# Patient Record
Sex: MALE | Race: ASIAN | Employment: OTHER | URBAN - METROPOLITAN AREA
[De-identification: names, ages, dates, MRNs, and addresses within clinical notes are randomized per-mention and may not be internally consistent; named-entity substitution may affect disease eponyms.]

---

## 2017-11-03 ENCOUNTER — OFFICE VISIT (OUTPATIENT)
Dept: FAMILY MEDICINE CLINIC | Age: 62
End: 2017-11-03

## 2017-11-03 VITALS
WEIGHT: 153 LBS | RESPIRATION RATE: 18 BRPM | HEART RATE: 85 BPM | DIASTOLIC BLOOD PRESSURE: 92 MMHG | SYSTOLIC BLOOD PRESSURE: 141 MMHG | TEMPERATURE: 98.5 F | BODY MASS INDEX: 22.66 KG/M2 | OXYGEN SATURATION: 100 % | HEIGHT: 69 IN

## 2017-11-03 DIAGNOSIS — Z11.59 ENCOUNTER FOR HEPATITIS C SCREENING TEST FOR LOW RISK PATIENT: ICD-10-CM

## 2017-11-03 DIAGNOSIS — R26.89 SHUFFLING GAIT: ICD-10-CM

## 2017-11-03 DIAGNOSIS — Z13.220 SCREENING FOR LIPID DISORDERS: ICD-10-CM

## 2017-11-03 DIAGNOSIS — R41.0 CONFUSION: Primary | ICD-10-CM

## 2017-11-03 DIAGNOSIS — K62.5 RECTAL BLEEDING: ICD-10-CM

## 2017-11-03 LAB — HGB BLD-MCNC: 16.3 G/DL

## 2017-11-03 NOTE — MR AVS SNAPSHOT
Visit Information Date & Time Provider Department Dept. Phone Encounter #  
 11/3/2017  9:50 AM Shailesh Felipe MD [address] [place] [address] [phone] [id number] Upcoming Health Maintenance Date Due Hepatitis C Screening 1955 DTaP/Tdap/Td series (1 - Tdap) 5/10/1974 FOBT Q 1 YEAR AGE 50-75 5/10/2003 ZOSTER VACCINE AGE 60> 3/10/2013 INFLUENZA AGE 9 TO ADULT 8/1/2017 Allergies as of 11/3/2017  Review Complete On: 11/3/2017 By: Edilson Kelley LPN Severity Noted Reaction Type Reactions Sulfa (Sulfonamide Antibiotics)  01/20/2013    Rash Current Immunizations  Never Reviewed No immunizations on file. Not reviewed this visit You Were Diagnosed With   
  
 Codes Comments Confusion    -  Primary ICD-10-CM: R41.0 ICD-9-CM: 298.9 Rectal bleeding     ICD-10-CM: K62.5 ICD-9-CM: 569.3 Encounter for hepatitis C screening test for low risk patient     ICD-10-CM: Z11.59 
ICD-9-CM: V73.89 Screening for lipid disorders     ICD-10-CM: Z13.220 ICD-9-CM: V77.91 Vitals BP Pulse Temp Resp Height(growth percentile) Weight(growth percentile) (!) 141/92 (BP 1 Location: Left arm, BP Patient Position: Sitting) 85 98.5 °F (36.9 °C) (Oral) 18 5' 9\" (1.753 m) 153 lb (69.4 kg) SpO2 BMI Smoking Status 100% 22.59 kg/m2 Never Smoker Vitals History BMI and BSA Data Body Mass Index Body Surface Area  
 22.59 kg/m 2 1.84 m 2 Preferred Pharmacy Pharmacy Name Phone [facility] [phone] Your Updated Medication List  
  
   
This list is accurate as of: 11/3/17 10:23 AM.  Always use your most recent med list.  
  
  
  
  
 MOTRIN PO Take  by mouth. TYLENOL PO Take  by mouth. We Performed the Following AMB POC FECAL BLOOD, OCCULT, QL 3 CARDS [11023 CPT(R)] AMB POC HEMOGLOBIN (HGB) [45741 CPT(R)] CBC W/O DIFF [93853 CPT(R)] FOLATE [id number] CPT(R)] HEPATITIS C AB [22243 CPT(R)] LIPID PANEL [48991 CPT(R)] METABOLIC PANEL, COMPREHENSIVE [79656 CPT(R)] REFERRAL TO NEUROLOGY [ALY32 Custom] Comments:  
 Please evaluate for new onset confusion, issues with driving/coordination. Also with restlessness. REFERRAL TO NEUROPSYCHOLOGY [QTD45 Custom] Comments:  
 Please evaluate for recent onset confusion, fatigue. TSH RFX ON ABNORMAL TO FREE T4 [[id number] Custom] VITAMIN B12 [id number] CPT(R)] Referral Information Referral ID Referred By Referred To  
  
 [id number] VERONICA CORONA MD [place] [address] [place], [address] [address] Phone: [phone] Fax: [phone] Visits Status Start Date End Date 1 New Request 11/3/17 11/3/18 If your referral has a status of pending review or denied, additional information will be sent to support the outcome of this decision. Referral ID Referred By Referred To  
 [id number] [facility] [address] [place], [address] [address] Phone: [phone] Fax: [phone] Visits Status Start Date End Date 1 New Request 11/3/17 11/3/18 If your referral has a status of pending review or denied, additional information will be sent to support the outcome of this decision. Patient Instructions [facility]  
Emma Ocasio [address] [facility] [address] [place], [address] [address] Phone: [phone] Fax: [phone] Candice Kay PsyD, EdS, P - [facility] [address] [place], [address] [address] Phone: [phone] Fax: [phone] Once you have appointments, call Hannah at [phone] to complete referral. 
 
 
 
  
Learning About Positive Thinking What is positive thinking? Positive thinking, or healthy thinking, is a way to help you stay well or cope with a health problem by changing how you think. It's based on research that shows that you can change how you think. And how you think affects how you feel. Cognitive-behavioral therapy, also called CBT, is a therapy that is often used to help people think in a healthy way. It focuses on thought (cognitive) and action (behavioral). How can positive thinking help you? If you think in a positive way, you may be more able to care for yourself and handle life's challenges. You will feel better. And you may be more able to avoid or cope with stress, anxiety, and depression. CBT may be able to help you sleep better and lose weight. How can you get started with positive thinking? CBT involves techniques that you can practice every day so that healthy thinking comes naturally. Here are the steps for one technique. 1. Stop. When you notice a negative thought, stop it in its tracks and write it down. 2. Ask. Look at that thought and ask yourself whether it is helpful or unhelpful right now. 3. Choose. Choose a new, helpful thought to replace a negative one. Here's an example of how this might work: 
· In a job review, your boss praised several things about your work. But you're feeling down because she had one small criticism. You might even think, \"I'm no good at my job\" or \"She doesn't like me. I must be bad. \" These are negative thoughts. You want to stop them. · Ask yourself questions about the situation and your negative thoughts. You might ask, \"What did my boss say exactly? \" \"Were there positive comments? \" \"Why do I focus only on one criticism? \" Your answers can help you find more accurate and helpful statements. · Now choose a helpful thought to replace the negative thoughts. For example, you might think, \"I've done a lot of good work this year, and my boss noticed it. She thought there was one area I can improve.  So I'll think of some things I can do to get stronger in that area. \" With time and practice, you can learn to see that the harsh things you say to yourself may keep you from enjoying your life and work. You can replace them with more helpful thoughts. Where can you learn more? Go to http://elliot-casper.info/. Enter X812 in the search box to learn more about \"Learning About Positive Thinking. \" Current as of: May 12, 2017 Content Version: 11.4 © 0045-0084 [employer]. Care instructions adapted under license by [employer] (which disclaims liability or warranty for this information). If you have questions about a medical condition or this instruction, always ask your healthcare professional. [address] any warranty or liability for your use of this information. Introducing [facility]! Nayely Ernandez introduces [employer] patient portal. Now you can access parts of your medical record, email your doctor's office, and request medication refills online. 1. In your internet browser, go to [email or web address] [employer] 2. Click on the First Time User? Click Here link in the Sign In box. You will see the New Member Sign Up page. 3. Enter your [employer] Access Code exactly as it appears below. You will not need to use this code after youve completed the sign-up process. If you do not sign up before the expiration date, you must request a new code. · [employer] Access Code: [email or web address] Expires: 2/1/2018  9:36 AM 
 
4. Enter the last four digits of your Social Security Number (xxxx) and Date of Birth (mm/dd/yyyy) as indicated and click Submit. You will be taken to the next sign-up page. 5. Create a [employer] ID. This will be your [employer] login ID and cannot be changed, so think of one that is secure and easy to remember. 6. Create a [employer] password. You can change your password at any time. 7. Enter your Password Reset Question and Answer. This can be used at a later time if you forget your password. 8. Enter your e-mail address. You will receive e-mail notification when new information is available in [address] 9. Click Sign Up. You can now view and download portions of your medical record. 10. Click the Download Summary menu link to download a portable copy of your medical information. If you have questions, please visit the Frequently Asked Questions section of the [employer] website. Remember, [employer] is NOT to be used for urgent needs. For medical emergencies, dial 911. Now available from your iPhone and Android! Please provide this summary of care documentation to your next provider. If you have any questions after today's visit, please call [phone].

## 2017-11-03 NOTE — PATIENT INSTRUCTIONS
[facility]   Raya Hung [address]  [address] [facility]  Phone: [phone]  Fax: [phone]    Jenny Ascencio PsyD, [address], LCP - Neuropsychology  [place] [address]  [address] [place] [address]  Phone: [phone]  Fax: [phone]    Once you have appointments, call Adelfo Cohen at [phone] to complete referral.           Learning About Positive Thinking  What is positive thinking? Positive thinking, or healthy thinking, is a way to help you stay well or cope with a health problem by changing how you think. It's based on research that shows that you can change how you think. And how you think affects how you feel. Cognitive-behavioral therapy, also called CBT, is a therapy that is often used to help people think in a healthy way. It focuses on thought (cognitive) and action (behavioral). How can positive thinking help you? If you think in a positive way, you may be more able to care for yourself and handle life's challenges. You will feel better. And you may be more able to avoid or cope with stress, anxiety, and depression. CBT may be able to help you sleep better and lose weight. How can you get started with positive thinking? CBT involves techniques that you can practice every day so that healthy thinking comes naturally. Here are the steps for one technique. 1. Stop. When you notice a negative thought, stop it in its tracks and write it down. 2. Ask. Look at that thought and ask yourself whether it is helpful or unhelpful right now. 3. Choose. Choose a new, helpful thought to replace a negative one. Here's an example of how this might work:  · In a job review, your boss praised several things about your work. But you're feeling down because she had one small criticism. You might even think, \"I'm no good at my job\" or \"She doesn't like me. I must be bad. \" These are negative thoughts. You want to stop them.   · Ask yourself questions about the situation and your negative thoughts. You might ask, \"What did my boss say exactly? \" \"Were there positive comments? \" \"Why do I focus only on one criticism? \" Your answers can help you find more accurate and helpful statements. · Now choose a helpful thought to replace the negative thoughts. For example, you might think, \"I've done a lot of good work this year, and my boss noticed it. She thought there was one area I can improve. So I'll think of some things I can do to get stronger in that area. \"  With time and practice, you can learn to see that the harsh things you say to yourself may keep you from enjoying your life and work. You can replace them with more helpful thoughts. Where can you learn more? Go to http://elliot-casper.info/. Enter E346 in the search box to learn more about \"Learning About Positive Thinking. \"  Current as of: May 12, 2017  Content Version: 11.4  © 1081-3732 [employer]. Care instructions adapted under license by [employer] (which disclaims liability or warranty for this information). If you have questions about a medical condition or this instruction, always ask your healthcare professional. [address] any warranty or liability for your use of this information.

## 2017-11-03 NOTE — PROGRESS NOTES
[address] [place] [address] with [address] [place] [address]     Chief Complaint: \"Anal bleeding and fatigue. \"    Virginie Maya is an 57 y.o. male with no significant medical history who presents for increased fatigue, concentration issues, speech slowing, and rectal bleeding. He presents today with his wife. He has not been seen in our office for over 3 years. He and his wife note that he has not been himself over the past year or so. His speech is markedly slowed. He has forgetfulness. Sleeping very often. Increased weakness. Wife mentions that he has difficulty driving--often running off the road or driving into oncoming traffic. Also notes that he has suffered from constipation over the past few months. Able to use stool softeners, but they note that there is occasional blood in the stool as well. Has not had a bloody bowel movement in over 1 week. Allergies - reviewed: Allergies   Allergen Reactions    Sulfa (Sulfonamide Antibiotics) Rash       Medications - reviewed:   Current Outpatient Prescriptions   Medication Sig    ACETAMINOPHEN (TYLENOL PO) Take  by mouth.  IBUPROFEN (MOTRIN PO) Take  by mouth. No current facility-administered medications for this visit. I have reviewed and updated the histories as listed below:    Past Medical History - reviewed:  History reviewed. No pertinent past medical history. Past Surgical History - reviewed:   History reviewed. No pertinent surgical history. Social History - reviewed:  Social History     Social History    Marital status:      Spouse name: N/A    Number of children: N/A    Years of education: N/A     Occupational History    Not on file.      Social History Main Topics    Smoking status: Never Smoker    Smokeless tobacco: Never Used    Alcohol use No    Drug use: No    Sexual activity: Not on file     Other Topics Concern    Not on file     Social History Narrative Family History - reviewed:  History reviewed. No pertinent family history. Immunizations - reviewed: There is no immunization history on file for this patient. Flu: has not received flu vaccination this season. Review of Systems  Review of Systems   Constitutional: Negative for activity change, chills and fever. HENT: Negative for congestion. Respiratory: Negative for shortness of breath. Cardiovascular: Negative for chest pain. Gastrointestinal: Positive for blood in stool (not currenlty.) and constipation (not currently. ). Negative for diarrhea, nausea, rectal pain and vomiting. Musculoskeletal: Negative for arthralgias and myalgias. Neurological: Positive for weakness. Negative for dizziness, seizures, syncope, light-headedness, numbness and headaches. Psychiatric/Behavioral: Positive for behavioral problems, confusion, decreased concentration and sleep disturbance (sleeping more. ). All other systems reviewed and are negative. Physical Exam    Visit Vitals    BP (!) 141/92 (BP 1 Location: Left arm, BP Patient Position: Sitting)    Pulse 85    Temp 98.5 °F (36.9 °C) (Oral)    Resp 18    Ht 5' 9\" (1.753 m)    Wt 153 lb (69.4 kg)    SpO2 100%    BMI 22.59 kg/m2       Physical Exam   Constitutional: He is oriented to person, place, and time. He appears well-developed and well-nourished. No distress. Blunted affect. Very slow speech. Slowly responding. HENT:   Head: Normocephalic. Eyes: Pupils are equal, round, and reactive to light. Neck: Normal range of motion. No thyromegaly present. Cardiovascular: Normal rate, regular rhythm, normal heart sounds and intact distal pulses. Exam reveals no gallop and no friction rub. No murmur heard. Pulmonary/Chest: Effort normal and breath sounds normal. No respiratory distress. He has no wheezes. He has no rales. He exhibits no tenderness. Abdominal: Soft.  Bowel sounds are normal. He exhibits no distension and no mass. There is no tenderness. There is no rebound and no guarding. Genitourinary: Rectum normal and prostate normal. Rectal exam shows no external hemorrhoid and no internal hemorrhoid. Musculoskeletal: Normal range of motion. Shuffling gait as patient walking out of exam room. Not swaying arms. Lymphadenopathy:     He has no cervical adenopathy. Neurological: He is alert and oriented to person, place, and time. Skin: Skin is warm and dry. Psychiatric: His behavior is normal. Judgment and thought content normal. His affect is blunt. His speech is delayed. Cognition and memory are normal.   Nursing note and vitals reviewed. MMSE:  26/30. Normal clock draw. Recent Results (from the past 12 hour(s))   AMB POC HEMOGLOBIN (HGB)    Collection Time: 11/03/17  9:45 AM   Result Value Ref Range    Hemoglobin (POC) 16.3      FOBT:  Negative. Assessment    ICD-10-CM ICD-9-CM    1. Confusion R41.0 298.9 CBC W/O DIFF      METABOLIC PANEL, COMPREHENSIVE      TSH RFX ON ABNORMAL TO FREE T4      FOLATE      VITAMIN B12      REFERRAL TO NEUROLOGY      REFERRAL TO NEUROPSYCHOLOGY      IL PT-FOCUSED HLTH RISK ASSMT SCORE DOC STND INSTRM      BEHAV ASSMT W/SCORE & DOCD/STAND INSTRUMENT   2. Rectal bleeding K62.5 569.3 AMB POC HEMOGLOBIN (HGB)      AMB POC FECAL BLOOD, OCCULT, QL 3 CARDS   3. Encounter for hepatitis C screening test for low risk patient Z11.59 V73.89 HEPATITIS C AB   4. Screening for lipid disorders Z13.220 V77.91 LIPID PANEL   5. Shuffling gait R26.89 781.2      Plan  1. Rectal bleeding - Hgb today wnl. FOBT negative. - AMB POC HEMOGLOBIN (HGB)  - AMB POC FECAL OCCULT BLOOD QL-3 CARDS    2. Confusion - not sure what to make of this at this point. Patient with concerning findings on exam (affect, shuffling gait), but MMSE and clock draw wnl. Wonder if this is early Parkinson's. Will draw labs today. Will also make referral to neurology and neuropsychiatary.   Advised patient and his wife that he should NOT be driving until we are able to get to the bottom of what is going on. I gave instructions on how to make appointments at referred doctors. - CBC W/O DIFF  - METABOLIC PANEL, COMPREHENSIVE  - TSH RFX ON ABNORMAL TO FREE T4  - FOLATE  - VITAMIN B12  - REFERRAL TO NEUROLOGY  - REFERRAL TO NEUROPSYCHOLOGY    3. Encounter for hepatitis C screening test for low risk patient - due for this today, will obtain.  - HEPATITIS C AB    4. Screening for lipid disorders - due for this today, will obtain.  - LIPID PANEL    Follow-up Disposition:  Return in about 2 weeks (around 11/17/2017) for Catch up on HM and follow up on confusion. .    I have discussed the diagnosis with the patient and the intended plan as seen in the above orders. Patient verbalized understanding of the plan and agrees with the plan. The patient has received an after-visit summary and questions were answered concerning future plans. I have discussed medication side effects and warnings with the patient as well. Informed patient to return to the office if new symptoms arise. Patient was discussed with Dr. Amina Benitez.     Erendira Pfeiffer MD  Family Medicine Resident

## 2017-11-04 LAB
ALBUMIN SERPL-MCNC: 4.5 G/DL (ref 3.6–4.8)
ALBUMIN/GLOB SERPL: 1.5 {RATIO} (ref 1.2–2.2)
ALP SERPL-CCNC: 55 IU/L (ref 39–117)
ALT SERPL-CCNC: 12 IU/L (ref 0–44)
AST SERPL-CCNC: 19 IU/L (ref 0–40)
BILIRUB SERPL-MCNC: 0.5 MG/DL (ref 0–1.2)
BUN SERPL-MCNC: 14 MG/DL (ref 8–27)
BUN/CREAT SERPL: 15 (ref 10–24)
CALCIUM SERPL-MCNC: 9.4 MG/DL (ref 8.6–10.2)
CHLORIDE SERPL-SCNC: 101 MMOL/L (ref 96–106)
CHOLEST SERPL-MCNC: 199 MG/DL (ref 100–199)
CO2 SERPL-SCNC: 21 MMOL/L (ref 18–29)
CREAT SERPL-MCNC: 0.94 MG/DL (ref 0.76–1.27)
ERYTHROCYTE [DISTWIDTH] IN BLOOD BY AUTOMATED COUNT: 13 % (ref 12.3–15.4)
FOLATE SERPL-MCNC: 7.1 NG/ML
GFR SERPLBLD CREATININE-BSD FMLA CKD-EPI: 85 ML/MIN/1.73
GFR SERPLBLD CREATININE-BSD FMLA CKD-EPI: 99 ML/MIN/1.73
GLOBULIN SER CALC-MCNC: 3 G/DL (ref 1.5–4.5)
GLUCOSE SERPL-MCNC: 93 MG/DL (ref 65–99)
HCT VFR BLD AUTO: 45.6 % (ref 37.5–51)
HCV AB S/CO SERPL IA: <0.1 S/CO RATIO (ref 0–0.9)
HDLC SERPL-MCNC: 45 MG/DL
HGB BLD-MCNC: 15.3 G/DL (ref 12.6–17.7)
INTERPRETATION, 910389: NORMAL
LDLC SERPL CALC-MCNC: 113 MG/DL (ref 0–99)
MCH RBC QN AUTO: 28.9 PG (ref 26.6–33)
MCHC RBC AUTO-ENTMCNC: 33.6 G/DL (ref 31.5–35.7)
MCV RBC AUTO: 86 FL (ref 79–97)
PLATELET # BLD AUTO: 210 X10E3/UL (ref 150–379)
POTASSIUM SERPL-SCNC: 4.2 MMOL/L (ref 3.5–5.2)
PROT SERPL-MCNC: 7.5 G/DL (ref 6–8.5)
RBC # BLD AUTO: 5.29 X10E6/UL (ref 4.14–5.8)
SODIUM SERPL-SCNC: 142 MMOL/L (ref 134–144)
TRIGL SERPL-MCNC: 205 MG/DL (ref 0–149)
TSH SERPL DL<=0.005 MIU/L-ACNC: 1.98 UIU/ML (ref 0.45–4.5)
VIT B12 SERPL-MCNC: 242 PG/ML (ref 211–946)
VLDLC SERPL CALC-MCNC: 41 MG/DL (ref 5–40)
WBC # BLD AUTO: 5 X10E3/UL (ref 3.4–10.8)

## 2017-11-06 NOTE — PROGRESS NOTES
Results reviewed. Labs mostly wnl--no explanation for presenting symptoms during office visit. Will discuss results with patient at 11/21 appointment. Lipids are outside normal ranges as patient not fasting at lab draw.

## 2017-11-21 ENCOUNTER — OFFICE VISIT (OUTPATIENT)
Dept: FAMILY MEDICINE CLINIC | Age: 62
End: 2017-11-21

## 2017-11-21 VITALS
HEIGHT: 69 IN | BODY MASS INDEX: 22.66 KG/M2 | SYSTOLIC BLOOD PRESSURE: 131 MMHG | TEMPERATURE: 98.4 F | OXYGEN SATURATION: 99 % | WEIGHT: 153 LBS | HEART RATE: 82 BPM | RESPIRATION RATE: 18 BRPM | DIASTOLIC BLOOD PRESSURE: 90 MMHG

## 2017-11-21 DIAGNOSIS — R41.0 CONFUSION: Primary | ICD-10-CM

## 2017-11-21 DIAGNOSIS — R26.89 SHUFFLING GAIT: ICD-10-CM

## 2017-11-21 DIAGNOSIS — M21.611 BUNION OF GREAT TOE OF RIGHT FOOT: ICD-10-CM

## 2017-11-21 NOTE — MR AVS SNAPSHOT
Visit Information Date & Time Provider Department Dept. Phone Encounter #  
 11/21/2017  2:10 PM Laura Kolb MD [address] [place] [address] [phone] [id number] Upcoming Health Maintenance Date Due DTaP/Tdap/Td series (1 - Tdap) 5/10/1974 FOBT Q 1 YEAR AGE 50-75 5/10/2003 ZOSTER VACCINE AGE 60> 3/10/2013 Influenza Age 5 to Adult 8/1/2017 Allergies as of 11/21/2017  Review Complete On: 11/3/2017 By: Laura Kolb MD  
  
 Severity Noted Reaction Type Reactions Sulfa (Sulfonamide Antibiotics)  01/20/2013    Rash Current Immunizations  Never Reviewed No immunizations on file. Not reviewed this visit You Were Diagnosed With   
  
 Codes Comments Confusion    -  Primary ICD-10-CM: R41.0 ICD-9-CM: 298.9 Shuffling gait     ICD-10-CM: R26.89 
ICD-9-CM: 781.2 Bunion of great toe of right foot     ICD-10-CM: M21.611 ICD-9-CM: 727.1 Vitals BP Pulse Temp Resp Height(growth percentile) Weight(growth percentile) 131/90 82 98.4 °F (36.9 °C) 18 5' 9\" (1.753 m) 153 lb (69.4 kg) SpO2 BMI Smoking Status 99% 22.59 kg/m2 Never Smoker Vitals History BMI and BSA Data Body Mass Index Body Surface Area  
 22.59 kg/m 2 1.84 m 2 Preferred Pharmacy Pharmacy Name Phone Joseline Maurice [address] [facility] [phone] Your Updated Medication List  
  
   
This list is accurate as of: 11/21/17  2:19 PM.  Always use your most recent med list.  
  
  
  
  
 MOTRIN PO Take  by mouth. TYLENOL PO Take  by mouth. Patient Instructions [facility]  
Emma Mckee [address] [facility] [address] [place], [address] [facility] Phone: [phone] Fax: [phone] Rose Lopez PsyD, EdS, LCP - Neuropsychology [place] [address] [place], [address] [facility] Phone: [phone] Fax: [phone] Bunions: Care Instructions Your Care Instructions A bunion is a bump on the outside of the joint at the bottom of your big toe. It can cause pain and swelling in the toe. A bunion forms when bone or tissue around the joint becomes swollen from too much pressure. You also can have a bunionette, or tailor's bunion, which forms on the joint of the little toe. Sometimes, a bunion on the big toe turns the toe in toward the second toe. This is called displacement. It can lead to problems with the other toes. You can get a bunion from having an unusual walking style, having flatfeet, or wearing tight-fitting shoes. You can treat most bunions at home with a few simple steps. If you have a lot of pain, your doctor may inject medicine into the bunion to reduce swelling for a while. If you still have pain, you may need to have surgery. Follow-up care is a key part of your treatment and safety. Be sure to make and go to all appointments, and call your doctor if you are having problems. It's also a good idea to know your test results and keep a list of the medicines you take. How can you care for yourself at home? · Ask your doctor if you can take an over-the-counter pain medicine, such as acetaminophen (Tylenol), ibuprofen (Advil, Motrin), or naproxen (Aleve). Be safe with medicines. Read and follow all instructions on the label. · Wear shoes that have a wide and deep space for the toes. Also, wear shoes that have low or flat heels and good arch supports. Do not wear tight, narrow, or high-heeled shoes. · Try bunion pads, arch supports, toe spacers, or shoe inserts. They can help shift your weight when you walk to take pressure off your big toe. · Put moleskin or another type of cushion on or around the bunion to keep it from rubbing against your shoe. · Put ice or a cold pack on the area for 10 to 20 minutes at a time as needed. Put a thin cloth between the ice and your skin. · Prop up your foot on a pillow when you ice your toe or anytime you sit or lie down. Try to keep it above the level of your heart. This will help reduce swelling. When should you call for help? Call your doctor now or seek immediate medical care if: 
? · You have severe pain. ? · Your toe is cool or pale or changes color. ? · You have tingling, weakness, or numbness in the toe. ? Watch closely for changes in your health, and be sure to contact your doctor if: 
? · Pain and swelling get worse. ? · You do not get better as expected. Where can you learn more? Go to http://elliot-casper.info/. Enter H210 in the search box to learn more about \"Bunions: Care Instructions. \" Current as of: March 21, 2017 Content Version: 11.4 © 9991-3421 [employer]. Care instructions adapted under license by [employer] (which disclaims liability or warranty for this information). If you have questions about a medical condition or this instruction, always ask your healthcare professional. [address] any warranty or liability for your use of this information. Introducing [facility] [place] [facility]! [facility] introduces [facility] patient portal. Now you can access parts of your medical record, email your doctor's office, and request medication refills online. 1. In your internet browser, go to [email or web address] 2. Click on the First Time User? Click Here link in the Sign In box. You will see the New Member Sign Up page. 3. Enter your [facility] Access Code exactly as it appears below. You will not need to use this code after youve completed the sign-up process. If you do not sign up before the expiration date, you must request a new code. · [employer] [facility] Access Code: [email or web address] Expires: 2/1/2018  8:36 AM 
 
4.  Enter the last four digits of your Social Security Number (xxxx) and Date of Birth (mm/dd/yyyy) as indicated and click Submit. You will be taken to the next sign-up page. 5. Create a Erenis ID. This will be your Erenis login ID and cannot be changed, so think of one that is secure and easy to remember. 6. Create a Erenis password. You can change your password at any time. 7. Enter your Password Reset Question and Answer. This can be used at a later time if you forget your password. 8. Enter your e-mail address. You will receive e-mail notification when new information is available in [address] 9. Click Sign Up. You can now view and download portions of your medical record. 10. Click the Download Summary menu link to download a portable copy of your medical information. If you have questions, please visit the Frequently Asked Questions section of the Erenis website. Remember, E[employer]is is NOT to be used for urgent needs. For medical emergencies, dial 911. Now available from your iPhone and Android! Please provide this summary of care documentation to your next provider. If you have any questions after today's visit, please call [phone].

## 2017-11-21 NOTE — PATIENT INSTRUCTIONS
[facility]   Raya Hung 51  [address] [facility]  Phone: [phone]  Fax: [phone]    Dex Mcconnell PsyD, EdS, P - Neuropsychology  [facility] [address]  [address] [facility] [address]  Phone: [phone]  Fax: [phone]         Bunions: Care Instructions  Your Care Instructions    A bunion is a bump on the outside of the joint at the bottom of your big toe. It can cause pain and swelling in the toe. A bunion forms when bone or tissue around the joint becomes swollen from too much pressure. You also can have a bunionette, or tailor's bunion, which forms on the joint of the little toe. Sometimes, a bunion on the big toe turns the toe in toward the second toe. This is called displacement. It can lead to problems with the other toes. You can get a bunion from having an unusual walking style, having flatfeet, or wearing tight-fitting shoes. You can treat most bunions at home with a few simple steps. If you have a lot of pain, your doctor may inject medicine into the bunion to reduce swelling for a while. If you still have pain, you may need to have surgery. Follow-up care is a key part of your treatment and safety. Be sure to make and go to all appointments, and call your doctor if you are having problems. It's also a good idea to know your test results and keep a list of the medicines you take. How can you care for yourself at home? · Ask your doctor if you can take an over-the-counter pain medicine, such as acetaminophen (Tylenol), ibuprofen (Advil, Motrin), or naproxen (Aleve). Be safe with medicines. Read and follow all instructions on the label. · Wear shoes that have a wide and deep space for the toes. Also, wear shoes that have low or flat heels and good arch supports. Do not wear tight, narrow, or high-heeled shoes. · Try bunion pads, arch supports, toe spacers, or shoe inserts.  They can help shift your weight when you walk to take pressure off your big toe. · Put moleskin or another type of cushion on or around the bunion to keep it from rubbing against your shoe. · Put ice or a cold pack on the area for 10 to 20 minutes at a time as needed. Put a thin cloth between the ice and your skin. · Prop up your foot on a pillow when you ice your toe or anytime you sit or lie down. Try to keep it above the level of your heart. This will help reduce swelling. When should you call for help? Call your doctor now or seek immediate medical care if:  ? · You have severe pain. ? · Your toe is cool or pale or changes color. ? · You have tingling, weakness, or numbness in the toe. ? Watch closely for changes in your health, and be sure to contact your doctor if:  ? · Pain and swelling get worse. ? · You do not get better as expected. Where can you learn more? Go to http://elliot-casper.info/. Enter H210 in the search box to learn more about \"Bunions: Care Instructions. \"  Current as of: March 21, 2017  Content Version: 11.4  © 6498-4285 [employer]. Care instructions adapted under license by [employer] (which disclaims liability or warranty for this information). If you have questions about a medical condition or this instruction, always ask your healthcare professional. [address] any warranty or liability for your use of this information.

## 2017-11-22 NOTE — PROGRESS NOTES
[address] [facility] with [address] [facility]     Chief Complaint: \"altered mental status, would like to discuss results. \"    Amber Ulloa is an 57 y.o. male with no significant medical history who presents for a follow up visit. I saw this patient in 11/3 for extreme fatigue, concentration issues, and speech slowing. He presents again today with his wife for a follow up visit. She notes that there has been no improvement him Blanka's thinking and fatigue since last visit. Still moving around very slowly. Due to insurance issues, they have been unable to get in with neurology and neuropsychology. They are waiting on Care Card approval.  She is interested in lab work up that was done at last visit. Mr. Sybil Pérez notes no pain today. No acute symptoms. Still sleeping often and with forgetfulness. Does have some increased weakness. Still driving (though I made recommendation not to at last visit), but wife is always present in the car with him when driving. He is also complaining of a foot deformity that he has dealt with for several years. Notes that he wears supportive shoes, does not change them often though. Wife notes that he insists on wearing the same worn out shoes every day (though he states that they are comfortable). Notes no foot pain. Concerned that his great toe wraps around his smaller toes and there is a hard bunion on the medial edge of his right foot. The same is present on his left foot, though not as severe. The patient also notes that rectal bleeding discussed at last visit has completely resolved. Allergies - reviewed: Allergies   Allergen Reactions    Sulfa (Sulfonamide Antibiotics) Rash       Medications - reviewed:   Current Outpatient Prescriptions   Medication Sig    ACETAMINOPHEN (TYLENOL PO) Take  by mouth.  IBUPROFEN (MOTRIN PO) Take  by mouth. No current facility-administered medications for this visit. I have reviewed and updated the histories as listed below:    Past Medical History - reviewed:  History reviewed. No pertinent past medical history. Past Surgical History - reviewed:   History reviewed. No pertinent surgical history. Social History - reviewed:  Social History     Social History    Marital status:      Spouse name: N/A    Number of children: N/A    Years of education: N/A     Occupational History    Not on file. Social History Main Topics    Smoking status: Never Smoker    Smokeless tobacco: Never Used    Alcohol use No    Drug use: No    Sexual activity: Not on file     Other Topics Concern    Not on file     Social History Narrative         Family History - reviewed:  History reviewed. No pertinent family history. Immunizations - reviewed: There is no immunization history on file for this patient. Flu: declines flu shot again today. Review of Systems  Review of Systems   Constitutional: Negative for chills and fever. Respiratory: Negative for shortness of breath. Cardiovascular: Negative for chest pain. Neurological: Negative for light-headedness and headaches. Psychiatric/Behavioral: Positive for confusion (not confused currently.) and sleep disturbance (sleeping too much.). Physical Exam    Visit Vitals    /90    Pulse 82    Temp 98.4 °F (36.9 °C)    Resp 18    Ht 5' 9\" (1.753 m)    Wt 153 lb (69.4 kg)    SpO2 99%    BMI 22.59 kg/m2       Physical Exam   Constitutional: He is oriented to person, place, and time. He appears well-developed and well-nourished. Blunted affect. Shuffling while walking. HENT:   Head: Normocephalic. Mouth/Throat: No oropharyngeal exudate. Eyes: Pupils are equal, round, and reactive to light. Neck: Normal range of motion. Neck supple. No thyromegaly present. Cardiovascular: Normal rate, regular rhythm, normal heart sounds and intact distal pulses.   Exam reveals no gallop and no friction rub. No murmur heard. Pulmonary/Chest: Effort normal and breath sounds normal. No respiratory distress. He has no wheezes. He has no rales. He exhibits no tenderness. Lymphadenopathy:     He has no cervical adenopathy. Neurological: He is alert and oriented to person, place, and time. He has normal strength. No cranial nerve deficit or sensory deficit. He displays a negative Romberg sign. Gait (shuffling.) abnormal. Coordination normal.   Psychiatric:   Blunted affect. Pleasant in conversation. Answering questions appropriately, but responses are very slow. Vitals reviewed. Assessment    ICD-10-CM ICD-9-CM    1. Confusion R41.0 298.9    2. Shuffling gait R26.89 781.2    3. Bunion of great toe of right foot M21.611 727.1      Plan    1. Confusion - needs evaluation by neurology and neuropsychology. Referrals were placed at last visit, but patient has not yet gotten an appointment. I discussed with referrals coordinator about this. [facility]. Appointment was made with neurology for 11/27, but not neuropsychology (he can make that appointment when he is at the office). The patient was in agreement with this. 2. Shuffling gait - see #1. Has appointment scheduled with neurology. 3. Bunion of great toe of right foot - most likely what is present on his right foot. Instructed to wear more supportive footwear and change shoes every ~6 months. Wearing slide sandals today. If able to get insurance, can make referral to podiatry down the line. Follow-up Disposition:  Return in about 1 month (around 12/21/2017), or after follow up with neurology. .    I have discussed the diagnosis with the patient and the intended plan as seen in the above orders. Patient verbalized understanding of the plan and agrees with the plan.  The patient has received an after-visit summary and questions were answered concerning future plans. I have discussed medication side effects and warnings with the patient as well. Informed patient to return to the office if new symptoms arise. Patient was discussed with Dr. Maura Phan.     Pavan Pollock MD  Family Medicine Resident

## 2017-11-27 ENCOUNTER — OFFICE VISIT (OUTPATIENT)
Dept: NEUROLOGY | Age: 62
End: 2017-11-27

## 2017-11-27 VITALS
HEART RATE: 82 BPM | BODY MASS INDEX: 22.96 KG/M2 | OXYGEN SATURATION: 98 % | WEIGHT: 155 LBS | DIASTOLIC BLOOD PRESSURE: 80 MMHG | SYSTOLIC BLOOD PRESSURE: 126 MMHG | HEIGHT: 69 IN

## 2017-11-27 DIAGNOSIS — G25.81 RESTLESS LEG: ICD-10-CM

## 2017-11-27 DIAGNOSIS — G20 PARKINSONISM, UNSPECIFIED PARKINSONISM TYPE (HCC): Primary | ICD-10-CM

## 2017-11-27 DIAGNOSIS — F32.A DEPRESSION, UNSPECIFIED DEPRESSION TYPE: ICD-10-CM

## 2017-11-27 RX ORDER — CARBIDOPA AND LEVODOPA 25; 100 MG/1; MG/1
TABLET ORAL
Qty: 90 TAB | Refills: 1 | Status: SHIPPED | OUTPATIENT
Start: 2017-11-27 | End: 2018-01-23 | Stop reason: SDUPTHER

## 2017-11-27 NOTE — PATIENT INSTRUCTIONS
You have been diagnosed with Parkinson's disease     Common symptoms: hand tremor at rest, slow walking, rigidity of arms or legs, low voice, progressively smaller handwriting, difficulty swallowing, difficulty getting up from a seated position, balance problems and/ or falls, freezing episodes while walking, taking multiple steps to turn around, attention or concentration difficulty, difficulty with memory/ planning/ organization, vivid hallucinations or nightmares, fatigue, frequent limb movements in sleep, excessive daytime sleepiness, constipation    You are being started on Sinemet (levodopa-carbidopa, 25/ 100) for Parkinson's      If you develop side-effects during the upward titration phase, reduce the # of tablets to the lower tolerable dose    If you need to discontinue the medication, do it slowly by gradually decreasing the tablets over time. Do not abruptly discontinue the medication.     Common side effects of Sinemet: nausea, vomiting, sedation/ sleepiness, insomnia, dizziness, headaches, mood changes/ anxiety/ depression, swelling of feet/ lower legs, low blood pressure causing light-headedness/ feeling like passing out/ passing out, constipation, sleep disturbances, altered dreaming, dry mouth, fatigue    Potential severe side effects of Sinemet: sexual preoccupation, madisyn/ paranoia/ agitation/ psychosis, vivid hallucinations (usually animals, people), compulsive behaviors, unusual movements of the head/ neck/ face or limbs (dyskinesias, pronounced [id number])    * impulse control disorders (gambling or spending sprees)  - Family should keep a close eye on patient's finances    * accelerated growth of melanoma (a form of skin cancer) in patients with Parkinson's disease who are treated with anti-parkinson's medications     - Family/ spouse/ significant other should check your skin every couple months for any increasing moles or change in size/ shape/ color of skin moles      - You should report any increasing number of, or change in size/ shape/ color of skin moles to your PCP or Neurologist, and ask for a referral to a Dermatologist for evaluation of skin moles

## 2017-11-27 NOTE — MR AVS SNAPSHOT
Visit Information Date & Time Provider Department Dept. Phone Encounter #  
 11/27/2017  9:00 AM Bala Garcia MD [facility] [phone] [id number] Follow-up Instructions Return in about 5 weeks (around 1/1/2018). Upcoming Health Maintenance Date Due DTaP/Tdap/Td series (1 - Tdap) 5/10/1974 FOBT Q 1 YEAR AGE 50-75 5/10/2003 ZOSTER VACCINE AGE 60> 3/10/2013 Influenza Age 5 to Adult 8/1/2017 Allergies as of 11/27/2017  Review Complete On: 11/27/2017 By: Casey Diaz LPN Severity Noted Reaction Type Reactions Sulfa (Sulfonamide Antibiotics)  01/20/2013    Rash Current Immunizations  Never Reviewed No immunizations on file. Not reviewed this visit You Were Diagnosed With   
  
 Codes Comments Parkinsonism, unspecified [facility]ism type ([facility]    -  Primary ICD-10-CM: G20 
ICD-9-CM: 332.0 Vitals BP Pulse Height(growth percentile) Weight(growth percentile) SpO2 BMI  
 126/80 (BP 1 Location: Left arm, BP Patient Position: Sitting) 82 5' 9\" (1.753 m) 155 lb (70.3 kg) 98% 22.89 kg/m2 Smoking Status Never Smoker BMI and BSA Data Body Mass Index Body Surface Area  
 22.89 kg/m 2 1.85 m 2 Preferred Pharmacy Pharmacy Name Phone [facility] [phone] Your Updated Medication List  
  
   
This list is accurate as of: 11/27/17  9:55 AM.  Always use your most recent med list.  
  
  
  
  
 carbidopa-levodopa  mg per tablet Commonly known as:  SINEMET Week 1: HALF tab 8 AM, 12 PM, and 4 PM.  Week 2 and after: one tablet three times a day. For Parkinson's symptoms MOTRIN PO Take  by mouth. TYLENOL PO Take  by mouth. Prescriptions Sent to Pharmacy  Refills  
 carbidopa-levodopa (SINEMET)  mg per tablet 1  
 Sig: Week 1: HALF tab 8 AM, 12 PM, and 4 PM.  Week 2 and after: one tablet three times a day. For Parkinson's symptoms Class: Normal  
 Pharmacy: [facility] [address]  #: [phone] Follow-up Instructions Return in about 5 weeks (around 1/1/2018). To-Do List   
 11/27/2017 Imaging:  MRI BRAIN W WO CONT Patient Instructions You have been diagnosed with Parkinson's disease Common symptoms: hand tremor at rest, slow walking, rigidity of arms or legs, low voice, progressively smaller handwriting, difficulty swallowing, difficulty getting up from a seated position, balance problems and/ or falls, freezing episodes while walking, taking multiple steps to turn around, attention or concentration difficulty, difficulty with memory/ planning/ organization, vivid hallucinations or nightmares, fatigue, frequent limb movements in sleep, excessive daytime sleepiness, constipation You are being started on Sinemet (levodopa-carbidopa, 25/ 100) for Parkinson's If you develop side-effects during the upward titration phase, reduce the # of tablets to the lower tolerable dose If you need to discontinue the medication, do it slowly by gradually decreasing the tablets over time. Do not abruptly discontinue the medication. Common side effects of Sinemet: nausea, vomiting, sedation/ sleepiness, insomnia, dizziness, headaches, mood changes/ anxiety/ depression, swelling of feet/ lower legs, low blood pressure causing light-headedness/ feeling like passing out/ passing out, constipation, sleep disturbances, altered dreaming, dry mouth, fatigue Potential severe side effects of Sinemet: sexual preoccupation, madisyn/ paranoia/ agitation/ psychosis, vivid hallucinations (usually animals, people), compulsive behaviors, unusual movements of the head/ neck/ face or limbs (dyskinesias, pronounced [id number]) * impulse control disorders (gambling or spending sprees) - Family should keep a close eye on patient's finances * accelerated growth of melanoma (a form of skin cancer) in patients with Parkinson's disease who are treated with anti-parkinson's medications - Family/ spouse/ significant other should check your skin every couple months for any increasing moles or change in size/ shape/ color of skin moles - You should report any increasing number of, or change in size/ shape/ color of skin moles to your PCP or Neurologist, and ask for a referral to a Dermatologist for evaluation of skin moles Introducing [address]! [facility] introduces [employer] patient portal. Now you can access parts of your medical record, email your doctor's office, and request medication refills online. 1. In your internet browser, go to [email or web address] 2. Click on the First Time User? Click Here link in the Sign In box. You will see the New Member Sign Up page. 3. Enter your [email or web address] Access Code exactly as it appears below. You will not need to use this code after youve completed the sign-up process. If you do not sign up before the expiration date, you must request a new code. · [email or web address] Access Code: [email or web address] Expires: 2/1/2018  8:36 AM 
 
4. Enter the last four digits of your Social Security Number (xxxx) and Date of Birth (mm/dd/yyyy) as indicated and click Submit. You will be taken to the next sign-up page. 5. Create a makemojit ID. This will be your [email or web address] login ID and cannot be changed, so think of one that is secure and easy to remember. 6. Create a [email or web address] password. You can change your password at any time. 7. Enter your Password Reset Question and Answer. This can be used at a later time if you forget your password. 8. Enter your e-mail address. You will receive e-mail notification when new information is available in [address] 9. Click Sign Up. You can now view and download portions of your medical record. 10. Click the Download Summary menu link to download a portable copy of your medical information. If you have questions, please visit the Frequently Asked Questions section of the Daptiv website. Remember, Daptiv is NOT to be used for urgent needs. For medical emergencies, dial 911. Now available from your iPhone and Android! Please provide this summary of care documentation to your next provider. Your primary care clinician is listed as Bebeto Singh. If you have any questions after today's visit, please call [phone].
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
## 2017-12-04 ENCOUNTER — HOSPITAL ENCOUNTER (OUTPATIENT)
Dept: MRI IMAGING | Age: 62
Discharge: HOME | End: 2017-12-04
Attending: PSYCHIATRY & NEUROLOGY
Payer: SUBSIDIZED

## 2017-12-04 DIAGNOSIS — Z51.89 ENCOUNTER FOR OTHER SPECIFIED AFTERCARE: ICD-10-CM

## 2017-12-04 DIAGNOSIS — G20 PARKINSONISM, UNSPECIFIED PARKINSONISM TYPE (HCC): ICD-10-CM

## 2017-12-04 PROCEDURE — 74011250636 HC RX REV CODE- 250/636: Performed by: PSYCHIATRY & NEUROLOGY

## 2017-12-04 PROCEDURE — A9576 INJ PROHANCE MULTIPACK: HCPCS | Performed by: PSYCHIATRY & NEUROLOGY

## 2017-12-04 PROCEDURE — 70553 MRI BRAIN STEM W/O & W/DYE: CPT

## 2017-12-04 PROCEDURE — 70030 X-RAY EYE FOR FOREIGN BODY: CPT

## 2017-12-04 RX ADMIN — GADOTERIDOL 13 ML: 279.3 INJECTION, SOLUTION INTRAVENOUS at 11:45

## 2017-12-21 DIAGNOSIS — G20 PARKINSONISM, UNSPECIFIED PARKINSONISM TYPE (HCC): ICD-10-CM

## 2017-12-21 RX ORDER — CARBIDOPA AND LEVODOPA 25; 100 MG/1; MG/1
TABLET ORAL
Qty: 90 TAB | Refills: 1 | Status: CANCELLED | OUTPATIENT
Start: 2017-12-21

## 2017-12-21 NOTE — TELEPHONE ENCOUNTER
Jenny Sonw, wife, called to request a refill for the pt's \"Sinemet\". They will be traveling for vacation tomorrow, and he doesn't have enough pills to last. The Rx can be sent to the pt's [facility]. Also Randell Rankin would like to pt's EEG and MRI results before they fly out.     Allison Cardozo contact numbers are [phone] and [phone].

## 2017-12-21 NOTE — TELEPHONE ENCOUNTER
Contacted Mrs. Kayli Gibbs back. Informed her patient should have one refill left at pharmacy. Informed her Dr. Latha Peoples will discuss results at patients follow up visit. Mrs Kayli Gibbs voiced understanding and will call back if any further questions or concerns.

## 2018-01-08 ENCOUNTER — DOCUMENTATION ONLY (OUTPATIENT)
Dept: NEUROLOGY | Age: 63
End: 2018-01-08

## 2018-01-08 NOTE — PROCEDURES
Procedure:   Routine EEG     Location:   [address] [place] [address]  Date of Recordin17     Date of Interpretation:    18  Requesting provider:   Jayme Nguyễn MD    Interpreting physician: Jayme Nguyễn MD      Introduction: This is a 57 y.o. male with PMHx of Parkinson's who is undergoing this EEG due to complaints of associated confusion. Current medications: has a current medication list which includes the following prescription(s): carbidopa-levodopa, acetaminophen, and ibuprofen. Technical:   Digital EEG recorded in 10-20 international placement system, multiple montages    Interpretation:   Patient level of alertness: awake, drowsy  Background pattern: symmetric  Artifacts: no significant    Posterior dominant rhythm: 7-8 Hz. Photic stimulation: no clear driving response on either side. Hyperventilation: not performed. Drowsiness recorded: yes, normal.  Sleep recorded: no.  Single lead EKG: no abnormalities    Areas of focal slowing: none. Epileptiform discharges: none. Electrographic seizures: none. Impression: This is a normal awake and drowsy EEG recording. There were no areas of focal slowing, epileptiform discharge or subclinical seizure. Clinical correlation is necessary.         Jayme Nguyễn MD  [facility]

## 2018-01-23 ENCOUNTER — OFFICE VISIT (OUTPATIENT)
Dept: NEUROLOGY | Age: 63
End: 2018-01-23

## 2018-01-23 VITALS
OXYGEN SATURATION: 98 % | HEART RATE: 86 BPM | HEIGHT: 69 IN | WEIGHT: 155 LBS | SYSTOLIC BLOOD PRESSURE: 128 MMHG | BODY MASS INDEX: 22.96 KG/M2 | DIASTOLIC BLOOD PRESSURE: 84 MMHG

## 2018-01-23 DIAGNOSIS — G20 PARKINSONISM, UNSPECIFIED PARKINSONISM TYPE (HCC): Primary | ICD-10-CM

## 2018-01-23 RX ORDER — CARBIDOPA AND LEVODOPA 25; 100 MG/1; MG/1
1.5 TABLET ORAL 3 TIMES DAILY
Qty: 135 TAB | Refills: 3 | Status: SHIPPED | OUTPATIENT
Start: 2018-01-23 | End: 2018-05-23 | Stop reason: SDUPTHER

## 2018-01-23 NOTE — PROGRESS NOTES
Interval HPI:   This is a 57 y.o. male who is following up for     Chief Complaint   Patient presents with    Results     MRI Brain (images reviewed): mild chronic small vessel ischemic disease, and mild global atrophy, no hydrocephalus (i.e no evidence of NPH). EEG: normal awake and drowsy EEG. Pt says voice is louder, easier to understand. He appears more alert. Is more active than he was at last visit, before the Sinemet. Appetite is improved, eating better. Wife says not as restless as he was before (couldn't sit still). Didn't have any tremors to begin with. Wasn't shuffling either. Mood is slightly improved. Pt says he has started driving again. Brief ROS: as above or otherwise negative  There have been no significant changes in PMHx, PSHx, SHx except as noted above. Allergies   Allergen Reactions    Sulfa (Sulfonamide Antibiotics) Rash     Current Outpatient Prescriptions   Medication Sig Dispense Refill    carbidopa-levodopa (SINEMET)  mg per tablet Take 1.5 Tabs by mouth three (3) times daily. Take around 8 AM, 12 PM, and 4 PM.  Indications: IDIOPATHIC PARKINSONISM 135 Tab 3    ACETAMINOPHEN (TYLENOL PO) Take  by mouth.  IBUPROFEN (MOTRIN PO) Take  by mouth. Physical Exam  Blood pressure 128/84, pulse 86, height 5' 9\" (1.753 m), weight 70.3 kg (155 lb), SpO2 98 %. Awake, alert, masked facial expression bilaterally but conversant, low voice  Neck: no stiffness  Skin: no rashes    Focused Neurological Exam     Mental status: Alert and oriented to person, place situation. Language: normal fluency and comprehension; no dysarthria. CNs:   Visual fields grossly normal  Extraocular movements intact, no nystagmus  Face appears symmetric and facial strength normal.    Hearing is intact to casual conversation. Sensory: intact light touch in arms  Motor: Normal bulk and strength in all 4 extremities.     Reflexes: not examined this visit  Cerebellar: no resting tremors, mild cogwheel rigidity both arms  Gait: ambulates independently, forward leaning gait, not shuffling      Impression      ICD-10-CM ICD-9-CM    1.  Parkinsonism, unspecified Parkinsonism type ([facility] G20 332.0 carbidopa-levodopa (SINEMET)  mg per tablet       Some improvement with voice, akathisia, and mood since starting Sinemet  Increased Sinemet to 1.5 tabs TID  Follow up in 4 months

## 2018-01-23 NOTE — MR AVS SNAPSHOT
[address] [address] [address] [address] 
[phone] Patient: Nunzio Dancer MRN: [id number] MELINDA:[birth year] Visit Information Date & Time Provider Department Dept. Phone Encounter #  
 1/23/2018 10:40 AM Marianne Harden, [address] [facility] [phone] [id number] Follow-up Instructions Return in about 4 months (around 5/23/2018). Upcoming Health Maintenance Date Due DTaP/Tdap/Td series (1 - Tdap) 5/10/1974 FOBT Q 1 YEAR AGE 50-75 5/10/2003 ZOSTER VACCINE AGE 60> 3/10/2013 Influenza Age 5 to Adult 8/1/2017 Allergies as of 1/23/2018  Review Complete On: 1/23/2018 By: Mattie Lockhart LPN Severity Noted Reaction Type Reactions Sulfa (Sulfonamide Antibiotics)  01/20/2013    Rash Current Immunizations  Never Reviewed No immunizations on file. Not reviewed this visit You Were Diagnosed With   
  
 Codes Comments Parkinsonism, unspecified Parkinsonism type ([facility]    -  Primary ICD-10-CM: G20 
ICD-9-CM: 332.0 Vitals BP Pulse Height(growth percentile) Weight(growth percentile) SpO2 BMI  
 128/84 (BP 1 Location: Left arm, BP Patient Position: Sitting) 86 5' 9\" (1.753 m) 155 lb (70.3 kg) 98% 22.89 kg/m2 Smoking Status Never Smoker Vitals History BMI and BSA Data Body Mass Index Body Surface Area  
 22.89 kg/m 2 1.85 m 2 Preferred Pharmacy Pharmacy Name Phone [facility] [phone] Your Updated Medication List  
  
   
This list is accurate as of: 1/23/18 11:16 AM.  Always use your most recent med list.  
  
  
  
  
 carbidopa-levodopa  mg per tablet Commonly known as:  SINEMET Take 1.5 Tabs by mouth three (3) times daily. Take around 8 AM, 12 PM, and 4 PM.  Indications: IDIOPATHIC PARKINSONISM MOTRIN PO Take  by mouth. TYLENOL PO Take  by mouth. Prescriptions Sent to Pharmacy Refills  
 carbidopa-levodopa (SINEMET)  mg per tablet 3 Sig: Take 1.5 Tabs by mouth three (3) times daily. Take around 8 AM, 12 PM, and 4 PM.  Indications: IDIOPATHIC PARKINSONISM Class: Normal  
 Pharmacy: [facility] [address] [address], [address]  #: [phone] Route: Oral  
  
Follow-up Instructions Return in about 4 months (around 5/23/2018). Introducing [facility]! [employer] introduces [employer] My Car patient portal. Now you can access parts of your medical record, email your doctor's office, and request medication refills online. 1. In your internet browser, go to [email or web address] 2. Click on the First Time User? Click Here link in the Sign In box. You will see the New Member Sign Up page. 3. Enter your [employer] Can [employer] My Car Access Code exactly as it appears below. You will not need to use this code after youve completed the sign-up process. If you do not sign up before the expiration date, you must request a new code. · [employer] Access Code: [email or web address] Expires: 2/1/2018  8:36 AM 
 
4. Enter the last four digits of your Social Security Number (xxxx) and Date of Birth (mm/dd/yyyy) as indicated and click Submit. You will be taken to the next sign-up page. 5. Create a [employer] My [employer] ID. This will be your [employer] login ID and cannot be changed, so think of one that is secure and easy to remember. 6. Create a [employer] password. You can change your password at any time. 7. Enter your Password Reset Question and Answer. This can be used at a later time if you forget your password. 8. Enter your e-mail address. You will receive e-mail notification when new information is available in [address] 9. Click Sign Up. You can now view and download portions of your medical record.  
10. Click the Download Summary menu link to download a portable copy of your medical information. If you have questions, please visit the Frequently Asked Questions section of the HumanCloud website. Remember, HumanCloud is NOT to be used for urgent needs. For medical emergencies, dial 911. Now available from your iPhone and Android! Please provide this summary of care documentation to your next provider. Your primary care clinician is listed as Frank Linda. If you have any questions after today's visit, please call [phone].

## 2018-05-07 ENCOUNTER — HOSPITAL ENCOUNTER (OUTPATIENT)
Dept: LAB | Age: 63
Discharge: HOME | End: 2018-05-07
Payer: MEDICARE

## 2018-05-07 ENCOUNTER — OFFICE VISIT (OUTPATIENT)
Dept: FAMILY MEDICINE CLINIC | Age: 63
End: 2018-05-07

## 2018-05-07 VITALS
DIASTOLIC BLOOD PRESSURE: 76 MMHG | RESPIRATION RATE: 16 BRPM | HEIGHT: 69 IN | TEMPERATURE: 98 F | WEIGHT: 163 LBS | HEART RATE: 90 BPM | SYSTOLIC BLOOD PRESSURE: 118 MMHG | OXYGEN SATURATION: 98 % | BODY MASS INDEX: 24.14 KG/M2

## 2018-05-07 DIAGNOSIS — R35.0 URINARY FREQUENCY: Primary | ICD-10-CM

## 2018-05-07 DIAGNOSIS — K64.4 EXTERNAL HEMORRHOID: ICD-10-CM

## 2018-05-07 DIAGNOSIS — Z12.11 COLON CANCER SCREENING: ICD-10-CM

## 2018-05-07 DIAGNOSIS — K59.00 CONSTIPATION, UNSPECIFIED CONSTIPATION TYPE: ICD-10-CM

## 2018-05-07 PROCEDURE — G0103 PSA SCREENING: HCPCS

## 2018-05-07 PROCEDURE — 87086 URINE CULTURE/COLONY COUNT: CPT

## 2018-05-07 PROCEDURE — 80048 BASIC METABOLIC PNL TOTAL CA: CPT

## 2018-05-07 PROCEDURE — 36415 COLL VENOUS BLD VENIPUNCTURE: CPT

## 2018-05-07 PROCEDURE — 81001 URINALYSIS AUTO W/SCOPE: CPT

## 2018-05-07 PROCEDURE — 84153 ASSAY OF PSA TOTAL: CPT

## 2018-05-07 RX ORDER — POLYETHYLENE GLYCOL 3350 17 G/17G
17 POWDER, FOR SOLUTION ORAL DAILY
Qty: 90 PACKET | Refills: 2 | Status: SHIPPED | OUTPATIENT
Start: 2018-05-07 | End: 2018-08-26 | Stop reason: SDUPTHER

## 2018-05-07 NOTE — PATIENT INSTRUCTIONS
Marlee Islas MD  [facility] [address]  [place]   Office: [phone]    Lazaro Willis MD  [facility]. [address]  [address]  Phone: [phone]  Fax: [phone]

## 2018-05-07 NOTE — MR AVS SNAPSHOT
[address] 
[phone] Patient: Christina Jewell MRN: [id number] NAK:4/07/6612 Visit Information Date & Time Provider Department Dept. Phone Encounter #  
 5/7/2018  1:30 PM Karmen Wallace MD [address] [place] [address] [phone] [id number] Follow-up Instructions Return in about 4 weeks (around 6/4/2018) for Follow Up. Your Appointments 5/23/2018 10:40 AM  
Follow Up with Kentrell Burdick MD  
[facility]) Appt Note: follow up parkinsons sck [place] [address] [facility] 250 [facility] 99 [phone]  
  
   
 [place] [address] [id number] [address] Upcoming Health Maintenance Date Due DTaP/Tdap/Td series (1 - Tdap) 5/10/1974 FOBT Q 1 YEAR AGE 50-75 5/10/2003 ZOSTER VACCINE AGE 60> 3/10/2013 Influenza Age 5 to Adult 8/1/2018 Allergies as of 5/7/2018  Review Complete On: 1/23/2018 By: Jose Florentino LPN Severity Noted Reaction Type Reactions Sulfa (Sulfonamide Antibiotics)  01/20/2013    Rash Current Immunizations  Never Reviewed No immunizations on file. Not reviewed this visit You Were Diagnosed With   
  
 Codes Comments Urinary frequency    -  Primary ICD-10-CM: R35.0 ICD-9-CM: 788.41 Checking labs, can see if symptoms resolve with constipation. He is also to discuss with neurologist as possible side effect 2/2 sinemet. INI - uro referral.  
 Colon cancer screening     ICD-10-CM: Z12.11 ICD-9-CM: V76.51 Colonoscopy referral.  
 Constipation, unspecified constipation type     ICD-10-CM: K59.00 ICD-9-CM: 564.00 Pt to start miralax daily and see GI for very overdue colonoscopy. External hemorrhoid     ICD-10-CM: K64.4 ICD-9-CM: 769. 3 Will have pt see GI for band ligation. Vitals BP Pulse Temp Resp Height(growth percentile) Weight(growth percentile) 118/76 (BP 1 Location: Left arm, BP Patient Position: Sitting) 90 98 °F (36.7 °C) (Oral) 16 5' 9\" (1.753 m) 163 lb (73.9 kg) SpO2 BMI Smoking Status 98% 24.07 kg/m2 Never Smoker Vitals History BMI and BSA Data Body Mass Index Body Surface Area 24.07 kg/m 2 1.9 m 2 Preferred Pharmacy Pharmacy Name Phone [facility] [place] [address], [place] 2017 [facility] [phone] Your Updated Medication List  
  
   
This list is accurate as of 5/7/18  2:27 PM.  Always use your most recent med list.  
  
  
  
  
 carbidopa-levodopa  mg per tablet Commonly known as:  SINEMET Take 1.5 Tabs by mouth three (3) times daily. Take around 8 AM, 12 PM, and 4 PM.  Indications: IDIOPATHIC PARKINSONISM MOTRIN PO Take  by mouth. TYLENOL PO Take  by mouth. We Performed the Following CULTURE, URINE X9623888 CPT(R)] METABOLIC PANEL, BASIC [54390 CPT(R)] PSA W/ REFLX FREE PSA [67282 CPT(R)] REFERRAL FOR COLONOSCOPY [TVM567 Custom] URINALYSIS W/MICROSCOPIC [40438 CPT(R)] Follow-up Instructions Return in about 4 weeks (around 6/4/2018) for Follow Up. Referral Information Referral ID Referred By Referred To  
  
 [id number] Rachael Gonzalez Not Available Visits Status Start Date End Date 1 New Request 5/7/18 5/7/19 If your referral has a status of pending review or denied, additional information will be sent to support the outcome of this decision. Patient Instructions Gallito Godwin MD 
[facility], [address] [place] [address] Office: [phone] Bebeto Lepe MD 
[facility] [address] [place] [address] Phone: [phone] Fax: [phone] Introducing [facility]! Juan Sumner introduces D[employer]ymotion patient portal. Now you can access parts of your medical record, email your doctor's office, and request medication refills online. 1. In your internet browser, go to [email or web address] 2. Click on the First Time User? Click Here link in the Sign In box. You will see the New Member Sign Up page. 3. Enter your Dailymotion Access Code exactly as it appears below. You will not need to use this code after youve completed the sign-up process. If you do not sign up before the expiration date, you must request a new code. · Dail[employer]otion Access Code: [email or web address] Expires: 8/5/2018  2:27 PM 
 
4. Enter the last four digits of your Social Security Number (xxxx) and Date of Birth (mm/dd/yyyy) as indicated and click Submit. You will be taken to the next sign-up page. 5. Create a Dailymotion ID. This will be your Dailymotion login ID and cannot be changed, so think of one that is secure and easy to remember. 6. Create a Dailymotion password. You can change your password at any time. 7. Enter your Password Reset Question and Answer. This can be used at a later time if you forget your password. 8. Enter your e-mail address. You will receive e-mail notification when new information is available in [address] 9. Click Sign Up. You can now view and download portions of your medical record. 10. Click the Download Summary menu link to download a portable copy of your medical information. If you have questions, please visit the Frequently Asked Questions section of the Dailymotion website. Remember, Dailymotion is NOT to be used for urgent needs. For medical emergencies, dial 911. Now available from your iPhone and Android! Please provide this summary of care documentation to your next provider. Your primary care clinician is listed as Radha Rice. If you have any questions after today's visit, please call [phone].

## 2018-05-07 NOTE — PROGRESS NOTES
Assessment / Plan   Diagnoses and all orders for this visit:    1. Urinary frequency  Comments:  Checking labs, can see if symptoms resolve with constipation. He is also to discuss with neurologist as possible side effect 2/2 sinemet. INI - uro referral.  Orders:  -     URINALYSIS W/MICROSCOPIC  -     PSA W/ REFLX FREE PSA  -     CULTURE, URINE  -     METABOLIC PANEL, BASIC    2. Colon cancer screening  Comments:  Colonoscopy referral.  Orders:  -     REFERRAL FOR COLONOSCOPY    3. Constipation, unspecified constipation type  Comments:  Pt to start miralax daily and see GI for very overdue colonoscopy. Orders:  -     REFERRAL FOR COLONOSCOPY  -     polyethylene glycol (MIRALAX) 17 gram packet; Take 1 Packet by mouth daily. 4. External hemorrhoid  Comments: Will have pt see GI for band ligation. Follow-up Disposition:  Return in about 4 weeks (around 6/4/2018) for Follow Up. I have discussed the diagnosis with the patient and the intended plan as seen in the above orders. The patient has received an after-visit summary and questions were answered concerning future plans. I have discussed medication side effects and warnings with the patient as well. Sunday Alexandre MD  Family Medicine Resident       HPI     Chief Complaint   Patient presents with    Urinary Retention     x months    Constipation    Urinary Frequency     He is a 57 y.o. male who presents for multiple complaints. He notes 3 to 4 months of increasing urinary frequency and urinary urgency but denies any change in color of his urine, change in smell of his urine, or any dysuria. He also notes about a year and a half of constipation. Bowel movements every 3 to 5 days, and he strains to have a bowel movement. He also notes several months of severe rectal pain and feeling external lumps by his rectum with pain when he sits down.  He notes he has not had any colonoscopy ever, denies night sweats, denies weight loss, but does endorse fatigue. Last BM this AM.     Review of Systems - No fears, chills, nausea, vomiting, abdominal pain, chest pain, crashes. Reviewed PmHx, RxHx, FmHx, SocHx, AllgHx and updated and dated in the chart. Physical Exam:  Visit Vitals    /76 (BP 1 Location: Left arm, BP Patient Position: Sitting)    Pulse 90    Temp 98 °F (36.7 °C) (Oral)    Resp 16    Ht 5' 9\" (1.753 m)    Wt 163 lb (73.9 kg)    SpO2 98%    BMI 24.07 kg/m2     Physical Exam   Constitutional: He appears well-developed and well-nourished. HENT:   Head: Normocephalic. Nose: Nose normal.   Eyes: Conjunctivae are normal.   Cardiovascular: Normal rate, regular rhythm and normal heart sounds. Exam reveals no gallop and no friction rub. No murmur heard. Pulmonary/Chest: Effort normal and breath sounds normal. No respiratory distress. He has no wheezes. He has no rales. Abdominal: Soft. Bowel sounds are normal. He exhibits no distension. There is no tenderness. Genitourinary: Penis normal.   Genitourinary Comments: Significant, several external non-thrombosed hemorrhoids present on exam - due to these, prostate exam deferred. Musculoskeletal: He exhibits no edema or tenderness. Neurological: He is alert. Skin: Skin is warm and dry. He is not diaphoretic. Vitals reviewed.

## 2018-05-08 LAB
APPEARANCE UR: CLEAR
BACTERIA #/AREA URNS HPF: NORMAL /[HPF]
BACTERIA UR CULT: NO GROWTH
BILIRUB UR QL STRIP: NEGATIVE
BUN SERPL-MCNC: 8 MG/DL (ref 8–27)
BUN/CREAT SERPL: 10 (ref 10–24)
CALCIUM SERPL-MCNC: 8.9 MG/DL (ref 8.6–10.2)
CASTS URNS QL MICRO: NORMAL /LPF
CHLORIDE SERPL-SCNC: 101 MMOL/L (ref 96–106)
CO2 SERPL-SCNC: 26 MMOL/L (ref 18–29)
COLOR UR: YELLOW
CREAT SERPL-MCNC: 0.81 MG/DL (ref 0.76–1.27)
EPI CELLS #/AREA URNS HPF: NORMAL /HPF
GFR SERPLBLD CREATININE-BSD FMLA CKD-EPI: 109 ML/MIN/1.73
GFR SERPLBLD CREATININE-BSD FMLA CKD-EPI: 94 ML/MIN/1.73
GLUCOSE SERPL-MCNC: 105 MG/DL (ref 65–99)
GLUCOSE UR QL: NEGATIVE
HGB UR QL STRIP: NEGATIVE
KETONES UR QL STRIP: NEGATIVE
LEUKOCYTE ESTERASE UR QL STRIP: NEGATIVE
MICRO URNS: NORMAL
MICRO URNS: NORMAL
NITRITE UR QL STRIP: NEGATIVE
PH UR STRIP: 7.5 [PH] (ref 5–7.5)
POTASSIUM SERPL-SCNC: 4.1 MMOL/L (ref 3.5–5.2)
PROT UR QL STRIP: NEGATIVE
PSA SERPL-MCNC: 0.9 NG/ML (ref 0–4)
RBC #/AREA URNS HPF: NORMAL /HPF
REFLEX CRITERIA: NORMAL
SODIUM SERPL-SCNC: 143 MMOL/L (ref 134–144)
SP GR UR: 1.01 (ref 1–1.03)
UROBILINOGEN UR STRIP-MCNC: 0.2 MG/DL (ref 0.2–1)
WBC #/AREA URNS HPF: NORMAL /HPF

## 2018-05-23 ENCOUNTER — OFFICE VISIT (OUTPATIENT)
Dept: NEUROLOGY | Age: 63
End: 2018-05-23

## 2018-05-23 VITALS
OXYGEN SATURATION: 98 % | BODY MASS INDEX: 23.11 KG/M2 | HEART RATE: 83 BPM | HEIGHT: 69 IN | WEIGHT: 156 LBS | SYSTOLIC BLOOD PRESSURE: 122 MMHG | DIASTOLIC BLOOD PRESSURE: 82 MMHG

## 2018-05-23 DIAGNOSIS — G25.81 RESTLESS LEG: ICD-10-CM

## 2018-05-23 DIAGNOSIS — F32.A DEPRESSION, UNSPECIFIED DEPRESSION TYPE: ICD-10-CM

## 2018-05-23 DIAGNOSIS — G20 PARKINSONISM, UNSPECIFIED PARKINSONISM TYPE (HCC): ICD-10-CM

## 2018-05-23 RX ORDER — ESCITALOPRAM OXALATE 10 MG/1
10 TABLET ORAL
Qty: 30 TAB | Refills: 0 | Status: SHIPPED | OUTPATIENT
Start: 2018-05-23 | End: 2018-08-26 | Stop reason: SDUPTHER

## 2018-05-23 RX ORDER — CARBIDOPA AND LEVODOPA 25; 100 MG/1; MG/1
1 TABLET ORAL 3 TIMES DAILY
Qty: 270 TAB | Refills: 0 | Status: SHIPPED | OUTPATIENT
Start: 2018-05-23 | End: 2018-07-03 | Stop reason: SDUPTHER

## 2018-05-23 NOTE — MR AVS SNAPSHOT
[address] [facility] [place] 
 
 
 [place] [address] [address] [phone] Patient: Nubia Evans MRN: [id number] EMP:6/39/9443 Visit Information Date & Time Provider Department Dept. Phone Encounter #  
 5/23/2018 10:40 AM Monica Johnson MD [facility] Neurology [facility] [phone] [id number] Follow-up Instructions Return in about 4 months (around 9/23/2018). Your Appointments 9/19/2018 10:40 AM  
Follow Up with Monica Johnson MD  
[facility]) Appt Note: follow up PD  
 [address] [address] [phone]  
  
   
 [facility] [place] [address] [address] [address] Upcoming Health Maintenance Date Due DTaP/Tdap/Td series (1 - Tdap) 5/10/1974 FOBT Q 1 YEAR AGE 50-75 5/10/2003 ZOSTER VACCINE AGE 60> 3/10/2013 GLAUCOMA SCREENING Q2Y 5/10/2018 Pneumococcal 65+ Low/Medium Risk (1 of 2 - PCV13) 5/10/2018 Influenza Age 5 to Adult 8/1/2018 Allergies as of 5/23/2018  Review Complete On: 5/23/2018 By: Yifan Wilkins LPN Severity Noted Reaction Type Reactions Sulfa (Sulfonamide Antibiotics)  01/20/2013    Rash Current Immunizations  Never Reviewed No immunizations on file. Not reviewed this visit You Were Diagnosed With   
  
 Codes Comments Parkinsonism, unspecified [facility] type [facility]     ICD-10-CM: G20 
ICD-9-CM: 332.0 Restless leg     ICD-10-CM: G25.81 ICD-9-CM: 333.94 Depression, unspecified depression type     ICD-10-CM: F32.9 ICD-9-CM: 400 Vitals BP Pulse Height(growth percentile) Weight(growth percentile) SpO2 BMI  
 122/82 (BP 1 Location: Left arm, BP Patient Position: Sitting) 83 5' 9\" (1.753 m) 156 lb (70.8 kg) 98% 23.04 kg/m2 Smoking Status Never Smoker Vitals History BMI and BSA Data Body Mass Index Body Surface Area 23.04 kg/m 2 1.86 m 2 Preferred Pharmacy Pharmacy Name Phone [facility] [address] [phone] Your Updated Medication List  
  
   
This list is accurate as of 5/23/18 11:13 AM.  Always use your most recent med list.  
  
  
  
  
 carbidopa-levodopa  mg per tablet Commonly known as:  SINEMET Take 1 Tab by mouth three (3) times daily for 90 days. Take around 8 AM, 12 PM, and 4 PM.  Indications: IDIOPATHIC PARKINSONISM  
  
 escitalopram oxalate 10 mg tablet Commonly known as:  [employer] Take 1 Tab by mouth every morning. MOTRIN PO Take  by mouth.  
  
 polyethylene glycol 17 gram packet Commonly known as:  Lugene Minder Take 1 Packet by mouth daily. TYLENOL PO Take  by mouth. Prescriptions Sent to Pharmacy Refills  
 carbidopa-levodopa (SINEMET)  mg per tablet 0 Sig: Take 1 Tab by mouth three (3) times daily for 90 days. Take around 8 AM, 12 PM, and 4 PM.  Indications: IDIOPATHIC PARKINSONISM Class: Normal  
 [facility] [place] Ph #: [phone] Route: Oral  
 escitalopram oxalate (LEXAPRO) 10 mg tablet 0 Sig: Take 1 Tab by mouth every morning. Class: Normal  
 [facility] [place] Ph #: [phone] Route: Oral  
  
Follow-up Instructions Return in about 4 months (around 9/23/2018). Introducing [facility]! [employer] introduces [employer] patient portal. Now you can access parts of your medical record, email your doctor's office, and request medication refills online. 1. In your internet browser, go to [email or web address] 2. Click on the First Time User? Click Here link in the Sign In box. You will see the New Member Sign Up page. 3. Enter your Physician Referral Network (PRN) Access Code exactly as it appears below. You will not need to use this code after youve completed the sign-up process. If you do not sign up before the expiration date, you must request a new code. · Physician Referral Network (PRN) Access Code: [email or web address] Expires: 8/5/2018  2:27 PM 
 
4. Enter the last four digits of your Social Security Number (xxxx) and Date of Birth (mm/dd/yyyy) as indicated and click Submit. You will be taken to the next sign-up page. 5. Create a Physician Referral Network (PRN) ID. This will be your Physician Referral Network (PRN) login ID and cannot be changed, so think of one that is secure and easy to remember. 6. Create a Physician Referral Network (PRN) password. You can change your password at any time. 7. Enter your Password Reset Question and Answer. This can be used at a later time if you forget your password. 8. Enter your e-mail address. You will receive e-mail notification when new information is available in [address] 9. Click Sign Up. You can now view and download portions of your medical record. 10. Click the Download Summary menu link to download a portable copy of your medical information. If you have questions, please visit the Frequently Asked Questions section of the Physician Referral Network (PRN) website. Remember, Physician Referral Network (PRN) is NOT to be used for urgent needs. For medical emergencies, dial 911. Now available from your iPhone and Android! Please provide this summary of care documentation to your next provider. Your primary care clinician is listed as Kris Stinson. If you have any questions after today's visit, please call [phone].

## 2018-05-23 NOTE — PROGRESS NOTES
Interval HPI:   This is a 70 y.o. male who is following up for     Chief Complaint   Patient presents with    Parkinsons Disease     MRI Brain (images reviewed): mild chronic small vessel ischemic disease, and mild global atrophy, no hydrocephalus (i.e no evidence of NPH). EEG: normal awake and drowsy EEG. Last visit increased Sinemet to 1.5 tabs TID. Pt says he tried taking the higher dose but caused some mood or cognitive side effects so he lowered back to 1 tab TID. He and wife continue to feel that since being on the medication, he's moving around better, \"more active\" and his voice is not as low (not normal though). Also, not as restless as before the medication. Since he has been fasting for Ramadan, he has stopped taking the noon dose. Since then, he notices that he feels slower until the evening dose. Didn't have any tremors to begin with and no shuffling noted at initial visit, though wife says at times he was shuffling. He continues to drive locally,,short distances, avoids driving at night. When asked about mood, wife says he is still withdrawn/ avoids social interactions and gets upset easily. Brief ROS: as above or otherwise negative  There have been no significant changes in PMHx, PSHx, SHx except as noted above. Allergies   Allergen Reactions    Sulfa (Sulfonamide Antibiotics) Rash     Current Outpatient Prescriptions   Medication Sig Dispense Refill    carbidopa-levodopa (SINEMET)  mg per tablet Take 1 Tab by mouth three (3) times daily for 90 days. Take around 8 AM, 12 PM, and 4 PM.  Indications: IDIOPATHIC PARKINSONISM 270 Tab 0    escitalopram oxalate (LEXAPRO) 10 mg tablet Take 1 Tab by mouth every morning. 30 Tab 0    polyethylene glycol (MIRALAX) 17 gram packet Take 1 Packet by mouth daily. 90 Packet 2    ACETAMINOPHEN (TYLENOL PO) Take  by mouth.  IBUPROFEN (MOTRIN PO) Take  by mouth.            Physical Exam  Blood pressure 122/82, pulse 83, height 5' 9\" (1.753 m), weight 70.8 kg (156 lb), SpO2 98 %. Awake, alert, masked facial expression bilaterally but conversant, low voice  Neck: no stiffness  Skin: no rashes    Focused Neurological Exam     Mental status: Alert and oriented to person, place situation. Language: normal fluency and comprehension; no dysarthria. CNs:   Visual fields grossly normal  Extraocular movements intact, no nystagmus  Face appears symmetric and facial strength normal.    Hearing is intact to casual conversation. Sensory: intact light touch in arms  Motor: Normal bulk and strength in all 4 extremities. Reflexes: not examined this visit  Cerebellar: no resting tremors, mild cogwheel rigidity both arms  Gait: ambulates independently, forward leaning gait, not shuffling      Impression      ICD-10-CM ICD-9-CM    1. Parkinsonism, unspecified Parkinsonism type ([facility] [address] [facility] G20 332.0 carbidopa-levodopa (SINEMET)  mg per tablet   2. Restless leg G25.81 333.94 carbidopa-levodopa (SINEMET)  mg per tablet   3.  Depression, unspecified depression type F32.9 311 escitalopram oxalate (LEXAPRO) 10 mg tablet         Some improvement with voice, akathisia, mood, and mobility since starting Sinemet  Cannot tolerate more than 1 tablet per dose  At next visit, will consider changing to another medication that can be titrated upward   For now, continue 1 tab Sinemet TID (when not fasting)   Pt agreeable to adding antidepressant to address mood issues  Added Lexapro 10 mg QAM.  Sent 1 month Rx for him to try  Advised pt not to drive on highways or busy streets due to reduced reaction times with Parkinson's  Pt to work with GI for constipation issues      F/u in 4 months

## 2018-06-11 DIAGNOSIS — G20 PARKINSONISM, UNSPECIFIED PARKINSONISM TYPE (HCC): ICD-10-CM

## 2018-06-11 DIAGNOSIS — G25.81 RESTLESS LEG: ICD-10-CM

## 2018-07-12 ENCOUNTER — TELEPHONE (OUTPATIENT)
Dept: NEUROLOGY | Age: 63
End: 2018-07-12
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
## 2018-07-12 NOTE — TELEPHONE ENCOUNTER
Patient's spouse is requesting an appt as soon as possible. Was not very clear about what was wrong with her  but stated it was an emergency and needs to talk to/see Dr. Araseli Payne as soon as possible. Patient's spouse would like a call back today.  [phone]

## 2018-07-13 NOTE — TELEPHONE ENCOUNTER
Patient's wife called the office. She states understanding and will come to the office Monday in order to  patient's office notes. Printed office records with results.  Put up front for patient  after signing release of information

## 2018-07-13 NOTE — TELEPHONE ENCOUNTER
----- Message from Corrinne Hue sent at 7/13/2018  2:45 PM EDT -----  Regarding:  Steven Jiménez, wife, returned a missed call. Best contact [phone].

## 2018-08-26 ENCOUNTER — OFFICE VISIT (OUTPATIENT)
Dept: FAMILY MEDICINE CLINIC | Age: 63
End: 2018-08-26

## 2018-08-26 ENCOUNTER — HOSPITAL ENCOUNTER (OUTPATIENT)
Dept: LAB | Age: 63
Discharge: HOME | End: 2018-08-26
Payer: MEDICARE

## 2018-08-26 VITALS
DIASTOLIC BLOOD PRESSURE: 66 MMHG | HEART RATE: 89 BPM | HEIGHT: 69 IN | SYSTOLIC BLOOD PRESSURE: 95 MMHG | RESPIRATION RATE: 16 BRPM | WEIGHT: 160 LBS | TEMPERATURE: 98.4 F | OXYGEN SATURATION: 99 % | BODY MASS INDEX: 23.7 KG/M2

## 2018-08-26 DIAGNOSIS — R29.91 ABNORMAL FOOT FINDING: ICD-10-CM

## 2018-08-26 DIAGNOSIS — Z12.11 COLON CANCER SCREENING: ICD-10-CM

## 2018-08-26 DIAGNOSIS — F32.A DEPRESSION, UNSPECIFIED DEPRESSION TYPE: ICD-10-CM

## 2018-08-26 DIAGNOSIS — F33.9 RECURRENT DEPRESSION (HCC): ICD-10-CM

## 2018-08-26 DIAGNOSIS — K62.5 RECTAL BLEEDING: ICD-10-CM

## 2018-08-26 DIAGNOSIS — Z13.5 GLAUCOMA SCREENING: ICD-10-CM

## 2018-08-26 DIAGNOSIS — R35.1 NOCTURIA ASSOCIATED WITH BENIGN PROSTATIC HYPERPLASIA: ICD-10-CM

## 2018-08-26 DIAGNOSIS — R53.83 FATIGUE, UNSPECIFIED TYPE: ICD-10-CM

## 2018-08-26 DIAGNOSIS — L21.9 SEBORRHEA: ICD-10-CM

## 2018-08-26 DIAGNOSIS — K59.00 CONSTIPATION, UNSPECIFIED CONSTIPATION TYPE: ICD-10-CM

## 2018-08-26 DIAGNOSIS — Z00.00 WELCOME TO MEDICARE PREVENTIVE VISIT: Primary | ICD-10-CM

## 2018-08-26 DIAGNOSIS — N40.1 NOCTURIA ASSOCIATED WITH BENIGN PROSTATIC HYPERPLASIA: ICD-10-CM

## 2018-08-26 DIAGNOSIS — Z91.81 RISK FOR FALLS: ICD-10-CM

## 2018-08-26 DIAGNOSIS — G20 PARKINSONISM, UNSPECIFIED PARKINSONISM TYPE (HCC): ICD-10-CM

## 2018-08-26 PROCEDURE — 87086 URINE CULTURE/COLONY COUNT: CPT

## 2018-08-26 RX ORDER — POLYETHYLENE GLYCOL 3350 17 G/17G
17 POWDER, FOR SOLUTION ORAL DAILY
Qty: 90 PACKET | Refills: 2 | Status: SHIPPED | OUTPATIENT
Start: 2018-08-26 | End: 2019-08-13

## 2018-08-26 RX ORDER — CLOBETASOL PROPIONATE 0.46 MG/ML
SOLUTION TOPICAL
Qty: 50 ML | Refills: 11 | Status: SHIPPED | OUTPATIENT
Start: 2018-08-26 | End: 2019-08-13

## 2018-08-26 RX ORDER — ESCITALOPRAM OXALATE 10 MG/1
10 TABLET ORAL
Qty: 90 TAB | Refills: 3 | Status: SHIPPED | OUTPATIENT
Start: 2018-08-26 | End: 2018-08-29

## 2018-08-26 NOTE — PATIENT INSTRUCTIONS
Fasting labs soon    Obtain EKG at follow up    See Dr. Jonel Nuñez for eye exam in mid September (#[phone])    See Dr. Uli Carlton for rectal bleeding. #[phone] (call for an appointment)    Consider physical therapy for fall prevention    Consider a flu vaccine soon    Consider vaccines for tetanus and PCV13 pneumonia (Rx were given to obtain at [facility])    Start lexapro 10 mg one pill daily for energy    Start miralax once daily for constipation    Follow up with Dr. Jed Ramirez for Parkinsonism    Stay active. Do no fall    Wash scalp and face with Selsun Blue medicated shampoo.     Apply tomovate scalp solution at bedtime

## 2018-08-26 NOTE — PROGRESS NOTES
Lien Bynum is a 70 y.o. male      Issues discussed today include:        Signs and symptoms:  Constipation and hemorrhoids  Duration:  months  Context:  He is now on Rx for Parkinson's (for about one year). Likely related  Location:  +[facility]  Quality:  +painful BM  Severity:  He goes BM every 3 days  Timing:  Never had colonscopy  Modifying factors:  Refer GI, start miralx    Data reviewed or ordered today:  Needs EKG , fasting labs soon    WELLNESS     PSA:  2018  AAA screen:   Never smoker  Screening chest CT scan:   Never smoker    Hearing screen:   done  Vision screening:   done  Depression screening:   Done. PHQ9 = 10. Rx lexapro 10 mg  Fall assessment:   Done. +fall risk. They declined PT for fall prevention    We discussed health maintenance: They declined flu vaccine today    BMI = Body mass index is 23.63 kg/(m^2). We discussed diet/exercise/healthy weight    We reviewed and updated pertinent past medical history in the problem list      Colonoscopy:  Needs 2018  TDAP:  Rx given 2018  Pneumovax:  Consider 2019  PCV-13:  Rx 2018  Flu shot:  Consider 2018  Shingrix:  Consider 2019  Eye exam:  Needs 9/15/2018 Dr. Hansel Morales     EKG:  Needs 2018    FTF for DME:  done:  none  Advanced directive:  Full code  Specialists:  Neuro Fahad  GI Grzegorz  OP Hansel Morales            Other problems include:  Patient Active Problem List   Diagnosis Code    Parkinsonism ([facility] [address] G20    Restless leg G25.81    Depression F32.9       Medications:  Current Outpatient Prescriptions   Medication Sig Dispense Refill    escitalopram oxalate (LEXAPRO) 10 mg tablet Take 1 Tab by mouth every morning. 90 Tab 3    polyethylene glycol (MIRALAX) 17 gram packet Take 1 Packet by mouth daily. 90 Packet 2    clobetasol (TEMOVATE) 0.05 % external solution Apply to scaling skin at bedtime 50 mL 11    carbidopa-levodopa (SINEMET)  mg per tablet Take 1 Tab by mouth three (3) times daily. 90 Tab 1    ACETAMINOPHEN (TYLENOL PO) Take  by mouth. Allergies: Allergies   Allergen Reactions    Sulfa (Sulfonamide Antibiotics) Rash       LMP:  No LMP for male patient. Social History     Social History    Marital status:      Spouse name: N/A    Number of children: N/A    Years of education: N/A     Occupational History    Not on file. Social History Main Topics    Smoking status: Never Smoker    Smokeless tobacco: Never Used    Alcohol use No    Drug use: No    Sexual activity: Not on file     Other Topics Concern    Not on file     Social History Narrative         History reviewed. No pertinent family history. Other family history:  No Parkinsonism    Meaningful use:  done      ROS:  Headaches:  no  Chest Pain:  no  SOB:  no  Fevers:  no  Falls:  no  anxiety/depression/losing interest in doing things that were previously enjoyed:  no. PHQ2 = 6, PHQ9 = 10  Other significant ROS:  +drooling  Patient denied problems with:    Hearing/vision, swallowing, Reflux/indigestion, Cough,Diarrhea, Snoring/sleep apnea,Fatigue, Weight change, memory                                                         Any other Positive ROS include: soft speech    Nocturia, constipation    Falls in the past 12 months:  no           Over the last year (or since your last visit):  Have you been diagnosed with heart attack, stroke, broken bones, or skin cancer = no    Exercise:  Decline PT             Smoking history:  none                                  No LMP for male patient. Physical Exam  Visit Vitals    BP 95/66    Pulse 89    Temp 98.4 °F (36.9 °C)    Resp 16    Ht 5' 9\" (1.753 m)    Wt 160 lb (72.6 kg)    SpO2 99%    BMI 23.63 kg/m2     BP Readings from Last 3 Encounters:   08/26/18 95/66   05/23/18 122/82   05/07/18 118/76     Constitutional:  Appears well,  No Acute Distress, Vitals noted  Psychiatric:   Affect flat, Alert and cooperative, Oriented to person/place/time.   He looks to wife for a lot of answers    Eyes:   Pupils equally round and reactive, EOMI, conjunctiva clear, eyelids normal  ENT:   External ears and nose normal/lips, teeth=OK/gums normal, TMs and Orophyarynx normal  Neck:   general inspection and Thyroid normal.  No abnormal cervical or supraclavicular nodes    Lungs:   clear to auscultation, good respiratory effort  Heart: Ausculation normal.  Regular rhythm. No cardiac murmurs. No carotid bruits or palpable thrills  Chest wall normal  Abdominal exam:   normal.  Liver and spleen normal.  No bruits/masses/tenderness    Extremities:   without edema, good peripheral pulses  Skin:   Warm to palpation, without rashes, bruising, or suspicious lesions   Feet:  +bunion right foot.   calluses left foot    Neuro:  No facial droop, speech fluent bit soft, EOMI, ?proptosis, Pupils equally round and reactive to light, visual fields seem OK, hearing seems normal and symmetrical,smile symmetrical, puffs out cheeks symmetrically    Shoulder shrug symmetrical     moves all extremities, strength/sensation seems intact and symmetrical    +cogwheeling, balance seems tentative, no pronator drift, gait fairly normal. \"get up and go\" test OK    squats OK, heel standing/toe standing OK    no tenderness of C spine, T spine, LS spine, flexion/extension of spine OK    Affect seems flat, mental processing seems slow    MSK:  Full passive ROM all joints     and Rectal:  Will defer until he sees Dr. Mundo Dinero (wife present and uncomfortable today)                    Assessment:    Patient Active Problem List   Diagnosis Code    [facility] G20    Restless leg G25.81    Depression F32.9       Today's diagnoses are:    ICD-10-CM ICD-9-CM    1. Welcome to Medicare preventive visit Z00.00 V70.0 AMB POC EKG ROUTINE W/ 12 LEADS, INTER & REP      LIPID PANEL      CBC W/O DIFF      METABOLIC PANEL, COMPREHENSIVE      AMB POC URINE, MICROALBUMIN, SEMIQUANT (3 RESULTS)   2. Parkinsonism, unspecified Parkinsonism type ([facility] G20 332.0     sees Dr. Alfa Sotelo 3. Recurrent depression (HCC) F33.9 296.30     PHQ9 = 10. start lexapro (not started previously)   4. Depression, unspecified depression type F32.9 311 escitalopram oxalate (LEXAPRO) 10 mg tablet   5. Constipation, unspecified constipation type K59.00 564.00 polyethylene glycol (MIRALAX) 17 gram packet    Pt to start miralax daily and see GI for very overdue colonoscopy. 6. Fatigue, unspecified type R53.83 780.79 TSH 3RD GENERATION   7. Nocturia associated with benign prostatic hyperplasia N40.1 600.01 PSA, DIAGNOSTIC (PROSTATE SPECIFIC AG)    R35.1 788.43 CULTURE, URINE   8. Rectal bleeding K62.5 569.3 REFERRAL TO GASTROENTEROLOGY   9. Colon cancer screening Z12.11 V76.51 REFERRAL TO GASTROENTEROLOGY   10. Glaucoma screening Z13.5 V80.1 REFERRAL TO OPHTHALMOLOGY       GLAUCOMA SCREENING   11. Risk for falls Z91.81 V15.88 GA FALLS RISK ASSESSMENT DOCUMENTED    consdier PT for fall prevention   12. Seborrhea L21.9 706.3 clobetasol (TEMOVATE) 0.05 % external solution   13. Abnormal foot finding R29.91 793.7     bunion and calluses.   may need to see podiatry       Plan:  Orders Placed This Encounter    CULTURE, URINE    LIPID PANEL    CBC W/O DIFF    METABOLIC PANEL, COMPREHENSIVE    TSH 3RD GENERATION    PROSTATE SPECIFIC AG    [facility]     Referral Priority:   Routine     Referral Type:   Consultation     Referral Reason:   Specialty Services Required     Referral Location:   [facility]     Referred to Provider:   Trinity Abraham MD    REFERRAL TO OPHTHALMOLOGY     Referral Priority:   Routine     Referral Type:   Consultation     Referral Reason:   Specialty Services Required     Referral Location:   [facility]     Referred to Provider:   Maria De Jesus Rios MD     Requested Specialty:   Ophthalmology    AMB POC URINE, MICROALBUMIN, SEMIQUANT (3 RESULTS)    AMB POC EKG ROUTINE W/ 12 LEADS, INTER & REP     Order Specific Question:   Reason for Exam:     Answer:   IPPE  HM GLAUCOMA SCREENING    TN FALLS RISK ASSESSMENT DOCUMENTED    escitalopram oxalate (LEXAPRO) 10 mg tablet     Sig: Take 1 Tab by mouth every morning. Dispense:  90 Tab     Refill:  3    polyethylene glycol (MIRALAX) 17 gram packet     Sig: Take 1 Packet by mouth daily. Dispense:  90 Packet     Refill:  2    clobetasol (TEMOVATE) 0.05 % external solution     Sig: Apply to scaling skin at bedtime     Dispense:  50 mL     Refill:  11       See patient instructions  Patient Instructions   Fasting labs soon    Obtain EKG at follow up    See Dr. Mike Fulton for eye exam in mid September (#[phone])    See Dr. Carolynn Valladares for rectal bleeding. #[phone] (call for an appointment)    Consider physical therapy for fall prevention    Consider a flu vaccine soon    Consider vaccines for tetanus and PCV13 pneumonia (Rx were given to obtain at [facility])    Start lexapro 10 mg one pill daily for energy    Start miralax once daily for constipation    Follow up with Dr. Alla Bravo for Parkinsonism    Stay active. Do no fall    Wash scalp and face with Selsun Blue medicated shampoo. Apply tomovate scalp solution at bedtime        refresh note:  done    AVS Printed:  done      Diagnoses and all orders for this visit:    1. Welcome to Medicare preventive visit  -     AMB POC EKG ROUTINE W/ 12 LEADS, INTER & REP  -     LIPID PANEL  -     CBC W/O DIFF  -     METABOLIC PANEL, COMPREHENSIVE  -     AMB POC URINE, MICROALBUMIN, SEMIQUANT (3 RESULTS)    2. Parkinsonism, unspecified Parkinsonism type (HCC)  Comments:  sees Dr. Alla Bravo    3. Recurrent depression ([facility] [address]  Comments:  PHQ9 = 10. start lexapro (not started previously)    4. Depression, unspecified depression type  -     escitalopram oxalate (LEXAPRO) 10 mg tablet; Take 1 Tab by mouth every morning. 5. Constipation, unspecified constipation type  Comments:  Pt to start miralax daily and see GI for very overdue colonoscopy.    Orders:  -     polyethylene glycol (MIRALAX) 17 gram packet; Take 1 Packet by mouth daily. 6. Fatigue, unspecified type  -     TSH 3RD GENERATION    7. Nocturia associated with benign prostatic hyperplasia  -     PROSTATE SPECIFIC AG  -     CULTURE, URINE    8. Rectal bleeding  -     [facility]    9. Colon cancer screening  -     [facility]    10. Glaucoma screening  -     REFERRAL TO OPHTHALMOLOGY  -      GLAUCOMA SCREENING    11. Risk for falls  Comments:  consdier PT for fall prevention  Orders:  -     UT FALLS RISK ASSESSMENT DOCUMENTED    12. Seborrhea  -     clobetasol (TEMOVATE) 0.05 % external solution; Apply to scaling skin at bedtime    13. Abnormal foot finding  Comments:  bunion and calluses.   may need to see podiatry

## 2018-08-26 NOTE — MR AVS SNAPSHOT
[address] [phone] Patient: Kaylee Sellers MRN: [id number] ESL:[birth year] Visit Information Date & Time Provider Department Dept. Phone Encounter #  
 8/26/2018  8:30 AM Layla Ibarra MD [address] [facility] [phone] [id number] Your Appointments 9/19/2018 10:40 AM  
Follow Up with Capri Rolon MD  
[facility] Appt Note: follow up PD  
 [address] [facility] [address] [phone]  
  
   
 [place] [address] [facility] [id number] [address] Upcoming Health Maintenance Date Due ZOSTER VACCINE AGE 60> 8/26/2019* Pneumococcal 65+ Low/Medium Risk (1 of 2 - PCV13) 8/26/2019* Influenza Age 5 to Adult 8/26/2019* FOBT Q 1 YEAR AGE 50-75 8/26/2019* DTaP/Tdap/Td series (1 - Tdap) 8/26/2028* MEDICARE YEARLY EXAM 8/27/2019 GLAUCOMA SCREENING Q2Y 8/26/2020 *Topic was postponed. The date shown is not the original due date. Allergies as of 8/26/2018  Review Complete On: 8/26/2018 By: Layla Ibarra MD  
  
 Severity Noted Reaction Type Reactions Sulfa (Sulfonamide Antibiotics)  01/20/2013    Rash Current Immunizations  Never Reviewed No immunizations on file. Not reviewed this visit You Were Diagnosed With   
  
 Codes Comments Welcome to Medicare preventive visit    -  Primary ICD-10-CM: Z00.00 ICD-9-CM: V70.0 Parkinsonism, unspecified Parkinsonism type ([facility] [address]     ICD-10-CM: G20 
ICD-9-CM: 332.0 Recurrent depression ([facility]     ICD-10-CM: F33.9 ICD-9-CM: 296.30 PHQ9 = 10. start lexapro (not started previously) Depression, unspecified depression type     ICD-10-CM: F32.9 ICD-9-CM: 087 Constipation, unspecified constipation type     ICD-10-CM: K59.00 ICD-9-CM: 564.00 Pt to start miralax daily and see GI for very overdue colonoscopy. Fatigue, unspecified type     ICD-10-CM: R53.83 ICD-9-CM: 780.79 Nocturia associated with benign prostatic hyperplasia     ICD-10-CM: N40.1, R35.1 ICD-9-CM: 600.01, 788.43 Rectal bleeding     ICD-10-CM: K62.5 ICD-9-CM: 569.3 Colon cancer screening     ICD-10-CM: Z12.11 ICD-9-CM: V76.51 Glaucoma screening     ICD-10-CM: Z13.5 ICD-9-CM: V80.1 Risk for falls     ICD-10-CM: Z91.81 
ICD-9-CM: V15.88 consdier PT for fall prevention Seborrhea     ICD-10-CM: L21.9 ICD-9-CM: 706. 3 Vitals BP Pulse Temp Resp Height(growth percentile) Weight(growth percentile) 95/66 89 98.4 °F (36.9 °C) 16 5' 9\" (1.753 m) 160 lb (72.6 kg) SpO2 BMI Smoking Status 99% 23.63 kg/m2 Never Smoker BMI and BSA Data Body Mass Index Body Surface Area  
 23.63 kg/m 2 1.88 m 2 Preferred Pharmacy Pharmacy Name Phone [id number] [place], [address] AT [facility] [phone] Your Updated Medication List  
  
   
This list is accurate as of 8/26/18  9:20 AM.  Always use your most recent med list.  
  
  
  
  
 carbidopa-levodopa  mg per tablet Commonly known as:  SINEMET Take 1 Tab by mouth three (3) times daily. clobetasol 0.05 % external solution Commonly known as:  Amrit Congress Apply to scaling skin at bedtime  
  
 escitalopram oxalate 10 mg tablet Commonly known as:  Colan Big Take 1 Tab by mouth every morning. polyethylene glycol 17 gram packet Commonly known as:  Kavitha Zainab Take 1 Packet by mouth daily. TYLENOL PO Take  by mouth. Prescriptions Sent to Pharmacy Refills  
 escitalopram oxalate (LEXAPRO) 10 mg tablet 3 Sig: Take 1 Tab by mouth every morning. Class: Normal  
 Pharmacy: [address] [place] [address] Ph #: [phone]  Route: Oral  
 polyethylene glycol (MIRALAX) 17 gram packet 2  
 Sig: Take 1 Packet by mouth daily. Class: Normal  
 Pharmacy: [address] Ph #: [phone] Route: Oral  
 clobetasol (TEMOVATE) 0.05 % external solution 11 Sig: Apply to scaling skin at bedtime Class: Normal  
 Pharmacy: [address] [facility], [address] [place] [address] Ph #: [phone] We Performed the Following AMB POC EKG ROUTINE W/ 12 LEADS, INTER & REP [54304 CPT(R)] AMB POC URINE, MICROALBUMIN, SEMIQUANT (3 RESULTS) [94215 CPT(R)] CBC W/O DIFF [94432 CPT(R)] CULTURE, URINE [id number] CPT(R)] HM GLAUCOMA SCREENING [LF39 Custom] LIPID PANEL [29270 CPT(R)] METABOLIC PANEL, COMPREHENSIVE [39165 CPT(R)] NM FALLS RISK ASSESSMENT DOCUMENTED [1979Q CPT(R)] PSA, DIAGNOSTIC (PROSTATE SPECIFIC AG) [id number] CPT(R)] REFERRAL TO GASTROENTEROLOGY [OSO51 Custom] REFERRAL TO OPHTHALMOLOGY [REF57 Custom] Comments:  
 See Dr. Phylliss Spurling for glaucoma screening #[phone] TSH 3RD GENERATION [52246 CPT(R)] Referral Information Referral ID Referred By Referred To  
  
 [id number] [address]  [place], [address] [facility] Visits Status Start Date End Date 1 New Request 8/26/18 8/26/19 If your referral has a status of pending review or denied, additional information will be sent to support the outcome of this decision. Referral ID Referred By Referred To  
 [id number] Lesleigh Nash [facility] [address] Visits Status Start Date End Date 1 New Request 8/26/18 8/26/19 If your referral has a status of pending review or denied, additional information will be sent to support the outcome of this decision. Patient Instructions Fasting labs soon Obtain EKG at follow up See Dr. Phylliss Spurling for eye exam in mid September (#[phone]) See Dr. Jesus Alberto Hernandez for rectal bleeding. #[phone] (call for an appointment) Consider physical therapy for fall prevention Consider a flu vaccine soon Consider vaccines for tetanus and PCV13 pneumonia (Rx were given to obtain at [facility]) Start lexapro 10 mg one pill daily for energy Start miralax once daily for constipation Follow up with Dr. Jhonny Sanabria for Parkinsonism Stay active. Do no fall Wash scalp and face with Selsun Blue medicated shampoo. Apply tomovate scalp solution at bedtime Introducing [facility]! [facility] introduces [employer] patient portal. Now you can access parts of your medical record, email your doctor's office, and request medication refills online. 1. In your internet browser, go to [email or web address] 2. Click on the First Time User? Click Here link in the Sign In box. You will see the New Member Sign Up page. 3. Enter your [employer] Access Code exactly as it appears below. You will not need to use this code after youve completed the sign-up process. If you do not sign up before the expiration date, you must request a new code. · [employer] Access Code: [email or web address] Expires: 11/24/2018  9:17 AM 
 
4. Enter the last four digits of your Social Security Number (xxxx) and Date of Birth (mm/dd/yyyy) as indicated and click Submit. You will be taken to the next sign-up page. 5. Create a [employer] ID. This will be your [employer] login ID and cannot be changed, so think of one that is secure and easy to remember. 6. Create a [employer] password. You can change your password at any time. 7. Enter your Password Reset Question and Answer. This can be used at a later time if you forget your password. 8. Enter your e-mail address. You will receive e-mail notification when new information is available in [address] 9. Click Sign Up. You can now view and download portions of your medical record. 10. Click the Download Summary menu link to download a portable copy of your medical information. If you have questions, please visit the Frequently Asked Questions section of the [employer] website. Remember, [employer] is NOT to be used for urgent needs. For medical emergencies, dial 911. Now available from your iPhone and Android! Please provide this summary of care documentation to your next provider. Your primary care clinician is listed as Cassie Doty. If you have any questions after today's visit, please call [phone].

## 2018-08-26 NOTE — PROGRESS NOTES
Chief Complaint   Patient presents with    Constipation     Continuous constipation and bleeding during bowel movements

## 2018-08-27 ENCOUNTER — LAB ONLY (OUTPATIENT)
Dept: FAMILY MEDICINE CLINIC | Age: 63
End: 2018-08-27

## 2018-08-27 ENCOUNTER — HOSPITAL ENCOUNTER (OUTPATIENT)
Dept: LAB | Age: 63
Discharge: HOME | End: 2018-08-27
Payer: MEDICARE

## 2018-08-27 LAB
ALBUMIN UR QL STRIP: 10 MG/L
CREATININE, URINE POC: 50 MG/DL
MICROALBUMIN/CREAT RATIO POC: <30 MG/G

## 2018-08-27 PROCEDURE — 84153 ASSAY OF PSA TOTAL: CPT

## 2018-08-27 PROCEDURE — 36415 COLL VENOUS BLD VENIPUNCTURE: CPT

## 2018-08-27 PROCEDURE — 80061 LIPID PANEL: CPT

## 2018-08-27 PROCEDURE — 80053 COMPREHEN METABOLIC PANEL: CPT

## 2018-08-27 PROCEDURE — 85027 COMPLETE CBC AUTOMATED: CPT

## 2018-08-27 PROCEDURE — 84443 ASSAY THYROID STIM HORMONE: CPT

## 2018-08-27 NOTE — MR AVS SNAPSHOT
[address] [phone] Patient: Curtis Frias MRN: [id number] EUP:4[birth year] Visit Information Date & Time Provider Department Dept. Phone Encounter #  
 8/27/2018 10:00 AM LAB [facility] [address] [facility] [phone] [id number] Your Appointments 9/19/2018 10:40 AM  
Follow Up with Karen Archibald MD  
[facility] Appt Note: follow up PD  
 [address] [facility] [address] [address] [phone]  
  
   
 [place] [address] [id number] [address] Upcoming Health Maintenance Date Due ZOSTER VACCINE AGE 60> 8/26/2019* Pneumococcal 65+ Low/Medium Risk (1 of 2 - PCV13) 8/26/2019* Influenza Age 5 to Adult 8/26/2019* FOBT Q 1 YEAR AGE 50-75 8/26/2019* DTaP/Tdap/Td series (1 - Tdap) 8/26/2028* MEDICARE YEARLY EXAM 8/27/2019 GLAUCOMA SCREENING Q2Y 8/26/2020 *Topic was postponed. The date shown is not the original due date. Allergies as of 8/27/2018  Review Complete On: 8/26/2018 By: Marleen Clement MD  
  
 Severity Noted Reaction Type Reactions Sulfa (Sulfonamide Antibiotics)  01/20/2013    Rash Current Immunizations  Never Reviewed No immunizations on file. Not reviewed this visit Vitals Smoking Status Never Smoker Preferred Pharmacy Pharmacy Name Phone [facility] [phone] Your Updated Medication List  
  
   
This list is accurate as of 8/27/18 11:37 AM.  Always use your most recent med list.  
  
  
  
  
 carbidopa-levodopa  mg per tablet Commonly known as:  SINEMET Take 1 Tab by mouth three (3) times daily. clobetasol 0.05 % external solution Commonly known as:  Dana Sosa Apply to scaling skin at bedtime escitalopram oxalate 10 mg tablet Commonly known as:  Shan Barrs Take 1 Tab by mouth every morning. polyethylene glycol 17 gram packet Commonly known as:  Jai Crow Take 1 Packet by mouth daily. TYLENOL PO Take  by mouth. Introducing [facility]! [facility] introduces [employer] patient portal. Now you can access parts of your medical record, email your doctor's office, and request medication refills online. 1. In your internet browser, go to [email or web address] 2. Click on the First Time User? Click Here link in the Sign In box. You will see the New Member Sign Up page. 3. Enter your [employer] Access Code exactly as it appears below. You will not need to use this code after youve completed the sign-up process. If you do not sign up before the expiration date, you must request a new code. · [employer] Access Code: [email or web address] Expires: 11/24/2018  9:17 AM 
 
4. Enter the last four digits of your Social Security Number (xxxx) and Date of Birth (mm/dd/yyyy) as indicated and click Submit. You will be taken to the next sign-up page. 5. Create a [employer] ID. This will be your [employer] login ID and cannot be changed, so think of one that is secure and easy to remember. 6. Create a [employer] password. You can change your password at any time. 7. Enter your Password Reset Question and Answer. This can be used at a later time if you forget your password. 8. Enter your e-mail address. You will receive e-mail notification when new information is available in [address] 9. Click Sign Up. You can now view and download portions of your medical record. 10. Click the Download Summary menu link to download a portable copy of your medical information. If you have questions, please visit the Frequently Asked Questions section of the [employer] website. Remember, [employer] is NOT to be used for urgent needs. For medical emergencies, dial 911. Now available from your iPhone and Android! Please provide this summary of care documentation to your next provider. Your primary care clinician is listed as Omar Atwood. If you have any questions after today's visit, please call [phone].

## 2018-08-28 LAB
ALBUMIN SERPL-MCNC: 4.2 G/DL (ref 3.6–4.8)
ALBUMIN/GLOB SERPL: 1.4 {RATIO} (ref 1.2–2.2)
ALP SERPL-CCNC: 61 IU/L (ref 39–117)
ALT SERPL-CCNC: 9 IU/L (ref 0–44)
AST SERPL-CCNC: 14 IU/L (ref 0–40)
BACTERIA UR CULT: NORMAL
BILIRUB SERPL-MCNC: 1.2 MG/DL (ref 0–1.2)
BUN SERPL-MCNC: 9 MG/DL (ref 8–27)
BUN/CREAT SERPL: 10 (ref 10–24)
CALCIUM SERPL-MCNC: 9.2 MG/DL (ref 8.6–10.2)
CHLORIDE SERPL-SCNC: 103 MMOL/L (ref 96–106)
CHOLEST SERPL-MCNC: 200 MG/DL (ref 100–199)
CO2 SERPL-SCNC: 24 MMOL/L (ref 20–29)
CREAT SERPL-MCNC: 0.91 MG/DL (ref 0.76–1.27)
ERYTHROCYTE [DISTWIDTH] IN BLOOD BY AUTOMATED COUNT: 13.9 % (ref 12.3–15.4)
GLOBULIN SER CALC-MCNC: 3.1 G/DL (ref 1.5–4.5)
GLUCOSE SERPL-MCNC: 89 MG/DL (ref 65–99)
HCT VFR BLD AUTO: 45.6 % (ref 37.5–51)
HDLC SERPL-MCNC: 41 MG/DL
HGB BLD-MCNC: 15.4 G/DL (ref 13–17.7)
INTERPRETATION, 910389: NORMAL
LDLC SERPL CALC-MCNC: 109 MG/DL (ref 0–99)
MCH RBC QN AUTO: 29.6 PG (ref 26.6–33)
MCHC RBC AUTO-ENTMCNC: 33.8 G/DL (ref 31.5–35.7)
MCV RBC AUTO: 88 FL (ref 79–97)
PLATELET # BLD AUTO: 204 X10E3/UL (ref 150–379)
POTASSIUM SERPL-SCNC: 4.6 MMOL/L (ref 3.5–5.2)
PROT SERPL-MCNC: 7.3 G/DL (ref 6–8.5)
PSA SERPL-MCNC: 0.7 NG/ML (ref 0–4)
RBC # BLD AUTO: 5.21 X10E6/UL (ref 4.14–5.8)
SODIUM SERPL-SCNC: 142 MMOL/L (ref 134–144)
TRIGL SERPL-MCNC: 252 MG/DL (ref 0–149)
TSH SERPL DL<=0.005 MIU/L-ACNC: 2.19 UIU/ML (ref 0.45–4.5)
VLDLC SERPL CALC-MCNC: 50 MG/DL (ref 5–40)
WBC # BLD AUTO: 8 X10E3/UL (ref 3.4–10.8)

## 2018-08-29 ENCOUNTER — TELEPHONE (OUTPATIENT)
Dept: FAMILY MEDICINE CLINIC | Age: 63
End: 2018-08-29

## 2018-08-29 DIAGNOSIS — N41.1 PROSTATITIS, CHRONIC: Primary | ICD-10-CM

## 2018-08-29 DIAGNOSIS — E78.9 LIPID DISORDER: ICD-10-CM

## 2018-08-29 RX ORDER — DOXYCYCLINE 100 MG/1
100 TABLET ORAL DAILY
Qty: 14 TAB | Refills: 0 | Status: SHIPPED | OUTPATIENT
Start: 2018-08-29 | End: 2018-09-08

## 2018-08-29 RX ORDER — ESCITALOPRAM OXALATE 10 MG/1
TABLET ORAL
Qty: 30 TAB | Refills: 0 | Status: SHIPPED | OUTPATIENT
Start: 2018-08-29 | End: 2018-09-19 | Stop reason: SDUPTHER

## 2018-08-29 RX ORDER — GUAIFENESIN 100 MG/5ML
81 LIQUID (ML) ORAL EVERY OTHER DAY
Qty: 90 TAB | Refills: 1
Start: 2018-08-29 | End: 2019-08-13

## 2018-08-29 NOTE — PROGRESS NOTES
It appears that you may have a low grade prostate infection. I suggest doxycycline 100 mg once daily for 2 weeks. Your cholesterol and triglycerides are creeping up. Focus on regular exercise (150 minutes each week) and healthy eating. Eat more fruits and vegetables. Eat more protein (egg whites, beans, and nuts you know you tolerate) and less carbohydrates (white bread, white rice, white pasta, white potatoes, sodas, and sweets). Eat appropriately small portion sizes. Recheck fasting labs in 3 months. If lipids remain up, we may need to consider treatment. I do suggest an 81 mg aspirin with food 3 times a week. Follow up for eye exam and colonoscopy as we discussed.

## 2018-08-29 NOTE — TELEPHONE ENCOUNTER
[address] [facility],    Please call to clarify directions on medication      doxycycline (ADOXA) 100 mg tablet       Call [phone]

## 2018-08-29 NOTE — TELEPHONE ENCOUNTER
Spoke with pharmacist, clarified medication is for 14 days not 10. Pharmacist verbalized understanding.

## 2018-09-19 ENCOUNTER — OFFICE VISIT (OUTPATIENT)
Dept: NEUROLOGY | Age: 63
End: 2018-09-19

## 2018-09-19 VITALS
DIASTOLIC BLOOD PRESSURE: 74 MMHG | WEIGHT: 161 LBS | HEIGHT: 69 IN | OXYGEN SATURATION: 97 % | SYSTOLIC BLOOD PRESSURE: 124 MMHG | HEART RATE: 84 BPM | BODY MASS INDEX: 23.85 KG/M2

## 2018-09-19 DIAGNOSIS — G20 PARKINSONISM, UNSPECIFIED PARKINSONISM TYPE (HCC): Primary | ICD-10-CM

## 2018-09-19 DIAGNOSIS — F32.A DEPRESSION, UNSPECIFIED DEPRESSION TYPE: ICD-10-CM

## 2018-09-19 RX ORDER — ESCITALOPRAM OXALATE 10 MG/1
10 TABLET ORAL DAILY
Qty: 30 TAB | Refills: 2 | Status: SHIPPED | OUTPATIENT
Start: 2018-09-19 | End: 2018-12-19 | Stop reason: SDUPTHER

## 2018-09-19 RX ORDER — PRAMIPEXOLE DIHYDROCHLORIDE 0.12 MG/1
TABLET ORAL
Qty: 126 TAB | Refills: 0 | Status: SHIPPED | OUTPATIENT
Start: 2018-09-19 | End: 2018-12-19 | Stop reason: SDUPTHER

## 2018-09-19 RX ORDER — PRAMIPEXOLE DIHYDROCHLORIDE 0.25 MG/1
TABLET ORAL
Qty: 210 TAB | Refills: 0 | Status: SHIPPED | OUTPATIENT
Start: 2018-09-19 | End: 2018-12-19 | Stop reason: SDUPTHER

## 2018-09-19 NOTE — MR AVS SNAPSHOT
[address] [place] [address] 
 
 
 [place] [address] [address] [phone] Patient: Jovi Tate MRN: [id number] [id number] Visit Information Date & Time Provider Department Dept. Phone Encounter #  
 9/19/2018 10:40 AM Dieudonne Sevilla MD [facility] Neurology [facility] [phone] [id number] Follow-up Instructions Return in about 3 months (around 12/19/2018). Your Appointments 12/19/2018 10:40 AM  
Follow Up with Dieudonne Sevilla MD  
[facility]) Appt Note: Follow up,pd,lsw,09/19/18 [place] [address] [address] [phone]  
  
   
 [place] [address] [address] [address] Upcoming Health Maintenance Date Due ZOSTER VACCINE AGE 60> 8/26/2019* Influenza Age 5 to Adult 8/26/2019* FOBT Q 1 YEAR AGE 50-75 8/26/2019* Pneumococcal 65+ Low/Medium Risk (2 of 2 - PPSV23) 8/26/2019 MEDICARE YEARLY EXAM 8/27/2019 GLAUCOMA SCREENING Q2Y 8/26/2020 DTaP/Tdap/Td series (2 - Td) 8/26/2028 *Topic was postponed. The date shown is not the original due date. Allergies as of 9/19/2018  Review Complete On: 9/19/2018 By: Hudson Goodpasture Severity Noted Reaction Type Reactions Sulfa (Sulfonamide Antibiotics)  01/20/2013    Rash Current Immunizations  Reviewed on 8/28/2018 Name Date Pneumococcal Conjugate (PCV-13) 8/26/2018 Tdap 8/26/2018 Not reviewed this visit You Were Diagnosed With   
  
 Codes Comments Parkinsonism, unspecified Parkinsonism type ([facility]    -  Primary ICD-10-CM: G20 
ICD-9-CM: 332.0 Depression, unspecified depression type     ICD-10-CM: F32.9 ICD-9-CM: 392 Vitals BP Pulse Height(growth percentile) Weight(growth percentile) SpO2 BMI  
 124/74 84 5' 9\" (1.753 m) 161 lb (73 kg) 97% 23.78 kg/m2 Smoking Status Never Smoker Vitals History BMI and BSA Data Body Mass Index Body Surface Area 23.78 kg/m 2 1.89 m 2 Preferred Pharmacy Pharmacy Name Phone [facility] [address] [phone] Your Updated Medication List  
  
   
This list is accurate as of 9/19/18 11:14 AM.  Always use your most recent med list.  
  
  
  
  
 aspirin 81 mg chewable tablet Take 1 Tab by mouth every other day. carbidopa-levodopa  mg per tablet Commonly known as:  SINEMET Take 1 Tab by mouth three (3) times daily. clobetasol 0.05 % external solution Commonly known as:  Adriane Nestle Apply to scaling skin at bedtime  
  
 escitalopram oxalate 10 mg tablet Commonly known as:  Wilene Heads Take 1 Tab by mouth daily. Anti-depressant for mood  
  
 polyethylene glycol 17 gram packet Commonly known as:  Carol Lamprey Take 1 Packet by mouth daily. * pramipexole 0.125 mg tablet Commonly known as:  MIRAPEX Week 1 and 2: take 1 tab three times a day. Week 3 and 4: take 2 tabs three times a day. For Parkinson's. * pramipexole 0.25 mg tablet Commonly known as:  MIRAPEX Week 1 and 2: 2 tabs three times a day. Week 3 and 4: 3 tabs three times a day. For Parkinson's. TYLENOL PO Take  by mouth. * Notice: This list has 2 medication(s) that are the same as other medications prescribed for you. Read the directions carefully, and ask your doctor or other care provider to review them with you. Prescriptions Printed Refills  
 pramipexole (MIRAPEX) 0.125 mg tablet 0 Sig: Week 1 and 2: take 1 tab three times a day. Week 3 and 4: take 2 tabs three times a day. For Parkinson's. Class: Print  
 pramipexole (MIRAPEX) 0.25 mg tablet 0 Sig: Week 1 and 2: 2 tabs three times a day. Week 3 and 4: 3 tabs three times a day. For Parkinson's.   
 Class: Print  
 escitalopram oxalate (LEXAPRO) 10 mg tablet 2  
 Sig: Take 1 Tab by mouth daily. Anti-depressant for mood Class: Print Route: Oral  
  
Follow-up Instructions Return in about 3 months (around 12/19/2018). Patient Instructions A Healthy Lifestyle: Care Instructions Your Care Instructions A healthy lifestyle can help you feel good, stay at a healthy weight, and have plenty of energy for both work and play. A healthy lifestyle is something you can share with your whole family. A healthy lifestyle also can lower your risk for serious health problems, such as high blood pressure, heart disease, and diabetes. You can follow a few steps listed below to improve your health and the health of your family. Follow-up care is a key part of your treatment and safety. Be sure to make and go to all appointments, and call your doctor if you are having problems. It's also a good idea to know your test results and keep a list of the medicines you take. How can you care for yourself at home? · Do not eat too much sugar, fat, or fast foods. You can still have dessert and treats now and then. The goal is moderation. · Start small to improve your eating habits. Pay attention to portion sizes, drink less juice and soda pop, and eat more fruits and vegetables. ¨ Eat a healthy amount of food. A 3-ounce serving of meat, for example, is about the size of a deck of cards. Fill the rest of your plate with vegetables and whole grains. ¨ Limit the amount of soda and sports drinks you have every day. Drink more water when you are thirsty. ¨ Eat at least 5 servings of fruits and vegetables every day. It may seem like a lot, but it is not hard to reach this goal. A serving or helping is 1 piece of fruit, 1 cup of vegetables, or 2 cups of leafy, raw vegetables. Have an apple or some carrot sticks as an afternoon snack instead of a candy bar.  Try to have fruits and/or vegetables at every meal. 
 · Make exercise part of your daily routine. You may want to start with simple activities, such as walking, bicycling, or slow swimming. Try to be active 30 to 60 minutes every day. You do not need to do all 30 to 60 minutes all at once. For example, you can exercise 3 times a day for 10 or 20 minutes. Moderate exercise is safe for most people, but it is always a good idea to talk to your doctor before starting an exercise program. 
· Keep moving. Ginette Folk the lawn, work in the garden, or [employer]. Take the stairs instead of the elevator at work. · If you smoke, quit. People who smoke have an increased risk for heart attack, stroke, cancer, and other lung illnesses. Quitting is hard, but there are ways to boost your chance of quitting tobacco for good. ¨ Use nicotine gum, patches, or lozenges. ¨ Ask your doctor about stop-smoking programs and medicines. ¨ Keep trying. In addition to reducing your risk of diseases in the future, you will notice some benefits soon after you stop using tobacco. If you have shortness of breath or asthma symptoms, they will likely get better within a few weeks after you quit. · Limit how much alcohol you drink. Moderate amounts of alcohol (up to 2 drinks a day for men, 1 drink a day for women) are okay. But drinking too much can lead to liver problems, high blood pressure, and other health problems. Family health If you have a family, there are many things you can do together to improve your health. · Eat meals together as a family as often as possible. · Eat healthy foods. This includes fruits, vegetables, lean meats and dairy, and whole grains. · Include your family in your fitness plan. Most people think of activities such as jogging or tennis as the way to fitness, but there are many ways you and your family can be more active. Anything that makes you breathe hard and gets your heart pumping is exercise. Here are some tips: ¨ Walk to do errands or to take your child to school or the bus. ¨ Go for a family bike ride after dinner instead of watching TV. Where can you learn more? Go to http://elliot-casper.info/. Enter Q761 in the search box to learn more about \"A Healthy Lifestyle: Care Instructions. \" Current as of: December 7, 2017 Content Version: 11.7 © 3939-2766 "[employer]. Care instructions adapted under license by [employer] (which disclaims liability or warranty for this information). If you have questions about a medical condition or this instruction, always ask your healthcare professional. [address] any warranty or liability for your use of this information. Introducing [facility]! Lyndsey Monroy introduces [employer] patient portal. Now you can access parts of your medical record, email your doctor's office, and request medication refills online. 1. In your internet browser, go to [email or web address]. [employer] 2. Click on the First Time User? Click Here link in the Sign In box. You will see the New Member Sign Up page. 3. Enter your [employer] Access Code exactly as it appears below. You will not need to use this code after youve completed the sign-up process. If you do not sign up before the expiration date, you must request a new code. · [employer] Access Code: [email or web address] Expires: 11/24/2018  9:17 AM 
 
4. Enter the last four digits of your Social Security Number (xxxx) and Date of Birth (mm/dd/yyyy) as indicated and click Submit. You will be taken to the next sign-up page. 5. Create a [employer] ID. This will be your [employer] login ID and cannot be changed, so think of one that is secure and easy to remember. 6. Create a [employer] password. You can change your password at any time. 7. Enter your Password Reset Question and Answer. This can be used at a later time if you forget your password. 8. Enter your e-mail address. You will receive e-mail notification when new information is available in [address] 9. Click Sign Up. You can now view and download portions of your medical record. 10. Click the Download Summary menu link to download a portable copy of your medical information. If you have questions, please visit the Frequently Asked Questions section of the [employer] website. Remember, [employer] is NOT to be used for urgent needs. For medical emergencies, dial 911. Now available from your iPhone and Android! Please provide this summary of care documentation to your next provider. Your primary care clinician is listed as Leonard Valdovinos. If you have any questions after today's visit, please call [phone].

## 2018-09-19 NOTE — PATIENT INSTRUCTIONS

## 2018-09-19 NOTE — PROGRESS NOTES
Interval HPI:   This is a 70 y.o. male who is following up for     Chief Complaint   Patient presents with    Follow-up     parkinsons       Remains on Sinemet 1 tab TID (couldn't tolerate higher dose). Given Rx for 1 month trial of lexapro at last visit as wife noted he didn't seem motivated to do anything, and easily irritated. Pt can't recall how he responded to the lexapro. Brief ROS: as above or otherwise negative    =====================    Brief Hx:    MRI Brain (images reviewed): mild chronic small vessel ischemic disease, and mild global atrophy, no hydrocephalus (i.e no evidence of NPH). EEG: normal awake and drowsy EEG. Couldn't tolerate Sinemet 1.5 TID due to cognitive difficulty so backed down to 1 tab TID. He/ wife reported that since being on Sinemet, he's more active, moving around better, voice is not as low as it used to be, not as restless as he used to be. Never had any tremors to begin with. No shuffling noted at initial visit but wife says he shuffles at times. Allergies   Allergen Reactions    Sulfa (Sulfonamide Antibiotics) Rash     Current Outpatient Prescriptions   Medication Sig Dispense Refill    pramipexole (MIRAPEX) 0.125 mg tablet Week 1 and 2: take 1 tab three times a day. Week 3 and 4: take 2 tabs three times a day. For Parkinson's. 126 Tab 0    pramipexole (MIRAPEX) 0.25 mg tablet Week 1 and 2: 2 tabs three times a day. Week 3 and 4: 3 tabs three times a day. For Parkinson's. 210 Tab 0    escitalopram oxalate (LEXAPRO) 10 mg tablet Take 1 Tab by mouth daily. Anti-depressant for mood 30 Tab 2    carbidopa-levodopa (SINEMET)  mg per tablet Take 1 Tab by mouth three (3) times daily. 90 Tab 1    ACETAMINOPHEN (TYLENOL PO) Take  by mouth.  aspirin 81 mg chewable tablet Take 1 Tab by mouth every other day. 90 Tab 1    polyethylene glycol (MIRALAX) 17 gram packet Take 1 Packet by mouth daily.  90 Packet 2    clobetasol (TEMOVATE) 0.05 % external solution Apply to scaling skin at bedtime 50 mL 11       History reviewed. No pertinent past medical history. History reviewed. No pertinent surgical history. History reviewed. No pertinent family history. Social History     Social History    Marital status:      Spouse name: N/A    Number of children: N/A    Years of education: N/A     Occupational History    Not on file. Social History Main Topics    Smoking status: Never Smoker    Smokeless tobacco: Never Used    Alcohol use No    Drug use: No    Sexual activity: Not on file     Other Topics Concern    Not on file     Social History Narrative       Physical Exam  Blood pressure 124/74, pulse 84, height 5' 9\" (1.753 m), weight 73 kg (161 lb), SpO2 97 %. Awake, alert, masked facial expression bilaterally but conversant, low voice  Neck: no stiffness  Skin: no rashes    Focused Neurological Exam     Mental status: Alert and oriented to person, place situation. Language: normal fluency and comprehension; no dysarthria. CNs:   Visual fields grossly normal  Extraocular movements intact, no nystagmus  Face appears symmetric and facial strength normal.    Hearing is intact to casual conversation. Sensory: intact light touch in arms  Motor: Normal bulk and strength in all 4 extremities. Reflexes: not examined this visit  Cerebellar: no resting tremors, mild cogwheel rigidity both arms  Gait: ambulates independently, forward leaning gait, not shuffling, right shoulder dropped, reduced right arm swing      Impression      ICD-10-CM ICD-9-CM    1. Parkinsonism, unspecified Parkinsonism type ([facility] G20 332.0 pramipexole (MIRAPEX) 0.125 mg tablet      pramipexole (MIRAPEX) 0.25 mg tablet      REFERRAL TO OCCUPATIONAL THERAPY   2.  Depression, unspecified depression type F32.9 311 escitalopram oxalate (LEXAPRO) 10 mg tablet       70 y.o. male with Parkinsonian features (hypo-phonia, masked facial expression, reduced mobility, never any tremors, subjective/ intermittent shuffling per wife). Some improvement since starting Sinemet , one tab TID (can't tolerate higher doses). Discussed addition of a 2nd med (pramipexole) to see if that improves mobility/ gait. Pt was agreeable to try. Wrote 2 separate Rxs, one starting at 0.125 mg TID x 2 weeks, then increase to 2 tabs TID x 2 weeks. Next switch to new Rx of Pramipexole 0.25 mg, two tabs TID x 2 weeks, then increase to 3 tabs TID x 2 weeks. Renewed Rx Lexapro 10 mg/ day for suspected depression    D/w pt and wife in detail that he should not be driving until/ unless he is cleared through a formal driving eval course. Referred him to [facility] for Drivers Eval.  Wife/ son asked whether I would fill out a form saying the he needed transportation to medical appointments. I told them to drop off the form and I will sign. F/u in 3 months.

## 2018-09-23 DIAGNOSIS — G25.81 RESTLESS LEG: ICD-10-CM

## 2018-09-23 DIAGNOSIS — G20 PARKINSONISM, UNSPECIFIED PARKINSONISM TYPE (HCC): ICD-10-CM

## 2018-10-03 RX ORDER — CARBIDOPA AND LEVODOPA 25; 100 MG/1; MG/1
TABLET ORAL
Qty: 90 TAB | Refills: 0 | Status: SHIPPED | OUTPATIENT
Start: 2018-10-03 | End: 2018-12-19 | Stop reason: SDUPTHER

## 2018-12-12 ENCOUNTER — OFFICE VISIT (OUTPATIENT)
Dept: FAMILY MEDICINE CLINIC | Age: 63
End: 2018-12-12

## 2018-12-12 VITALS
HEIGHT: 69 IN | RESPIRATION RATE: 16 BRPM | WEIGHT: 164 LBS | HEART RATE: 89 BPM | OXYGEN SATURATION: 98 % | DIASTOLIC BLOOD PRESSURE: 84 MMHG | BODY MASS INDEX: 24.29 KG/M2 | TEMPERATURE: 98.5 F | SYSTOLIC BLOOD PRESSURE: 150 MMHG

## 2018-12-12 DIAGNOSIS — Z53.20 COLONOSCOPY REFUSED: ICD-10-CM

## 2018-12-12 DIAGNOSIS — M62.89 HERNIA OF FASCIA: Primary | ICD-10-CM

## 2018-12-12 RX ORDER — IBUPROFEN 200 MG
TABLET ORAL AS NEEDED
COMMUNITY
End: 2018-12-12

## 2018-12-12 NOTE — PROGRESS NOTES
1. Have you been to the ER, urgent care clinic since your last visit? Hospitalized since your last visit? No    2. Have you seen or consulted any other health care providers outside of the [address] [place] [address] since your last visit? Include any pap smears or colon screening. No    Chief Complaint   Patient presents with    Groin Pain     > one week       Blood pressure 150/84, pulse 89, temperature 98.5 °F (36.9 °C), temperature source Oral, resp. rate 16, height 5' 9\" (1.753 m), weight 164 lb (74.4 kg), SpO2 98 %.

## 2018-12-12 NOTE — PATIENT INSTRUCTIONS
Take tylenol for pain    Consider colonoscopy as we previously discussed    See Dr Sammy Moore for surgical evaluation    Call Dr. Sammy Moore #[phone] for an appointment then call our referral coordinator (#[phone]) with the date and time and the first and last name of who you will be seeing so we can authorize your referral with your insurance.

## 2018-12-12 NOTE — PROGRESS NOTES
Devan Cleaning is a 70 y.o. male      Issues discussed today include:        Signs and symptoms:  Pain in right groin  Duration:  2 weeks  Context:  Feels like flesh tearing he says  Location:  [facility]  Quality:  Size of 50 cent piece  Severity:  uncomfortable  Timing:  now  Modifying factors:  Refer to surgeon  He declined to get colonoscopy as we previously discussed    Data reviewed or ordered today:  Refer Dr. Kaylee Gates  Other problems include:  Patient Active Problem List   Diagnosis Code    Parkinsonism ([facility] G20    Restless leg G25.81    Depression F32.9    Lipid disorder E78.9    Colonoscopy refused Z53.20    Hernia of fascia M62.89       Medications:  Current Outpatient Medications   Medication Sig Dispense Refill    ibuprofen (MOTRIN IB) 200 mg tablet Take  by mouth as needed for Pain.  carbidopa-levodopa (SINEMET)  mg per tablet TAKE 1 TABLET BY MOUTH THREE TIMES A DAY 90 Tab 0    escitalopram oxalate (LEXAPRO) 10 mg tablet Take 1 Tab by mouth daily. Anti-depressant for mood 30 Tab 2    polyethylene glycol (MIRALAX) 17 gram packet Take 1 Packet by mouth daily. 90 Packet 2    ACETAMINOPHEN (TYLENOL PO) Take  by mouth as needed.  pramipexole (MIRAPEX) 0.125 mg tablet Week 1 and 2: take 1 tab three times a day. Week 3 and 4: take 2 tabs three times a day. For Parkinson's. 126 Tab 0    pramipexole (MIRAPEX) 0.25 mg tablet Week 1 and 2: 2 tabs three times a day. Week 3 and 4: 3 tabs three times a day. For Parkinson's. 210 Tab 0    aspirin 81 mg chewable tablet Take 1 Tab by mouth every other day. 90 Tab 1    clobetasol (TEMOVATE) 0.05 % external solution Apply to scaling skin at bedtime 50 mL 11       Allergies: Allergies   Allergen Reactions    Sulfa (Sulfonamide Antibiotics) Rash       LMP:  No LMP for male patient.     Social History     Socioeconomic History    Marital status:      Spouse name: Not on file    Number of children: Not on file    Years of education: Not on file    Highest education level: Not on file   Social Needs    Financial resource strain: Not on file    Food insecurity - worry: Not on file    Food insecurity - inability: Not on file    Transportation needs - medical: Not on file   [employer] [facility] needs - non-medical: Not on file   Occupational History    Not on file   Tobacco Use    Smoking status: Never Smoker    Smokeless tobacco: Never Used   Substance and Sexual Activity    Alcohol use: No    Drug use: No    Sexual activity: Not on file   Other Topics Concern    Not on file   Social History Narrative    Not on file         No family history on file. Meaningful use:  done      ROS:  Headaches:  no  Chest Pain:  no  SOB:  no  Fevers:  no  Falls:  no    Other significant ROS:      No LMP for male patient. Physical Exam  Visit Vitals  /84 (BP 1 Location: Left arm, BP Patient Position: Sitting)   Pulse 89   Temp 98.5 °F (36.9 °C) (Oral)   Resp 16   Ht 5' 9\" (1.753 m)   Wt 164 lb (74.4 kg)   SpO2 98%   BMI 24.22 kg/m²     BP Readings from Last 3 Encounters:   12/12/18 150/84   09/19/18 124/74   08/26/18 95/66     Constitutional:  Appears well,  No Acute Distress, Vitals noted  Psychiatric:   Affect normal, Alert and cooperative, Oriented to person/place/time    :  Both testicles descended, +right inguinal hernia  we previously discussed the USPSTF guidelines on prostate cancer screening and physical exam of the prostate was not done                Assessment:    Patient Active Problem List   Diagnosis Code    Parkinsonism ([facility] G20    Restless leg G25.81    Depression F32.9    Lipid disorder E78.9    Colonoscopy refused Z53.20    Hernia of fascia M62.89       Today's diagnoses are:  Diagnoses and all orders for this visit:    1. Colonoscopy refused    2.  Hernia of fascia  -     REFERRAL TO GENERAL SURGERY          Plan:  Orders Placed This Encounter    [address] [facility] [place] Surgery ref [facility]     Referral Priority:   Routine     Referral Type:   Consultation     Referral Reason:   Specialty Services Required     Referred to Provider:   Evert Otto MD     Number of Visits Requested:   1    ibuprofen (MOTRIN IB) 200 mg tablet     Sig: Take  by mouth as needed for Pain. See patient instructions  There are no Patient Instructions on file for this visit.       Arrange diagnoses:  done    Go down rooming path:      Check meds:  done    AVS Printed: done

## 2018-12-14 ENCOUNTER — OFFICE VISIT (OUTPATIENT)
Dept: SURGERY | Age: 63
End: 2018-12-14

## 2018-12-14 VITALS
BODY MASS INDEX: 24.29 KG/M2 | WEIGHT: 164 LBS | DIASTOLIC BLOOD PRESSURE: 72 MMHG | HEART RATE: 82 BPM | SYSTOLIC BLOOD PRESSURE: 109 MMHG | TEMPERATURE: 98.1 F | RESPIRATION RATE: 14 BRPM | HEIGHT: 69 IN | OXYGEN SATURATION: 98 %

## 2018-12-14 DIAGNOSIS — R10.31 RIGHT GROIN PAIN: Primary | ICD-10-CM

## 2018-12-14 NOTE — PROGRESS NOTES
1. Have you been to the ER, urgent care clinic since your last visit? Hospitalized since your last visit? No    2. Have you seen or consulted any other health care providers outside of the [address] [place] [address] since your last visit? Include any pap smears or colon screening.  No

## 2018-12-14 NOTE — PROGRESS NOTES
Surgery History and Physical    Subjective:      Devan Cleaning is a 70 y.o. [facility] male who presents for evaluation of a right inguinal hernia. About 15 days ago, Mr. Dejon Dennis was lifting and developed a scratching, stretching pain in his right groin. The pain is worse when he walks. He has not noticed a bulge. He denies any symptoms on the left side. He is eating fine and moving his bowels with constipation and urinating fine. He denies any previous hernia repairs or abdominal surgery. Past Medical History:   Diagnosis Date    Depression     Parkinson disease ([address]     Restless leg syndrome      History reviewed. No pertinent surgical history. History reviewed. No pertinent family history. Social History     Tobacco Use    Smoking status: Never Smoker    Smokeless tobacco: Never Used   Substance Use Topics    Alcohol use: No      Prior to Admission medications    Medication Sig Start Date End Date Taking? Authorizing Provider   carbidopa-levodopa (SINEMET)  mg per tablet TAKE 1 TABLET BY MOUTH THREE TIMES A DAY 10/3/18  Yes Jasbir Recinos MD   pramipexole (MIRAPEX) 0.125 mg tablet Week 1 and 2: take 1 tab three times a day. Week 3 and 4: take 2 tabs three times a day. For Parkinson's. 9/19/18  Yes Jasbir Recinos MD   pramipexole (MIRAPEX) 0.25 mg tablet Week 1 and 2: 2 tabs three times a day. Week 3 and 4: 3 tabs three times a day. For Parkinson's. 9/19/18  Yes Jasbir Recinos MD   escitalopram oxalate (LEXAPRO) 10 mg tablet Take 1 Tab by mouth daily. Anti-depressant for mood 9/19/18  Yes Jasbir Recinos MD   aspirin 81 mg chewable tablet Take 1 Tab by mouth every other day. 8/29/18  Yes Loan Gamboa MD   polyethylene glycol [facility]) 17 gram packet Take 1 Packet by mouth daily.  8/26/18  Yes Loan Gamboa MD   clobetasol (TEMOVATE) 0.05 % external solution Apply to scaling skin at bedtime 8/26/18  Yes Loan Gamboa MD   ACETAMINOPHEN (TYLENOL PO) Take  by mouth as needed. Yes Provider, Historical      Allergies   Allergen Reactions    Sulfa (Sulfonamide Antibiotics) Rash       Review of Systems:  A comprehensive review of systems was negative except for that written in the History of Present Illness. Objective:      Physical Exam:  GENERAL: alert, cooperative, no distress, appears stated age, EYE: negative findings: anicteric sclera, LYMPHATIC: Cervical, supraclavicular, and axillary nodes normal. , THROAT & NECK: neck supple and symmetrical.  The thyroid is grossly normal., LUNG: clear to auscultation bilaterally, HEART: regular rate and rhythm, ABDOMEN: Soft, NT, ND. There are no masses. , GROIN: On the right, there is no hernia. On the left, there is no hernia. Genitalia is grossly normal.  Testicles are descended bilaterally. , EXTREMITIES:  no edema, SKIN: Normal., NEUROLOGIC: negative, PSYCHIATRIC: non focal    Assessment:     Right groin pain. Plan:     Mr. Jenny Angulo does not have a hernia evident by my exam today. He denies any pain presently. He is not able to give much effort with Valsalva so I will order an US of the right groin to evaluate for a hernia. I will call his wife with the results. Further management will be based on the findings.     Signed By: Gissell Davis MD     December 14, 2018

## 2018-12-17 DIAGNOSIS — R10.31 RIGHT GROIN PAIN: Primary | ICD-10-CM

## 2018-12-18 ENCOUNTER — OFFICE VISIT (OUTPATIENT)
Dept: FAMILY MEDICINE CLINIC | Age: 63
End: 2018-12-18

## 2018-12-18 VITALS
WEIGHT: 165 LBS | DIASTOLIC BLOOD PRESSURE: 84 MMHG | SYSTOLIC BLOOD PRESSURE: 129 MMHG | HEART RATE: 86 BPM | TEMPERATURE: 97.6 F | OXYGEN SATURATION: 97 % | BODY MASS INDEX: 24.44 KG/M2 | RESPIRATION RATE: 16 BRPM | HEIGHT: 69 IN

## 2018-12-18 DIAGNOSIS — Z91.09 ENVIRONMENTAL ALLERGIES: Primary | ICD-10-CM

## 2018-12-18 DIAGNOSIS — R05.9 COUGH: ICD-10-CM

## 2018-12-18 RX ORDER — GUAIFENESIN 600 MG/1
600 TABLET, EXTENDED RELEASE ORAL 2 TIMES DAILY
Qty: 10 TAB | Refills: 0 | Status: SHIPPED | OUTPATIENT
Start: 2018-12-18 | End: 2019-07-01 | Stop reason: ALTCHOICE

## 2018-12-18 NOTE — PROGRESS NOTES
Chief Complaint   Patient presents with    Cough     times 2 days    Watery Eyes    Nasal Discharge     1. Have you been to the ER, urgent care clinic since your last visit? Hospitalized since your last visit? No    2. Have you seen or consulted any other health care providers outside of the [address] [place] [address] since your last visit? Include any pap smears or colon screening.  No

## 2018-12-18 NOTE — PROGRESS NOTES
Subjective  Siobhan Kevin is an 70 y.o. male who presents for dry cough. Pt states symptoms started 2 days ago. No fever, SOB, N/V. Eating and drinking at baseline. The symptoms are similar to env allergies that he has hx of, but he moved to a new home and has not unpacked his medication yet. No one else is sick at home. The patient denies any recent travel history. Never smoker. Allergies - reviewed: Allergies   Allergen Reactions    Sulfa (Sulfonamide Antibiotics) Rash         Medications - reviewed:   Current Outpatient Medications   Medication Sig    guaiFENesin ER (MUCINEX) 600 mg ER tablet Take 1 Tab by mouth two (2) times a day.  aspirin 81 mg chewable tablet Take 1 Tab by mouth every other day.  escitalopram oxalate (LEXAPRO) 10 mg tablet Take 1 Tab by mouth daily. Anti-depressant for mood    pramipexole (MIRAPEX) 0.125 mg tablet Week 1 and 2: take 1 tab three times a day. Week 3 and 4: take 2 tabs three times a day. For Parkinson's.  pramipexole (MIRAPEX) 0.25 mg tablet Week 1 and 2: 2 tabs three times a day. Week 3 and 4: 3 tabs three times a day. For Parkinson's.  carbidopa-levodopa (SINEMET)  mg per tablet Take 1 Tab by mouth three (3) times daily.  polyethylene glycol (MIRALAX) 17 gram packet Take 1 Packet by mouth daily.  clobetasol (TEMOVATE) 0.05 % external solution Apply to scaling skin at bedtime    ACETAMINOPHEN (TYLENOL PO) Take  by mouth as needed. No current facility-administered medications for this visit.           Past Medical History - reviewed:  Past Medical History:   Diagnosis Date    Depression     Parkinson disease ([facility]     Restless leg syndrome        Immunizations - reviewed:   Immunization History   Administered Date(s) Administered    Pneumococcal Conjugate (PCV-13) 08/26/2018    Tdap 08/26/2018       ROS  Denies fever, chills  Denies SOB, wheezing  Denies N/V/D  Denies headache or dizziness    Physical Exam  Visit Vitals  /84 Pulse 86   Temp 97.6 °F (36.4 °C) (Oral)   Resp 16   Ht 5' 9\" (1.753 m)   Wt 165 lb (74.8 kg)   SpO2 97%   BMI 24.37 kg/m²       Gen: Appears in NAD, well hydrated, no coughing  EYES:  Conjunctiva clear; pupils round and reactive to light; extraocular  movements are intact. EAR: External ears are normal.  Tympanic membranes are clear and without  effusion. NOSE: Turbinates are within normal limits. No drainage  OROPHYARYNX: No oral lesions or exudates. Postnasal drip. NECK:  Supple; no cervical lymphadenopathy           LUNGS: Respirations unlabored; clear to auscultation bilaterally  CARDIOVASCULAR: Regular, rate, and rhythm without murmurs, gallops or rubs   EXT: Well perfused. No edema. SKIN: No obvious rashes. Assessment/Plan    ICD-10-CM ICD-9-CM    1. Environmental allergies Z91.09 V15.09    2. Cough R05 786.2      VSS. Reassuring PE. Hydration. Restart Flonase and supportive care  Precautions given    Follow-up Disposition:  Return if symptoms worsen or fail to improve. I have discussed the diagnosis with the patient and the intended plan as seen in the above orders. Patient verbalized understanding of the plan and agrees with the plan. The patient has received an after-visit summary and questions were answered concerning future plans. I have discussed medication side effects and warnings with the patient as well. Informed patient to return to the office if new symptoms arise.         Maya Holman DO  Family Medicine Resident

## 2018-12-19 ENCOUNTER — OFFICE VISIT (OUTPATIENT)
Dept: NEUROLOGY | Age: 63
End: 2018-12-19

## 2018-12-19 VITALS
DIASTOLIC BLOOD PRESSURE: 84 MMHG | RESPIRATION RATE: 20 BRPM | HEIGHT: 69 IN | SYSTOLIC BLOOD PRESSURE: 122 MMHG | BODY MASS INDEX: 24.44 KG/M2 | HEART RATE: 88 BPM | OXYGEN SATURATION: 97 % | WEIGHT: 165 LBS

## 2018-12-19 DIAGNOSIS — G20 PARKINSONISM, UNSPECIFIED PARKINSONISM TYPE (HCC): ICD-10-CM

## 2018-12-19 DIAGNOSIS — F32.A DEPRESSION, UNSPECIFIED DEPRESSION TYPE: ICD-10-CM

## 2018-12-19 DIAGNOSIS — G25.81 RESTLESS LEG: ICD-10-CM

## 2018-12-19 RX ORDER — CARBIDOPA AND LEVODOPA 25; 100 MG/1; MG/1
1 TABLET ORAL 3 TIMES DAILY
Qty: 90 TAB | Refills: 1 | Status: SHIPPED | OUTPATIENT
Start: 2018-12-19 | End: 2019-03-13 | Stop reason: SDUPTHER

## 2018-12-19 RX ORDER — PRAMIPEXOLE DIHYDROCHLORIDE 0.25 MG/1
TABLET ORAL
Qty: 210 TAB | Refills: 0 | Status: SHIPPED | OUTPATIENT
Start: 2018-12-19 | End: 2019-03-13 | Stop reason: SDUPTHER

## 2018-12-19 RX ORDER — PRAMIPEXOLE DIHYDROCHLORIDE 0.12 MG/1
TABLET ORAL
Qty: 126 TAB | Refills: 0 | Status: SHIPPED | OUTPATIENT
Start: 2018-12-19 | End: 2019-03-13 | Stop reason: SDUPTHER

## 2018-12-19 RX ORDER — ESCITALOPRAM OXALATE 10 MG/1
10 TABLET ORAL DAILY
Qty: 30 TAB | Refills: 2 | Status: SHIPPED | OUTPATIENT
Start: 2018-12-19 | End: 2019-06-13 | Stop reason: SDUPTHER

## 2018-12-19 NOTE — PATIENT INSTRUCTIONS
Discharge instructions:  Salt water gargle. Plenty of fluids. Throat lozenges such as Halls as needed. Humidifier as needed. FLONASE AND claratin     Follow Up:  Get re-examined if not improved in  5-7 days or if symptoms worsen.      If you get suddenly worse, go to the nearest hospital Emergency Room      [phone]

## 2018-12-19 NOTE — PROGRESS NOTES
Parkinson's disease- he stated he has been doing fine, no changes he thinks he has improved     Less sleeping during the day   Less tiredness

## 2018-12-19 NOTE — PROGRESS NOTES
Interval HPI:   This is a 70 y.o. male who is following up for     Chief Complaint   Patient presents with    Follow-up     parkinson's disease       Last visit, pt was not able to tolerate more than 1 tab of Sinemet TID so given 2 tapering up Rxs of pramipexole (starting at 0.125 mg TID and ending at 0.25 mg THREE tabs TID). Pt doesn't adding the second med made much difference/ improvement, but wife says that there was some moving issues at the time and the medication got put in a box and she's not sure if he actually was taking it/ pramipexole. They also report he stopped taking the lexapro, no reason. Brief ROS: as above or otherwise negative    =====================    Brief Hx:    MRI Brain (images reviewed): mild chronic small vessel ischemic disease, and mild global atrophy, no hydrocephalus (i.e no evidence of NPH). EEG: normal awake and drowsy EEG. Couldn't tolerate Sinemet 1.5 TID due to cognitive difficulty so backed down to 1 tab TID. He/ wife reported that since being on Sinemet, he's more active, moving around better, voice is not as low as it used to be, not as restless as he used to be. Never had any tremors to begin with. No shuffling noted at initial visit but wife says he shuffles at times. Allergies   Allergen Reactions    Sulfa (Sulfonamide Antibiotics) Rash     Current Outpatient Medications   Medication Sig Dispense Refill    escitalopram oxalate (LEXAPRO) 10 mg tablet Take 1 Tab by mouth daily. Anti-depressant for mood 30 Tab 2    pramipexole (MIRAPEX) 0.125 mg tablet Week 1 and 2: take 1 tab three times a day. Week 3 and 4: take 2 tabs three times a day. For Parkinson's. 126 Tab 0    pramipexole (MIRAPEX) 0.25 mg tablet Week 1 and 2: 2 tabs three times a day. Week 3 and 4: 3 tabs three times a day. For Parkinson's. 210 Tab 0    carbidopa-levodopa (SINEMET)  mg per tablet Take 1 Tab by mouth three (3) times daily.  90 Tab 1    guaiFENesin ER (MUCINEX) 600 mg ER tablet Take 1 Tab by mouth two (2) times a day. 10 Tab 0    aspirin 81 mg chewable tablet Take 1 Tab by mouth every other day. 90 Tab 1    polyethylene glycol (MIRALAX) 17 gram packet Take 1 Packet by mouth daily. 90 Packet 2    clobetasol (TEMOVATE) 0.05 % external solution Apply to scaling skin at bedtime 50 mL 11    ACETAMINOPHEN (TYLENOL PO) Take  by mouth as needed. Past Medical History:   Diagnosis Date    Depression     Parkinson disease ([facility]     Restless leg syndrome        No past surgical history on file. No family history on file. Social History     Socioeconomic History    Marital status:      Spouse name: Not on file    Number of children: Not on file    Years of education: Not on file    Highest education level: Not on file   Social Needs    Financial resource strain: Not on file    Food insecurity - worry: Not on file    Food insecurity - inability: Not on file    Transportation needs - medical: Not on file   [employer] needs - non-medical: Not on file   Occupational History    Not on file   Tobacco Use    Smoking status: Never Smoker    Smokeless tobacco: Never Used   Substance and Sexual Activity    Alcohol use: No    Drug use: No    Sexual activity: Not on file   Other Topics Concern    Not on file   Social History Narrative    Not on file       Physical Exam  Blood pressure 122/84, pulse 88, resp. rate 20, height 5' 9\" (1.753 m), weight 74.8 kg (165 lb), SpO2 97 %. Awake, alert, masked facial expression bilaterally but conversant, low voice  Neck: no stiffness  Skin: no rashes    Focused Neurological Exam     Mental status: Alert and oriented to person, place situation. Language: normal fluency and comprehension; no dysarthria. CNs:   Visual fields grossly normal  Extraocular movements intact, no nystagmus  Face appears symmetric and facial strength normal.    Hearing is intact to casual conversation.      Sensory: intact light touch in arms  Motor: Normal bulk and strength in all 4 extremities. Reflexes: not examined this visit  Cerebellar: no resting tremors, mild cogwheel rigidity both arms  Gait: ambulates independently, forward leaning gait, not shuffling, right shoulder dropped, reduced right arm swing      Impression      ICD-10-CM ICD-9-CM    1. Depression, unspecified depression type F32.9 311 escitalopram oxalate (LEXAPRO) 10 mg tablet   2. Parkinsonism, unspecified Parkinsonism type ([facility] G20 332.0 pramipexole (MIRAPEX) 0.125 mg tablet      pramipexole (MIRAPEX) 0.25 mg tablet      carbidopa-levodopa (SINEMET)  mg per tablet   3. Restless leg G25.81 333.94 carbidopa-levodopa (SINEMET)  mg per tablet       70 y.o. male with Parkinsonian features (hypo-phonia, masked facial expression, reduced mobility, never any tremors, subjective/ intermittent shuffling per wife). Continue Sinemet , one tab TID (can't tolerate higher doses). Add back Lexapro 10 mg/ day. 1 month after adding back Lexapro, re-try Pramipexole, tapering up (instructions written on Rxs).   Follow up in 3 months    Signed By: Leatha Parkinson MD     December 19, 2018

## 2018-12-19 NOTE — PATIENT INSTRUCTIONS
1. Restart Lexapro 10 mg/ day for mood, irritability, reduced motivation    2. 1 month after being on Lexapro, start taking Pramipexole (for parkison's). Start with the first, lower dose prescription, and when that's done, move on to the 2nd Pramipexole prescription.     3. Follow up in 3 months

## 2018-12-24 ENCOUNTER — HOSPITAL ENCOUNTER (OUTPATIENT)
Dept: ULTRASOUND IMAGING | Age: 63
Discharge: HOME | End: 2018-12-24
Attending: SURGERY
Payer: MEDICARE

## 2018-12-24 ENCOUNTER — DOCUMENTATION ONLY (OUTPATIENT)
Dept: SURGERY | Age: 63
End: 2018-12-24

## 2018-12-24 DIAGNOSIS — R10.31 RIGHT GROIN PAIN: ICD-10-CM

## 2018-12-24 PROCEDURE — 76882 US LMTD JT/FCL EVL NVASC XTR: CPT

## 2018-12-24 NOTE — PROGRESS NOTES
12/24/18 - 100 PM    I spoke to Mrs. Ransom Lundborg and informed her that her 's US did not reveal a hernia or any other abnormality. I have recommended that she f/u with Dr. Jeremie Mccloud if he has any further problems.

## 2019-03-01 ENCOUNTER — OFFICE VISIT (OUTPATIENT)
Dept: FAMILY MEDICINE CLINIC | Age: 64
End: 2019-03-01

## 2019-03-01 VITALS
TEMPERATURE: 98.6 F | SYSTOLIC BLOOD PRESSURE: 122 MMHG | HEART RATE: 91 BPM | RESPIRATION RATE: 18 BRPM | OXYGEN SATURATION: 95 % | HEIGHT: 69 IN | WEIGHT: 172 LBS | BODY MASS INDEX: 25.48 KG/M2 | DIASTOLIC BLOOD PRESSURE: 73 MMHG

## 2019-03-01 DIAGNOSIS — J00 COMMON COLD: ICD-10-CM

## 2019-03-01 DIAGNOSIS — R05.9 COUGH: ICD-10-CM

## 2019-03-01 DIAGNOSIS — J02.9 SORE THROAT: Primary | ICD-10-CM

## 2019-03-01 LAB
QUICKVUE INFLUENZA TEST: NEGATIVE
S PYO AG THROAT QL: NEGATIVE
VALID INTERNAL CONTROL?: YES
VALID INTERNAL CONTROL?: YES

## 2019-03-01 NOTE — PATIENT INSTRUCTIONS
Advised on the need to stay well hydrated and that symptoms can last up to 1.5 weeks   - Can use tylenol/motrin as needed for generalized muscle pain and fever  -  for nasal congestion or nasal saline rinses 2-3 times daily  - Will give Mucinex for cough, may also try Tea with honey  - Chloraseptic throat losenges and saline gargles (1 tsp salt in 8 oz water) for sore   throat  - Wash hand frequently and cough/sneeze into your sleeve to help prevent   infection of others   - Educated on the lack of benefit of antibiotics in a viral illness but advised to call if symptoms worsening past 10 days

## 2019-03-01 NOTE — PROGRESS NOTES
Chief Complaint   Patient presents with    Sore Throat     x 3 days    Cough   :    HPI  Dequan Hilton is a 70 y.o. male who presents for an acute onset of cough and runny nose, 3 days ago and progressively improving. the context:siblings also sick. Associated symptoms are body aches, sore throat subjective fever and chills Tmax: 99F. Has has flu shot. Patient has tried tylenol, cold + which are helping. Patient denies   CP/ SOB/ N/V/ diarrhea. Allergies - reviewed: Allergies   Allergen Reactions    Sulfa (Sulfonamide Antibiotics) Rash     Immunization History   Administered Date(s) Administered    Pneumococcal Conjugate (PCV-13) 08/26/2018    Tdap 08/26/2018       Meds - reviewed:   Current Outpatient Medications   Medication Sig Dispense Refill    escitalopram oxalate (LEXAPRO) 10 mg tablet Take 1 Tab by mouth daily. Anti-depressant for mood 30 Tab 2    pramipexole (MIRAPEX) 0.125 mg tablet Week 1 and 2: take 1 tab three times a day. Week 3 and 4: take 2 tabs three times a day. For Parkinson's. 126 Tab 0    pramipexole (MIRAPEX) 0.25 mg tablet Week 1 and 2: 2 tabs three times a day. Week 3 and 4: 3 tabs three times a day. For Parkinson's. 210 Tab 0    carbidopa-levodopa (SINEMET)  mg per tablet Take 1 Tab by mouth three (3) times daily. 90 Tab 1    guaiFENesin ER (MUCINEX) 600 mg ER tablet Take 1 Tab by mouth two (2) times a day. 10 Tab 0    aspirin 81 mg chewable tablet Take 1 Tab by mouth every other day. 90 Tab 1    polyethylene glycol (MIRALAX) 17 gram packet Take 1 Packet by mouth daily. 90 Packet 2    clobetasol (TEMOVATE) 0.05 % external solution Apply to scaling skin at bedtime 50 mL 11    ACETAMINOPHEN (TYLENOL PO) Take  by mouth as needed.          PMH- reviewed:  Past Medical History:   Diagnosis Date    Depression     Parkinson disease ([facility]     Restless leg syndrome        SH- reviewed:  Smoker:  Social History     Tobacco Use   Smoking Status Never Smoker   Smokeless Tobacco Never Used       ETOH- reviewed:   Social History     Substance and Sexual Activity   Alcohol Use No       FH- reviewed:   No family history on file. ROS: Positive for Items in bold:   Constitutional: headache, fever, fatigue, weight loss or weight gain      Eyes: redness, pruritis, pain, visual changes, swelling, or discharge      Ears: ear pain, loss or changes in hearing     Nose: congestion, rhinorrhea  Oropharynx: sore throat, lesions in throat  Cardiac: chest pain      Resp: cough or shortness of breath      GI: nausea, vomiting, constipation, diarrhea, blood in stool      Physical Exam:  Visit Vitals  /73   Pulse 91   Temp 98.6 °F (37 °C) (Oral)   Resp 18   Ht 5' 9\" (1.753 m)   Wt 172 lb (78 kg)   SpO2 95%   BMI 25.40 kg/m²       Gen: No apparent distress. Alert and oriented and responds to all questions appropriately. Eyes: Conjunctiva clear, extraocular movements are intact. Ear: External ears are normal. Hearing grossly normal b/l. Tympanic membranes are clear and without effusion. Nose: Turbinates are within normal limits. + drainage. no sinus tenderness. Oropharynx: No oral lesions or exudates. Neck: Supple; no masses; no thyromegaly appreciated. No cervical LAD  Lungs: Respirations unlabored; clear to auscultation bilaterally  Cardio: Regular, rate, and rhythm without murmurs, gallops or rubs     Skin: No obvious rashes.       Lab Results   Component Value Date/Time    Sodium 142 08/27/2018 09:56 AM    Potassium 4.6 08/27/2018 09:56 AM    Chloride 103 08/27/2018 09:56 AM    CO2 24 08/27/2018 09:56 AM    Glucose 89 08/27/2018 09:56 AM    BUN 9 08/27/2018 09:56 AM    Creatinine 0.91 08/27/2018 09:56 AM    BUN/Creatinine ratio 10 08/27/2018 09:56 AM    GFR est  08/27/2018 09:56 AM    GFR est non-AA 88 08/27/2018 09:56 AM    Calcium 9.2 08/27/2018 09:56 AM     Lab Results   Component Value Date/Time    Cholesterol, total 200 (H) 08/27/2018 09:56 AM    HDL Cholesterol 41 08/27/2018 09:56 AM    LDL, calculated 109 (H) 08/27/2018 09:56 AM    VLDL, calculated 50 (H) 08/27/2018 09:56 AM    Triglyceride 252 (H) 08/27/2018 09:56 AM     No results found for: HBA1C, HGBE8, UAB7VUNA, NHB9DSXC, EHD8SOYS    I have personally reviewed the labs results  amb flu and strep negative      Assessment and Plan:   Annette Ewing is a 70 y.o. male who presents for uri symptoms  - Advised on the need to stay well hydrated and that symptoms can last up to 1.5 weeks   - Can use tylenol/motrin as needed for generalized muscle pain and fever  -  for nasal congestion or nasal saline rinses 2-3 times daily  - Will give Mucinex for cough, may also try Tea with honey  - Chloraseptic throat losenges and saline gargles (1 tsp salt in 8 oz water) for sore   throat  - Wash hand frequently and cough/sneeze into your sleeve to help prevent   infection of others   - Educated on the lack of benefit of antibiotics in a viral illness but advised to call if symptoms worsening past 10 days          ICD-10-CM ICD-9-CM    1. Sore throat J02.9 462 AMB POC RAPID STREP A   2. Cough R05 786.2 AMB POC RAPID INFLUENZA TEST   3. Common cold J00 460          Discussed diagnoses in detail with patient. Patient expressed understanding of and agreement to above plan. All questions and concerns addressed. Medication risks/benefits/side effects discussed with patient. Patient is counseled to return to the office and/or ED if symptoms do not improve as expected. Patient discussed with Dr. Gay Stanford, Attending Physician. Saundra Ibarra MD  03/01/19    Family Medicine Resident

## 2019-03-01 NOTE — PROGRESS NOTES
Chief Complaint   Patient presents with    Sore Throat     x 3 days    Cough     1. Have you been to the ER, urgent care clinic since your last visit? Hospitalized since your last visit? No    2. Have you seen or consulted any other health care providers outside of the [address] [place] [address] since your last visit? Include any pap smears or colon screening.  No

## 2019-03-13 ENCOUNTER — OFFICE VISIT (OUTPATIENT)
Dept: NEUROLOGY | Age: 64
End: 2019-03-13

## 2019-03-13 VITALS
HEART RATE: 96 BPM | HEIGHT: 69 IN | DIASTOLIC BLOOD PRESSURE: 72 MMHG | RESPIRATION RATE: 20 BRPM | WEIGHT: 174.9 LBS | TEMPERATURE: 97.5 F | BODY MASS INDEX: 25.9 KG/M2 | OXYGEN SATURATION: 97 % | SYSTOLIC BLOOD PRESSURE: 112 MMHG

## 2019-03-13 DIAGNOSIS — G25.81 RESTLESS LEG: ICD-10-CM

## 2019-03-13 DIAGNOSIS — G20 PARKINSONISM, UNSPECIFIED PARKINSONISM TYPE (HCC): ICD-10-CM

## 2019-03-13 RX ORDER — PRAMIPEXOLE DIHYDROCHLORIDE 0.25 MG/1
TABLET ORAL
Qty: 210 TAB | Refills: 0 | Status: SHIPPED | OUTPATIENT
Start: 2019-03-13 | End: 2019-06-13

## 2019-03-13 RX ORDER — PRAMIPEXOLE DIHYDROCHLORIDE 0.12 MG/1
TABLET ORAL
Qty: 126 TAB | Refills: 0 | Status: SHIPPED | OUTPATIENT
Start: 2019-03-13 | End: 2019-06-13

## 2019-03-13 RX ORDER — CARBIDOPA AND LEVODOPA 25; 100 MG/1; MG/1
1 TABLET ORAL 3 TIMES DAILY
Qty: 270 TAB | Refills: 1 | Status: SHIPPED | OUTPATIENT
Start: 2019-03-13 | End: 2019-06-11

## 2019-03-13 RX ORDER — CARBIDOPA AND LEVODOPA 25; 100 MG/1; MG/1
1 TABLET ORAL 3 TIMES DAILY
Qty: 270 TAB | Refills: 1 | Status: SHIPPED | OUTPATIENT
Start: 2019-03-13 | End: 2019-03-13

## 2019-03-13 NOTE — PROGRESS NOTES
Interval HPI:   This is a 70 y.o. male who is following up for     Chief Complaint   Patient presents with    Parkinsons Disease     Follow Up      Continues on Sinemet  one tab TID (can't tolerate higher doses). Pt says he added back Lexapro 10 mg/ day and that has improved his mood. Wife says he's less irritable. He also complains that calves feel tight. He has a repetitive tapping behavior with feet (left > right). Wife says she doesn't notice it at home, only when he's at doctors or if he's anxious. Pt denies any pain radiating down either leg, no pain in feet. Last visit, given printed Rxs of Pramipexole to taper up on for Sx of both RLS and Parkinson's. Pt never started that Rx. Brief ROS: as above or otherwise negative    =====================    Brief Hx:    MRI Brain (images reviewed): mild chronic small vessel ischemic disease, and mild global atrophy, no hydrocephalus (i.e no evidence of NPH). EEG: normal awake and drowsy EEG. Couldn't tolerate Sinemet 1.5 TID due to cognitive difficulty so backed down to 1 tab TID. He/ wife reported that since being on Sinemet, he's more active, moving around better, voice is not as low as it used to be, not as restless as he used to be. Never had any tremors to begin with. No shuffling noted at initial visit but wife says he shuffles at times. Allergies   Allergen Reactions    Sulfa (Sulfonamide Antibiotics) Rash     Current Outpatient Medications   Medication Sig Dispense Refill    pramipexole (MIRAPEX) 0.125 mg tablet Week 1 and 2: take 1 tab three times a day. Week 3 and 4: take 2 tabs three times a day. For Parkinson's. 126 Tab 0    pramipexole (MIRAPEX) 0.25 mg tablet Week 1 and 2: 2 tabs three times a day. Week 3 and 4: 3 tabs three times a day. For Parkinson's. 210 Tab 0    carbidopa-levodopa (SINEMET)  mg per tablet Take 1 Tab by mouth three (3) times daily for 90 days.  270 Tab 1    escitalopram oxalate (LEXAPRO) 10 mg tablet Take 1 Tab by mouth daily. Anti-depressant for mood 30 Tab 2    aspirin 81 mg chewable tablet Take 1 Tab by mouth every other day. 90 Tab 1    polyethylene glycol (MIRALAX) 17 gram packet Take 1 Packet by mouth daily. 90 Packet 2    ACETAMINOPHEN (TYLENOL PO) Take  by mouth as needed.  guaiFENesin ER (MUCINEX) 600 mg ER tablet Take 1 Tab by mouth two (2) times a day. 10 Tab 0    clobetasol (TEMOVATE) 0.05 % external solution Apply to scaling skin at bedtime 50 mL 11       Past Medical History:   Diagnosis Date    Depression     Parkinson disease (HCC)     Restless leg syndrome        No past surgical history on file. No family history on file. Social History     Socioeconomic History    Marital status:      Spouse name: Not on file    Number of children: Not on file    Years of education: Not on file    Highest education level: Not on file   Social Needs    Financial resource strain: Not on file    Food insecurity - worry: Not on file    Food insecurity - inability: Not on file    Transportation needs - medical: Not on file   [employer] needs - non-medical: Not on file   Occupational History    Not on file   Tobacco Use    Smoking status: Never Smoker    Smokeless tobacco: Never Used   Substance and Sexual Activity    Alcohol use: No    Drug use: No    Sexual activity: Not on file   Other Topics Concern    Not on file   Social History Narrative    Not on file       Physical Exam  Blood pressure 112/72, pulse 96, temperature 97.5 °F (36.4 °C), resp. rate 20, height 5' 9\" (1.753 m), weight 79.3 kg (174 lb 14.4 oz), SpO2 97 %. Awake, alert, masked facial expression but conversant, low voice  Mood seems a little more upbeat  Neck: no stiffness  Skin: no rashes    Focused Neurological Exam     Mental status: Alert and oriented to person, place situation. Language: normal fluency and comprehension; no dysarthria.       CNs:   Visual fields grossly normal  Extraocular movements intact, no nystagmus  Face appears symmetric and facial strength normal.    Hearing is intact to casual conversation. Sensory: intact light touch in arms  Motor: Normal bulk and strength in all 4 extremities. Reflexes: not examined this visit  Cerebellar: no resting tremors, mild cogwheel rigidity both arms  Gait: ambulates independently, forward leaning gait, not shuffling, upper body rigidity with reduced right arm swing     Impression      ICD-10-CM ICD-9-CM    1. Parkinsonism, unspecified Parkinsonism type ([facility] G20 332.0 pramipexole (MIRAPEX) 0.125 mg tablet      pramipexole (MIRAPEX) 0.25 mg tablet      carbidopa-levodopa (SINEMET)  mg per tablet      DISCONTINUED: carbidopa-levodopa (SINEMET)  mg per tablet   2. Restless leg G25.81 333.94 pramipexole (MIRAPEX) 0.125 mg tablet      pramipexole (MIRAPEX) 0.25 mg tablet      carbidopa-levodopa (SINEMET)  mg per tablet      DISCONTINUED: carbidopa-levodopa (SINEMET)  mg per tablet       70 y.o. male with Parkinsonian features (hypo-phonia, masked facial expression, reduced mobility, never any tremors, subjective/ intermittent shuffling per wife). Continue Sinemet , one tab TID (can't tolerate higher doses). Pt doesn't want to increase Lexapro. Keep at Lexapro 10 mg/ day. Revisited topic of adding Pramipexole. Pt says he is willing to try. Rx'd Pramipexole with tapering up instructions.           Signed By: Kerry Huitron MD     March 13, 2019

## 2019-03-13 NOTE — PROGRESS NOTES
Patient is here for a follow up for parkinson's disease. He states that he has been doing quite good since his last visit. He states that he has pain in his legs. His wife states that he has mucus in the stool if he takes miralax. No other concerns at this time.

## 2019-03-13 NOTE — PATIENT INSTRUCTIONS
Add Pramipexole for your Parkinson's.     Follow the instructions on the prescriptions to TAPER UP    You were given 2 separate prescriptions for Pramipexole and I also printed a new prescription for your Carbidopa-levodopa (one tablet three times a day)    Follow up in 3 months

## 2019-05-30 ENCOUNTER — OFFICE VISIT (OUTPATIENT)
Dept: FAMILY MEDICINE CLINIC | Age: 64
End: 2019-05-30

## 2019-05-30 VITALS
HEIGHT: 69 IN | HEART RATE: 89 BPM | DIASTOLIC BLOOD PRESSURE: 93 MMHG | WEIGHT: 172.2 LBS | SYSTOLIC BLOOD PRESSURE: 153 MMHG | OXYGEN SATURATION: 97 % | TEMPERATURE: 98.4 F | RESPIRATION RATE: 17 BRPM | BODY MASS INDEX: 25.51 KG/M2

## 2019-05-30 DIAGNOSIS — R03.0 ELEVATED BLOOD PRESSURE READING: ICD-10-CM

## 2019-05-30 DIAGNOSIS — R09.82 POSTNASAL DRIP: ICD-10-CM

## 2019-05-30 DIAGNOSIS — M25.561 ACUTE PAIN OF RIGHT KNEE: Primary | ICD-10-CM

## 2019-05-30 DIAGNOSIS — Z88.9 H/O SEASONAL ALLERGIES: ICD-10-CM

## 2019-05-30 RX ORDER — FLUTICASONE PROPIONATE 50 MCG
2 SPRAY, SUSPENSION (ML) NASAL DAILY
Qty: 1 BOTTLE | Refills: 1 | Status: SHIPPED | OUTPATIENT
Start: 2019-05-30 | End: 2019-08-13

## 2019-05-30 RX ORDER — CETIRIZINE HCL 10 MG
10 TABLET ORAL DAILY
Qty: 90 TAB | Refills: 3 | Status: SHIPPED | OUTPATIENT
Start: 2019-05-30 | End: 2019-08-13

## 2019-05-30 NOTE — PATIENT INSTRUCTIONS
Your MRI is scheduled for 12.00 pm on Tuesday June 4, 2019. Please arrive at 11.30 am.  [facility]  Address: [address]  Jesus Manuel Li, Sirisha [facility]  Phone: [phone]    Allergies  1. Start using Flonase nasal spray 2 puffs to each nostril two times a day  2. Start taking your Zyrtec 10 mg once a day      Joint Pain: Care Instructions  Your Care Instructions    Many people have small aches and pains from overuse or injury to muscles and joints. Joint injuries often happen during sports or recreation, work tasks, or projects around the home. An overuse injury can happen when you put too much stress on a joint or when you do an activity that stresses the joint over and over, such as using the computer or rowing a boat. You can take action at home to help your muscles and joints get better. You should feel better in 1 to 2 weeks, but it can take 3 months or more to heal completely. Follow-up care is a key part of your treatment and safety. Be sure to make and go to all appointments, and call your doctor if you are having problems. It's also a good idea to know your test results and keep a list of the medicines you take. How can you care for yourself at home? · Do not put weight on the injured joint for at least a day or two. · For the first day or two after an injury, do not take hot showers or baths, and do not use hot packs. The heat could make swelling worse. · Put ice or a cold pack on the sore joint for 10 to 20 minutes at a time. Try to do this every 1 to 2 hours for the next 3 days (when you are awake) or until the swelling goes down. Put a thin cloth between the ice and your skin. · Wrap the injury in an elastic bandage. Do not wrap it too tightly because this can cause more swelling. · Prop up the sore joint on a pillow when you ice it or anytime you sit or lie down during the next 3 days. Try to keep it above the level of your heart.  This will help reduce swelling. · Take an over-the-counter pain medicine, such as acetaminophen (Tylenol), ibuprofen (Advil, Motrin), or naproxen (Aleve). Read and follow all instructions on the label. · After 1 or 2 days of rest, begin moving the joint gently. While the joint is still healing, you can begin to exercise using activities that do not strain or hurt the painful joint. When should you call for help? Call your doctor now or seek immediate medical care if:    · You have signs of infection, such as:  ? Increased pain, swelling, warmth, and redness. ? Red streaks leading from the joint. ? A fever.    Watch closely for changes in your health, and be sure to contact your doctor if:    · Your movement or symptoms are not getting better after 1 to 2 weeks of home treatment. Where can you learn more? Go to http://elliot[employer]earLab Corporationcasper.info/. Enter P205 in the search box to learn more about \"Joint Pain: Care Instructions. \"  Current as of: September 20, 2018  Content Version: 11.9  © 1967-1990 [employer]. Care instructions adapted under license by [employer] (which disclaims liability or warranty for this information). If you have questions about a medical condition or this instruction, always ask your healthcare professional. [address] any warranty or liability for your use of this information. Musculoskeletal Pain: Care Instructions  Your Care Instructions    Different problems with the bones, muscles, nerves, ligaments, and tendons in the body can cause pain. One or more areas of your body may ache or burn. Or they may feel tired, stiff, or sore. The medical term for this type of pain is musculoskeletal pain. It can have many different causes. Sometimes the pain is caused by an injury such as a strain or sprain. Or you might have pain from using one part of your body in the same way over and over again. This is called overuse.   In some cases, the cause of the pain is another health problem such as arthritis or fibromyalgia. The doctor will examine you and ask you questions about your health to help find the cause of your pain. Blood tests or imaging tests like an X-ray may also be helpful. But sometimes doctors can't find a cause of the pain. Treatment depends on your symptoms and the cause of the pain, if known. The doctor has checked you carefully, but problems can develop later. If you notice any problems or new symptoms, get medical treatment right away. Follow-up care is a key part of your treatment and safety. Be sure to make and go to all appointments, and call your doctor if you are having problems. It's also a good idea to know your test results and keep a list of the medicines you take. How can you care for yourself at home? · Rest until you feel better. · Do not do anything that makes the pain worse. Return to exercise gradually if you feel better and your doctor says it's okay. · Be safe with medicines. Read and follow all instructions on the label. ? If the doctor gave you a prescription medicine for pain, take it as prescribed. ? If you are not taking a prescription pain medicine, ask your doctor if you can take an over-the-counter medicine. · Put ice or a cold pack on the area for 10 to 20 minutes at a time to ease pain. Put a thin cloth between the ice and your skin. When should you call for help? Call your doctor now or seek immediate medical care if:    · You have new pain, or your pain gets worse.     · You have new symptoms such as a fever, a rash, or chills.    Watch closely for changes in your health, and be sure to contact your doctor if:    · You do not get better as expected. Where can you learn more? Go to http://elliot-casper.info/. Enter L178 in the search box to learn more about \"Musculoskeletal Pain: Care Instructions. \"  Current as of: Mae 3, 2018  Content Version: 11.9  © 2778-4029 [employer]. Care instructions adapted under license by [employer] (which disclaims liability or warranty for this information). If you have questions about a medical condition or this instruction, always ask your healthcare professional. [address] any warranty or liability for your use of this information.

## 2019-05-30 NOTE — PROGRESS NOTES
HPI:  Chief Complaint   Patient presents with    Knee Pain     right knee pain x 5 days        Kiara Cordova is a 75 y.o. male who presents with right  knee pain. The pain located on the lateral side of the right knee,started 5 days ago. The pain started suddenly without prior trauma. The pain is exacerbated by prolong walking, alleviated by rest and Tylenol intake. No mechanical symptoms. He denies fever, swelling of the knee. No previous hx of trauma, surgeries on the right knee. He has chronic intermittent numbness and tingling in the feet bilaterally. Past Medical History:   Diagnosis Date    Depression     Parkinson disease ([facility] [address]     Restless leg syndrome        Current Outpatient Medications:     fluticasone propionate (FLONASE) 50 mcg/actuation nasal spray, 2 Sprays by Both Nostrils route daily. , Disp: 1 Bottle, Rfl: 1    cetirizine (ZYRTEC) 10 mg tablet, Take 1 Tab by mouth daily. , Disp: 90 Tab, Rfl: 3    pramipexole (MIRAPEX) 0.125 mg tablet, Week 1 and 2: take 1 tab three times a day. Week 3 and 4: take 2 tabs three times a day. For Parkinson's., Disp: 126 Tab, Rfl: 0    pramipexole (MIRAPEX) 0.25 mg tablet, Week 1 and 2: 2 tabs three times a day. Week 3 and 4: 3 tabs three times a day. For Parkinson's., Disp: 210 Tab, Rfl: 0    carbidopa-levodopa (SINEMET)  mg per tablet, Take 1 Tab by mouth three (3) times daily for 90 days. , Disp: 270 Tab, Rfl: 1    escitalopram oxalate (LEXAPRO) 10 mg tablet, Take 1 Tab by mouth daily. Anti-depressant for mood, Disp: 30 Tab, Rfl: 2    polyethylene glycol (MIRALAX) 17 gram packet, Take 1 Packet by mouth daily. , Disp: 80 Packet, Rfl: 2    ACETAMINOPHEN (TYLENOL PO), Take  by mouth as needed. , Disp: , Rfl:     guaiFENesin ER (MUCINEX) 600 mg ER tablet, Take 1 Tab by mouth two (2) times a day., Disp: 10 Tab, Rfl: 0    aspirin 81 mg chewable tablet, Take 1 Tab by mouth every other day., Disp: 90 Tab, Rfl: 1    clobetasol (TEMOVATE) 0.05 % external solution, Apply to scaling skin at bedtime, Disp: 50 mL, Rfl: 11  Allergies   Allergen Reactions    Sulfa (Sulfonamide Antibiotics) Rash     Past Medical History:   Diagnosis Date    Depression     Parkinson disease ([address]     Restless leg syndrome      History reviewed. No pertinent family history. ROS: As per HPI otherwise negative. Objective:   Visit Vitals  BP (!) 153/93   Pulse 89   Temp 98.4 °F (36.9 °C) (Oral)   Resp 17   Ht 5' 9\" (1.753 m)   Wt 172 lb 3.2 oz (78.1 kg)   SpO2 97%   BMI 25.43 kg/m²     Gen: Well appearing. No apparent distress. Alert and oriented. Responds to all questions appropriately. Lungs: No labored respirations. Talking in complete sentences without difficulty. Musculoskeletal:  Knee: Right  Knee Effusion: None  Quadriceps atrophy: None   Popliteal (Bakers) Cyst: Negative   Patellofemoral crepitus: Negative  ROM:  Flexion: 130  Extension: 0   Hip IR/ER: FROM without pain    Dynamic Test:  Gait: Minimal limping   Assistive devices: None    Palpation:   Patella tenderness: None  Patellar tendon tenderness: None  Quad tendon tenderness: None  Medial joint line tenderness: None  Lateral joint line tenderness: None  MCL tenderness: None  LCL tenderness: None  Tibia tubercle tenderness: None  Proximal fibula tenderness: None  Prepatellar bursa tenderness: None    Ligament/Meniscal Exam:  Patellar Grind: Negative   Patellar apprehension (medial/lateral): Negative   Lachman: Negative, with good endpoint   Anterior Drawer: Negative   Posterior Drawer: Negative   Valgus stress: Negative with good endpoint   Varus stress: Negative with good endpoint   Dial test: Negative   Catherine: Negative   Amber sign: Negative. Strength (0-5/5):   Flexion: Left: 5/5    Right: 5/5    Extension: Left: 5/5    Right: 5/5    Hip abduction: 5/5    Hip adduction: 5/5      Neuro/Vascular : Pulses intact, no edema, and neurologically intact .   Skin: No obvious rash or skin breakdown. ASSESSMENT:  76 yo male who is here for:     ICD-10-CM ICD-9-CM    1. Acute pain of right knee M25.561 719.46 XR KNEE RT 3 V      MRI KNEE RT W WO CONT   2. H/O seasonal allergies Z88.9 V15.09 fluticasone propionate (FLONASE) 50 mcg/actuation nasal spray      cetirizine (ZYRTEC) 10 mg tablet   3. Postnasal drip R09.82 784.91    4. Elevated blood pressure reading R03.0 796.2        PLAN:  1. Right knee pain. DDx includes degenerative disease vs insufficiency fracture based on the CXR, but the picture is unclear.   -Will get MRI of the knee. If there is degenerative changes will refer to sports medicine specialist for steroid injection, if any significant changes -will consider referring to ortho  -Non weight bearing until MRI results are back by using the walker, script was given  -Ice 15 minutes, three times a day PRN and after exercise. Can alternate with heat for 15 minutes. - Acetaminophen (Tylenol):  500mg 1-2 tablets every 6 hours as needed for pain. 2. Seasonal allergies with postnasal drip  -Start Zyrtec daily   -Flonase nasal spray daily    3. Elevated blood pressure reading. No hx of hypertension.  Likely 2/2 pain  -will recheck next time    The patient was discussed with Dr. Deneen White ( The sports medicine attending physician)    Nathen Valencia MD  Family Medicine Resident, PGY-3

## 2019-05-30 NOTE — PROGRESS NOTES
Chief Complaint   Patient presents with    Knee Pain     right knee pain x 5 days      Blood pressure (!) 153/93, pulse 89, temperature 98.4 °F (36.9 °C), temperature source Oral, resp. rate 17, height 5' 9\" (1.753 m), weight 172 lb 3.2 oz (78.1 kg), SpO2 97 %. 1. Have you been to the ER, urgent care clinic since your last visit? Hospitalized since your last visit? No    2. Have you seen or consulted any other health care providers outside of the [address] [place] [address] since your last visit? Include any pap smears or colon screening. No      MRI of the right knee scheduled 6/4/19 at 12:00pm at [facility]. Patient notified of appointment.

## 2019-06-04 ENCOUNTER — HOSPITAL ENCOUNTER (OUTPATIENT)
Dept: MRI IMAGING | Age: 64
Discharge: HOME | End: 2019-06-04
Attending: FAMILY MEDICINE
Payer: MEDICARE

## 2019-06-04 DIAGNOSIS — M25.561 ACUTE PAIN OF RIGHT KNEE: ICD-10-CM

## 2019-06-04 PROCEDURE — 73721 MRI JNT OF LWR EXTRE W/O DYE: CPT

## 2019-06-06 ENCOUNTER — TELEPHONE (OUTPATIENT)
Dept: FAMILY MEDICINE CLINIC | Age: 64
End: 2019-06-06

## 2019-06-06 DIAGNOSIS — Z87.828 HISTORY OF MENISCAL TEAR: Primary | ICD-10-CM

## 2019-06-06 NOTE — TELEPHONE ENCOUNTER
I called the patient at [phone] to discuss the test results. The patient was identified by 2 identifiers. Discussed results (please refer to my result note for MRI). Discussed the findings on MRI, recommended to see orthopedic surgeon as the patient would benefit from total knee replacement. The patient was open to surgery and wanted to see the orthopedic surgeon. Contact information was provided as below, referral was placed in the chart.        Dr. Elizabeth Cooper MD  Orthopedic surgeon in [place], [place]  Address: [address] [place] [address], [address] [place] [address]  Phone: [phone]    8:44 AM  6/6/2019  Susan Leahy MD

## 2019-06-06 NOTE — PROGRESS NOTES
MRI of the knee c/w multiple changes including chronic ACL tear, tear on meniscus in different sites, tricorpartment degenerative changes with joint effusion. Discussed the case with  (Sports medicine specialist). The patient will benefit from knee replacement. Will refer to Ortho. Will call and discuss with the patient.

## 2019-06-13 ENCOUNTER — OFFICE VISIT (OUTPATIENT)
Dept: NEUROLOGY | Age: 64
End: 2019-06-13

## 2019-06-13 VITALS
OXYGEN SATURATION: 95 % | RESPIRATION RATE: 20 BRPM | DIASTOLIC BLOOD PRESSURE: 78 MMHG | HEIGHT: 69 IN | WEIGHT: 172 LBS | BODY MASS INDEX: 25.48 KG/M2 | SYSTOLIC BLOOD PRESSURE: 100 MMHG | HEART RATE: 97 BPM

## 2019-06-13 DIAGNOSIS — F32.A DEPRESSION, UNSPECIFIED DEPRESSION TYPE: ICD-10-CM

## 2019-06-13 DIAGNOSIS — G20 PARKINSONISM, UNSPECIFIED PARKINSONISM TYPE (HCC): Primary | ICD-10-CM

## 2019-06-13 RX ORDER — CARBIDOPA AND LEVODOPA 25; 100 MG/1; MG/1
1 TABLET ORAL 3 TIMES DAILY
Qty: 270 TAB | Refills: 1 | Status: SHIPPED | OUTPATIENT
Start: 2019-06-13 | End: 2019-09-11

## 2019-06-13 RX ORDER — ESCITALOPRAM OXALATE 10 MG/1
10 TABLET ORAL DAILY
Qty: 90 TAB | Refills: 1 | Status: SHIPPED | OUTPATIENT
Start: 2019-06-13 | End: 2019-09-11

## 2019-06-13 NOTE — PROGRESS NOTES
Interval HPI:   This is a 75 y.o. male who is following up for Parkinson's (masked facies, forward leaning/ fast gait)    Chief Complaint   Patient presents with    Parkinsons Disease     Follow Up     Continues on Sinemet  one tab TID (can't tolerate higher doses). No worsening of fgait, no hand tremors  Ran out of Lexapro 10 mg/day but wife says mood was better on it  Pt started Pramipexole at last visit due to RLS and Parkinson's  Today he can't say it's improved his leg symptoms  Repetitively taps his feet (left > right)  More often if he's anxious      Brief ROS: as above or otherwise negative    =====================    Brief Hx:    MRI Brain (images reviewed): mild chronic small vessel ischemic disease, and mild global atrophy, no hydrocephalus (i.e no evidence of NPH). EEG: normal awake and drowsy EEG. Couldn't tolerate Sinemet 1.5 TID due to cognitive difficulty so backed down to 1 tab TID. He/ wife reported that since being on Sinemet, he's more active, moving around better, voice is not as low as it used to be, not as restless as he used to be. Never had any tremors to begin with. No shuffling noted at initial visit but wife says he shuffles at times. Allergies   Allergen Reactions    Sulfa (Sulfonamide Antibiotics) Rash     Current Outpatient Medications   Medication Sig Dispense Refill    escitalopram oxalate (LEXAPRO) 10 mg tablet Take 1 Tab by mouth daily for 90 days. Anti-depressant for mood 90 Tab 1    carbidopa-levodopa (SINEMET)  mg per tablet Take 1 Tab by mouth three (3) times daily for 90 days. 270 Tab 1    fluticasone propionate (FLONASE) 50 mcg/actuation nasal spray 2 Sprays by Both Nostrils route daily. 1 Bottle 1    guaiFENesin ER (MUCINEX) 600 mg ER tablet Take 1 Tab by mouth two (2) times a day. 10 Tab 0    polyethylene glycol (MIRALAX) 17 gram packet Take 1 Packet by mouth daily.  90 Packet 2    ACETAMINOPHEN (TYLENOL PO) Take  by mouth as needed.  cetirizine (ZYRTEC) 10 mg tablet Take 1 Tab by mouth daily. 90 Tab 3    aspirin 81 mg chewable tablet Take 1 Tab by mouth every other day. 90 Tab 1    clobetasol (TEMOVATE) 0.05 % external solution Apply to scaling skin at bedtime 50 mL 11     PMHx:   Past Medical History:   Diagnosis Date    Depression     Parkinson disease (HCC)     Restless leg syndrome      PSHx:  has no past surgical history on file. SocHx:  reports that he has never smoked. He has never used smokeless tobacco. He reports that he does not drink alcohol or use drugs. FHx: family history is not on file. Physical Exam  Blood pressure 100/78, pulse 97, resp. rate 20, height 5' 9\" (1.753 m), weight 78 kg (172 lb), SpO2 95 %. Awake, alert, masked facial expression but conversant, low voice  Mood seems a little more upbeat  Neck: no stiffness  Skin: no rashes    Focused Neurological Exam     Mental status: Alert and oriented to person, place situation. Language: normal fluency and comprehension; no dysarthria. CNs:   Visual fields grossly normal  Extraocular movements intact, no nystagmus  Face appears symmetric and facial strength normal.    Hearing is intact to casual conversation. Sensory: intact light touch in arms  Motor: Normal bulk and strength in all 4 extremities. Reflexes: not examined this visit  Cerebellar: no resting tremors, mild cogwheel rigidity both arms  Gait: ambulates independently, forward leaning gait, not shuffling, upper body rigidity with reduced right arm swing     Impression      ICD-10-CM ICD-9-CM    1. Parkinsonism, unspecified [facility]ism type ([facility] G20 332.0 carbidopa-levodopa (SINEMET)  mg per tablet   2. Depression, unspecified depression type F32.9 311 escitalopram oxalate (LEXAPRO) 10 mg tablet       75 y.o. male with Parkinsonian features (hypo-phonia, masked facial expression, reduced mobility, never any tremors, subjective/ intermittent shuffling per wife). Continue Sinemet , one tab TID (can't tolerate higher doses). Restart lexapro 10 mg/ day (pt doesn't want to increase). Stop taking Pramipexole (no clear benefit, may be causing REM sleep disorder)    Follow-up and Dispositions    · Return in about 6 months (around 12/13/2019).          Signed By: Daiana Carey MD     June 13, 2019

## 2019-06-13 NOTE — PROGRESS NOTES
Patient is here for a follow up for parkinson's     He states that he has been feeling tired, having less energy. No specific concerns at this time. Would like to continue the medication for his mood. He states that it helped him a lot. No other concerns at this time.

## 2019-07-01 ENCOUNTER — OFFICE VISIT (OUTPATIENT)
Dept: FAMILY MEDICINE CLINIC | Age: 64
End: 2019-07-01

## 2019-07-01 VITALS
RESPIRATION RATE: 16 BRPM | BODY MASS INDEX: 25.98 KG/M2 | OXYGEN SATURATION: 96 % | WEIGHT: 175.4 LBS | HEIGHT: 69 IN | DIASTOLIC BLOOD PRESSURE: 89 MMHG | SYSTOLIC BLOOD PRESSURE: 155 MMHG | TEMPERATURE: 97.9 F | HEART RATE: 85 BPM

## 2019-07-01 DIAGNOSIS — M54.59 MECHANICAL LOW BACK PAIN: Primary | ICD-10-CM

## 2019-07-01 NOTE — PROGRESS NOTES
Identified Patient with two Patient identifiers (Name and ). Two Patient Identifiers confirmed. Reviewed record in preparation for visit and have obtained necessary documentation. Chief Complaint   Patient presents with    Back Pain     c/o right side lower back pain x 5 days. Taking OTC Motrin without relief. States pain is worse when gpoing from s sitting standing position        Visit Vitals  /89 (BP 1 Location: Right arm, BP Patient Position: Sitting)   Pulse 85   Temp 97.9 °F (36.6 °C) (Oral)   Resp 16   Ht 5' 9\" (1.753 m)   Wt 175 lb 6.4 oz (79.6 kg)   SpO2 96%   BMI 25.90 kg/m²       1. Have you been to the ER, urgent care clinic since your last visit? Hospitalized since your last visit? No    2. Have you seen or consulted any other health care providers outside of the [address] [place] [address] since your last visit? Include any pap smears or colon screening.  No

## 2019-07-01 NOTE — PROGRESS NOTES
[address] [facility] [address]   Office [phone], Fax [phone]    Subjective:     Chief Complaint   Patient presents with    Back Pain     History provided by patient and wife. HPI:  Tang Reid is a 75 y.o.  male w/ Hx of Parkinson's Disease, restless leg syndrome and depression presents for back pain (R lower back, 10 days, 2 tablets 200 motrin 1-2x per day with no relief & 500-1000mgTylenol every 6 hours, standing pain \"feels tight\", 10/10, non-radiating). Pain constant but worse while standing or ambulating. He denies trauma or extraneous activity. He denies constitutional signs (fever/chills) or Hx of cancer. Today, patient did not take anything for pain. Medication reviewed. Current Outpatient Medications on File Prior to Visit   Medication Sig Dispense Refill    escitalopram oxalate (LEXAPRO) 10 mg tablet Take 1 Tab by mouth daily for 90 days. Anti-depressant for mood 90 Tab 1    carbidopa-levodopa (SINEMET)  mg per tablet Take 1 Tab by mouth three (3) times daily for 90 days. 270 Tab 1    polyethylene glycol (MIRALAX) 17 gram packet Take 1 Packet by mouth daily. 90 Packet 2    clobetasol (TEMOVATE) 0.05 % external solution Apply to scaling skin at bedtime 50 mL 11    ACETAMINOPHEN (TYLENOL PO) Take  by mouth as needed.  fluticasone propionate (FLONASE) 50 mcg/actuation nasal spray 2 Sprays by Both Nostrils route daily. 1 Bottle 1    cetirizine (ZYRTEC) 10 mg tablet Take 1 Tab by mouth daily. 90 Tab 3    aspirin 81 mg chewable tablet Take 1 Tab by mouth every other day. 90 Tab 1     No current facility-administered medications on file prior to visit. Allergy reviewed. Allergies   Allergen Reactions    Sulfa (Sulfonamide Antibiotics) Rash       SocHx. - Denies smoking, alcohol use, illcit drug use. - Occupation: none. Low physical activity. Usually sitting. Review of Systems   Constitutional: Negative. HENT: Negative.     Eyes: Positive for blurred vision. Respiratory: Negative. Cardiovascular: Negative. Gastrointestinal: Negative. Genitourinary: Positive for frequency. Musculoskeletal: Positive for back pain and joint pain. Negative for falls, myalgias and neck pain. R back pain, BL knee pain (R>L)   Skin: Negative. Neurological: Positive for speech change. Negative for dizziness, tremors and focal weakness. Slow speech   Endo/Heme/Allergies: Negative. Psychiatric/Behavioral: Negative. Negative for depression, substance abuse and suicidal ideas. Objective:     Visit Vitals  /89 (BP 1 Location: Right arm, BP Patient Position: Sitting)   Pulse 85   Temp 97.9 °F (36.6 °C) (Oral)   Resp 16   Ht 5' 9\" (1.753 m)   Wt 175 lb 6.4 oz (79.6 kg)   SpO2 96%   BMI 25.90 kg/m²     Visit Vitals  Ht 5' 9\" (1.753 m)   Wt 175 lb 6.4 oz (79.6 kg)   BMI 25.90 kg/m²      Physical Exam   Constitutional: He is oriented to person, place, and time. He appears well-developed and well-nourished. No distress. Pt sitting comfortably. HENT:   Head: Normocephalic and atraumatic. Eyes: Pupils are equal, round, and reactive to light. Conjunctivae and EOM are normal.   Neck: Normal range of motion. Neck supple. Cardiovascular: Normal rate, regular rhythm, normal heart sounds and intact distal pulses. Pulmonary/Chest: Effort normal and breath sounds normal. No respiratory distress. Abdominal: Soft. Bowel sounds are normal. There is tenderness. L side    Musculoskeletal: Normal range of motion. He exhibits edema and tenderness. R lower back pain, non-tender on palpation  Mild B/L LE swelling   Neurological: He is alert and oriented to person, place, and time. Coordination abnormal.   Forward leaning shuffling gait, cogwheel rigidity, mask-like facies, NO tremor    Skin: Skin is dry. Psychiatric: He has a normal mood and affect.  Judgment normal.       Assessment and orders:   76 yo M w/ PMH Parkinson's and restless leg syndrome presenting w/ R lower back pain. Mechanical Low Back pain  -possibly related to Parkinson's- altered posture, spasm  -continue OTC motrin 600 mg (3 at a time 4x day), add Tylenol 3x as needed   -warm compress, massages    Leg Swelling- Venous Insufficiency   - pt counciled to increase physical activity (walking) and elevate feet while sitting    *continue regimen for 10 days  *f/u in 2 weeks to reassess     Pt was discussed with Dr Agustin Carvalho (attending physician). I have reviewed patient medical and social history and medications. I have reviewed pertinent labs results and other data. I have discussed the diagnosis with the patient and the intended plan as seen in the above orders. The patient has received an after-visit summary and questions were answered concerning future plans. I have discussed medication side effects and warnings with the patient as well.     Lexie Jalloh MD  Resident [facility]  07/01/19

## 2019-07-17 ENCOUNTER — OFFICE VISIT (OUTPATIENT)
Dept: FAMILY MEDICINE CLINIC | Age: 64
End: 2019-07-17

## 2019-07-17 VITALS
HEART RATE: 90 BPM | TEMPERATURE: 98.2 F | BODY MASS INDEX: 26.04 KG/M2 | WEIGHT: 175.8 LBS | RESPIRATION RATE: 18 BRPM | SYSTOLIC BLOOD PRESSURE: 111 MMHG | HEIGHT: 69 IN | OXYGEN SATURATION: 96 % | DIASTOLIC BLOOD PRESSURE: 73 MMHG

## 2019-07-17 DIAGNOSIS — M54.59 MECHANICAL LOW BACK PAIN: Primary | ICD-10-CM

## 2019-07-17 NOTE — PATIENT INSTRUCTIONS
-Only take Motrin/Tylenol as needed.  -Continue current regimen of Physical Therapy, massages and warm compresses. Low Back Pain: Exercises  Your Care Instructions  Here are some examples of typical rehabilitation exercises for your condition. Start each exercise slowly. Ease off the exercise if you start to have pain. Your doctor or physical therapist will tell you when you can start these exercises and which ones will work best for you. How to do the exercises  Press-up    1. Lie on your stomach, supporting your body with your forearms. 2. Press your elbows down into the floor to raise your upper back. As you do this, relax your stomach muscles and allow your back to arch without using your back muscles. As your press up, do not let your hips or pelvis come off the floor. 3. Hold for 15 to 30 seconds, then relax. 4. Repeat 2 to 4 times. Alternate arm and leg (bird dog) exercise    1. Start on the floor, on your hands and knees. 2. Tighten your belly muscles. 3. Raise one leg off the floor, and hold it straight out behind you. Be careful not to let your hip drop down, because that will twist your trunk. 4. Hold for about 6 seconds, then lower your leg and switch to the other leg. 5. Repeat 8 to 12 times on each leg. 6. Over time, work up to holding for 10 to 30 seconds each time. 7. If you feel stable and secure with your leg raised, try raising the opposite arm straight out in front of you at the same time. Knee-to-chest exercise    1. Lie on your back with your knees bent and your feet flat on the floor. 2. Bring one knee to your chest, keeping the other foot flat on the floor (or keeping the other leg straight, whichever feels better on your lower back). 3. Keep your lower back pressed to the floor. Hold for at least 15 to 30 seconds. 4. Relax, and lower the knee to the starting position. 5. Repeat with the other leg. Repeat 2 to 4 times with each leg.   6. To get more stretch, put your other leg flat on the floor while pulling your knee to your chest.    Curl-ups    1. Lie on the floor on your back with your knees bent at a 90-degree angle. Your feet should be flat on the floor, about 12 inches from your buttocks. 2. Cross your arms over your chest. If this bothers your neck, try putting your hands behind your neck (not your head), with your elbows spread apart. 3. Slowly tighten your belly muscles and raise your shoulder blades off the floor. 4. Keep your head in line with your body, and do not press your chin to your chest.  5. Hold this position for 1 or 2 seconds, then slowly lower yourself back down to the floor. 6. Repeat 8 to 12 times. Pelvic tilt exercise    1. Lie on your back with your knees bent. 2. \"Brace\" your stomach. This means to tighten your muscles by pulling in and imagining your belly button moving toward your spine. You should feel like your back is pressing to the floor and your hips and pelvis are rocking back. 3. Hold for about 6 seconds while you breathe smoothly. 4. Repeat 8 to 12 times. Heel dig bridging    1. Lie on your back with both knees bent and your ankles bent so that only your heels are digging into the floor. Your knees should be bent about 90 degrees. 2. Then push your heels into the floor, squeeze your buttocks, and lift your hips off the floor until your shoulders, hips, and knees are all in a straight line. 3. Hold for about 6 seconds as you continue to breathe normally, and then slowly lower your hips back down to the floor and rest for up to 10 seconds. 4. Do 8 to 12 repetitions. Hamstring stretch in doorway    1. Lie on your back in a doorway, with one leg through the open door. 2. Slide your leg up the wall to straighten your knee. You should feel a gentle stretch down the back of your leg. 3. Hold the stretch for at least 15 to 30 seconds. Do not arch your back, point your toes, or bend either knee.  Keep one heel touching the floor and the other heel touching the wall. 4. Repeat with your other leg. 5. Do 2 to 4 times for each leg. Hip flexor stretch    1. Kneel on the floor with one knee bent and one leg behind you. Place your forward knee over your foot. Keep your other knee touching the floor. 2. Slowly push your hips forward until you feel a stretch in the upper thigh of your rear leg. 3. Hold the stretch for at least 15 to 30 seconds. Repeat with your other leg. 4. Do 2 to 4 times on each side. Wall sit    1. Stand with your back 10 to 12 inches away from a wall. 2. Lean into the wall until your back is flat against it. 3. Slowly slide down until your knees are slightly bent, pressing your lower back into the wall. 4. Hold for about 6 seconds, then slide back up the wall. 5. Repeat 8 to 12 times. Follow-up care is a key part of your treatment and safety. Be sure to make and go to all appointments, and call your doctor if you are having problems. It's also a good idea to know your test results and keep a list of the medicines you take. Where can you learn more? Go to http://elliot-casper.info/. Enter N971 in the search box to learn more about \"Low Back Pain: Exercises. \"  Current as of: September 20, 2018  Content Version: 11.9  © 6046-1542 [employer]. Care instructions adapted under license by [employer] (which disclaims liability or warranty for this information). If you have questions about a medical condition or this instruction, always ask your healthcare professional. [place] [address] any warranty or liability for your use of this information.

## 2019-07-17 NOTE — PROGRESS NOTES
Identified Patient with two Patient identifiers (Name and ). Two Patient Identifiers confirmed. Reviewed record in preparation for visit and have obtained necessary documentation. Chief Complaint   Patient presents with    Back Pain     Back Pain Follow Up       Visit Vitals  /73 (BP 1 Location: Right arm, BP Patient Position: Sitting)   Pulse 90   Temp 98.2 °F (36.8 °C) (Oral)   Resp 18   Ht 5' 9\" (1.753 m)   Wt 175 lb 12.8 oz (79.7 kg)   SpO2 96%   BMI 25.96 kg/m²       1. Have you been to the ER, urgent care clinic since your last visit? Hospitalized since your last visit? No    2. Have you seen or consulted any other health care providers outside of the [address] [place] [address] since your last visit? Include any pap smears or colon screening.  No

## 2019-07-17 NOTE — PROGRESS NOTES
[address] [facility] [address] [place] [address]   Office [phone], Fax [phone]    Subjective:     Chief Complaint   Patient presents with    Back Pain     Back Pain Follow Up     History provided by patient and wife. HPI:  Saji Vo is a 75 y.o.  male w/ Hx of Parkinson's Disease, restless leg syndrome and depression presents for f/u on back pain (R lower back, almost 1 month, non-radiating, 4/10, worse when bending, no longer worse when walking/standing). His pain has significantly improved and he is only taking 1 OTC motrin (600 mg) per day and at times Tylenol 1x/day. The warm compresses and massages have been working. PT sees pt 2x per week. He can now stand up and sit by himself. He denies trauma or extraneous activity. He denies constitutional signs (fever/chills) or Hx of cancer. Today, patient did not take anything for pain. Medication reviewed. Allergy reviewed. SocHx. - Denies smoking, alcohol use, illcit drug use. - Sexually active:   - Occupation: none. Low physical activity. Usually sitting. Current Outpatient Medications on File Prior to Visit   Medication Sig Dispense Refill    escitalopram oxalate (LEXAPRO) 10 mg tablet Take 1 Tab by mouth daily for 90 days. Anti-depressant for mood 90 Tab 1    carbidopa-levodopa (SINEMET)  mg per tablet Take 1 Tab by mouth three (3) times daily for 90 days. 270 Tab 1    fluticasone propionate (FLONASE) 50 mcg/actuation nasal spray 2 Sprays by Both Nostrils route daily. 1 Bottle 1    cetirizine (ZYRTEC) 10 mg tablet Take 1 Tab by mouth daily. 90 Tab 3    aspirin 81 mg chewable tablet Take 1 Tab by mouth every other day. 90 Tab 1    polyethylene glycol (MIRALAX) 17 gram packet Take 1 Packet by mouth daily. 90 Packet 2    clobetasol (TEMOVATE) 0.05 % external solution Apply to scaling skin at bedtime 50 mL 11    ACETAMINOPHEN (TYLENOL PO) Take  by mouth as needed.        No current facility-administered medications on file prior to visit. ROS (bolded are positive):   General Negative for fever, chills, changes in weight, changes in appetite   HEENT Negative for hearing loss, earache, congestion, sore throat   CV Negative for chest pain, palpitations, edema   Respiratory Negative for cough, shortness of breath, wheezing   GI Negative for change in bowel habits, abdominal pain, black or bloody stools, nausea or vomiting    Negative for frequency, dysuria, hematuria, vaginal discharge   MSK +back pain, +joint pain (BL knee), muscle pain   Breast Negative for breast lumps, nipple discharge, galactorrhea   Skin Negative for itching, rash, hives   Neuro +speech change (slow). Negative for dizziness, headache, confusion, weakness   Psych Negative for anxiety, depression, change in mood   Heme/lymph Negative for bleeding, bruising, pallor     Objective:   Vitals - reviewed. Visit Vitals  /73 (BP 1 Location: Right arm, BP Patient Position: Sitting)   Pulse 90   Temp 98.2 °F (36.8 °C) (Oral)   Resp 18   Ht 5' 9\" (1.753 m)   Wt 175 lb 12.8 oz (79.7 kg)   SpO2 96%   BMI 25.96 kg/m²        Physical Exam   Constitutional: He is oriented to person, place, and time. He appears well-developed and well-nourished. HENT:   Head: Normocephalic and atraumatic. Neck: Normal range of motion. Neck supple. Cardiovascular: Normal rate, regular rhythm and normal heart sounds. Pulmonary/Chest: Effort normal and breath sounds normal.   Abdominal: Soft. Bowel sounds are normal.   Musculoskeletal:   R lower back tenderness (worse with flexion)   Neurological: He is alert and oriented to person, place, and time. Coordination abnormal.   Forward leaning shuffling gait, cogwheel rigidity, mask-like facies, NO tremor     Skin: Skin is warm and dry. Psychiatric: He has a normal mood and affect.        Assessment and orders:     76 yo M w/ PMH Parkinson's and restless leg syndrome presenting for f/u on R lower back pain.     Mechanical Low Back pain- improved significantly  -possibly related to Parkinson's- altered posture, spasm  -limit or discontinue OTC meds (Motrin and Tylenol)   -continue conservative management with warm compress, massages  -continue Physical Therapy 2x per week. -counseled to increase physical activity (take frequent walks)      Pt was discussed with Dr Rand Iyer. I have reviewed patient medical and social history and medications. I have reviewed pertinent labs results and other data. I have discussed the diagnosis with the patient and the intended plan as seen in the above orders. The patient has received an after-visit summary and questions were answered concerning future plans. I have discussed medication side effects and warnings with the patient as well.     Reji Kamara MD  Resident [address] [facility]  07/17/19

## 2019-07-30 NOTE — PROGRESS NOTES
[address] Medicine Residency Attending Addendum:  I saw and evaluated the patient on the day of the encounter with Luis Blanco MD  , performing the key elements of the service. I discussed the findings, assessment and plan with the resident and agree with the resident's findings and plan as documented in the resident's note.       Fely Mcdonald MD, CAQSM, RMSK

## 2019-08-01 ENCOUNTER — HOSPITAL ENCOUNTER (OUTPATIENT)
Dept: LAB | Age: 64
Discharge: HOME | End: 2019-08-01
Payer: MEDICARE

## 2019-08-01 ENCOUNTER — OFFICE VISIT (OUTPATIENT)
Dept: FAMILY MEDICINE CLINIC | Age: 64
End: 2019-08-01

## 2019-08-01 ENCOUNTER — HOSPITAL ENCOUNTER (OUTPATIENT)
Dept: CT IMAGING | Age: 64
Discharge: HOME | End: 2019-08-01
Attending: STUDENT IN AN ORGANIZED HEALTH CARE EDUCATION/TRAINING PROGRAM
Payer: MEDICARE

## 2019-08-01 VITALS
SYSTOLIC BLOOD PRESSURE: 110 MMHG | BODY MASS INDEX: 26.07 KG/M2 | WEIGHT: 176 LBS | TEMPERATURE: 98 F | RESPIRATION RATE: 16 BRPM | OXYGEN SATURATION: 95 % | DIASTOLIC BLOOD PRESSURE: 73 MMHG | HEIGHT: 69 IN | HEART RATE: 88 BPM

## 2019-08-01 DIAGNOSIS — R10.84 GENERALIZED ABDOMINAL PAIN: Primary | ICD-10-CM

## 2019-08-01 DIAGNOSIS — R10.84 GENERALIZED ABDOMINAL PAIN: ICD-10-CM

## 2019-08-01 DIAGNOSIS — K40.90 RIGHT INGUINAL HERNIA: ICD-10-CM

## 2019-08-01 DIAGNOSIS — Z12.9 CANCER SCREENING: ICD-10-CM

## 2019-08-01 LAB
BILIRUB UR QL STRIP: NEGATIVE
CREAT BLD-MCNC: 0.9 MG/DL (ref 0.6–1.3)
GLUCOSE UR-MCNC: NEGATIVE MG/DL
KETONES P FAST UR STRIP-MCNC: NEGATIVE MG/DL
PH UR STRIP: 5.5 [PH] (ref 4.6–8)
PROT UR QL STRIP: NEGATIVE
SP GR UR STRIP: 1.02 (ref 1–1.03)
UA UROBILINOGEN AMB POC: NORMAL (ref 0.2–1)
URINALYSIS CLARITY POC: CLEAR
URINALYSIS COLOR POC: YELLOW
URINE BLOOD POC: NORMAL
URINE LEUKOCYTES POC: NEGATIVE
URINE NITRITES POC: NEGATIVE

## 2019-08-01 PROCEDURE — 85025 COMPLETE CBC W/AUTO DIFF WBC: CPT

## 2019-08-01 PROCEDURE — 80053 COMPREHEN METABOLIC PANEL: CPT

## 2019-08-01 PROCEDURE — 74177 CT ABD & PELVIS W/CONTRAST: CPT

## 2019-08-01 PROCEDURE — 74011636320 HC RX REV CODE- 636/320

## 2019-08-01 PROCEDURE — 82565 ASSAY OF CREATININE: CPT

## 2019-08-01 PROCEDURE — 83690 ASSAY OF LIPASE: CPT

## 2019-08-01 PROCEDURE — 36415 COLL VENOUS BLD VENIPUNCTURE: CPT

## 2019-08-01 RX ADMIN — IOPAMIDOL 100 ML: 755 INJECTION, SOLUTION INTRAVENOUS at 17:11

## 2019-08-01 NOTE — PATIENT INSTRUCTIONS

## 2019-08-01 NOTE — PROGRESS NOTES
[address] [place] [address]   Office [phone], Fax [phone]    Subjective:     Chief Complaint   Patient presents with    Abdominal Pain     History provided by patient and wife    HPI:  Alecia Conner is a 75 y.o. middle [facility] male presents for abdominal pain. Significant history pertinent to presentation includes constipation, parkinson's disease. Generalized abdominal pain  - For past 1-2 months, pt has been feeling abdominal pain. Feels like a cramping mainly in periumbilical region. Today he was taking out the  and felt a sharp pain 10/10, now 6/10. Hurts more when sitting straight up. Worse with activity. BM every 3-4 days. Last one last night. Denies fever/chills, diarrhea, n/v, burning with urination, discharge. Has miralax prn (last taken 2 days). No alcohol drinking hx (1 drink every month). Medication reviewed. Allergy reviewed. SocHx. - Denies smoking, alcohol use, illcit drug use. ROS (bolded are positive):   General Negative for fever, chills, changes in weight, changes in appetite   CV Negative for chest pain, palpitations, edema   Respiratory Negative for cough, shortness of breath, wheezing   GI Negative for change in bowel habits, abdominal pain, black or bloody stools, nausea or vomiting    Negative for frequency, dysuria, hematuria, penile discharge   Skin Negative for itching, rash, hives   Neuro Negative for dizziness, headache, confusion, weakness   Psych Negative for anxiety, depression, change in mood     Objective:   Vitals - reviewed  Visit Vitals  /73   Pulse 88   Temp 98 °F (36.7 °C) (Oral)   Resp 16   Ht 5' 9\" (1.753 m)   Wt 176 lb (79.8 kg)   SpO2 95%   BMI 25.99 kg/m²        Physical exam:   GEN: NAD. Alert. Well nourished. EYES:  Conjunctiva clear; PERRLA. extraocular movements are intact. LUNGS: Respirations unlabored; CTAB.  no wheeze, rales, rhonchi   CARDIOVASCULAR: Regular, rate, and rhythm without murmurs, gallops or rubs   ABDOMEN: Soft; mild-mod TTP periumbilical region, ND. Mild decrease in bowel sounds; no rebound tenderness, voluntary guarding. no masses or organomegaly  NEUROLOGIC:  No focal neurologic deficits. Strength and sensation grossly intact. EXT: Well perfused. No edema. No erythema. PSYCH: appropriate mood and affect. Good insight and judgement. Cooperative. SKIN: No obvious rashes or lesions. Pertinent Labs/Studies:   - UA unremarkable. Assessment and orders:       ICD-10-CM ICD-9-CM    1. Generalized abdominal pain R10.84 789.07 CBC WITH AUTOMATED DIFF      METABOLIC PANEL, COMPREHENSIVE      LIPASE      CT ABD PELV W CONT      AMB POC URINALYSIS DIP STICK AUTO W/O MICRO     Diagnoses and all orders for this visit:    1. Generalized abdominal pain. No acute abdomen. Will get stat CT abd/pelvis for further evaluation. F/u labs as below. UA unremarkable. -     CBC WITH AUTOMATED DIFF  -     METABOLIC PANEL, COMPREHENSIVE  -     LIPASE  -     CT ABD PELV W CONT; Future  -     AMB POC URINALYSIS DIP STICK AUTO W/O MICRO      Follow-up and Dispositions    · Return in about 1 week (around 8/8/2019) for abdominal pain. Pt was discussed with Dr Russ Reddy (attending physician). I have reviewed patient medical and social history and medications. I have reviewed pertinent labs results and other data. I have discussed the diagnosis with the patient and the intended plan as seen in the above orders. The patient has received an after-visit summary and questions were answered concerning future plans. I have discussed medication side effects and warnings with the patient as well.     Lucila Bates MD  Resident [address] [facility]  08/01/19

## 2019-08-01 NOTE — PROGRESS NOTES
Chief Complaint   Patient presents with    Abdominal Pain     1. Have you been to the ER, urgent care clinic since your last visit? Hospitalized since your last visit? No    2. Have you seen or consulted any other health care providers outside of the [address] [place] [address] since your last visit? Include any pap smears or colon screening.  No

## 2019-08-02 LAB
ALBUMIN SERPL-MCNC: 4.1 G/DL (ref 3.6–4.8)
ALBUMIN/GLOB SERPL: 1.4 {RATIO} (ref 1.2–2.2)
ALP SERPL-CCNC: 63 IU/L (ref 39–117)
ALT SERPL-CCNC: 16 IU/L (ref 0–44)
AST SERPL-CCNC: 18 IU/L (ref 0–40)
BASOPHILS # BLD AUTO: 0 X10E3/UL (ref 0–0.2)
BASOPHILS NFR BLD AUTO: 0 %
BILIRUB SERPL-MCNC: 0.7 MG/DL (ref 0–1.2)
BUN SERPL-MCNC: 13 MG/DL (ref 8–27)
BUN/CREAT SERPL: 15 (ref 10–24)
CALCIUM SERPL-MCNC: 9.3 MG/DL (ref 8.6–10.2)
CHLORIDE SERPL-SCNC: 101 MMOL/L (ref 96–106)
CO2 SERPL-SCNC: 25 MMOL/L (ref 20–29)
CREAT SERPL-MCNC: 0.85 MG/DL (ref 0.76–1.27)
EOSINOPHIL # BLD AUTO: 0.2 X10E3/UL (ref 0–0.4)
EOSINOPHIL NFR BLD AUTO: 3 %
ERYTHROCYTE [DISTWIDTH] IN BLOOD BY AUTOMATED COUNT: 14.5 % (ref 12.3–15.4)
GLOBULIN SER CALC-MCNC: 3 G/DL (ref 1.5–4.5)
GLUCOSE SERPL-MCNC: 85 MG/DL (ref 65–99)
HCT VFR BLD AUTO: 44.2 % (ref 37.5–51)
HGB BLD-MCNC: 14.7 G/DL (ref 13–17.7)
IMM GRANULOCYTES # BLD AUTO: 0 X10E3/UL (ref 0–0.1)
IMM GRANULOCYTES NFR BLD AUTO: 0 %
LIPASE SERPL-CCNC: 38 U/L (ref 13–78)
LYMPHOCYTES # BLD AUTO: 1.5 X10E3/UL (ref 0.7–3.1)
LYMPHOCYTES NFR BLD AUTO: 25 %
MCH RBC QN AUTO: 28.9 PG (ref 26.6–33)
MCHC RBC AUTO-ENTMCNC: 33.3 G/DL (ref 31.5–35.7)
MCV RBC AUTO: 87 FL (ref 79–97)
MONOCYTES # BLD AUTO: 0.7 X10E3/UL (ref 0.1–0.9)
MONOCYTES NFR BLD AUTO: 12 %
NEUTROPHILS # BLD AUTO: 3.7 X10E3/UL (ref 1.4–7)
NEUTROPHILS NFR BLD AUTO: 60 %
PLATELET # BLD AUTO: 231 X10E3/UL (ref 150–450)
POTASSIUM SERPL-SCNC: 4.3 MMOL/L (ref 3.5–5.2)
PROT SERPL-MCNC: 7.1 G/DL (ref 6–8.5)
RBC # BLD AUTO: 5.08 X10E6/UL (ref 4.14–5.8)
SODIUM SERPL-SCNC: 141 MMOL/L (ref 134–144)
WBC # BLD AUTO: 6.2 X10E3/UL (ref 3.4–10.8)

## 2019-08-02 NOTE — PROGRESS NOTES
No acute process. Non-obstructed R inguinal hernia with loop ileum. Will refer to Gen surgery. Also will send to GI (in the past had declined colonoscopy but would like to now). To assess stomach pain as well. Given precautions to go to ED. Informed to do daily miralax for now compared to prn use prior. Spoke to pt as well about the L4-L5 finding and informed to discuss that with his neurologist who is taking care of his Parkinson's disease.

## 2019-08-09 ENCOUNTER — OFFICE VISIT (OUTPATIENT)
Dept: SURGERY | Age: 64
End: 2019-08-09

## 2019-08-09 VITALS
OXYGEN SATURATION: 96 % | HEIGHT: 69 IN | DIASTOLIC BLOOD PRESSURE: 67 MMHG | BODY MASS INDEX: 25.97 KG/M2 | RESPIRATION RATE: 14 BRPM | SYSTOLIC BLOOD PRESSURE: 109 MMHG | TEMPERATURE: 98.3 F | WEIGHT: 175.31 LBS | HEART RATE: 87 BPM

## 2019-08-09 DIAGNOSIS — K40.90 INGUINAL HERNIA OF RIGHT SIDE WITHOUT OBSTRUCTION OR GANGRENE: Primary | ICD-10-CM

## 2019-08-09 PROBLEM — R10.31 RIGHT GROIN PAIN: Status: RESOLVED | Noted: 2018-12-14 | Resolved: 2019-08-09

## 2019-08-09 PROBLEM — M62.89 HERNIA OF FASCIA: Status: RESOLVED | Noted: 2018-12-12 | Resolved: 2019-08-09

## 2019-08-09 NOTE — PROGRESS NOTES
Surgery History and Physical    Subjective:      Antony Casper is a 75 y.o. [facility] male who presents for evaluation of a right inguinal hernia. Mr. Shaina Lopez was seen last year by me for right groin pain. No hernia was detected on exam, and an US of his groin was negative. He has continued to have pain so a CT was ordered which revealed a right inguinal hernia. He has now noticed a bulge. He is eating fine and moving his bowels and urinating normally. He denies any abdomina surgery or previous hernia repairs. Past Medical History:   Diagnosis Date    Depression     Parkinson disease ([facility] [address]     Restless leg syndrome      History reviewed. No pertinent surgical history. History reviewed. No pertinent family history. Social History     Tobacco Use    Smoking status: Never Smoker    Smokeless tobacco: Never Used   Substance Use Topics    Alcohol use: No      Prior to Admission medications    Medication Sig Start Date End Date Taking? Authorizing Provider   escitalopram oxalate (LEXAPRO) 10 mg tablet Take 1 Tab by mouth daily for 90 days. Anti-depressant for mood 6/13/19 9/11/19 Yes Adalberto Quiñonez MD   carbidopa-levodopa (SINEMET)  mg per tablet Take 1 Tab by mouth three (3) times daily for 90 days. 6/13/19 9/11/19 Yes Adalberto Quiñonez MD   fluticasone propionate (FLONASE) 50 mcg/actuation nasal spray 2 Sprays by Both Nostrils route daily. 5/30/19  Yes Capri Lopes MD   cetirizine (ZYRTEC) 10 mg tablet Take 1 Tab by mouth daily. 5/30/19  Yes Capri Lopes MD   aspirin 81 mg chewable tablet Take 1 Tab by mouth every other day. 8/29/18  Yes Scotty Luna MD   polyethylene glycol [facility]) 17 gram packet Take 1 Packet by mouth daily. 8/26/18  Yes Scotty Luna MD   clobetasol (TEMOVATE) 0.05 % external solution Apply to scaling skin at bedtime 8/26/18  Yes Scotty Luna MD   ACETAMINOPHEN (TYLENOL PO) Take  by mouth as needed.    Yes Provider, Historical      Allergies Allergen Reactions    Sulfa (Sulfonamide Antibiotics) Rash       Review of Systems:  Pertinent items are noted in the History of Present Illness. Objective:      Physical Exam:  GENERAL: alert, cooperative, no distress, EYE: negative findings: anicteric sclera, LUNG: clear to auscultation bilaterally, HEART: regular rate and rhythm, ABDOMEN: Soft, NT, ND., GROIN: On the left, there is no hernia. On the right, there is a reducible inguinal hernia. Genitalia is grossly normal.  Testicles are descended bilaterally. , EXTREMITIES:  edema mild, SKIN: Normal., NEUROLOGIC: negative, PSYCHIATRIC: non focal    Assessment:     Right inguinal hernia without gangrene or obstruction. Plan:     Mr. Maria T Colby and his wife would like to have his hernia repaired as soon as possible. I discussed the risks of the procedure including bleeding, infection, wound healing problems, urinary retention, mesh complications, blood clots, injury to the bowel, bladder, spermatic cord, or neurovascular structures, and reaction to the prep or local and general anesthetic. He and his wife understand the risks; any and all questions were answered to their satisfaction. Mr. Maria T Colby will be scheduled for an elective outpatient robotic-assisted laparoscopic right inguinal herniorrhaphy with mesh, possible open under general anesthesia.

## 2019-08-09 NOTE — H&P (VIEW-ONLY)
Surgery History and Physical 
 
Subjective:  
  
La Schmidt is a 75 [facility] who presents for evaluation of a right inguinal hernia. Mr. Evelyne Villafuerte was seen last year by me for right groin pain. No hernia was detected on exam, and an US of his groin was negative. He has continued to have pain so a CT was ordered which revealed a right inguinal hernia. He has now noticed a bulge. He is eating fine and moving his bowels and urinating normally. He denies any abdomina surgery or previous hernia repairs. Past Medical History:  
Diagnosis Date  Depression  Parkinson disease ([facility] [address]  Restless leg syndrome History reviewed. No pertinent surgical history. History reviewed. No pertinent family history. Social History Tobacco Use  Smoking status: Never Smoker  Smokeless tobacco: Never Used Substance Use Topics  Alcohol use: No  
  
Prior to Admission medications Medication Sig Start Date End Date Taking? Authorizing Provider  
escitalopram oxalate (LEXAPRO) 10 mg tablet Take 1 Tab by mouth daily for 90 days. Anti-depressant for mood 6/13/19 9/11/19 Yes David Forbes MD  
carbidopa-levodopa (SINEMET)  mg per tablet Take 1 Tab by mouth three (3) times daily for 90 days. 6/13/19 9/11/19 Yes David Forbes MD  
fluticasone propionate (FLONASE) 50 mcg/actuation nasal spray 2 Sprays by Both Nostrils route daily. 5/30/19  Yes Mika Reid MD  
cetirizine (ZYRTEC) 10 mg tablet Take 1 Tab by mouth daily. 5/30/19  Yes Mika Reid MD  
aspirin 81 mg chewable tablet Take 1 Tab by mouth every other day. 8/29/18  Yes Myron Slade MD  
polyethylene glycol [facility]) 17 gram packet Take 1 Packet by mouth daily. 8/26/18  Yes Myron Slade MD  
clobetasol (TEMOVATE) 0.05 % external solution Apply to scaling skin at bedtime 8/26/18  Yes Myron Slade MD  
ACETAMINOPHEN (TYLENOL PO) Take  by mouth as needed. Yes Provider, Historical  
  
Allergies Allergen Reactions  Sulfa (Sulfonamide Antibiotics) Rash Review of Systems: 
Pertinent items are noted in the History of Present Illness. Objective:  
 
 Physical Exam: 
GENERAL: alert, cooperative, no distress, EYE: negative findings: anicteric sclera, LUNG: clear to auscultation bilaterally, HEART: regular rate and rhythm, ABDOMEN: Soft, NT, ND., GROIN: On the left, there is no hernia. On the right, there is a reducible inguinal hernia. Genitalia is grossly normal.  Testicles are descended bilaterally. , EXTREMITIES:  edema mild, SKIN: Normal., NEUROLOGIC: negative, PSYCHIATRIC: non focal 
 
Assessment:  
 
Right inguinal hernia without gangrene or obstruction. Plan: Mr. Mary Beth Partida and his wife would like to have his hernia repaired as soon as possible. I discussed the risks of the procedure including bleeding, infection, wound healing problems, urinary retention, mesh complications, blood clots, injury to the bowel, bladder, spermatic cord, or neurovascular structures, and reaction to the prep or local and general anesthetic. He and his wife understand the risks; any and all questions were answered to their satisfaction. Mr. Mary Beth Partida will be scheduled for an elective outpatient robotic-assisted laparoscopic right inguinal herniorrhaphy with mesh, possible open under general anesthesia.

## 2019-08-09 NOTE — PROGRESS NOTES
1. Have you been to the ER, urgent care clinic since your last visit? Hospitalized since your last visit? No    2. Have you seen or consulted any other health care providers outside of the [address] [place] [address] since your last visit? Include any pap smears or colon screening.  No

## 2019-08-13 RX ORDER — POLYETHYLENE GLYCOL 3350 17 G/17G
17 POWDER, FOR SOLUTION ORAL AS NEEDED
COMMUNITY
End: 2020-04-21 | Stop reason: SDUPTHER

## 2019-08-13 NOTE — PERIOP NOTES
[address], [address] [facility]   MAIN OR                                  [phone]   MAIN PRE OP                          [phone]                                                                                AMBULATORY PRE OP          [phone]  PRE-ADMISSION TESTING    [phone]   Surgery Date:   Thursday 8/15/19        Is surgery arrival time given by surgeon? NO  If [facility] staff will call you Wednesday 8/14/19 between 3 and 7pm the day before your surgery with your arrival time. (If your surgery is on a Monday, we will call you the Friday before.)    Call [phone] after 7pm Monday-Friday if you did not receive your arrival time. INSTRUCTIONS BEFORE YOUR SURGERY   When You  Arrive Arrive at the [address] [facility] on the day of your surgery  Have your insurance card, photo ID, and any copayment (if needed)   Food   and   Drink NO food or drink after midnight the night before surgery    This means NO water, gum, mints, coffee, juice, etc.  No alcohol (beer, wine, liquor) 24 hours before and after surgery   Medications to   TAKE   Morning of Surgery MEDICATIONS TO TAKE THE MORNING OF SURGERY WITH A SIP OF WATER:    Sinemet, lexapro, Tylenol if needed   Medications  To  STOP      7 days before surgery  Non-Steroidal anti-inflammatory Drugs (NSAID's): for example, Ibuprofen (Advil, Motrin), Naproxen (Aleve)   Aspirin, if taking for pain    Herbal supplements, vitamins, and fish oil     Blood  Thinners  If you take  Aspirin, Plavix, Coumadin, or any blood-thinning or anti-blood clot medicine, talk to the doctor who prescribed the medications for pre-operative instructions.    Bathing Clothing  Jewelry  Valuables       If you shower the morning of surgery, please do not apply anything to your skin (lotions, powders, deodorant, or makeup, especially mascara)   Do not shave or trim anywhere 24 hours before surgery   Wear loose, comfortable, clean clothes   Wear glasses instead of contacts  [address], valuables, and jewelry, including body piercings, at home   Going Home - or Spending the Night  SAME-DAY SURGERY: You must have a responsible adult drive you home and stay with you 24 hours after surgery   ADMITS: If your doctor is keeping you in the hospital after surgery, leave personal belongings/luggage in your car until you have a hospital room number. Hospital discharge time is 12 noon  Drivers must be here before 12 noon unless you are told differently   Special Instructions Free  parking 7a-5p. Bring list of medication currently taking on day of surgery. Follow all instructions so your surgery wont be cancelled. Please, be on time. If a situation occurs and you are delayed the day of surgery, call [phone] or [phone]. If your physical condition changes (like a fever, cold, flu, etc.) call your surgeon. The patient was contacted  via phone. Home medication reviewed and verified during PAT appointment. The patient verbalizes understanding of all instructions and does not  need reinforcement.

## 2019-08-14 ENCOUNTER — TELEPHONE (OUTPATIENT)
Dept: SURGERY | Age: 64
End: 2019-08-14

## 2019-08-14 ENCOUNTER — ANESTHESIA EVENT (OUTPATIENT)
Dept: SURGERY | Age: 64
End: 2019-08-14
Payer: MEDICARE

## 2019-08-14 NOTE — TELEPHONE ENCOUNTER
Spoke to pt's wife, Urban Sandifer (on hipaa) and she stated she would like the pt to be kept over night for observation. I explained that this is an out pt procedure and he will not be kept over night unless it is medically necessary. Wife stated it would be easier on her if he was kept for at least one night because she will be keeping him by herself and she is not feeling well & he is a Parkinsons pt. I informed that we cannot keep him because it would be better for her. Mohsina verbalized understanding.

## 2019-08-14 NOTE — TELEPHONE ENCOUNTER
Wife called. Patient having surgery tomorrow. Wants patient to stay over night. Cause Ms. Lesley Ndiaye has health problems. Please call at [phone].

## 2019-08-15 ENCOUNTER — ANESTHESIA (OUTPATIENT)
Dept: SURGERY | Age: 64
End: 2019-08-15
Payer: MEDICARE

## 2019-08-15 ENCOUNTER — HOSPITAL ENCOUNTER (OUTPATIENT)
Age: 64
Setting detail: OUTPATIENT SURGERY
Discharge: HOME | End: 2019-08-15
Attending: SURGERY | Admitting: SURGERY
Payer: MEDICARE

## 2019-08-15 VITALS
WEIGHT: 174.38 LBS | HEIGHT: 70 IN | DIASTOLIC BLOOD PRESSURE: 73 MMHG | OXYGEN SATURATION: 96 % | HEART RATE: 76 BPM | TEMPERATURE: 98 F | RESPIRATION RATE: 14 BRPM | BODY MASS INDEX: 24.97 KG/M2 | SYSTOLIC BLOOD PRESSURE: 124 MMHG

## 2019-08-15 DIAGNOSIS — Z98.890 S/P LAPAROSCOPIC HERNIA REPAIR: Primary | ICD-10-CM

## 2019-08-15 DIAGNOSIS — Z87.19 S/P LAPAROSCOPIC HERNIA REPAIR: Primary | ICD-10-CM

## 2019-08-15 DIAGNOSIS — K40.90 INGUINAL HERNIA OF RIGHT SIDE WITHOUT OBSTRUCTION OR GANGRENE: ICD-10-CM

## 2019-08-15 PROCEDURE — 76210000016 HC OR PH I REC 1 TO 1.5 HR: Performed by: SURGERY

## 2019-08-15 PROCEDURE — C1781 MESH (IMPLANTABLE): HCPCS | Performed by: SURGERY

## 2019-08-15 PROCEDURE — 77030016151 HC PROTCTR LNS DFOG COVD -B: Performed by: SURGERY

## 2019-08-15 PROCEDURE — 93005 ELECTROCARDIOGRAM TRACING: CPT

## 2019-08-15 PROCEDURE — 76210000022 HC REC RM PH II 1.5 TO 2 HR: Performed by: SURGERY

## 2019-08-15 PROCEDURE — 77030018673: Performed by: SURGERY

## 2019-08-15 PROCEDURE — 77030018836 HC SOL IRR NACL ICUM -A: Performed by: SURGERY

## 2019-08-15 PROCEDURE — 74011000250 HC RX REV CODE- 250: Performed by: SURGERY

## 2019-08-15 PROCEDURE — 77030020782 HC GWN BAIR PAWS FLX 3M -B

## 2019-08-15 PROCEDURE — 74011250636 HC RX REV CODE- 250/636: Performed by: ANESTHESIOLOGY

## 2019-08-15 PROCEDURE — 77030008684 HC TU ET CUF COVD -B: Performed by: STUDENT IN AN ORGANIZED HEALTH CARE EDUCATION/TRAINING PROGRAM

## 2019-08-15 PROCEDURE — 77030020703 HC SEAL CANN DISP INTU -B: Performed by: SURGERY

## 2019-08-15 PROCEDURE — 77030034850: Performed by: SURGERY

## 2019-08-15 PROCEDURE — 77030002895 HC DEV VASC CLOSR COVD -B: Performed by: SURGERY

## 2019-08-15 PROCEDURE — 74011250636 HC RX REV CODE- 250/636: Performed by: SURGERY

## 2019-08-15 PROCEDURE — 77030002933 HC SUT MCRYL J&J -A: Performed by: SURGERY

## 2019-08-15 PROCEDURE — 76010000877 HC OR TIME 2.5 TO 3HR INTENSV - TIER 2: Performed by: SURGERY

## 2019-08-15 PROCEDURE — 77030037032 HC INSRT SCIS CLICKLLINE DISP STOR -B: Performed by: SURGERY

## 2019-08-15 PROCEDURE — 74011000250 HC RX REV CODE- 250: Performed by: STUDENT IN AN ORGANIZED HEALTH CARE EDUCATION/TRAINING PROGRAM

## 2019-08-15 PROCEDURE — 74011250637 HC RX REV CODE- 250/637: Performed by: SURGERY

## 2019-08-15 PROCEDURE — 77030035277 HC OBTRTR BLDELSS DISP INTU -B: Performed by: SURGERY

## 2019-08-15 PROCEDURE — 77030011640 HC PAD GRND REM COVD -A: Performed by: SURGERY

## 2019-08-15 PROCEDURE — 74011250636 HC RX REV CODE- 250/636: Performed by: NURSE ANESTHETIST, CERTIFIED REGISTERED

## 2019-08-15 PROCEDURE — 77030031139 HC SUT VCRL2 J&J -A: Performed by: SURGERY

## 2019-08-15 PROCEDURE — 77030008771 HC TU NG SALEM SUMP -A: Performed by: STUDENT IN AN ORGANIZED HEALTH CARE EDUCATION/TRAINING PROGRAM

## 2019-08-15 PROCEDURE — 77030022704 HC SUT VLOC COVD -B: Performed by: SURGERY

## 2019-08-15 PROCEDURE — 77030013079 HC BLNKT BAIR HGGR 3M -A: Performed by: STUDENT IN AN ORGANIZED HEALTH CARE EDUCATION/TRAINING PROGRAM

## 2019-08-15 PROCEDURE — 74011250636 HC RX REV CODE- 250/636: Performed by: STUDENT IN AN ORGANIZED HEALTH CARE EDUCATION/TRAINING PROGRAM

## 2019-08-15 PROCEDURE — 77030040361 HC SLV COMPR DVT MDII -B

## 2019-08-15 PROCEDURE — 76060000036 HC ANESTHESIA 2.5 TO 3 HR: Performed by: SURGERY

## 2019-08-15 PROCEDURE — 77030033188 HC TBNG FLTRD BIIFUR DISP CNMD -C: Performed by: SURGERY

## 2019-08-15 DEVICE — HYDROPHILIC ANATOMICAL MESH,LEFT SIDE, POLYESTER
Type: IMPLANTABLE DEVICE | Site: INGUINAL | Status: FUNCTIONAL
Brand: PARIETEX

## 2019-08-15 RX ORDER — ONDANSETRON 2 MG/ML
INJECTION INTRAMUSCULAR; INTRAVENOUS AS NEEDED
Status: DISCONTINUED | OUTPATIENT
Start: 2019-08-15 | End: 2019-08-15 | Stop reason: HOSPADM

## 2019-08-15 RX ORDER — HYDROCODONE BITARTRATE AND ACETAMINOPHEN 5; 325 MG/1; MG/1
1 TABLET ORAL
Qty: 20 TAB | Refills: 0 | Status: SHIPPED | OUTPATIENT
Start: 2019-08-15 | End: 2019-08-18

## 2019-08-15 RX ORDER — LABETALOL HYDROCHLORIDE 5 MG/ML
INJECTION, SOLUTION INTRAVENOUS AS NEEDED
Status: DISCONTINUED | OUTPATIENT
Start: 2019-08-15 | End: 2019-08-15 | Stop reason: HOSPADM

## 2019-08-15 RX ORDER — NEOSTIGMINE METHYLSULFATE 1 MG/ML
INJECTION INTRAVENOUS AS NEEDED
Status: DISCONTINUED | OUTPATIENT
Start: 2019-08-15 | End: 2019-08-15 | Stop reason: HOSPADM

## 2019-08-15 RX ORDER — SODIUM CHLORIDE, SODIUM LACTATE, POTASSIUM CHLORIDE, CALCIUM CHLORIDE 600; 310; 30; 20 MG/100ML; MG/100ML; MG/100ML; MG/100ML
125 INJECTION, SOLUTION INTRAVENOUS CONTINUOUS
Status: DISCONTINUED | OUTPATIENT
Start: 2019-08-15 | End: 2019-08-15 | Stop reason: HOSPADM

## 2019-08-15 RX ORDER — LIDOCAINE HYDROCHLORIDE 10 MG/ML
0.1 INJECTION, SOLUTION EPIDURAL; INFILTRATION; INTRACAUDAL; PERINEURAL AS NEEDED
Status: DISCONTINUED | OUTPATIENT
Start: 2019-08-15 | End: 2019-08-15 | Stop reason: HOSPADM

## 2019-08-15 RX ORDER — SODIUM CHLORIDE, SODIUM LACTATE, POTASSIUM CHLORIDE, CALCIUM CHLORIDE 600; 310; 30; 20 MG/100ML; MG/100ML; MG/100ML; MG/100ML
INJECTION, SOLUTION INTRAVENOUS
Status: DISCONTINUED | OUTPATIENT
Start: 2019-08-15 | End: 2019-08-15 | Stop reason: HOSPADM

## 2019-08-15 RX ORDER — CEFAZOLIN SODIUM/WATER 2 G/20 ML
2 SYRINGE (ML) INTRAVENOUS ONCE
Status: DISCONTINUED | OUTPATIENT
Start: 2019-08-15 | End: 2019-08-15 | Stop reason: HOSPADM

## 2019-08-15 RX ORDER — CEFAZOLIN SODIUM 1 G/3ML
INJECTION, POWDER, FOR SOLUTION INTRAMUSCULAR; INTRAVENOUS AS NEEDED
Status: DISCONTINUED | OUTPATIENT
Start: 2019-08-15 | End: 2019-08-15 | Stop reason: HOSPADM

## 2019-08-15 RX ORDER — BUPIVACAINE HYDROCHLORIDE 5 MG/ML
30 INJECTION, SOLUTION EPIDURAL; INTRACAUDAL ONCE
Status: COMPLETED | OUTPATIENT
Start: 2019-08-15 | End: 2019-08-15

## 2019-08-15 RX ORDER — HYDROMORPHONE HYDROCHLORIDE 1 MG/ML
.25-1 INJECTION, SOLUTION INTRAMUSCULAR; INTRAVENOUS; SUBCUTANEOUS
Status: DISCONTINUED | OUTPATIENT
Start: 2019-08-15 | End: 2019-08-15 | Stop reason: HOSPADM

## 2019-08-15 RX ORDER — FENTANYL CITRATE 50 UG/ML
INJECTION, SOLUTION INTRAMUSCULAR; INTRAVENOUS AS NEEDED
Status: DISCONTINUED | OUTPATIENT
Start: 2019-08-15 | End: 2019-08-15 | Stop reason: HOSPADM

## 2019-08-15 RX ORDER — PROPOFOL 10 MG/ML
INJECTION, EMULSION INTRAVENOUS AS NEEDED
Status: DISCONTINUED | OUTPATIENT
Start: 2019-08-15 | End: 2019-08-15 | Stop reason: HOSPADM

## 2019-08-15 RX ORDER — ESMOLOL HYDROCHLORIDE 10 MG/ML
INJECTION INTRAVENOUS AS NEEDED
Status: DISCONTINUED | OUTPATIENT
Start: 2019-08-15 | End: 2019-08-15 | Stop reason: HOSPADM

## 2019-08-15 RX ORDER — ROCURONIUM BROMIDE 10 MG/ML
INJECTION, SOLUTION INTRAVENOUS AS NEEDED
Status: DISCONTINUED | OUTPATIENT
Start: 2019-08-15 | End: 2019-08-15 | Stop reason: HOSPADM

## 2019-08-15 RX ORDER — DIPHENHYDRAMINE HYDROCHLORIDE 50 MG/ML
12.5 INJECTION, SOLUTION INTRAMUSCULAR; INTRAVENOUS AS NEEDED
Status: DISCONTINUED | OUTPATIENT
Start: 2019-08-15 | End: 2019-08-15 | Stop reason: HOSPADM

## 2019-08-15 RX ORDER — HYDROCODONE BITARTRATE AND ACETAMINOPHEN 5; 325 MG/1; MG/1
1 TABLET ORAL
Status: DISCONTINUED | OUTPATIENT
Start: 2019-08-15 | End: 2019-08-15 | Stop reason: HOSPADM

## 2019-08-15 RX ORDER — NALOXONE HYDROCHLORIDE 0.4 MG/ML
0.2 INJECTION, SOLUTION INTRAMUSCULAR; INTRAVENOUS; SUBCUTANEOUS
Status: DISCONTINUED | OUTPATIENT
Start: 2019-08-15 | End: 2019-08-15 | Stop reason: HOSPADM

## 2019-08-15 RX ORDER — FLUMAZENIL 0.1 MG/ML
0.2 INJECTION INTRAVENOUS
Status: DISCONTINUED | OUTPATIENT
Start: 2019-08-15 | End: 2019-08-15 | Stop reason: HOSPADM

## 2019-08-15 RX ADMIN — PROPOFOL 120 MG: 10 INJECTION, EMULSION INTRAVENOUS at 10:26

## 2019-08-15 RX ADMIN — ROCURONIUM BROMIDE 50 MG: 50 INJECTION, SOLUTION INTRAVENOUS at 10:26

## 2019-08-15 RX ADMIN — ESMOLOL HYDROCHLORIDE 5 MG: 100 INJECTION, SOLUTION INTRAVENOUS at 12:34

## 2019-08-15 RX ADMIN — FENTANYL CITRATE 25 MCG: 50 INJECTION, SOLUTION INTRAMUSCULAR; INTRAVENOUS at 11:39

## 2019-08-15 RX ADMIN — SODIUM CHLORIDE, SODIUM LACTATE, POTASSIUM CHLORIDE, AND CALCIUM CHLORIDE 125 ML/HR: 600; 310; 30; 20 INJECTION, SOLUTION INTRAVENOUS at 08:35

## 2019-08-15 RX ADMIN — FENTANYL CITRATE 50 MCG: 50 INJECTION, SOLUTION INTRAMUSCULAR; INTRAVENOUS at 10:26

## 2019-08-15 RX ADMIN — ROCURONIUM BROMIDE 10 MG: 50 INJECTION, SOLUTION INTRAVENOUS at 11:38

## 2019-08-15 RX ADMIN — NEOSTIGMINE METHYLSULFATE 1 MG: 1 INJECTION, SOLUTION INTRAVENOUS at 12:58

## 2019-08-15 RX ADMIN — ONDANSETRON 4 MG: 2 INJECTION INTRAMUSCULAR; INTRAVENOUS at 10:51

## 2019-08-15 RX ADMIN — ESMOLOL HYDROCHLORIDE 5 MG: 100 INJECTION, SOLUTION INTRAVENOUS at 12:17

## 2019-08-15 RX ADMIN — FENTANYL CITRATE 25 MCG: 50 INJECTION, SOLUTION INTRAMUSCULAR; INTRAVENOUS at 11:55

## 2019-08-15 RX ADMIN — LABETALOL HYDROCHLORIDE 5 MG: 5 INJECTION INTRAVENOUS at 10:47

## 2019-08-15 RX ADMIN — CEFAZOLIN 2 G: 1 INJECTION, POWDER, FOR SOLUTION INTRAMUSCULAR; INTRAVENOUS at 10:41

## 2019-08-15 RX ADMIN — HYDROMORPHONE HYDROCHLORIDE 0.5 MG: 1 INJECTION, SOLUTION INTRAMUSCULAR; INTRAVENOUS; SUBCUTANEOUS at 13:53

## 2019-08-15 RX ADMIN — SODIUM CHLORIDE, SODIUM LACTATE, POTASSIUM CHLORIDE, AND CALCIUM CHLORIDE 125 ML/HR: 600; 310; 30; 20 INJECTION, SOLUTION INTRAVENOUS at 08:34

## 2019-08-15 RX ADMIN — SODIUM CHLORIDE, POTASSIUM CHLORIDE, SODIUM LACTATE AND CALCIUM CHLORIDE: 600; 310; 30; 20 INJECTION, SOLUTION INTRAVENOUS at 11:18

## 2019-08-15 RX ADMIN — HYDROCODONE BITARTRATE AND ACETAMINOPHEN 1 TABLET: 5; 325 TABLET ORAL at 15:10

## 2019-08-15 RX ADMIN — LABETALOL HYDROCHLORIDE 5 MG: 5 INJECTION INTRAVENOUS at 10:52

## 2019-08-15 RX ADMIN — SODIUM CHLORIDE, SODIUM LACTATE, POTASSIUM CHLORIDE, AND CALCIUM CHLORIDE: 600; 310; 30; 20 INJECTION, SOLUTION INTRAVENOUS at 12:22

## 2019-08-15 RX ADMIN — ROCURONIUM BROMIDE 10 MG: 50 INJECTION, SOLUTION INTRAVENOUS at 11:12

## 2019-08-15 RX ADMIN — ROCURONIUM BROMIDE 10 MG: 50 INJECTION, SOLUTION INTRAVENOUS at 12:04

## 2019-08-15 NOTE — DISCHARGE INSTRUCTIONS
Patient Education        Hernia Repair: What to Expect at [address] are likely to have pain for the next few days. You may also feel like you have the flu, and you may have a low fever and feel tired and nauseated. This is common. You should feel better after a few days and will probably feel much better in 7 days. For several weeks you may feel twinges or pulling in the hernia repair when you move. You may have some bruising on the scrotum and along the penis. This is normal.  Men will need to wear a jockstrap or briefs, not boxers, for scrotal support for several days after a groin (inguinal) hernia repair. [place] bicycle shorts may provide good support. This care sheet gives you a general idea about how long it will take for you to recover. But each person recovers at a different pace. Follow the steps below to get better as quickly as possible. How can you care for yourself at home? Activity    · Rest when you feel tired. Getting enough sleep will help you recover.     · Try to walk each day. Start by walking a little more than you did the day before. Bit by bit, increase the amount you walk. Walking boosts blood flow and helps prevent pneumonia and constipation.     · Avoid strenuous activities, such as biking, jogging, weight lifting, or aerobic exercise, until your doctor says it is okay.     · Avoid lifting anything over 30 pounds or that would make you strain for 6 weeks! This may include heavy grocery bags and milk containers, a heavy briefcase or backpack, cat litter or dog food bags, a vacuum , or a child.     · You may drive after 3 days and when you are no longer taking narcotic pain medicine and can quickly move your foot from the gas pedal to the brake! You must also be able to sit comfortably for a long period of time, even if you do not plan to go far.   You might get caught in traffic.     · Most people are able to return to work within 1 to 2 weeks after surgery.     · You may shower 24 hours after surgery, if your doctor okays it. Pat the cuts (incisions) dry. Do not take a bath for the first 2 weeks, and until after seen by your doctor.     ·    Diet    · You can eat your normal diet. If your stomach is upset, try bland, low-fat foods like plain rice, broiled chicken, toast, and yogurt.     · Drink plenty of fluids (unless your doctor tells you not to).     · You may notice that your bowel movements are not regular right after your surgery. This is common. Avoid constipation and straining with bowel movements. You may want to take a fiber supplement every day. If you have not had a bowel movement after a couple of days, take Miralax, Milk of Magnesia, prune juice or other laxative. Medicines    · You can restart your medicines. Your doctor will also give you instructions about taking any new medicines.     · If you take blood thinners, such as warfarin (Coumadin), be sure to talk to your doctor. Your doctor will tell you if and when to start taking those medicines again. Make sure that you understand exactly what your doctor wants you to do.     · Be safe with medicines. Take pain medicines exactly as directed. ? If the doctor gave you a prescription medicine for pain, take it as prescribed. ? If you are not taking a prescription pain medicine, take an over-the-counter medicine such as acetaminophen (Tylenol), ibuprofen (Advil, Motrin), or naproxen (Aleve). Read and follow all instructions on the label. ? Do not take two or more pain medicines at the same time unless the doctor told you to. Many pain medicines have acetaminophen, which is Tylenol. Too much acetaminophen (Tylenol) can be harmful.     · If your doctor prescribed antibiotics, take them as directed. Do not stop taking them just because you feel better.   You need to take the full course of antibiotics.     · If you think your pain medicine is making you sick to your stomach:  ? Take your medicine after meals (unless your doctor has told you not to). ? Ask your doctor for a different pain medicine. Incision care    · Remove the bandages on Saturday, 8/17. You may leave the incisions uncovered. · If you have strips of tape on the cut (incision) the doctor made, leave the tape on for a week or until it falls off.     · Keep the incisions clean and dry. · If there is any drainage, then cover the incisions with a dry bandage. Change the bandage daily as needed.     · Wash the area daily with warm, soapy water and pat it dry. · You may apply an ice pack to your groin for the first 48 hours. Every hour for 10 minutes. Wrap the ice pack in a towel. Do not apply directly to your skin! Follow-up care is a key part of your treatment and safety. Be sure to make and go to all appointments, and call your doctor if you are having problems. It's also a good idea to know your test results and keep a list of the medicines you take. When should you call for help? Call 911 anytime you think you may need emergency care. For example, call if:    · You passed out (lost consciousness).     · You are short of breath.    Call your doctor now or seek immediate medical care if:    · You have abdominal or groin pain that does not get better after you take pain medicine.     · You are sick to your stomach and cannot keep fluids down.     · You have signs of a blood clot in your leg (called a deep vein thrombosis), such as:  ? Pain in your calf, back of the knee, thigh, or groin. ? Redness and swelling in your leg or groin.     · You cannot pass stool, gas, or urine.     · Bright red blood has soaked through the bandage over your incision.     · You have loose stitches, or your incision comes open.     · You have signs of infection, such as:  ? Increased pain, swelling, warmth, or redness. ? Red streaks leading from the incision. ? Pus draining from the incision. ? A fever > 101.  Watch closely for any changes in your health, and be sure to contact your doctor if you have any problems. Where can you learn more? Go to http://elliot-casper.info/. Enter L298 in the search box to learn more about \"Hernia Repair: What to Expect at Home. \"  Current as of: November 7, 2018  Content Version: 12.1  © 6956-1622 [employer]. Care instructions adapted under license by [employer] (which disclaims liability or warranty for this information). If you have questions about a medical condition or this instruction, always ask your healthcare professional. [place] [address] any warranty or liability for your use of this information. Aure Buck. Adrienne Ferreira MD, FACS  General Surgery at [address] [place] [address], [place], [address]  [phone]  Fax [phone]        DISCHARGE SUMMARY from your Nurse    The following personal items collected during your admission are returned to you:   Dental Appliance: Dental Appliances: None  Vision: Visual Aid: Glasses  Hearing Aid:    Jewelry: Jewelry: None  Clothing: Clothing: Other (comment)(pacu locker)  Other Valuables: Other Valuables: None  Valuables sent to safe:        PATIENT INSTRUCTIONS:    After general anesthesia or intravenous sedation, for 24 hours or while taking prescription Narcotics:  · Limit your activities  · Do not drive and operate hazardous machinery  · Do not make important personal or business decisions  · Do  not drink alcoholic beverages  · If you have not urinated within 8 hours after discharge, please contact your surgeon on call.     Report the following to your surgeon:  · Excessive pain, swelling, redness or odor of or around the surgical area  · Temperature over 100.5  · Nausea and vomiting lasting longer than 4 hours or if unable to take medications  · Any signs of decreased circulation or nerve impairment to extremity: change in color, persistent  numbness, tingling, coldness or increase pain  · Any questions    INCENTIVE SPIROMETER  Your nurse may send an incentive spirometer home with you to help you with your breathing. The incentive spirometer provides another way to make the lungs work better. DIRECTIONS:  1. Exhale - begin with empty lungs. 2. Place lips around the mouthpiece; take a SLOW and DEEP breath in.  3. Keep the small blue \"float\" on the RIGHT between the top and bottom arrows. If you suck the small blue float all the way to the top, YOU ARE CHEATING. SLOW DOWN when you breathe in.  4. Your nurse will help you decide your target level for the big blue \"float\" that rises. Usually, that number is over the 1500 level. Your goal number is based upon your height and age, giving an estimate goal for your lung volume. 5. The arrow on the left side marks your goal.  Always try to go past your goal. Raise the arrow when you make a new goal.    6. When you take a full deep breath in, hold it for 2 seconds. Exhale normally. Don't be afraid to push yourself; rest a little between each attempt. Use the Incentive spirometer 10 times every hour while awake. It is OK to break the 10 to 12 attempts into smaller numbers if this exercise makes your tired. For example, perform 2 times every 10 minutes. IF YOU HAVE BEEN DIAGNOSED WITH SLEEP APNEA, PLEASE USE YOUR SLEEP APNEA DEVICE OR CPAP MACHINE WHEN YOU INTEND TO NAP AFTER TAKING PAIN MEDICATION. Ankle Pumps    Ankle pumps increase the circulation of oxygenated blood to your lower extremities and decrease your risk for circulation problems such as blood clots. They also stretch the muscles, tendons and ligaments in your foot and ankle, and prevent joint contracture in the ankle and foot, especially after surgeries on the legs.     It is important to do ankle pump exercises regularly after surgery because immobility increases your risk for developing a blood clot. Your doctor may also have you take an Aspirin for the next few days as well. If your doctor did not ask you to take an Aspirin, consult with him before starting Aspirin therapy on your own. The exercise is quite simple. 6. Slowly point your foot forward, feeling the muscles on the top of your lower leg stretch, and hold this position for 5 seconds. 7. Next, pull your foot back toward you as far as possible, stretching the calf muscles, and hold that position for 5 seconds. 8. Repeat with the other foot. 9. Perform 10 repetitions every hour while awake for both ankles if possible (down and then up with the foot once is one repetition). You should feel gentle stretching of the muscles in your lower leg when doing this exercise. If you feel pain, or your range of motion is limited, don't  Push too hard. Only go the limit your joint and muscles will let you go. If you have increasing pain, progressively worsening leg warmth or swelling, STOP the exercise and call your doctor. Other information in your discharge envelope:  []     PRESCRIPTIONS  []     PHYSICAL THERAPY PRESCRIPTION  []     APPOINTMENT CARDS  []     Regional Anesthesia Pamphlet for block or block with On-Q Catheter from Anesthesia Service  []     Medical device information sheets/pamphlets from their    []     School/work excuse note. []     /parent work excuse note. These are general instructions for a healthy lifestyle:    *  Please give a list of your current medications to your Primary Care Provider. *  Please update this list whenever your medications are discontinued, doses are      changed, or new medications (including over-the-counter products) are added. *  Please carry medication information at all times in case of emergency situations.     No smoking / No tobacco products / Avoid exposure to second hand smoke    Surgeon General's Warning: Quitting smoking now greatly reduces serious risk to your health. Obesity, smoking, and sedentary lifestyle greatly increases your risk for illness and disease. A healthy diet, regular physical exercise & weight monitoring are important for maintaining a healthy lifestyle. Congestive Heart Failure  You may be retaining fluid if you have a history of heart failure or if you experience any of the following symptoms:  Weight gain of 3 pounds or more overnight or 5 pounds in a week, increased swelling in our hands or feet or shortness of breath while lying flat in bed. Please call your doctor as soon as you notice any of these symptoms; do not wait until your next office visit. Recognize signs and symptoms of STROKE:  F - face looks uneven  A - arms unable to move or move even  S - speech slurred or non-existent  T - time-call 911 as soon as signs and symptoms begin-DO NOT go         Back to bed or wait to see if you get better-TIME IS BRAIN. Warning signs of Heart Attack                 Call 911 if you have these symptoms:    · Chest discomfort. Most heart attacks involve discomfort in the center of the chest that lasts more than a few minutes, or that goes away and comes back. It can feel like uncomfortable pressure, squeezing, fullness, or pain. · Discomfort in other areas of the upper body. Symptoms can include pain or discomfort in one or both        Arms, the back, neck, jaw, or stomach. ·  Shortness of breath with or without chest discomfort. · Other signs may include breaking out in a cold sweat, nausea, or lightheadedness    Don't wait more than five minutes to call 911 - MINUTES MATTER! Fast action can save your life. Calling 911 is almost always the fastest way to get lifesaving treatment. Emergency Medical Services staff can begin treatment when they arrive - up to an hour sooner than if someone gets to the hospital by car.     [facility] MEDICATION AND SIDE EFFECTS GUIDE    [facility] MEDICATION AND SIDE EFFECT GUIDE was provided to the PATIENT AND CARE PROVIDER.   Information provided includes instruction about drug purpose and common side effects for the following medications:    · Norco

## 2019-08-15 NOTE — OP NOTES
[facility]  OPERATIVE REPORT    Name:  Mumtaz Orta  MR#:  [id number]  :  1955  ACCOUNT #:  [de-identified]  DATE OF SERVICE:  08/15/2019      SURGEON:    Juan Wilson MD.    ASSISTANT:  1.  Maryam Dolan. 2.  [facility]. ANESTHESIA:  1. General endotracheal.  2.  0.5% Marcaine. PREOPERATIVE DIAGNOSIS:    Right inguinal hernia. POSTOPERATIVE DIAGNOSIS:    Indirect right inguinal hernia. PROCEDURE:    Robotic-assisted laparoscopic right inguinal herniorrhaphy with Parietex mesh. INDICATION:    Mr. Maria T Colby is a 75year-old [place] gentleman who was seen initially a year ago for right groin pain. He had a negative exam at that time, and an ultrasound of the scrotum was negative. He has continued to have pain and underwent a CAT scan which revealed a right inguinal hernia containing a loop of small bowel. He presents at this time for his elective repair. PROCEDURE IN DETAIL:    The patient was seen preoperatively in the holding area. The risks, benefits, and expected outcome were discussed with the patient, and all questions were answered satisfactorily. The patient concurred with the proposed plan, giving informed consent. The patient identified the right side as the correct side, and this was verified by me. The right was then marked. The patient was shaved in the holding area. The patient was taken to the OR. The patient was identified Crambu Ernst. Maria T Colby, and the procedure verified as Robotic-assisted laparoscopic right inguinal herniorrhaphy with mesh, possible open. The patient was placed on the OR table in the supine position. Prior to induction, antibiotic prophylaxis was administered. General anesthesia was administered and tolerated well. Both arms were tucked. The patient's abdomen and groin were prepped with ChloraPrep and draped in the usual sterile fashion with Bk Argue placed over the skin.   A timeout was performed, and the above information was confirmed. The local anesthetic was injected into the skin and subcutaneous tissue in the midline of the upper abdomen. Using a #15 blade, an incision was made. Towel clips were used to elevate the patient's abdominal wall. Using the Optiview technique, an 8 mm trocar was introduced into the abdomen. Insufflation was provided through this trocar to establish a pneumoperitoneum of 15 mmHg, which the patient tolerated. The pelvis was examined, and there were no adhesions noted to prevent a laparoscopic approach. An indirect inguinal hernia was identified on the right side, but no hernia was identified on the left side. The local anesthetic was injected at the remaining intended trocar sites. Two more 8 mm trocars were placed in the left upper quadrant and right upper quadrant, respectively, under direct laparoscopic vision. The patient was placed in the Trendelenburg position. The robotic arms were docked. At this time, I scrubbed out and went to the surgeon's console. I took control of the robotic arms for the majority of the procedure. Attention was turned to the right side. My assistant localized the anterior-superior iliac spine. The peritoneum was scored a few centimeters superior and medial to this point. The peritoneum was incised at this level, starting at the right medial umbilical ligament and carried over to the scored point. Dissection was initiated in the preperitoneal plane, elevating the transversalis fascia toward the abdominal wall. Dissection was continued caudally until Aashish's ligament was identified. The bladder was reflected medially. No direct, femoral, or obturator hernias were noted. As dissection was continued laterally into the space of Bogros, the iliopubic tract was defined. A tedious dissection was performed in order to skeletonize the right spermatic cord.   A large indirect hernia sac was identified, and it was carefully dissected out and inverted. There was a lipoma of the cord as well that was dissected out and reduced into the hernia sac. The right spermatic cord was then able to be encircled. Hemostasis was confirmed. A right-sided Parietex mesh was chosen for the repair. The mesh was folded up and placed into the preperitoneal space. The mesh was opened to cover all known and potential defects, while encircling the spermatic cord. The mesh was secured inferiorly to the Aashish's ligament, medially to the rectus abdominis muscle, and laterally to the abdominal wall with interrupted 3-0 Vicryl. The needle was removed. The pneumoperitoneum was taken down to 10 mmHg. The peritoneum was closed with a running 3-0 V-Loc. The needle was removed. The peritoneal flap was examined, and there was a single defect noted. The defect was closed with an interrupted 3-0 Vicryl. The needle was removed. The indirect hernia sac was left inverted. The underlying bowel was examined, and no injuries were noted. The robotic arms were undocked. The patient was placed flat. The left upper quadrant and epigastric trocars were removed under direct laparoscopic vision, and there was no bleeding noted internally from either site. The laparoscope was removed, and the abdomen was decompressed. The right upper quadrant trocar was then removed. Hemostasis was obtained within all wounds as needed with cautery. The skin at all sites was closed with 4-0 Monocryl in the usual subcuticular fashion. The wounds were cleaned and dried, and Steri Strips and bandages were applied. The right testicle was pulled down in the usual standard fashion. The patient was extubated in the room. ESTIMATED BLOOD LOSS:    Less than 25 mL. SPECIMENS:    None. IMPLANTS:    A right-sided Parietex mesh was placed into the preperitoneal space, covering the right myopectineal orifice. FINDINGS:  1.  A large indirect right inguinal hernia sac.   2.  A lipoma of the right spermatic cord. 3.  No left inguinal hernia. COUNTS:    All sponge, needle, and instrument counts were correct x 2. COMPLICATIONS:    None. DISPOSITION:    The patient was transferred to the recovery room in stable condition, having tolerated the procedure and anesthesia well.           Mike Cottrell.MD [email or web address]/  D:  08/15/2019 15:04  T:  08/15/2019 19:39  JOB #:  [id number]  CC:  MD Doug Alvares MD Verlena Homme, MD

## 2019-08-15 NOTE — INTERVAL H&P NOTE
H&P Update:  Antony Casper was seen and examined. History and physical has been reviewed. The patient has been examined.  There have been no significant clinical changes since the completion of the originally dated History and Physical.

## 2019-08-15 NOTE — ANESTHESIA PREPROCEDURE EVALUATION
Relevant Problems   No relevant active problems       Anesthetic History   No history of anesthetic complications            Review of Systems / Medical History  Patient summary reviewed, nursing notes reviewed and pertinent labs reviewed    Pulmonary  Within defined limits                 Neuro/Psych             Comments: PARKINSON'S DISEASE Cardiovascular                  Exercise tolerance: >4 METS     GI/Hepatic/Renal  Within defined limits              Endo/Other  Within defined limits           Other Findings              Physical Exam    Airway  Mallampati: III  TM Distance: 4 - 6 cm  Neck ROM: decreased range of motion   Mouth opening: Diminished (comment)     Cardiovascular    Rhythm: regular  Rate: normal         Dental  No notable dental hx       Pulmonary  Breath sounds clear to auscultation               Abdominal  GI exam deferred       Other Findings            Anesthetic Plan    ASA: 3  Anesthesia type: general          Induction: Intravenous  Anesthetic plan and risks discussed with: Patient and Spouse

## 2019-08-15 NOTE — PERIOP NOTES
Wife asking questions if home care is needed and if it would be covered by insurance. Son at pt. Side as well and both stated will be staying with pt. This evening at home. Noted if any further concerns to contact Dr. Vivek Miltno if needed - Both wife and son in agreement of plan.

## 2019-08-15 NOTE — BRIEF OP NOTE
BRIEF OPERATIVE NOTE    Date of Procedure: 8/15/2019   Preoperative Diagnosis: RIGHT INGUINAL HERNIA  Postoperative Diagnosis: INDIRECT RIGHT INGUINAL HERNIA    Procedure(s):  ROBOTIC ASSISTED LAPAROSCOPIC RIGHT INGUINAL HERNIORRHAPHY WITH PARIETEX MESH  Surgeon(s) and Role:     Dash Ying MD - Primary         Surgical Assistant: Giuseppe Renteria. Surgical Staff:  Circ-1: Antonio Olmstead RN; StillMay RN  Scrub Tech-1: Dileep Hay  Surg Asst-1: Maik Siu RN  Surg Asst-Relief: Ghazal Hansen  Float Staff: Bubba CARPIO  Event Time In Time Out   Incision Start 1039    Incision Close       Anesthesia: General   Estimated Blood Loss: Less than 25 ml. Specimens: * No specimens in log *   Findings: 1. A large indirect right inguinal hernia sac., 2. A lipoma of the right spermatic cord. Complications: None. Implants:   Implant Name Type Inv. Item Serial No.  Lot No. LRB No. Used Action   MESH CANDACE 6X4IN LT -- PARIETEX - SN/A Mesh MESH CANDACE 6X4IN LT -- PARIETEX N/A COVIDIEN  SURGICAL [id number] Right 1 Implanted     Disposition: Stable to the .

## 2019-08-15 NOTE — ANESTHESIA POSTPROCEDURE EVALUATION
Procedure(s):  ROBOTIC ASSISTED LAPAROSCOPIC RIGHT INGUINAL HERNIORRHAPHY WITH MESH. general    Anesthesia Post Evaluation      Multimodal analgesia: multimodal analgesia used between 6 hours prior to anesthesia start to PACU discharge  Patient location during evaluation: bedside  Patient participation: complete - patient participated  Level of consciousness: awake  Pain management: adequate  Airway patency: patent  Anesthetic complications: no  Cardiovascular status: acceptable  Respiratory status: acceptable  Hydration status: acceptable  Post anesthesia nausea and vomiting:  controlled      Vitals Value Taken Time   /77 8/15/2019  1:45 PM   Temp 36.7 °C (98 °F) 8/15/2019  1:14 PM   Pulse 57 8/15/2019  1:50 PM   Resp 17 8/15/2019  1:50 PM   SpO2 96 % 8/15/2019  1:50 PM   Vitals shown include unvalidated device data.

## 2019-08-16 ENCOUNTER — TELEPHONE (OUTPATIENT)
Dept: SURGERY | Age: 64
End: 2019-08-16

## 2019-08-16 NOTE — TELEPHONE ENCOUNTER
Spoke to pt's wife, Radha Finch (on hipaa) & pt. . Patient identified with two patient identifiers. How are you doing:better  Are you having any pain:yes  Are you taking pain meds:yes       If yes- recommended any OTC constipation treatment if needed  Have you had any nausea or vomiting:no  How is your appetite (eating & drinking):ok  Normal BM & urine output:urine but no BM yet  Pt notified to take dressing off after 48 hrs: informed  Do they have a drain:no  Are they keeping track of output:n/a  Did they review their discharge instructions:yes  Any other concerns:no  Your follow up office appt is:  8/30/2019 9:15 AM Severa Columbus., MD       Pt did not voice any other concerns. Pt will call with any problems or questions.

## 2019-08-19 LAB
ATRIAL RATE: 74 BPM
CALCULATED P AXIS, ECG09: 38 DEGREES
CALCULATED R AXIS, ECG10: 10 DEGREES
CALCULATED T AXIS, ECG11: 23 DEGREES
DIAGNOSIS, 93000: NORMAL
P-R INTERVAL, ECG05: 156 MS
Q-T INTERVAL, ECG07: 382 MS
QRS DURATION, ECG06: 84 MS
QTC CALCULATION (BEZET), ECG08: 424 MS
VENTRICULAR RATE, ECG03: 74 BPM

## 2019-08-30 ENCOUNTER — OFFICE VISIT (OUTPATIENT)
Dept: SURGERY | Age: 64
End: 2019-08-30

## 2019-08-30 VITALS
RESPIRATION RATE: 12 BRPM | WEIGHT: 173.13 LBS | OXYGEN SATURATION: 98 % | BODY MASS INDEX: 24.79 KG/M2 | TEMPERATURE: 97.7 F | DIASTOLIC BLOOD PRESSURE: 72 MMHG | HEIGHT: 70 IN | HEART RATE: 83 BPM | SYSTOLIC BLOOD PRESSURE: 110 MMHG

## 2019-08-30 DIAGNOSIS — Z98.890 S/P LAPAROSCOPIC HERNIA REPAIR: ICD-10-CM

## 2019-08-30 DIAGNOSIS — Z87.19 S/P LAPAROSCOPIC HERNIA REPAIR: ICD-10-CM

## 2019-08-30 PROBLEM — K40.90 INGUINAL HERNIA OF RIGHT SIDE WITHOUT OBSTRUCTION OR GANGRENE: Status: RESOLVED | Noted: 2019-08-09 | Resolved: 2019-08-30

## 2019-08-30 NOTE — PROGRESS NOTES
1. Have you been to the ER, urgent care clinic since your last visit? Hospitalized since your last visit? No    2. Have you seen or consulted any other health care providers outside of the [address] [place] [address] since your last visit? Include any pap smears or colon screening.  No

## 2019-08-30 NOTE — PROGRESS NOTES
Subjective:      Tang Reid is a 75 y.o. [facility] male presents for postop care 2 weeks following a lap hernia repair. Mr. Erica Damico is doing fine. His appetite is good, and he is eating a regular diet without difficulty. His bowel movements are regular. He is voiding without difficulty. He is not having any pain or problems with his incisions. Objective:     Visit Vitals  /72 (BP 1 Location: Left arm, BP Patient Position: Sitting)   Pulse 83   Temp 97.7 °F (36.5 °C) (Oral)   Resp 12   Ht 5' 10\" (1.778 m)   Wt 173 lb 2 oz (78.5 kg)   SpO2 98%   BMI 24.84 kg/m²       General:  alert, cooperative, no distress   Abdomen:  Soft, NT, ND. The incisions are clean, dry, and intact with no erythema. Right groin:  There is no hernia. Assessment:     1st POV, s/p robot lap right inguinal herniorrhaphy with mesh. Plan:     Mr. Erica Damico is doing fine and will continue with light activity until his final checkup with me in 4 weeks.   He is fine to resume PT.

## 2019-09-25 ENCOUNTER — OFFICE VISIT (OUTPATIENT)
Dept: SURGERY | Age: 64
End: 2019-09-25

## 2019-09-25 VITALS
SYSTOLIC BLOOD PRESSURE: 128 MMHG | TEMPERATURE: 98.4 F | DIASTOLIC BLOOD PRESSURE: 83 MMHG | HEIGHT: 70 IN | RESPIRATION RATE: 20 BRPM | WEIGHT: 177 LBS | HEART RATE: 91 BPM | BODY MASS INDEX: 25.34 KG/M2 | OXYGEN SATURATION: 98 %

## 2019-09-25 DIAGNOSIS — Z98.890 S/P LAPAROSCOPIC HERNIA REPAIR: Primary | ICD-10-CM

## 2019-09-25 DIAGNOSIS — Z87.19 S/P LAPAROSCOPIC HERNIA REPAIR: Primary | ICD-10-CM

## 2019-09-25 NOTE — PROGRESS NOTES
1. Have you been to the ER, urgent care clinic since your last visit? Hospitalized since your last visit? No    2. Have you seen or consulted any other health care providers outside of the [address] [place] [address] since your last visit? Include any pap smears or colon screening. No      Pt said he has pain in his calf and it has been there since before his surgery. Dr Arnel Del Valle aware.

## 2019-09-25 NOTE — PROGRESS NOTES
Subjective:      Maurisio Sorto is a 75 y.o. [place] male presents for postop care 6 weeks following a lap hernia repair. Mr. Nila Kelly is doing fine. He c/o b/l calf pain which was present prior to surgery. Objective:     Visit Vitals  /83   Pulse 91   Temp 98.4 °F (36.9 °C)   Resp 20   Ht 5' 10\" (1.778 m)   Wt 177 lb (80.3 kg)   SpO2 98%   BMI 25.40 kg/m²       General:  alert, cooperative, no distress   Abdomen:  Soft, NT, ND. The incisions are healed. Right groin:  There is no hernia. There is no calf tenderness. There is mild left leg edema. Assessment:     2nd POV, s/p robot lap right inguinal herniorrhaphy with mesh. Plan:     Mr. Nila Kelly is doing fine and can gradually resume normal activity. He can f/u with me prn. He has been instructed to f/u with his PCP regarding his calf pain.

## 2019-09-26 ENCOUNTER — OFFICE VISIT (OUTPATIENT)
Dept: FAMILY MEDICINE CLINIC | Age: 64
End: 2019-09-26

## 2019-09-26 VITALS
OXYGEN SATURATION: 95 % | HEART RATE: 85 BPM | TEMPERATURE: 98.2 F | SYSTOLIC BLOOD PRESSURE: 107 MMHG | BODY MASS INDEX: 25.34 KG/M2 | HEIGHT: 70 IN | RESPIRATION RATE: 16 BRPM | DIASTOLIC BLOOD PRESSURE: 76 MMHG | WEIGHT: 177 LBS

## 2019-09-26 DIAGNOSIS — R73.03 PRE-DIABETES: ICD-10-CM

## 2019-09-26 DIAGNOSIS — K64.4 EXTERNAL HEMORRHOID: ICD-10-CM

## 2019-09-26 DIAGNOSIS — R60.0 EDEMA OF LEFT FOOT: ICD-10-CM

## 2019-09-26 DIAGNOSIS — Z83.3 FAMILY HISTORY OF DIABETES MELLITUS: ICD-10-CM

## 2019-09-26 DIAGNOSIS — R35.0 URINARY FREQUENCY: Primary | ICD-10-CM

## 2019-09-26 DIAGNOSIS — R09.82 POSTNASAL DRIP: ICD-10-CM

## 2019-09-26 DIAGNOSIS — K59.00 CONSTIPATION, UNSPECIFIED CONSTIPATION TYPE: ICD-10-CM

## 2019-09-26 LAB
BILIRUB UR QL STRIP: NEGATIVE
GLUCOSE UR-MCNC: NEGATIVE MG/DL
HBA1C MFR BLD HPLC: 5.8 %
KETONES P FAST UR STRIP-MCNC: NORMAL MG/DL
PH UR STRIP: 6.5 [PH] (ref 4.6–8)
PROT UR QL STRIP: NEGATIVE
SP GR UR STRIP: 1.02 (ref 1–1.03)
UA UROBILINOGEN AMB POC: NORMAL (ref 0.2–1)
URINALYSIS CLARITY POC: CLEAR
URINALYSIS COLOR POC: YELLOW
URINE BLOOD POC: NEGATIVE
URINE LEUKOCYTES POC: NEGATIVE
URINE NITRITES POC: NEGATIVE

## 2019-09-26 RX ORDER — CETIRIZINE HCL 10 MG
10 TABLET ORAL DAILY
Qty: 30 TAB | Refills: 3 | Status: SHIPPED | OUTPATIENT
Start: 2019-09-26 | End: 2020-07-21

## 2019-09-26 RX ORDER — TAMSULOSIN HYDROCHLORIDE 0.4 MG/1
0.4 CAPSULE ORAL DAILY
Qty: 30 CAP | Refills: 3 | Status: SHIPPED | OUTPATIENT
Start: 2019-09-26 | End: 2020-04-21 | Stop reason: SDUPTHER

## 2019-09-26 NOTE — PROGRESS NOTES
I saw and evaluated the patient, performing the key elements of the service. I discussed the findings, assessment and plan with the resident and agree with the resident's findings and plan as documented in the resident's note. L foot edema: 5 months ongoing, no pain, L>R. Trial of compression stockings, not concerning for DVT, reduce salt intake. Allergies/PND: Cough, mucous, afebrile. Trial of Zyrtec + Flonase. Urinary frequency: 2 months but per chart review 1 year, sometimes dysuria, never had UTI, has hx 2 normal UAs with neg urine culture. PSA in the past 0.7. Check prostate exam, A1C to r/o DM. Likely BPH.

## 2019-09-26 NOTE — PROGRESS NOTES
1. Have you been to the ER, urgent care clinic since your last visit? Hospitalized since your last visit? Yes, [facility], Hernia repair    2. Have you seen or consulted any other health care providers outside of the [address] [place] [address] since your last visit? Include any pap smears or colon screening. No    Chief Complaint   Patient presents with    Foot Swelling     left foot       Patient states pain 8/10 from knees down to ankles bilateral leg. Gets some relief with massage and/or crossing one leg over other. Blood pressure 107/76, pulse 85, temperature 98.2 °F (36.8 °C), temperature source Oral, resp. rate 16, height 5' 10\" (1.778 m), weight 177 lb (80.3 kg), SpO2 95 %.

## 2019-09-26 NOTE — PROGRESS NOTES
[address] [place] [address]   Office [phone], Fax [phone]    Subjective:   Andreas Vivar is a 75 y.o. male   CC:   Chief Complaint   Patient presents with    Foot Swelling     left foot     History provided by patient     HPI:  75yoM with Parkinson's disease, R knee arthritis, bilateral chronic knee pain, bilateral leg weakness who is here for L foot edema, seasonal allergies and urinary frequency     L foot edema  This is a chronic issue for ~5-6 months now. He does have chronic b/l knee pain and b/l lower leg weakness. He just had lap hernia repair 6 weeks ago so he has not been going to PT, but it will restart on October 1, 2019. The edema seems to be constant. He has relief with massage or crossing his leg over the other. No redness, inhury, or recent falls. Seasonal allergies/ post-nasal drip  Everyday he has had a feeling of mucus running down his throat. He also has been coughing slightly with the small amount of yellow sputum. No blood. No SOB,wheezing. Not a smoker. Urinary Frequency  2 months now. Going every 1-2 hours. Sometimes it burns. No blood. No mucus. Never had UTI before. He does not have diagnosis of BPH. Per chart review, he has had this problem since May 2018 and as early as 11/2017. Chronic constipation  Ongoing issue for patient for years now. BM every 1-3 days with small, hard stools. No blood. Has h/o hemorrhoids. He does take Miralax as needed, usually every 3 days. No abdominal pain or N/V.           Past Medical History:   Diagnosis Date    Arthritis     knees    Chronic pain     knees, back    Constipation     Depression     H/O seasonal allergies     Parkinson disease (HCC)     Restless leg syndrome     Urinary frequency      Allergies   Allergen Reactions    Sulfa (Sulfonamide Antibiotics) Rash     Current Outpatient Medications on File Prior to Visit   Medication Sig Dispense Refill    CARBIDOPA-LEVODOPA PO Take  mg by mouth three (3) times daily.  polyethylene glycol (MIRALAX) 17 gram/dose powder Take 17 g by mouth as needed.  ACETAMINOPHEN (TYLENOL PO) Take 650 mg by mouth as needed. No current facility-administered medications on file prior to visit. No family history on file. Social History     Socioeconomic History    Marital status:      Spouse name: Not on file    Number of children: Not on file    Years of education: Not on file    Highest education level: Not on file   Tobacco Use    Smoking status: Never Smoker    Smokeless tobacco: Never Used   Substance and Sexual Activity    Alcohol use: No    Drug use: No    Sexual activity: Yes     Partners: Female     Past Surgical History:   Procedure Laterality Date    HX HERNIA REPAIR Right 08/15/2019    Robotic-assisted laparoscopic right inguinal herniorrhaphy with mesh. Patient Active Problem List   Diagnosis Code    Parkinsonism ([facility] G20    Restless leg G25.81    Depression F32.9    Lipid disorder E78.9    Colonoscopy refused Z53.20    S/P laparoscopic hernia repair Z98.890, Z87.19           ROS      Objective:     Visit Vitals  /76 (BP 1 Location: Left arm, BP Patient Position: Sitting)   Pulse 85   Temp 98.2 °F (36.8 °C) (Oral)   Resp 16   Ht 5' 10\" (1.778 m)   Wt 177 lb (80.3 kg)   SpO2 95%   BMI 25.40 kg/m²        Physical Exam    GEN: Alert and oriented. In no acute distress  HEENT: Normocephalic /Atraumatic. Sclera anicterus. Mask facies. B/l TM normal. Moist mucus membranes. No nasal discharge. CV: RRR. No M/R/R  Resp: Normal work of breathing. CTAB. No wheezes  GI: + bowel sounds. NTTP. No rebound tenderness or guarding. Nondistended. MSK: R great toe with hammertoe deformity. L foot with mild pedal edema. B/L NTTP, no erythema. : Multiple non-thrombosed external hemorrhoids. Large amount of hard stool in rectum. Prostate not enlarged, non-tender.         Assessment and orders:     Antony Casper is a 75 y.o.  male presents with:    1. Urinary frequency  UA with trace ketones, otherwise unremarkable so UTI unlikely. POC A1c 5.8%. Although prostate exam was limited by large about of stool in rectal vault, prostate was not enlarged or tender. Will check PSA, last one 0.7. Trial of flomax and f.u in 3 weeks. - AMB POC URINALYSIS DIP STICK AUTO W/O MICRO  - PSA, DIAGNOSTIC (PROSTATE SPECIFIC AG)  - AMB POC HEMOGLOBIN A1C  - tamsulosin (FLOMAX) 0.4 mg capsule; Take 1 Cap by mouth daily. Dispense: 30 Cap; Refill: 3    2. Pre-diabetes and 3. Family history of diabetes mellitus  - AMB POC HEMOGLOBIN A1C  - AMB POC URINALYSIS DIP STICK AUTO W/O MICRO    4. Postnasal drip  Lungs clear and HEENT unremarkable. Continue flonase. Will add zyrtec. - cetirizine (ZYRTEC) 10 mg tablet; Take 1 Tab by mouth daily. Dispense: 30 Tab; Refill: 3    5. External hemorrhoid and 6. Constipation, unspecified constipation type  Advise patient to start taking Miralax daily instead of prn until BM are regular and soft in consistently. Increase in fiber rich foods. Increase PO hydration    7. Edema of left foot  Compression stockings       Follow up: 3 weeks    I have reviewed patient medical and social history and medications. I have reviewed pertinent labs results and other data. I have discussed the diagnosis with the patient and the intended plan as seen in the above orders. The patient has received an after-visit summary and questions were answered concerning future plans. I have discussed medication side effects and warnings with the patient as well.     Patient discussed and seen with Dr. Heather Connolly, Attending Physician    Ashlee Prince MD  [address] [place] [address] Family Medicine Resident  09/26/19

## 2019-09-26 NOTE — PATIENT INSTRUCTIONS
COMPRESSION STOCKINGS to help with edema    LOW SALT DIET     Angie Gama, PT    Physical Therapy    NPI: [id number]    [address] [place] [address]         Phone: [phone]    Fax: [phone]     PT START Oct 1st, 2019    CONSTIPATION MIRALAX daily

## 2019-12-09 DIAGNOSIS — F32.A DEPRESSION, UNSPECIFIED DEPRESSION TYPE: Primary | ICD-10-CM

## 2019-12-09 DIAGNOSIS — G20 PARKINSONISM, UNSPECIFIED PARKINSONISM TYPE (HCC): ICD-10-CM

## 2019-12-09 RX ORDER — CARBIDOPA AND LEVODOPA 25; 100 MG/1; MG/1
1 TABLET ORAL 3 TIMES DAILY
Qty: 270 TAB | Refills: 0 | Status: SHIPPED | OUTPATIENT
Start: 2019-12-09 | End: 2020-02-12 | Stop reason: SDUPTHER

## 2019-12-09 RX ORDER — ESCITALOPRAM OXALATE 10 MG/1
10 TABLET ORAL DAILY
Qty: 90 TAB | Refills: 0 | Status: SHIPPED | OUTPATIENT
Start: 2019-12-09 | End: 2020-02-12

## 2020-02-12 ENCOUNTER — OFFICE VISIT (OUTPATIENT)
Dept: NEUROLOGY | Age: 65
End: 2020-02-12

## 2020-02-12 VITALS
HEART RATE: 89 BPM | OXYGEN SATURATION: 99 % | BODY MASS INDEX: 25.34 KG/M2 | WEIGHT: 177 LBS | DIASTOLIC BLOOD PRESSURE: 92 MMHG | SYSTOLIC BLOOD PRESSURE: 140 MMHG | HEIGHT: 70 IN

## 2020-02-12 DIAGNOSIS — F32.A DEPRESSION, UNSPECIFIED DEPRESSION TYPE: ICD-10-CM

## 2020-02-12 DIAGNOSIS — G20 PARKINSONISM, UNSPECIFIED PARKINSONISM TYPE (HCC): Primary | ICD-10-CM

## 2020-02-12 RX ORDER — CARBIDOPA AND LEVODOPA 25; 100 MG/1; MG/1
1 TABLET ORAL 3 TIMES DAILY
Qty: 90 TAB | Refills: 0 | Status: SHIPPED | OUTPATIENT
Start: 2020-02-12 | End: 2020-03-19

## 2020-02-12 RX ORDER — CARBIDOPA AND LEVODOPA 50; 200 MG/1; MG/1
1 TABLET, EXTENDED RELEASE ORAL
Qty: 30 TAB | Refills: 2 | Status: SHIPPED | OUTPATIENT
Start: 2020-02-12 | End: 2020-03-19

## 2020-02-12 RX ORDER — RASAGILINE 1 MG/1
1 TABLET ORAL DAILY
Qty: 30 TAB | Refills: 2 | Status: SHIPPED | OUTPATIENT
Start: 2020-02-12 | End: 2020-07-21

## 2020-02-12 NOTE — PATIENT INSTRUCTIONS
Added Azilect 1 mg once a day (for Parkinson's)    There are interactions between Azilect and depression medications so I had to stop the Lexapro. Put your Lexapro tablets in the cabinet and do not use while you are on Azilect. I tried to find another antidepressant that you could take while on Azilect but all of them showed possible interactions. I sent a Prescription for Rytary (combination of immediate release, medium release and extended release Carbidopa-Levodopa) to [facility] pharmacy. If this is approved, and affordable, then you would start taking this medication and stop taking Carbidopa-Levodopa  mg tablet three times a day. I also sent a prescription for an extended release version of Carbidopa-Levodopa  mg that you will take at bedtime (even if you start taking Rytary), as this may improve you early morning mobility/ getting out of bed issues.      Referred you to [facility]/ Parkinson's specialist

## 2020-02-12 NOTE — PROGRESS NOTES
Accompanied by wife. States he has been doing well but it has gotten harder to get out of bed. Normally he will slide to the floor and then stand up.

## 2020-02-12 NOTE — PROGRESS NOTES
Interval HPI:   This is a 75 y.o. male who is following up for Parkinson's (masked facies, forward leaning/ fast gait)    Chief Complaint   Patient presents with    Parkinsons Disease     Continues on Sinemet  one tab TID (can't tolerate higher doses). Tried Pramipexole at a prior visit due to Parkinson's and RLS symptoms  Pt couldn't tolerate it so that was stopped    He/ wife say that he's having increasing mobility difficulty. Can't get out of bed in the morning. They lowered his bed and he tries to get out but falls out of bed and lays there since he can't get up and she can't help him up. She says he's overall much less active, sits in chair for the majority of day. When asked he says it's a combination of him not being able to do things and him not wanting to do some things. Speech low and slow. No report of tremors (historically didn't have any tremors). Brief ROS: as above or otherwise negative    =====================    Brief Hx:    MRI Brain (images reviewed): mild chronic small vessel ischemic disease, and mild global atrophy, no hydrocephalus (i.e no evidence of NPH). EEG: normal awake and drowsy EEG. Couldn't tolerate Sinemet 1.5 TID due to cognitive difficulty so backed down to 1 tab TID. He/ wife reported that since being on Sinemet, he's more active, moving around better, voice is not as low as it used to be, not as restless as he used to be. Never had any tremors to begin with. No shuffling noted at initial visit but wife says he shuffles at times. Allergies   Allergen Reactions    Sulfa (Sulfonamide Antibiotics) Rash     Current Outpatient Medications   Medication Sig Dispense Refill    rasagiline (AZILECT) 1 mg tablet Take 1 Tab by mouth daily. For Parkinson's 30 Tab 2    carbidopa-levodopa ER (SINEMET CR)  mg per tablet Take 1 Tab by mouth nightly. To help early morning parkinson's symptoms.  30 Tab 2    carbidopa-levodopa ER (RYTARY) 23.75-95 mg per capsule Take 3 Caps by mouth three (3) times daily. For Parkinson's 270 Cap 2    carbidopa-levodopa (SINEMET)  mg per tablet Take 1 Tab by mouth three (3) times daily. For Parkinson's. Stop taking this if you start taking Rytary 90 Tab 0    cetirizine (ZYRTEC) 10 mg tablet Take 1 Tab by mouth daily. 30 Tab 3    tamsulosin (FLOMAX) 0.4 mg capsule Take 1 Cap by mouth daily. 30 Cap 3    polyethylene glycol (MIRALAX) 17 gram/dose powder Take 17 g by mouth as needed.  ACETAMINOPHEN (TYLENOL PO) Take 650 mg by mouth as needed. PMHx:   Past Medical History:   Diagnosis Date    Arthritis     knees    Chronic pain     knees, back    Constipation     Depression     H/O seasonal allergies     Parkinson disease (HCC)     Restless leg syndrome     Urinary frequency      PSHx:  has a past surgical history that includes hx hernia repair (Right, 08/15/2019). SocHx:  reports that he has never smoked. He has never used smokeless tobacco. He reports that he does not drink alcohol or use drugs. FHx: family history is not on file. Physical Exam  Blood pressure (!) 140/92, pulse 89, height 5' 10\" (1.778 m), weight 80.3 kg (177 lb), SpO2 99 %. Awake, alert, masked facial expression but conversant, low voice  Neck: no stiffness  Skin: no rashes    Focused Neurological Exam     Mental status: Alert and oriented to person, place situation. Language: normal fluency and comprehension; no dysarthria. CNs:   Visual fields grossly normal  Extraocular movements intact, no nystagmus  Face appears symmetric and facial strength normal.    Hearing is intact to casual conversation. Sensory: intact light touch in arms  Motor: Normal bulk and strength in all 4 extremities.     Reflexes: not examined this visit  Cerebellar: no resting tremors, mild cogwheel rigidity both arms  Gait: ambulates independently, forward leaning gait, not shuffling, upper body rigidity      Impression      ICD-10-CM ICD-9-CM    1. Parkinsonism, unspecified [facility] G20 332.0 rasagiline (AZILECT) 1 mg tablet      carbidopa-levodopa ER (SINEMET CR)  mg per tablet      carbidopa-levodopa ER (RYTARY) 23.75-95 mg per capsule      carbidopa-levodopa (SINEMET)  mg per tablet      REFERRAL TO NEUROLOGY   2. Depression, unspecified depression type F32.9 36        75 y.o. male with Parkinsonian features (hypo-phonia, masked facial expression, reduced mobility, never any tremors, subjective/ intermittent shuffling per wife). Given the following written instructions: Added Azilect 1 mg once a day (for Parkinson's)    There are interactions between Azilect and depression medications so I had to stop the Lexapro. Put your Lexapro tablets in the cabinet and do not use while you are on Azilect. I tried to find another antidepressant that you could take while on Azilect but all of them showed possible interactions. I sent a Prescription for Rytary (combination of immediate release, medium release and extended release Carbidopa-Levodopa) to [facility] pharmacy. If this is approved, and affordable, then you would start taking this medication and stop taking Carbidopa-Levodopa  mg tablet three times a day. I also sent a prescription for an extended release version of Carbidopa-Levodopa  mg that you will take at bedtime (even if you start taking Rytary), as this may improve you early morning mobility/ getting out of bed issues.      Referred you to [facility]/ Parkinson's specialist        Signed By: Kam Ricks MD     February 12, 2020

## 2020-02-14 ENCOUNTER — TELEPHONE (OUTPATIENT)
Dept: NEUROLOGY | Age: 65
End: 2020-02-14

## 2020-02-19 ENCOUNTER — TELEPHONE (OUTPATIENT)
Dept: NEUROLOGY | Age: 65
End: 2020-02-19

## 2020-02-19 NOTE — TELEPHONE ENCOUNTER
----- Message from Daya Peterson sent at 2/19/2020 12:31 PM EST -----  Regarding: Dr. Maite Banerjee first and last name: Salina Penaloza      Reason for call: requesting a call back in regards scheduling the appointment with the specialist the pt was referred to      [facility] required yes/no and why: yes      Best contact number(s): [phone]      Details to clarify the request:      Daya Peterson

## 2020-02-21 NOTE — TELEPHONE ENCOUNTER
R/t call. Spoke to patient's wife. appt for Dr. Juan Roberts is currently scheduled for June. She would like to get sooner appt. They were also a little confused about new medications that were prescribed. Tried to explain directions but patient's wife seemed confused. Put Dr. Asif Delay last note explaining medication in mail. If wife calls back with email, we will email note.

## 2020-02-26 ENCOUNTER — TELEPHONE (OUTPATIENT)
Dept: NEUROLOGY | Age: 65
End: 2020-02-26

## 2020-03-03 NOTE — TELEPHONE ENCOUNTER
Detailed message left on patient's voicemail
Patient's wife said they received Rytary medication and need to know how to take it.
Stop taking Sinemet  one tab three times a day and stop taking Sinemet CR one tablet at bedtime    Start taking Rytary 3 capsules three times day (as written on prescription)
- - -

## 2020-03-12 ENCOUNTER — TELEPHONE (OUTPATIENT)
Dept: NEUROLOGY | Age: 65
End: 2020-03-12

## 2020-03-12 NOTE — TELEPHONE ENCOUNTER
Prior Authorization submitted URGENTLY for Rytary 23.75-95 mg (x270 caps per 30 days) to [facility] via Cover My Meds. Status Pending. Allow 24 hours for response.      [facility] Key: [id number]

## 2020-03-13 NOTE — TELEPHONE ENCOUNTER
Prior Authorization APPROVED for Rytary 23.75mg-95mg (n556cxna/30 days) by [facility] Part D. Effective dates 03/12/20 - Until Further Notice. Case #[id number]. Approval will be scanned into media.  Pharmacy has been notified of approval.

## 2020-03-19 DIAGNOSIS — G20 PARKINSONISM, UNSPECIFIED PARKINSONISM TYPE (HCC): ICD-10-CM

## 2020-03-19 RX ORDER — LEVODOPA AND CARBIDOPA 95; 23.75 MG/1; MG/1
3 CAPSULE, EXTENDED RELEASE ORAL 3 TIMES DAILY
Qty: 270 CAP | Refills: 1 | Status: SHIPPED | OUTPATIENT
Start: 2020-03-19 | End: 2020-05-27

## 2020-04-02 ENCOUNTER — TELEPHONE (OUTPATIENT)
Dept: NEUROLOGY | Age: 65
End: 2020-04-02

## 2020-04-02 ENCOUNTER — VIRTUAL VISIT (OUTPATIENT)
Dept: NEUROLOGY | Age: 65
End: 2020-04-02

## 2020-04-02 VITALS — BODY MASS INDEX: 25.4 KG/M2 | HEIGHT: 70 IN

## 2020-04-02 DIAGNOSIS — G20 PARKINSONISM, UNSPECIFIED PARKINSONISM TYPE (HCC): Primary | ICD-10-CM

## 2020-04-02 RX ORDER — CARBIDOPA AND LEVODOPA 50; 200 MG/1; MG/1
1 TABLET, EXTENDED RELEASE ORAL
Qty: 90 TAB | Refills: 0 | Status: SHIPPED | OUTPATIENT
Start: 2020-04-02 | End: 2020-07-01

## 2020-04-02 NOTE — PROGRESS NOTES
Consent: René Carroll, who was seen by synchronous (real-time) audio-video technology, and/or his healthcare decision maker, is aware that this patient-initiated, Telehealth encounter on 4/2/2020 is a billable service, with coverage as determined by his insurance carrier. He is aware that he may receive a bill and has provided verbal consent to proceed: Yes. René Carroll is a 75 y.o. male being evaluated by a video visit encounter for concerns as above. A caregiver was present when appropriate. Due to this being a TeleHealth encounter (During RZJKU-81 public [facility] emergency), evaluation of the following organ systems was limited: Vitals/Constitutional/EENT/Resp/CV/GI//MS/Neuro/Skin/Heme-Lymph-Imm. Pursuant to the emergency declaration under the [facility], 1135 waiver authority and the [employer] Act, this Virtual  Visit was conducted, with patient's (and/or legal guardian's) consent, to reduce the patient's risk of exposure to COVID-19 and provide necessary medical care. Services were provided through a video synchronous discussion virtually to substitute for in-person clinic visit. Patient and provider were located at their individual homes. Assessment & Plan:   Diagnoses and all orders for this visit:    1. Parkinsonism, unspecified Parkinsonism type ([address]  -     carbidopa-levodopa ER (Sinemet CR)  mg per tablet; Take 1 Tab by mouth nightly for 90 days.  For Parkinson's      Continue Rytary 23.75/ 85 three capsules three times a day  Continue Sinemet CR  one tab QHS  Pt to look into cost of the Azilect  See [facility] in June  Follow up will be based on visit at [facility]:   René Carroll is a 75 y.o. male who was seen for Parkinsons Disease      Last visit, d/c'd Sinemet  (one tab TID; couldn't tolerate higher doses) and started pt on Rytary (Carbidopa-levodopa 23.75/ 95, three caps three times a day). Added Azilect 1 mg once a day (morning) and added Sinemet CR  one tab at bedtime (to help early morning symptoms) and referred pt to Parkinson's Specialist/ Dr Joshua Marie [facility]). Has appt with [facility] on 6-. Pt and wife report that there may be a mild improvement in his overall parkinson's symptoms since switching over to [address]. Gait a little better. Wake-up symptoms are only mildly improved since adding night time dose of Sinemet CR (one tab QHS). No resting tremor (no tremor historically). Denies any side effects. Has not picked up / started the Azilect Rx yet. Prior to Admission medications    Medication Sig Start Date End Date Taking? Authorizing Provider   carbidopa-levodopa ER (Sinemet CR)  mg per tablet Take 1 Tab by mouth nightly for 90 days. For Parkinson's 4/2/20 7/1/20 Yes Shanique Mars MD   carbidopa-levodopa ER (Rytary) 23.75-95 mg per capsule Take 3 Caps by mouth three (3) times daily. For Parkinson's 3/19/20  Yes Shanique Mars MD   ACETAMINOPHEN (TYLENOL PO) Take 650 mg by mouth as needed. Yes Provider, Historical   tamsulosin (Flomax) 0.4 mg capsule Take 1 Cap by mouth daily. 4/21/20   Perla Rudolph MD   rasagiline (AZILECT) 1 mg tablet Take 1 Tab by mouth daily. For Parkinson's 2/12/20   Shanique Mars MD   cetirizine (ZYRTEC) 10 mg tablet Take 1 Tab by mouth daily. 9/26/19   Mago Lange MD   polyethylene glycol [facility]) 17 gram/dose powder Take 17 g by mouth as needed.     Provider, Historical     Allergies   Allergen Reactions    Sulfa (Sulfonamide Antibiotics) Rash     PMHx:   Past Medical History:   Diagnosis Date    Arthritis     knees    Chronic pain     knees, back    Constipation     Depression     H/O seasonal allergies     Parkinson disease (HCC)     Restless leg syndrome     Urinary frequency      PSHx:  has a past surgical history that includes hx hernia repair (Right, 08/15/2019). SocHx:  reports that he has never smoked. He has never used smokeless tobacco. He reports that he does not drink alcohol or use drugs. FHx: family history is not on file. Objective:   Vital Signs: (As obtained by patient/caregiver at home)  Visit Vitals  Ht 5' 10\" (1.778 m)   BMI 25.40 kg/m²        [INSTRUCTIONS:  \"[x]\" Indicates a positive item  \"[]\" Indicates a negative item  -- DELETE ALL ITEMS NOT EXAMINED]    Constitutional: [x] Appears well-developed and well-nourished [x] No apparent distress      [] Abnormal -     Mental status: [x] Alert and awake  [x] Oriented to person/place/time [x] Able to follow commands    [] Abnormal -     Eyes:   EOM    [x]  Normal    [] Abnormal -   Sclera  [x]  Normal    [] Abnormal -          Discharge []  None visible   [] Abnormal -     HENT: [x] Normocephalic, atraumatic  [] Abnormal -   [] Mouth/Throat: Mucous membranes are moist    External Ears [x] Normal  [] Abnormal -    Neck: [x] No visualized mass [] Abnormal -     Pulmonary/Chest: [x] Respiratory effort normal   [x] No visualized signs of difficulty breathing or respiratory distress        [] Abnormal -      Musculoskeletal:   [x] Normal gait with no signs of ataxia         [x] Normal range of motion of neck        [] Abnormal -     Neurological:        [x] No Facial Asymmetry (Cranial nerve 7 motor function) (limited exam due to video visit)          [x] No gaze palsy        [] Masked facies, EOMI in all directions, conjugate eye movements, hearing normal, slow/ low volume speech, no resting tremors, no postural tremors. Slow to stand, slow gait (not clearly shuffling).          Skin:        [x] No significant exanthematous lesions or discoloration noted on facial skin         [] Abnormal -            Psychiatric:       [x] Normal Affect [] Abnormal -        [x] No Hallucinations    Other pertinent observable physical exam findings:-        Kg Huynh MD

## 2020-04-02 NOTE — TELEPHONE ENCOUNTER
----- Message from Shun Escobedo sent at 4/2/2020 10:47 AM EDT -----  Regarding: /telephone  General Message/Vendor Calls    Caller's first and last name:      Reason for call: The pt is returning a call from the office.       Callback required yes/no and why:yes      Best contact number(s):[phone]

## 2020-04-16 ENCOUNTER — DOCUMENTATION ONLY (OUTPATIENT)
Dept: FAMILY MEDICINE CLINIC | Age: 65
End: 2020-04-16

## 2020-04-16 ENCOUNTER — VIRTUAL VISIT (OUTPATIENT)
Dept: FAMILY MEDICINE CLINIC | Age: 65
End: 2020-04-16

## 2020-04-16 NOTE — PROGRESS NOTES
Erroneous encounter. Please disregard. Attempted to contact the patient multiple times at [phone] to perform Virtual visit. Let VM but no response yet.     11:35 AM  4/16/2020  Peewee Porter MD

## 2020-04-16 NOTE — PROGRESS NOTES
Attempted to reach the patient at [phone] to perform virtual visit. LVM. Will attempt to call again.     9:36 AM  4/16/2020  Radha Alba MD

## 2020-04-21 ENCOUNTER — TELEPHONE (OUTPATIENT)
Dept: FAMILY MEDICINE CLINIC | Age: 65
End: 2020-04-21

## 2020-04-21 ENCOUNTER — VIRTUAL VISIT (OUTPATIENT)
Dept: FAMILY MEDICINE CLINIC | Age: 65
End: 2020-04-21

## 2020-04-21 DIAGNOSIS — M79.604 BILATERAL LEG PAIN: ICD-10-CM

## 2020-04-21 DIAGNOSIS — M79.605 BILATERAL LEG PAIN: ICD-10-CM

## 2020-04-21 DIAGNOSIS — R35.0 URINARY FREQUENCY: ICD-10-CM

## 2020-04-21 DIAGNOSIS — M25.561 CHRONIC PAIN OF BOTH KNEES: ICD-10-CM

## 2020-04-21 DIAGNOSIS — M25.562 CHRONIC PAIN OF BOTH KNEES: ICD-10-CM

## 2020-04-21 DIAGNOSIS — M54.50 CHRONIC MIDLINE LOW BACK PAIN WITHOUT SCIATICA: Primary | ICD-10-CM

## 2020-04-21 DIAGNOSIS — G89.29 CHRONIC PAIN OF BOTH KNEES: ICD-10-CM

## 2020-04-21 DIAGNOSIS — G89.29 CHRONIC MIDLINE LOW BACK PAIN WITHOUT SCIATICA: Primary | ICD-10-CM

## 2020-04-21 RX ORDER — POLYETHYLENE GLYCOL 3350 17 G/17G
17 POWDER, FOR SOLUTION ORAL DAILY
Qty: 507 G | Refills: 3 | Status: SHIPPED | OUTPATIENT
Start: 2020-04-21 | End: 2021-07-15

## 2020-04-21 RX ORDER — DICLOFENAC SODIUM 10 MG/G
GEL TOPICAL 4 TIMES DAILY
Qty: 1 EACH | Refills: 1 | Status: SHIPPED | OUTPATIENT
Start: 2020-04-21

## 2020-04-21 RX ORDER — TAMSULOSIN HYDROCHLORIDE 0.4 MG/1
0.4 CAPSULE ORAL DAILY
Qty: 90 CAP | Refills: 3 | Status: SHIPPED | OUTPATIENT
Start: 2020-04-21 | End: 2020-07-21

## 2020-04-21 NOTE — PATIENT INSTRUCTIONS
Medicare Wellness Visit, Male The best way to live healthy is to have a lifestyle where you eat a well-balanced diet, exercise regularly, limit alcohol use, and quit all forms of tobacco/nicotine, if applicable. Regular preventive services are another way to keep healthy. Preventive services (vaccines, screening tests, monitoring & exams) can help personalize your care plan, which helps you manage your own care. Screening tests can find health problems at the earliest stages, when they are easiest to treat. [facility] follows the current, evidence-based guidelines published by the [facility] ([facility]) when recommending preventive services for our patients. Because we follow these guidelines, sometimes recommendations change over time as research supports it. (For example, a prostate screening blood test is no longer routinely recommended for men with no symptoms). Of course, you and your doctor may decide to screen more often for some diseases, based on your risk and co-morbidities (chronic disease you are already diagnosed with). Preventive services for you include: - Medicare offers their members a free annual wellness visit, which is time for you and your primary care provider to discuss and plan for your preventive service needs. Take advantage of this benefit every year! 
-All adults over age 72 should receive the recommended pneumonia vaccines. Current USPSTF guidelines recommend a series of two vaccines for the best pneumonia protection.  
-All adults should have a flu vaccine yearly and tetanus vaccine every 10 years. 
-All adults age 48 and older should receive the shingles vaccines (series of two vaccines).       
-All adults age 38-68 who are overweight should have a diabetes screening test once every three years.  
-Other screening tests & preventive services for persons with diabetes include: an eye exam to screen for diabetic retinopathy, a kidney function test, a foot exam, and stricter control over your cholesterol.  
-Cardiovascular screening for adults with routine risk involves an electrocardiogram (ECG) at intervals determined by the provider.  
-Colorectal cancer screening should be done for adults age 54-65 with no increased risk factors for colorectal cancer. There are a number of acceptable methods of screening for this type of cancer. Each test has its own benefits and drawbacks. Discuss with your provider what is most appropriate for you during your annual wellness visit. The different tests include: colonoscopy (considered the best screening method), a fecal occult blood test, a fecal DNA test, and sigmoidoscopy. 
-All adults born between [facility] should be screened once for Hepatitis C. 
-An Abdominal Aortic Aneurysm (AAA) Screening is recommended for men age 73-68 who has ever smoked in their lifetime. Here is a list of your current Health Maintenance items (your personalized list of preventive services) with a due date: 
Health Maintenance Due Topic Date Due  Shingles Vaccine (1 of 2) 05/10/2003  Colon Cancer Stool Test  05/10/2003  Pneumococcal Vaccine (2 of 2 - PPSV23) 08/26/2019 [address] [place] [address] Annual Well Visit  08/27/2019

## 2020-04-21 NOTE — PROGRESS NOTES
Estela Hall  75 y.o. male  1955  [address] [place] [address]  [id number]   [address]:    Telemedicine Progress Note  Cherelle Swift MD       Encounter Date and Time: April 21, 2020 at 9:29 AM    Consent:  He and/or the health care decision maker is aware that that he may receive a bill for this telephone service, depending on his insurance coverage, and has provided verbal consent to proceed: Yes        Chief Complaint   Patient presents with    LOW BACK PAIN    Leg Pain    Knee Pain    Benign Prostatic Hypertrophy     History of Present Illness   Estela Hall is a 75 y.o. male was evaluated by synchronous (real-time) audio-video technology from patient's home, through the [employer]  Portal.    The patient was initially scheduled for Medicare Wellness so decided to do problem visit and schedule Medicare Wellness visit later. Leg pain  The patient reports having intermittent pain in the legs primarily in the calves. The pain started about 4 months ago, intermittent, exacerbated by walking, alleviated by Tylenol intake. He denies any redness or warmth to touch. No history of trauma, no falls, no twisting on the ankles. No numbness, no tingling in the feet. Has has chronic limping while walking but no acute changes. No hx of VTE. No pain is present today. Back pain  The patient reports intermittent low back pain which has been present for many months. No trauma or falls prior to that. The pain is exacerbated by prolong walking or standing, alleviated by Tylenol Intake (he usually take Tylenol 500 mg once every other day). No fecal or urinary incontinence. No saddle anesthesia. He has chronic nocturia due to BPH but nothing new. Knee pain  The patient has chronic bilateral knee pain for many years, was evaluated for that in our clinic in May,2019.  Was found to have severe tricompartmental degenerative arthritis with chronic ACL tear and complex tear of medial meniscus. He was evaluated by ortho, IJ steroid injection was done and the patient was recommended the surgery if the pain persist. The patient is interested in surgery. Urinary frequency due to BPH  The patient reports intermittent frequent urination w/o burning, straining or pain with urination. He previously has been taking Flomax which controlled the symptoms but ran out of the medication. He denies fever, abdominal or flank pain, no hematuria. Review of Systems   Review of Systems   Constitutional: Negative for chills and fever. Respiratory: Negative for cough and shortness of breath. Cardiovascular: Negative for chest pain and palpitations. Gastrointestinal: Negative for abdominal pain, nausea and vomiting. Genitourinary: Positive for frequency. Negative for flank pain, hematuria and urgency. Musculoskeletal: Positive for back pain and joint pain. Neurological: Negative for dizziness and headaches. Vitals/Objective:     General: alert, cooperative, no distress   Mental  status: mental status: alert, oriented to person, place, and time, normal mood, behavior, speech, dress, motor activity, and thought processes   Resp: resp: normal effort and no respiratory distress   Neuro: neuro: no gross deficits   MSK: MSK:    Posture: Normal   Deformity: None    ROM:     Flexion: Normal    Extension: Normal     Lateral bending: Normal      Gait: Normal       Extremities: Both of the calves appears symmetrical, no erythema, no tenderness to palpation (the wide was asked to palpated the calves). Skin: skin: no discoloration or lesions of concern on visible areas       Due to this being a TeleHealth evaluation, many elements of the physical examination are unable to be assessed. Assessment and Plan:   69 yo male was evaluated for chronic medical conditions. 1. Urinary frequency  Likely due to BPH, uncontrolled currently due to non compliance with medication.  No signs of UTI. Advised to restart Flomax. Advised the patient to call the clinic if he starts to have burning, pain, blood with urination. - tamsulosin (Flomax) 0.4 mg capsule; Take 1 Cap by mouth daily. Dispense: 90 Cap; Refill: 3    2. Chronic midline low back pain without sciatica  Appears to be chronic. No red flags. Likely from OA , but no imaging on file. Responds to Tylenol. Advised to continue Tylenol and do home stretching exercising. May benefit form PT, will try to arrange when COVID-19 pandemic subsides, will need to get XR spine. 3. Bilateral leg pain  Likely MSK due to underlying knee arthritis. No signs of cellulitis, abdi not appear to be VTE, no risk factors. Responds to Tylenol. Advised to continue Tylenol and do home stretching exercise. If the pain worsen the patient will call the clinic. 4. Chronic pain of both knees  Continue Tylenol for now. Follow up with ortho as recommended. Time spent in direct conversation with the patient to include medical condition(s) discussed, assessment and treatment plan:       We discussed the expected course, resolution and complications of the diagnosis(es) in detail. Medication risks, benefits, costs, interactions, and alternatives were discussed as indicated. I advised him to contact the office if his condition worsens, changes or fails to improve as anticipated. He expressed understanding with the diagnosis(es) and plan. Patient understands that this encounter was a temporary measure, and the importance of further follow up and examination was emphasized. Patient verbalized understanding. Patient informed to follow up: Will schedule Medicare Wellness visit. Electronically Signed: Sophia Alford MD    Providers location when delivering service (clinic, hospital, home): HOME.          Pursuant to the emergency declaration under the [facility] [address] and Response Supplemental Appropriations Act, this Virtual  Visit was conducted, with patient's consent, to reduce the patient's risk of exposure to COVID-19 and provide continuity of care for an established patient. Services were provided through a video synchronous discussion virtually to substitute for in-person clinic visit. History   Patients past medical, surgical and family histories were reviewed and updated. Past Medical History:   Diagnosis Date    Arthritis     knees    Chronic pain     knees, back    Constipation     Depression     H/O seasonal allergies     Parkinson disease (HCC)     Restless leg syndrome     Urinary frequency      Past Surgical History:   Procedure Laterality Date    HX HERNIA REPAIR Right 08/15/2019    Robotic-assisted laparoscopic right inguinal herniorrhaphy with mesh. No family history on file.   Social History     Socioeconomic History    Marital status:      Spouse name: Not on file    Number of children: Not on file    Years of education: Not on file    Highest education level: Not on file   Occupational History    Not on file   Social Needs    Financial resource strain: Not on file    Food insecurity     Worry: Not on file     Inability: Not on file    Transportation needs     Medical: Not on file     Non-medical: Not on file   Tobacco Use    Smoking status: Never Smoker    Smokeless tobacco: Never Used   Substance and Sexual Activity    Alcohol use: No    Drug use: No    Sexual activity: Yes     Partners: Female   Lifestyle    Physical activity     Days per week: Not on file     Minutes per session: Not on file    Stress: Not on file   Relationships    Social connections     Talks on phone: Not on file     Gets together: Not on file     Attends [employer] service: Not on file     Active member of club or organization: Not on file     Attends meetings of clubs or organizations: Not on file     Relationship status: Not on file   Berenice Cherry Intimate partner violence     Fear of current or ex partner: Not on file     Emotionally abused: Not on file     Physically abused: Not on file     Forced sexual activity: Not on file   Other Topics Concern    Not on file   Social History Narrative    Not on file     Patient Active Problem List   Diagnosis Code    Parkinsonism ([facility] [address] G20    Restless leg G25.81    Depression F32.9    Lipid disorder E78.9    Colonoscopy refused Z53.20    S/P laparoscopic hernia repair A06.706, Z87.19          Current Medications/Allergies   Medications and Allergies reviewed:    Current Outpatient Medications   Medication Sig Dispense Refill    tamsulosin (Flomax) 0.4 mg capsule Take 1 Cap by mouth daily. 90 Cap 3    diclofenac (VOLTAREN) 1 % gel Apply  to affected area four (4) times daily. 1 Each 1    polyethylene glycol (Miralax) 17 gram/dose powder Take 17 g by mouth daily. 507 g 3    carbidopa-levodopa ER (Sinemet CR)  mg per tablet Take 1 Tab by mouth nightly for 90 days. For Parkinson's 90 Tab 0    carbidopa-levodopa ER (Rytary) 23.75-95 mg per capsule Take 3 Caps by mouth three (3) times daily. For Parkinson's 270 Cap 1    rasagiline (AZILECT) 1 mg tablet Take 1 Tab by mouth daily. For Parkinson's 30 Tab 2    cetirizine (ZYRTEC) 10 mg tablet Take 1 Tab by mouth daily. 30 Tab 3    ACETAMINOPHEN (TYLENOL PO) Take 650 mg by mouth as needed.        Allergies   Allergen Reactions    Sulfa (Sulfonamide Antibiotics) Rash

## 2020-04-21 NOTE — TELEPHONE ENCOUNTER
Patient's wife is calling back and has some questions about medications called into the pharmacy for patient today    Please advise thanks

## 2020-04-21 NOTE — TELEPHONE ENCOUNTER
I called the patient at [phone] to discuss the issue. She requested the medications of Voltaren gel and Miralax to be sent to the pharmacy. The scripts for Voltaren gel and Miralx were sent to the pharmacy. The patient was notified.     11:48 AM  4/21/2020  Dhara Plaza MD

## 2020-05-27 DIAGNOSIS — G20 PARKINSON'S DISEASE (HCC): Primary | ICD-10-CM

## 2020-05-27 DIAGNOSIS — R26.9 GAIT DIFFICULTY: ICD-10-CM

## 2020-05-28 ENCOUNTER — TELEPHONE (OUTPATIENT)
Dept: FAMILY MEDICINE CLINIC | Age: 65
End: 2020-05-28

## 2020-05-28 NOTE — TELEPHONE ENCOUNTER
----- Message from Bryan Carranza sent at 5/27/2020  5:26 PM EDT -----  Regarding: Dr. Anne Russo first and last name:  Ralph Collins  Reason for call:  wanted to be seen for swelling on l foot but no appts was available.    Callback required yes/no and why: yes   Best contact number(s): [phone]  Details to clarify the request:

## 2020-07-21 ENCOUNTER — OFFICE VISIT (OUTPATIENT)
Dept: PRIMARY CARE CLINIC | Age: 65
End: 2020-07-21

## 2020-07-21 VITALS
HEIGHT: 70 IN | WEIGHT: 178.6 LBS | DIASTOLIC BLOOD PRESSURE: 94 MMHG | SYSTOLIC BLOOD PRESSURE: 142 MMHG | OXYGEN SATURATION: 96 % | HEART RATE: 88 BPM | RESPIRATION RATE: 18 BRPM | TEMPERATURE: 96.1 F | BODY MASS INDEX: 25.57 KG/M2

## 2020-07-21 DIAGNOSIS — M25.473 ANKLE SWELLING, UNSPECIFIED LATERALITY: ICD-10-CM

## 2020-07-21 DIAGNOSIS — M20.12 VALGUS DEFORMITY OF BOTH GREAT TOES: Primary | ICD-10-CM

## 2020-07-21 DIAGNOSIS — R53.83 FATIGUE, UNSPECIFIED TYPE: ICD-10-CM

## 2020-07-21 DIAGNOSIS — R26.9 GAIT DIFFICULTY: ICD-10-CM

## 2020-07-21 DIAGNOSIS — M20.11 VALGUS DEFORMITY OF BOTH GREAT TOES: Primary | ICD-10-CM

## 2020-07-21 DIAGNOSIS — G20 PARKINSON'S DISEASE (HCC): ICD-10-CM

## 2020-07-21 DIAGNOSIS — M20.42 HAMMER TOES OF BOTH FEET: ICD-10-CM

## 2020-07-21 DIAGNOSIS — M20.41 HAMMER TOES OF BOTH FEET: ICD-10-CM

## 2020-07-21 RX ORDER — FUROSEMIDE 40 MG/1
40 TABLET ORAL DAILY
Qty: 30 TAB | Refills: 3 | Status: SHIPPED | OUTPATIENT
Start: 2020-07-21 | End: 2020-11-10 | Stop reason: SDUPTHER

## 2020-07-21 NOTE — PROGRESS NOTES
Chief Complaint   Patient presents with   Diaz Establish Care    Medication Refill     90 day refills needed       1. Have you been to the ER, urgent care clinic since your last visit? Hospitalized since your last visit? No    2. Have you seen or consulted any other health care providers outside of the [address] [place] [address] since your last visit? Include any pap smears or colon screening.  No

## 2020-07-21 NOTE — PROGRESS NOTES
Clemente Hernandez is a 77 y.o.  male and presents with     Chief Complaint   Patient presents with    Establish Care    Medication Refill     90 day refills needed    Tremors    Leg Swelling     Patient is here to establish care. He has a history of Parkinson's that was diagnosed about 3 years ago. He has been seeing neurologist who has the patient on Sinemet 3 times a day. He has significant problems with walking. In the past physical therapy has helped him. He gets pain in his calf but no swelling. No history of DVT  He has noticed swelling in his ankles and his feet for the past few weeks. No history of shortness of breath or chest pain . He walks very slowly and has some weakness in his lower extremity muscles from Parkinson's. He used to take furosemide in the past but wife thinks that it made urination difficult. He used to take Flomax for enlarged prostate. He has never seen a urologist.    He is asking for a walker for gait stability        Past Medical History:   Diagnosis Date    Arthritis     knees    Chronic pain     knees, back    Constipation     Depression     Gait difficulty     H/O seasonal allergies     Parkinson disease ([address]     Restless leg syndrome     Urinary frequency      Past Surgical History:   Procedure Laterality Date    HX HERNIA REPAIR Right 08/15/2019    Robotic-assisted laparoscopic right inguinal herniorrhaphy with mesh. Current Outpatient Medications   Medication Sig    [place] [facility] [place] [employer]. Diagnosis: Parkinson's ([place]), Gait difficulty (R26.9).  furosemide (Lasix) 40 mg tablet Take 1 Tab by mouth daily.  carbidopa-levodopa (SINEMET)  mg per tablet Take 1.5 Tabs by mouth three (3) times daily. Indications: Parkinson's disease    diclofenac (VOLTAREN) 1 % gel Apply  to affected area four (4) times daily.  polyethylene glycol (Miralax) 17 gram/dose powder Take 17 g by mouth daily.     ACETAMINOPHEN (TYLENOL PO) Take 650 mg by mouth as needed. No current facility-administered medications for this visit. Health Maintenance   Topic Date Due    Shingrix Vaccine Age 49> (1 of 2) 05/10/2003    FOBT Q1Y Age 54-65  05/10/2003    Pneumococcal 65+ years (2 of 2 - PPSV23) 08/26/2019    Medicare Yearly Exam  08/27/2019    GLAUCOMA SCREENING Q2Y  08/26/2020    Influenza Age 9 to Adult  08/01/2020    A1C test (Diabetic or Prediabetic)  09/26/2020    Lipid Screen  08/27/2023    DTaP/Tdap/Td series (2 - Td) 08/26/2028    Hepatitis C Screening  Completed     Immunization History   Administered Date(s) Administered    Pneumococcal Conjugate (PCV-13) 08/26/2018    Tdap 08/26/2018     No LMP for male patient. Allergies and Intolerances: Allergies   Allergen Reactions    Sulfa (Sulfonamide Antibiotics) Rash       Family History:   No family history on file. Social History:   He  reports that he has never smoked. He has never used smokeless tobacco.  He  reports no history of alcohol use.             Review of Systems:   General: negative for - chills, fatigue, fever, weight change  Psych: negative for - anxiety, depression, irritability or mood swings  ENT: negative for - headaches, hearing change, nasal congestion, oral lesions, sneezing or sore throat  Heme/ Lymph: negative for - bleeding problems, bruising, pallor or swollen lymph nodes  Endo: negative for - hot flashes, polydipsia/polyuria or temperature intolerance  Resp: negative for - cough, shortness of breath or wheezing  CV: negative for - chest pain, edema or palpitations  GI: negative for - abdominal pain, change in bowel habits, constipation, diarrhea or nausea/vomiting  : negative for - dysuria, hematuria, incontinence, pelvic pain or vulvar/vaginal symptoms  MSK: negative for - joint pain, joint swelling or muscle pain  Neuro: negative for - confusion, headaches, seizures or weakness  Derm: negative for - dry skin, hair changes, rash or skin lesion changes          Physical:   Vitals:   Vitals:    07/21/20 1112   BP: (!) 142/94   Pulse: 88   Resp: 18   Temp: (!) 96.1 °F (35.6 °C)   TempSrc: Temporal   SpO2: 96%   Weight: 178 lb 9.6 oz (81 kg)   Height: 5' 10\" (1.778 m)           Exam:   HEENT- atraumatic,normocephalic, awake, oriented, well nourished  Neck - supple,no enlarged lymph nodes, no JVD, no thyromegaly  Chest- CTA, no rhonchi, no crackles  Heart- rrr, no murmurs / gallop/rub  Abdomen- soft,BS+,NT, no hepatosplenomegaly  Ext - no c/c/edema , bilateral hallux valgus and hammertoes. Neuro- no focal deficits. Power 5/5 all extremities, tremors in extremity, walks slowly, decreased excursion of his arm movement, speaks slowly  Skin - warm,dry, no obvious rashes. Bilateral ankle swelling        Review of Data:   LABS:   Lab Results   Component Value Date/Time    WBC 6.2 08/01/2019 02:28 PM    HGB 14.7 08/01/2019 02:28 PM    HCT 44.2 08/01/2019 02:28 PM    PLATELET 888 31/17/6075 02:28 PM     Lab Results   Component Value Date/Time    Sodium 141 08/01/2019 02:28 PM    Potassium 4.3 08/01/2019 02:28 PM    Chloride 101 08/01/2019 02:28 PM    CO2 25 08/01/2019 02:28 PM    Glucose 85 08/01/2019 02:28 PM    BUN 13 08/01/2019 02:28 PM    Creatinine 0.85 08/01/2019 02:28 PM     Lab Results   Component Value Date/Time    Cholesterol, total 200 (H) 08/27/2018 09:56 AM    HDL Cholesterol 41 08/27/2018 09:56 AM    LDL, calculated 109 (H) 08/27/2018 09:56 AM    Triglyceride 252 (H) 08/27/2018 09:56 AM     No components found for: GPT        Impression / Plan:        ICD-10-CM ICD-9-CM    1. Valgus deformity of both great toes  M20.11 735.0 REFERRAL TO PODIATRY    M20.12  REFERRAL TO PODIATRY   2. Parkinson's disease ([address]  G20 332.0 Walker misc   3. Gait difficulty  R26.9 781.2 Walker misc   4. Hammer toes of both feet  M20.41 735.4 REFERRAL TO PODIATRY    M20.42  REFERRAL TO PODIATRY   5.  Ankle swelling, unspecified laterality  M25.473 719.07 CBC WITH AUTOMATED DIFF METABOLIC PANEL, COMPREHENSIVE      MICROALBUMIN, UR, RAND W/ MICROALB/CREAT RATIO      URIC ACID      furosemide (Lasix) 40 mg tablet      MICROALBUMIN, UR, RAND W/ MICROALB/CREAT RATIO   6. Fatigue, unspecified type  R53.83 780.79 furosemide (Lasix) 40 mg tablet         Explained to patient risk benefits of the medications. Advised patient to stop meds if having any side effects. Pt verbalized understanding of the instructions. I have discussed the diagnosis with the patient and the intended plan as seen in the above orders. The patient has received an after-visit summary and questions were answered concerning future plans. I have discussed medication side effects and warnings with the patient as well. I have reviewed the plan of care with the patient, accepted their input and they are in agreement with the treatment goals. Reviewed plan of care. Patient has provided input and agrees with goals. Follow-up and Dispositions    · Return in about 2 months (around 9/21/2020).          Magda Johnson MD

## 2020-07-23 LAB
ALBUMIN SERPL-MCNC: 3.9 G/DL (ref 3.8–4.8)
ALBUMIN/GLOB SERPL: 1.3 {RATIO} (ref 1.2–2.2)
ALP SERPL-CCNC: 64 IU/L (ref 39–117)
ALT SERPL-CCNC: 6 IU/L (ref 0–44)
AST SERPL-CCNC: 14 IU/L (ref 0–40)
BASOPHILS # BLD AUTO: 0 X10E3/UL (ref 0–0.2)
BASOPHILS NFR BLD AUTO: 1 %
BILIRUB SERPL-MCNC: 0.7 MG/DL (ref 0–1.2)
BUN SERPL-MCNC: 10 MG/DL (ref 8–27)
BUN/CREAT SERPL: 10 (ref 10–24)
CALCIUM SERPL-MCNC: 9 MG/DL (ref 8.6–10.2)
CHLORIDE SERPL-SCNC: 101 MMOL/L (ref 96–106)
CO2 SERPL-SCNC: 24 MMOL/L (ref 20–29)
CREAT SERPL-MCNC: 0.96 MG/DL (ref 0.76–1.27)
EOSINOPHIL # BLD AUTO: 0.3 X10E3/UL (ref 0–0.4)
EOSINOPHIL NFR BLD AUTO: 6 %
ERYTHROCYTE [DISTWIDTH] IN BLOOD BY AUTOMATED COUNT: 12.9 % (ref 11.6–15.4)
GLOBULIN SER CALC-MCNC: 3 G/DL (ref 1.5–4.5)
GLUCOSE SERPL-MCNC: 88 MG/DL (ref 65–99)
HCT VFR BLD AUTO: 47.2 % (ref 37.5–51)
HGB BLD-MCNC: 15.6 G/DL (ref 13–17.7)
IMM GRANULOCYTES # BLD AUTO: 0 X10E3/UL (ref 0–0.1)
IMM GRANULOCYTES NFR BLD AUTO: 0 %
LYMPHOCYTES # BLD AUTO: 1.7 X10E3/UL (ref 0.7–3.1)
LYMPHOCYTES NFR BLD AUTO: 29 %
MCH RBC QN AUTO: 28.7 PG (ref 26.6–33)
MCHC RBC AUTO-ENTMCNC: 33.1 G/DL (ref 31.5–35.7)
MCV RBC AUTO: 87 FL (ref 79–97)
MONOCYTES # BLD AUTO: 0.5 X10E3/UL (ref 0.1–0.9)
MONOCYTES NFR BLD AUTO: 9 %
NEUTROPHILS # BLD AUTO: 3.2 X10E3/UL (ref 1.4–7)
NEUTROPHILS NFR BLD AUTO: 55 %
PLATELET # BLD AUTO: 216 X10E3/UL (ref 150–450)
POTASSIUM SERPL-SCNC: 4.2 MMOL/L (ref 3.5–5.2)
PROT SERPL-MCNC: 6.9 G/DL (ref 6–8.5)
RBC # BLD AUTO: 5.43 X10E6/UL (ref 4.14–5.8)
SODIUM SERPL-SCNC: 139 MMOL/L (ref 134–144)
URATE SERPL-MCNC: 5.8 MG/DL (ref 3.7–8.6)
WBC # BLD AUTO: 5.8 X10E3/UL (ref 3.4–10.8)

## 2020-08-27 DIAGNOSIS — G20 PARKINSONISM, UNSPECIFIED PARKINSONISM TYPE (HCC): ICD-10-CM

## 2020-09-02 RX ORDER — CARBIDOPA AND LEVODOPA 25; 100 MG/1; MG/1
TABLET ORAL
Qty: 135 TAB | Refills: 0 | Status: SHIPPED | OUTPATIENT
Start: 2020-09-02 | End: 2020-12-30

## 2020-11-10 DIAGNOSIS — R53.83 FATIGUE, UNSPECIFIED TYPE: ICD-10-CM

## 2020-11-10 DIAGNOSIS — M25.473 ANKLE SWELLING, UNSPECIFIED LATERALITY: ICD-10-CM

## 2020-11-12 RX ORDER — FUROSEMIDE 40 MG/1
40 TABLET ORAL DAILY
Qty: 30 TAB | Refills: 1 | Status: SHIPPED | OUTPATIENT
Start: 2020-11-12 | End: 2021-01-14

## 2020-12-11 ENCOUNTER — OFFICE VISIT (OUTPATIENT)
Dept: PRIMARY CARE CLINIC | Age: 65
End: 2020-12-11
Payer: MEDICARE

## 2020-12-11 ENCOUNTER — HOSPITAL ENCOUNTER (OUTPATIENT)
Dept: GENERAL RADIOLOGY | Age: 65
Discharge: HOME | End: 2020-12-11
Payer: MEDICARE

## 2020-12-11 VITALS
SYSTOLIC BLOOD PRESSURE: 122 MMHG | TEMPERATURE: 98.7 F | BODY MASS INDEX: 25.71 KG/M2 | WEIGHT: 179.6 LBS | HEIGHT: 70 IN | HEART RATE: 101 BPM | OXYGEN SATURATION: 98 % | RESPIRATION RATE: 16 BRPM | DIASTOLIC BLOOD PRESSURE: 86 MMHG

## 2020-12-11 DIAGNOSIS — R10.30 LOWER ABDOMINAL PAIN: ICD-10-CM

## 2020-12-11 DIAGNOSIS — N40.1 BENIGN PROSTATIC HYPERPLASIA WITH URINARY FREQUENCY: ICD-10-CM

## 2020-12-11 DIAGNOSIS — R30.0 DYSURIA: ICD-10-CM

## 2020-12-11 DIAGNOSIS — R35.0 FREQUENT URINATION: ICD-10-CM

## 2020-12-11 DIAGNOSIS — G20 PARKINSON'S DISEASE (HCC): Primary | ICD-10-CM

## 2020-12-11 DIAGNOSIS — R35.0 BENIGN PROSTATIC HYPERPLASIA WITH URINARY FREQUENCY: ICD-10-CM

## 2020-12-11 DIAGNOSIS — K59.04 CHRONIC IDIOPATHIC CONSTIPATION: ICD-10-CM

## 2020-12-11 LAB
COMMENT, HOLDF: NORMAL
SAMPLES BEING HELD,HOLD: NORMAL

## 2020-12-11 PROCEDURE — G8427 DOCREV CUR MEDS BY ELIG CLIN: HCPCS | Performed by: INTERNAL MEDICINE

## 2020-12-11 PROCEDURE — G8419 CALC BMI OUT NRM PARAM NOF/U: HCPCS | Performed by: INTERNAL MEDICINE

## 2020-12-11 PROCEDURE — 3017F COLORECTAL CA SCREEN DOC REV: CPT | Performed by: INTERNAL MEDICINE

## 2020-12-11 PROCEDURE — 1101F PT FALLS ASSESS-DOCD LE1/YR: CPT | Performed by: INTERNAL MEDICINE

## 2020-12-11 PROCEDURE — G9717 DOC PT DX DEP/BP F/U NT REQ: HCPCS | Performed by: INTERNAL MEDICINE

## 2020-12-11 PROCEDURE — 99214 OFFICE O/P EST MOD 30 MIN: CPT | Performed by: INTERNAL MEDICINE

## 2020-12-11 PROCEDURE — 74018 RADEX ABDOMEN 1 VIEW: CPT

## 2020-12-11 PROCEDURE — G8536 NO DOC ELDER MAL SCRN: HCPCS | Performed by: INTERNAL MEDICINE

## 2020-12-11 NOTE — PROGRESS NOTES
Chief Complaint   Patient presents with    Abdominal Pain     Patient presents today for abdominal pain in lower abdomen x1 month. He reports pain feels like a someone is twisting his intestines. 1. Have you been to the ER, urgent care clinic since your last visit? Hospitalized since your last visit? No    2. Have you seen or consulted any other health care providers outside of the [address] [place] [address] since your last visit? Include any pap smears or colon screening.  No

## 2020-12-12 LAB
ALBUMIN SERPL-MCNC: 3.8 G/DL (ref 3.5–5)
ALBUMIN/GLOB SERPL: 1.1 {RATIO} (ref 1.1–2.2)
ALP SERPL-CCNC: 72 U/L (ref 45–117)
ALT SERPL-CCNC: 18 U/L (ref 12–78)
AMYLASE SERPL-CCNC: 82 U/L (ref 25–115)
ANION GAP SERPL CALC-SCNC: 7 MMOL/L (ref 5–15)
APPEARANCE UR: CLEAR
AST SERPL-CCNC: 18 U/L (ref 15–37)
BACTERIA SPEC CULT: ABNORMAL
BACTERIA URNS QL MICRO: NEGATIVE /HPF
BASOPHILS # BLD: 0 K/UL (ref 0–0.1)
BASOPHILS NFR BLD: 1 % (ref 0–1)
BILIRUB SERPL-MCNC: 0.9 MG/DL (ref 0.2–1)
BILIRUB UR QL: NEGATIVE
BUN SERPL-MCNC: 10 MG/DL (ref 6–20)
BUN/CREAT SERPL: 10 (ref 12–20)
CALCIUM SERPL-MCNC: 9.1 MG/DL (ref 8.5–10.1)
CC UR VC: ABNORMAL
CHLORIDE SERPL-SCNC: 102 MMOL/L (ref 97–108)
CO2 SERPL-SCNC: 30 MMOL/L (ref 21–32)
COLOR UR: NORMAL
CREAT SERPL-MCNC: 1 MG/DL (ref 0.7–1.3)
DIFFERENTIAL METHOD BLD: NORMAL
EOSINOPHIL # BLD: 0.2 K/UL (ref 0–0.4)
EOSINOPHIL NFR BLD: 3 % (ref 0–7)
EPITH CASTS URNS QL MICRO: NORMAL /LPF
ERYTHROCYTE [DISTWIDTH] IN BLOOD BY AUTOMATED COUNT: 12.8 % (ref 11.5–14.5)
GLOBULIN SER CALC-MCNC: 3.6 G/DL (ref 2–4)
GLUCOSE SERPL-MCNC: 99 MG/DL (ref 65–100)
GLUCOSE UR STRIP.AUTO-MCNC: NEGATIVE MG/DL
HCT VFR BLD AUTO: 50 % (ref 36.6–50.3)
HGB BLD-MCNC: 16.2 G/DL (ref 12.1–17)
HGB UR QL STRIP: NEGATIVE
HYALINE CASTS URNS QL MICRO: NORMAL /LPF (ref 0–5)
IMM GRANULOCYTES # BLD AUTO: 0 K/UL (ref 0–0.04)
IMM GRANULOCYTES NFR BLD AUTO: 0 % (ref 0–0.5)
KETONES UR QL STRIP.AUTO: NEGATIVE MG/DL
LEUKOCYTE ESTERASE UR QL STRIP.AUTO: NEGATIVE
LIPASE SERPL-CCNC: 159 U/L (ref 73–393)
LYMPHOCYTES # BLD: 1.6 K/UL (ref 0.8–3.5)
LYMPHOCYTES NFR BLD: 24 % (ref 12–49)
MCH RBC QN AUTO: 29.5 PG (ref 26–34)
MCHC RBC AUTO-ENTMCNC: 32.4 G/DL (ref 30–36.5)
MCV RBC AUTO: 91.1 FL (ref 80–99)
MONOCYTES # BLD: 0.7 K/UL (ref 0–1)
MONOCYTES NFR BLD: 11 % (ref 5–13)
NEUTS SEG # BLD: 4.2 K/UL (ref 1.8–8)
NEUTS SEG NFR BLD: 61 % (ref 32–75)
NITRITE UR QL STRIP.AUTO: NEGATIVE
NRBC # BLD: 0 K/UL (ref 0–0.01)
NRBC BLD-RTO: 0 PER 100 WBC
PH UR STRIP: 6 [PH] (ref 5–8)
PLATELET # BLD AUTO: 238 K/UL (ref 150–400)
PMV BLD AUTO: 10.6 FL (ref 8.9–12.9)
POTASSIUM SERPL-SCNC: 3.8 MMOL/L (ref 3.5–5.1)
PROT SERPL-MCNC: 7.4 G/DL (ref 6.4–8.2)
PROT UR STRIP-MCNC: NEGATIVE MG/DL
RBC # BLD AUTO: 5.49 M/UL (ref 4.1–5.7)
RBC #/AREA URNS HPF: NORMAL /HPF (ref 0–5)
SERVICE CMNT-IMP: ABNORMAL
SODIUM SERPL-SCNC: 139 MMOL/L (ref 136–145)
SP GR UR REFRACTOMETRY: 1.01 (ref 1–1.03)
UROBILINOGEN UR QL STRIP.AUTO: 0.2 EU/DL (ref 0.2–1)
WBC # BLD AUTO: 6.7 K/UL (ref 4.1–11.1)
WBC URNS QL MICRO: NORMAL /HPF (ref 0–4)

## 2020-12-14 DIAGNOSIS — N39.0 URINARY TRACT INFECTION WITHOUT HEMATURIA, SITE UNSPECIFIED: Primary | ICD-10-CM

## 2020-12-14 RX ORDER — CIPROFLOXACIN 500 MG/1
500 TABLET ORAL 2 TIMES DAILY
Qty: 14 TAB | Refills: 0 | Status: SHIPPED | OUTPATIENT
Start: 2020-12-14 | End: 2020-12-21

## 2020-12-14 NOTE — PROGRESS NOTES
Sherrie Ramirez is a 77 y.o.  male and presents with     Chief Complaint   Patient presents with    Abdominal Pain     Patient presents today for abdominal pain in lower abdomen x1 month. He reports pain feels like a someone is twisting his intestines. Patient has been having mild lower abdominal pain and constipation for a month. He also has some urinary frequency. He denies nausea vomiting. He denies chills or fever. Denies rectal bleeding or dark stools. He has a bowel movement every other day. He has a history of Parkinson's. Patient's wife is also asking for a walker. Past Medical History:   Diagnosis Date    Arthritis     knees    Chronic pain     knees, back    Constipation     Depression     Gait difficulty     H/O seasonal allergies     Parkinson disease (HCC)     Restless leg syndrome     Urinary frequency      Past Surgical History:   Procedure Laterality Date    HX HERNIA REPAIR Right 08/15/2019    Robotic-assisted laparoscopic right inguinal herniorrhaphy with mesh. Current Outpatient Medications   Medication Sig    Walker misc Use while ambulating    linaCLOtide (Linzess) 145 mcg cap capsule Take 1 Cap by mouth Daily (before breakfast).  furosemide (Lasix) 40 mg tablet Take 1 Tab by mouth daily.  carbidopa-levodopa (SINEMET)  mg per tablet TAKE 1& 1/2 TABLETS BY MOUTH THREE TIMES DAILY    Walker misc Dispense one [employer]Star. Diagnosis: Parkinson's ([place]), Gait difficulty (R26.9).  diclofenac (VOLTAREN) 1 % gel Apply  to affected area four (4) times daily.  polyethylene glycol (Miralax) 17 gram/dose powder Take 17 g by mouth daily.  ACETAMINOPHEN (TYLENOL PO) Take 650 mg by mouth as needed. No current facility-administered medications for this visit.       Health Maintenance   Topic Date Due    Shingrix Vaccine Age 49> (1 of 2) 05/10/2003    Colorectal Cancer Screening Combo  05/10/2003    Pneumococcal 65+ years (2 of 2 - PPSV23) 08/26/2019    Medicare Yearly Exam  08/27/2019    GLAUCOMA SCREENING Q2Y  08/26/2020    Flu Vaccine (1) 09/01/2020    Lipid Screen  08/27/2023    DTaP/Tdap/Td series (2 - Td) 08/26/2028    Hepatitis C Screening  Completed     Immunization History   Administered Date(s) Administered    Pneumococcal Conjugate (PCV-13) 08/26/2018    Tdap 08/26/2018     No LMP for male patient. Allergies and Intolerances: Allergies   Allergen Reactions    Sulfa (Sulfonamide Antibiotics) Rash       Family History:   No family history on file. Social History:   He  reports that he has never smoked. He has never used smokeless tobacco.  He  reports no history of alcohol use.             Review of Systems:   General: negative for - chills, fatigue, fever, weight change  Psych: negative for - anxiety, depression, irritability or mood swings  ENT: negative for - headaches, hearing change, nasal congestion, oral lesions, sneezing or sore throat  Heme/ Lymph: negative for - bleeding problems, bruising, pallor or swollen lymph nodes  Endo: negative for - hot flashes, polydipsia/polyuria or temperature intolerance  Resp: negative for - cough, shortness of breath or wheezing  CV: negative for - chest pain, edema or palpitations  GI: negative for - abdominal pain, change in bowel habits, constipation, diarrhea or nausea/vomiting  : negative for - dysuria, hematuria, incontinence, pelvic pain or vulvar/vaginal symptoms  MSK: negative for - joint pain, joint swelling or muscle pain  Neuro: negative for - confusion, headaches, seizures or weakness  Derm: negative for - dry skin, hair changes, rash or skin lesion changes          Physical:   Vitals:   Vitals:    12/11/20 1045   BP: 122/86   Pulse: (!) 101   Resp: 16   Temp: 98.7 °F (37.1 °C)   TempSrc: Temporal   SpO2: 98%   Weight: 179 lb 9.6 oz (81.5 kg)   Height: 5' 10\" (1.778 m)           Exam:   HEENT- atraumatic,normocephalic, awake, oriented, well nourished  Neck - supple,no enlarged lymph nodes, no JVD, no thyromegaly  Chest- CTA, no rhonchi, no crackles  Heart- rrr, no murmurs / gallop/rub  Abdomen- soft,BS+,NT, no hepatosplenomegaly, mild tenderness to percussion on the left side of abdomen, no shifting dullness  Ext - no c/c/edema   Neuro- no focal deficits. Power 5/5 all extremities  Skin - warm,dry, no obvious rashes. Review of Data:   LABS:   Lab Results   Component Value Date/Time    WBC 6.7 12/11/2020 12:30 PM    HGB 16.2 12/11/2020 12:30 PM    HCT 50.0 12/11/2020 12:30 PM    PLATELET 479 44/20/3112 12:30 PM     Lab Results   Component Value Date/Time    Sodium 139 12/11/2020 12:30 PM    Potassium 3.8 12/11/2020 12:30 PM    Chloride 102 12/11/2020 12:30 PM    CO2 30 12/11/2020 12:30 PM    Glucose 99 12/11/2020 12:30 PM    BUN 10 12/11/2020 12:30 PM    Creatinine 1.00 12/11/2020 12:30 PM     Lab Results   Component Value Date/Time    Cholesterol, total 200 (H) 08/27/2018 09:56 AM    HDL Cholesterol 41 08/27/2018 09:56 AM    LDL, calculated 109 (H) 08/27/2018 09:56 AM    Triglyceride 252 (H) 08/27/2018 09:56 AM     No components found for: GPT        Impression / Plan:        ICD-10-CM ICD-9-CM    1. Parkinson's disease ([facility]  G20 332.0 Walker misc   2. Dysuria  R30.0 788. 1 CULTURE, URINE      URINALYSIS W/MICROSCOPIC   3. Chronic idiopathic constipation  K59.04 564.00 linaCLOtide (Linzess) 145 mcg cap capsule      XR ABD (KUB)   4. Lower abdominal pain  R10.30 789.09 XR ABD (KUB)      CBC WITH AUTOMATED DIFF      METABOLIC PANEL, COMPREHENSIVE      LIPASE      AMYLASE   5. Frequent urination  R35.0 788.41    6. Benign prostatic hyperplasia with urinary frequency  N40.1 600.01 REFERRAL TO UROLOGY    R35.0 788.41        Explained to patient risk benefits of the medications. Advised patient to stop meds if having any side effects. Pt verbalized understanding of the instructions.     I have discussed the diagnosis with the patient and the intended plan as seen in the above orders. The patient has received an after-visit summary and questions were answered concerning future plans. I have discussed medication side effects and warnings with the patient as well. I have reviewed the plan of care with the patient, accepted their input and they are in agreement with the treatment goals. Reviewed plan of care. Patient has provided input and agrees with goals. Follow-up and Dispositions    · Return in about 6 weeks (around 1/22/2021).          Zach Edwards MD Color consistent with ethnicity/race, warm, dry intact, resilient.

## 2020-12-14 NOTE — PROGRESS NOTES
Xrays show some back up stools in right and transverse colon. I already prescribed meds for constiaption on day of visit. Urine culture shows some gram negative rods. I will send ciprofloxacin to pharmacy. Pt may have enlarge prostate. I think I may have already referred pt to Urology, if not I can do that.

## 2020-12-15 NOTE — PROGRESS NOTES
Spoke with patient to inform of results. Both patient and patient's wife verbalized understanding. Wife expressed that she is having a difficult time getting in contact with urology to schedule an appt and would like someone from our office to call and get him scheduled.

## 2020-12-16 ENCOUNTER — TELEPHONE (OUTPATIENT)
Dept: PRIMARY CARE CLINIC | Age: 65
End: 2020-12-16

## 2020-12-16 NOTE — PROGRESS NOTES
Got patient an appt with [facility] for 12/17/2020 @10am with Dr. Anne Keita at [address] [place] [address] [address]. Will fax over referral order, last OV and lab results.

## 2020-12-16 NOTE — TELEPHONE ENCOUNTER
----- Message from Gissell Scruggs MD sent at 12/15/2020  4:27 PM EST -----  If you can schedule the appt and let the pt know that would be great.  ----- Message -----  From: Jet Moe  Sent: 12/15/2020   2:33 PM EST  To: Gissell Scruggs MD    Spoke with patient to inform of results. Both patient and patient's wife verbalized understanding. Wife expressed that she is having a difficult time getting in contact with urology to schedule an appt and would like someone from our office to call and get him scheduled.

## 2020-12-16 NOTE — TELEPHONE ENCOUNTER
Spoke with pt's wife to give appt information, she verbalized understanding and wrote down the information for record. [facility] 12/17/2020 @10am on [address] [place] [address] [address] Floor with Dr. Tania Huffman.

## 2020-12-29 DIAGNOSIS — G20 PARKINSONISM, UNSPECIFIED PARKINSONISM TYPE (HCC): ICD-10-CM

## 2020-12-30 RX ORDER — CARBIDOPA AND LEVODOPA 25; 100 MG/1; MG/1
TABLET ORAL
Qty: 135 TAB | Refills: 0 | Status: SHIPPED | OUTPATIENT
Start: 2020-12-30

## 2021-01-14 DIAGNOSIS — M25.473 ANKLE SWELLING, UNSPECIFIED LATERALITY: ICD-10-CM

## 2021-01-14 DIAGNOSIS — R53.83 FATIGUE, UNSPECIFIED TYPE: ICD-10-CM

## 2021-01-14 RX ORDER — FUROSEMIDE 40 MG/1
40 TABLET ORAL DAILY
Qty: 30 TAB | Refills: 1 | Status: SHIPPED | OUTPATIENT
Start: 2021-01-14 | End: 2021-03-10 | Stop reason: SDUPTHER

## 2021-01-26 ENCOUNTER — HOSPITAL ENCOUNTER (OUTPATIENT)
Dept: GENERAL RADIOLOGY | Age: 66
Discharge: HOME | End: 2021-01-26
Attending: INTERNAL MEDICINE
Payer: MEDICARE

## 2021-01-26 ENCOUNTER — OFFICE VISIT (OUTPATIENT)
Dept: PRIMARY CARE CLINIC | Age: 66
End: 2021-01-26
Payer: MEDICARE

## 2021-01-26 VITALS
HEART RATE: 88 BPM | HEIGHT: 70 IN | RESPIRATION RATE: 16 BRPM | TEMPERATURE: 98.4 F | OXYGEN SATURATION: 100 % | BODY MASS INDEX: 25.65 KG/M2 | DIASTOLIC BLOOD PRESSURE: 87 MMHG | SYSTOLIC BLOOD PRESSURE: 127 MMHG | WEIGHT: 179.2 LBS

## 2021-01-26 DIAGNOSIS — R26.9 GAIT DIFFICULTY: ICD-10-CM

## 2021-01-26 DIAGNOSIS — M79.89 LEG SWELLING: ICD-10-CM

## 2021-01-26 DIAGNOSIS — K59.04 CHRONIC IDIOPATHIC CONSTIPATION: ICD-10-CM

## 2021-01-26 DIAGNOSIS — G20 PARKINSON'S DISEASE (HCC): ICD-10-CM

## 2021-01-26 DIAGNOSIS — R53.83 FATIGUE, UNSPECIFIED TYPE: ICD-10-CM

## 2021-01-26 DIAGNOSIS — D64.9 ANEMIA, UNSPECIFIED TYPE: Primary | ICD-10-CM

## 2021-01-26 DIAGNOSIS — Z12.11 COLON CANCER SCREENING: ICD-10-CM

## 2021-01-26 PROCEDURE — 71046 X-RAY EXAM CHEST 2 VIEWS: CPT

## 2021-01-26 PROCEDURE — G8419 CALC BMI OUT NRM PARAM NOF/U: HCPCS | Performed by: INTERNAL MEDICINE

## 2021-01-26 PROCEDURE — G8427 DOCREV CUR MEDS BY ELIG CLIN: HCPCS | Performed by: INTERNAL MEDICINE

## 2021-01-26 PROCEDURE — 3017F COLORECTAL CA SCREEN DOC REV: CPT | Performed by: INTERNAL MEDICINE

## 2021-01-26 PROCEDURE — G8536 NO DOC ELDER MAL SCRN: HCPCS | Performed by: INTERNAL MEDICINE

## 2021-01-26 PROCEDURE — G9717 DOC PT DX DEP/BP F/U NT REQ: HCPCS | Performed by: INTERNAL MEDICINE

## 2021-01-26 PROCEDURE — 1101F PT FALLS ASSESS-DOCD LE1/YR: CPT | Performed by: INTERNAL MEDICINE

## 2021-01-26 PROCEDURE — 99214 OFFICE O/P EST MOD 30 MIN: CPT | Performed by: INTERNAL MEDICINE

## 2021-01-26 RX ORDER — LANOLIN ALCOHOL/MO/W.PET/CERES
325 CREAM (GRAM) TOPICAL 2 TIMES DAILY
Qty: 60 TAB | Refills: 3 | Status: SHIPPED | OUTPATIENT
Start: 2021-01-26 | End: 2021-05-10 | Stop reason: SDUPTHER

## 2021-01-26 NOTE — PROGRESS NOTES
Gilda Valdez is a 77 y.o.  male and presents with     Chief Complaint   Patient presents with    Abdominal Pain    Constipation    Tremors    Leg Swelling     Patient is taking furosemide 40 mg daily for leg swelling. He still has some swelling in his legs. Lab work revealed mild anemia. He does have off-and-on rectal bleeding. He also has constipation. His wife believes he has hemorrhoids. Patient does not want colonoscopy done. He does have Parkinson's and sees neurologist.  Patient also has constipation but wife is not sure if he is taking Linzess or not. She is also requesting a walker to be represcribed. She is requesting handicap parking since it takes a long time for him to work and reach doctors office. Past Medical History:   Diagnosis Date    Arthritis     knees    Chronic pain     knees, back    Constipation     Depression     Gait difficulty     H/O seasonal allergies     Parkinson disease (HCC)     Restless leg syndrome     Urinary frequency      Past Surgical History:   Procedure Laterality Date    HX HERNIA REPAIR Right 08/15/2019    Robotic-assisted laparoscopic right inguinal herniorrhaphy with mesh. Current Outpatient Medications   Medication Sig    ferrous sulfate 325 mg (65 mg iron) tablet Take 1 Tab by mouth two (2) times a day.  linaCLOtide (Linzess) 145 mcg cap capsule Take 1 Cap by mouth Daily (before breakfast).  Walker misc Dispense one Rolling Walker. Diagnosis: Parkinson's ([place]), Gait difficulty (R26.9).  furosemide (Lasix) 40 mg tablet Take 1 Tab by mouth daily.  carbidopa-levodopa (SINEMET)  mg per tablet TAKE 1 AND 1/2 TABLETS BY MOUTH THREE TIMES DAILY    Walker misc Use while ambulating    diclofenac (VOLTAREN) 1 % gel Apply  to affected area four (4) times daily.  polyethylene glycol (Miralax) 17 gram/dose powder Take 17 g by mouth daily.  ACETAMINOPHEN (TYLENOL PO) Take 650 mg by mouth as needed.      No current facility-administered medications for this visit. Health Maintenance   Topic Date Due    COVID-19 Vaccine (1 of 2) 05/10/1969    Shingrix Vaccine Age 50> (1 of 2) 05/10/2003    Colorectal Cancer Screening Combo  05/10/2003    Pneumococcal 65+ years (2 of 2 - PPSV23) 08/26/2019    Medicare Yearly Exam  08/27/2019    GLAUCOMA SCREENING Q2Y  08/26/2020    Flu Vaccine (1) 09/01/2020    Lipid Screen  08/27/2023    DTaP/Tdap/Td series (2 - Td) 08/26/2028    Hepatitis C Screening  Completed     Immunization History   Administered Date(s) Administered    Pneumococcal Conjugate (PCV-13) 08/26/2018    Tdap 08/26/2018     No LMP for male patient. Allergies and Intolerances: Allergies   Allergen Reactions    Sulfa (Sulfonamide Antibiotics) Rash       Family History:   History reviewed. No pertinent family history. Social History:   He  reports that he has never smoked. He has never used smokeless tobacco.  He  reports no history of alcohol use.             Review of Systems:   General: negative for - chills, fatigue, fever, weight change  Psych: negative for - anxiety, depression, irritability or mood swings  ENT: negative for - headaches, hearing change, nasal congestion, oral lesions, sneezing or sore throat  Heme/ Lymph: negative for - bleeding problems, bruising, pallor or swollen lymph nodes  Endo: negative for - hot flashes, polydipsia/polyuria or temperature intolerance  Resp: negative for - cough, shortness of breath or wheezing  CV: negative for - chest pain, edema or palpitations  GI: negative for - abdominal pain, change in bowel habits, constipation, diarrhea or nausea/vomiting  : negative for - dysuria, hematuria, incontinence, pelvic pain or vulvar/vaginal symptoms  MSK: negative for - joint pain, joint swelling or muscle pain  Neuro: negative for - confusion, headaches, seizures or weakness  Derm: negative for - dry skin, hair changes, rash or skin lesion changes          Physical: Vitals:   Vitals:    01/26/21 1139   BP: 127/87   Pulse: 88   Resp: 16   Temp: 98.4 °F (36.9 °C)   TempSrc: Temporal   SpO2: 100%   Weight: 179 lb 3.2 oz (81.3 kg)   Height: 5' 10\" (1.778 m)           Exam:   HEENT- atraumatic,normocephalic, awake, oriented, well nourished  Neck - supple,no enlarged lymph nodes, no JVD, no thyromegaly  Chest- CTA, no rhonchi, no crackles  Heart- rrr, no murmurs / gallop/rub  Abdomen- soft,BS+,NT, no hepatosplenomegaly  Ext -bilateral +2 pitting edema. Neuro- no focal deficits. Power 5/5 all extremities  Skin - warm,dry, no obvious rashes. Review of Data:   LABS:   Lab Results   Component Value Date/Time    WBC 6.7 12/11/2020 12:30 PM    HGB 16.2 12/11/2020 12:30 PM    HCT 50.0 12/11/2020 12:30 PM    PLATELET 367 77/35/5187 12:30 PM     Lab Results   Component Value Date/Time    Sodium 139 12/11/2020 12:30 PM    Potassium 3.8 12/11/2020 12:30 PM    Chloride 102 12/11/2020 12:30 PM    CO2 30 12/11/2020 12:30 PM    Glucose 99 12/11/2020 12:30 PM    BUN 10 12/11/2020 12:30 PM    Creatinine 1.00 12/11/2020 12:30 PM     Lab Results   Component Value Date/Time    Cholesterol, total 200 (H) 08/27/2018 09:56 AM    HDL Cholesterol 41 08/27/2018 09:56 AM    LDL, calculated 109 (H) 08/27/2018 09:56 AM    Triglyceride 252 (H) 08/27/2018 09:56 AM     No components found for: GPT        Impression / Plan:        ICD-10-CM ICD-9-CM    1. Anemia, unspecified type  D64.9 285.9 ferrous sulfate 325 mg (65 mg iron) tablet   2. Colon cancer screening  Z12.11 V76.51 COLOGUARD TEST (FECAL DNA COLORECTAL CANCER SCREENING)   3. Leg swelling  Y64.31 876.53 METABOLIC PANEL, BASIC      TSH 3RD GENERATION      T4, FREE      XR CHEST PA LAT   4. Fatigue, unspecified type  R53.83 780.79 TSH 3RD GENERATION      T4, FREE   5. Chronic idiopathic constipation  K59.04 564.00 linaCLOtide (Linzess) 145 mcg cap capsule   6. Parkinson's disease ([facility]  G20 332.0 REFERRAL TO PHYSICAL THERAPY      [facility]   7. Gait difficulty  R26.9 781.2 [place] [facility] [place]     Handicap parking form filled given back to the patient. Explained to patient risk benefits of the medications. Advised patient to stop meds if having any side effects. Pt verbalized understanding of the instructions. I have discussed the diagnosis with the patient and the intended plan as seen in the above orders. The patient has received an after-visit summary and questions were answered concerning future plans. I have discussed medication side effects and warnings with the patient as well. I have reviewed the plan of care with the patient, accepted their input and they are in agreement with the treatment goals. Reviewed plan of care. Patient has provided input and agrees with goals. Follow-up and Dispositions    · Return in about 2 months (around 3/26/2021).          David Lechuga MD

## 2021-01-26 NOTE — PROGRESS NOTES
Chief Complaint   Patient presents with    Abdominal Pain    Constipation     1. Have you been to the ER, urgent care clinic since your last visit? Hospitalized since your last visit? No    2. Have you seen or consulted any other health care providers outside of the [address] [place] [address] since your last visit? Include any pap smears or colon screening.  No

## 2021-02-22 ENCOUNTER — TELEPHONE (OUTPATIENT)
Dept: PRIMARY CARE CLINIC | Age: 66
End: 2021-02-22

## 2021-03-10 DIAGNOSIS — M25.473 ANKLE SWELLING, UNSPECIFIED LATERALITY: ICD-10-CM

## 2021-03-10 DIAGNOSIS — R53.83 FATIGUE, UNSPECIFIED TYPE: ICD-10-CM

## 2021-03-10 RX ORDER — FUROSEMIDE 40 MG/1
40 TABLET ORAL DAILY
Qty: 30 TAB | Refills: 1 | Status: SHIPPED | OUTPATIENT
Start: 2021-03-10 | End: 2021-05-10 | Stop reason: SDUPTHER

## 2021-03-11 ENCOUNTER — TELEPHONE (OUTPATIENT)
Dept: FAMILY MEDICINE CLINIC | Age: 66
End: 2021-03-11

## 2021-03-11 DIAGNOSIS — R35.0 URINARY FREQUENCY: ICD-10-CM

## 2021-03-11 RX ORDER — TAMSULOSIN HYDROCHLORIDE 0.4 MG/1
0.4 CAPSULE ORAL DAILY
Qty: 90 CAP | Refills: 3 | Status: CANCELLED | OUTPATIENT
Start: 2021-03-11

## 2021-03-11 NOTE — TELEPHONE ENCOUNTER
Spoke with patient wife. Advised to call the neurologist for the medication refills otherwise Dr Isak Bedoya will refill for one month.

## 2021-03-15 NOTE — TELEPHONE ENCOUNTER
[facility], ph [phone] called to inquire on med refills. I did make them aware per previous notes that patient goes to [facility].

## 2021-03-18 DIAGNOSIS — N40.1 BENIGN PROSTATIC HYPERPLASIA WITH LOWER URINARY TRACT SYMPTOMS, SYMPTOM DETAILS UNSPECIFIED: Primary | ICD-10-CM

## 2021-03-18 RX ORDER — TAMSULOSIN HYDROCHLORIDE 0.4 MG/1
0.4 CAPSULE ORAL DAILY
Qty: 30 CAP | Refills: 1 | Status: SHIPPED | OUTPATIENT
Start: 2021-03-18 | End: 2021-05-10 | Stop reason: SDUPTHER

## 2021-03-31 DIAGNOSIS — M79.89 LEG SWELLING: Primary | ICD-10-CM

## 2021-03-31 RX ORDER — POTASSIUM CHLORIDE 20 MEQ/1
20 TABLET, EXTENDED RELEASE ORAL DAILY
Qty: 90 TAB | Refills: 1 | Status: SHIPPED | OUTPATIENT
Start: 2021-03-31 | End: 2021-07-15

## 2021-04-22 ENCOUNTER — OFFICE VISIT (OUTPATIENT)
Dept: PRIMARY CARE CLINIC | Age: 66
End: 2021-04-22
Payer: MEDICARE

## 2021-04-22 VITALS
RESPIRATION RATE: 16 BRPM | SYSTOLIC BLOOD PRESSURE: 127 MMHG | HEART RATE: 92 BPM | WEIGHT: 180 LBS | HEIGHT: 70 IN | TEMPERATURE: 97.8 F | OXYGEN SATURATION: 99 % | DIASTOLIC BLOOD PRESSURE: 86 MMHG | BODY MASS INDEX: 25.77 KG/M2

## 2021-04-22 DIAGNOSIS — M20.11 VALGUS DEFORMITY OF BOTH GREAT TOES: ICD-10-CM

## 2021-04-22 DIAGNOSIS — M20.12 VALGUS DEFORMITY OF BOTH GREAT TOES: ICD-10-CM

## 2021-04-22 DIAGNOSIS — N40.1 BENIGN PROSTATIC HYPERPLASIA WITH URINARY FREQUENCY: ICD-10-CM

## 2021-04-22 DIAGNOSIS — G20 PARKINSON'S DISEASE (HCC): ICD-10-CM

## 2021-04-22 DIAGNOSIS — K59.04 CHRONIC IDIOPATHIC CONSTIPATION: ICD-10-CM

## 2021-04-22 DIAGNOSIS — R35.0 BENIGN PROSTATIC HYPERPLASIA WITH URINARY FREQUENCY: ICD-10-CM

## 2021-04-22 DIAGNOSIS — M79.89 LEG SWELLING: Primary | ICD-10-CM

## 2021-04-22 DIAGNOSIS — H10.13 ALLERGIC CONJUNCTIVITIS OF BOTH EYES: ICD-10-CM

## 2021-04-22 DIAGNOSIS — R30.0 DYSURIA: ICD-10-CM

## 2021-04-22 DIAGNOSIS — Z23 ENCOUNTER FOR VACCINATION: ICD-10-CM

## 2021-04-22 PROCEDURE — 99214 OFFICE O/P EST MOD 30 MIN: CPT | Performed by: INTERNAL MEDICINE

## 2021-04-22 PROCEDURE — 1101F PT FALLS ASSESS-DOCD LE1/YR: CPT | Performed by: INTERNAL MEDICINE

## 2021-04-22 PROCEDURE — G9717 DOC PT DX DEP/BP F/U NT REQ: HCPCS | Performed by: INTERNAL MEDICINE

## 2021-04-22 PROCEDURE — G8419 CALC BMI OUT NRM PARAM NOF/U: HCPCS | Performed by: INTERNAL MEDICINE

## 2021-04-22 PROCEDURE — 90732 PPSV23 VACC 2 YRS+ SUBQ/IM: CPT | Performed by: INTERNAL MEDICINE

## 2021-04-22 PROCEDURE — G8536 NO DOC ELDER MAL SCRN: HCPCS | Performed by: INTERNAL MEDICINE

## 2021-04-22 PROCEDURE — G0009 ADMIN PNEUMOCOCCAL VACCINE: HCPCS | Performed by: INTERNAL MEDICINE

## 2021-04-22 PROCEDURE — 3017F COLORECTAL CA SCREEN DOC REV: CPT | Performed by: INTERNAL MEDICINE

## 2021-04-22 PROCEDURE — G8427 DOCREV CUR MEDS BY ELIG CLIN: HCPCS | Performed by: INTERNAL MEDICINE

## 2021-04-22 RX ORDER — POLYETHYLENE GLYCOL 3350 17 G/17G
17 POWDER, FOR SOLUTION ORAL DAILY
Qty: 850 G | Refills: 2 | Status: SHIPPED | OUTPATIENT
Start: 2021-04-22 | End: 2021-07-15

## 2021-04-22 RX ORDER — OLOPATADINE HYDROCHLORIDE 1 MG/ML
2 SOLUTION/ DROPS OPHTHALMIC 2 TIMES DAILY
Qty: 5 ML | Refills: 3 | Status: SHIPPED | OUTPATIENT
Start: 2021-04-22 | End: 2021-08-03

## 2021-04-22 NOTE — PROGRESS NOTES
Chief Complaint   Patient presents with    Abdominal Pain     1. Have you been to the ER, urgent care clinic since your last visit? Hospitalized since your last visit? No    2. Have you seen or consulted any other health care providers outside of the [address] [place] [address] since your last visit? Include any pap smears or colon screening.  No      Visit Vitals  /86 (BP 1 Location: Left upper arm, BP Patient Position: Sitting, BP Cuff Size: Adult)   Pulse 92   Temp 97.8 °F (36.6 °C) (Temporal)   Resp 16   Ht 5' 10\" (1.778 m)   Wt 180 lb (81.6 kg)   SpO2 99%   BMI 25.83 kg/m²

## 2021-04-25 NOTE — PROGRESS NOTES
Fausto Rebollar is a 77 y.o.  male and presents with     Chief Complaint   Patient presents with    Abdominal Pain    Tremors    Constipation    Foot Problem     Patient is seen in neurology for Parkinson's. He has severe Parkinson's and is not ambulatory as he used to be. He does have severe constipation and has bowel movements every 3 or 4 days. He has not started Linzess. He takes MiraLAX as needed. He has some problem with his foot. He is great toe is deviated laterally in both feet. He does want to see a podiatrist  He has constant drainage from his eyes. Wife wonders if it is a dry eye. They are already seen ophthalmologist.  He does seem to have history of allergic conjunctivitis. Past Medical History:   Diagnosis Date    Arthritis     knees    Chronic pain     knees, back    Constipation     Depression     Gait difficulty     H/O seasonal allergies     Parkinson disease (HCC)     Restless leg syndrome     Urinary frequency      Past Surgical History:   Procedure Laterality Date    HX HERNIA REPAIR Right 08/15/2019    Robotic-assisted laparoscopic right inguinal herniorrhaphy with mesh. Current Outpatient Medications   Medication Sig    polyethylene glycol (MIRALAX) 17 gram/dose powder Take 17 g by mouth daily.  linaCLOtide (Linzess) 145 mcg cap capsule Take 1 Cap by mouth Daily (before breakfast).  olopatadine (PATANOL) 0.1 % ophthalmic solution Administer 2 Drops to both eyes two (2) times a day.  potassium chloride (K-DUR, KLOR-CON) 20 mEq tablet Take 1 Tab by mouth daily.  tamsulosin (Flomax) 0.4 mg capsule Take 1 Cap by mouth daily.  furosemide (Lasix) 40 mg tablet Take 1 Tab by mouth daily.  ferrous sulfate 325 mg (65 mg iron) tablet Take 1 Tab by mouth two (2) times a day.  Walker misc Dispense one Rolling Walker. Diagnosis: Parkinson's (Naseem Scott), Gait difficulty (R26.9).     carbidopa-levodopa (SINEMET)  mg per tablet TAKE 1 AND 1/2 TABLETS BY MOUTH THREE TIMES DAILY    Walker misc Use while ambulating    diclofenac (VOLTAREN) 1 % gel Apply  to affected area four (4) times daily.  polyethylene glycol (Miralax) 17 gram/dose powder Take 17 g by mouth daily.  ACETAMINOPHEN (TYLENOL PO) Take 650 mg by mouth as needed. No current facility-administered medications for this visit. Health Maintenance   Topic Date Due    COVID-19 Vaccine (1) Never done    Shingrix Vaccine Age 50> (1 of 2) Never done    Colorectal Cancer Screening Combo  Never done    Medicare Yearly Exam  08/27/2019    Flu Vaccine (Season Ended) 09/01/2021    Lipid Screen  08/27/2023    DTaP/Tdap/Td series (2 - Td) 08/26/2028    Hepatitis C Screening  Completed    Pneumococcal 65+ years  Completed     Immunization History   Administered Date(s) Administered    Pneumococcal Conjugate (PCV-13) 08/26/2018    Pneumococcal Polysaccharide (PPSV-23) 04/22/2021    Tdap 08/26/2018     No LMP for male patient. Allergies and Intolerances: Allergies   Allergen Reactions    Sulfa (Sulfonamide Antibiotics) Rash       Family History:   History reviewed. No pertinent family history. Social History:   He  reports that he has never smoked. He has never used smokeless tobacco.  He  reports no history of alcohol use.             Review of Systems:   General: negative for - chills, fatigue, fever, weight change  Psych: negative for - anxiety, depression, irritability or mood swings  ENT: negative for - headaches, hearing change, nasal congestion, oral lesions, sneezing or sore throat  Heme/ Lymph: negative for - bleeding problems, bruising, pallor or swollen lymph nodes  Endo: negative for - hot flashes, polydipsia/polyuria or temperature intolerance  Resp: negative for - cough, shortness of breath or wheezing  CV: negative for - chest pain, edema or palpitations  GI: negative for - abdominal pain, change in bowel habits, constipation, diarrhea or nausea/vomiting  : negative for - dysuria, hematuria, incontinence, pelvic pain or vulvar/vaginal symptoms  MSK: negative for - joint pain, joint swelling or muscle pain  Neuro: negative for - confusion, headaches, seizures or weakness  Derm: negative for - dry skin, hair changes, rash or skin lesion changes          Physical:   Vitals:   Vitals:    04/22/21 1111   BP: 127/86   Pulse: 92   Resp: 16   Temp: 97.8 °F (36.6 °C)   TempSrc: Temporal   SpO2: 99%   Weight: 180 lb (81.6 kg)   Height: 5' 10\" (1.778 m)           Exam:   HEENT- atraumatic,normocephalic, awake, oriented, well nourished  Neck - supple,no enlarged lymph nodes, no JVD, no thyromegaly  Chest- CTA, no rhonchi, no crackles  Heart- rrr, no murmurs / gallop/rub  Abdomen- soft,BS+,NT, no hepatosplenomegaly, mild distention, dullness to percussion on the left side of the abdomen  Ext - no c/c/edema , valgus deformity of great toe of both feet  Neuro- no focal deficits. Power 5/5 all extremities, slow gait  Skin - warm,dry, no obvious rashes. Review of Data:   LABS:   Lab Results   Component Value Date/Time    WBC 6.7 12/11/2020 12:30 PM    HGB 16.2 12/11/2020 12:30 PM    HCT 50.0 12/11/2020 12:30 PM    PLATELET 300 64/36/8954 12:30 PM     Lab Results   Component Value Date/Time    Sodium 136 01/26/2021 01:10 PM    Potassium 3.8 01/26/2021 01:10 PM    Chloride 102 01/26/2021 01:10 PM    CO2 29 01/26/2021 01:10 PM    Glucose 87 01/26/2021 01:10 PM    BUN 12 01/26/2021 01:10 PM    Creatinine 0.94 01/26/2021 01:10 PM     Lab Results   Component Value Date/Time    Cholesterol, total 200 (H) 08/27/2018 09:56 AM    HDL Cholesterol 41 08/27/2018 09:56 AM    LDL, calculated 109 (H) 08/27/2018 09:56 AM    Triglyceride 252 (H) 08/27/2018 09:56 AM     No components found for: GPT        Impression / Plan:        ICD-10-CM ICD-9-CM    1. Leg swelling  M79.89 729.81    2.  Chronic idiopathic constipation  K59.04 564.00 polyethylene glycol (MIRALAX) 17 gram/dose powder      linaCLOtide (Linzess) 145 mcg cap capsule   3. Parkinson's disease ([address]  G20 332.0    4. Benign prostatic hyperplasia with urinary frequency  N40.1 600.01     R35.0 788.41    5. Allergic conjunctivitis of both eyes  H10.13 372.14 olopatadine (PATANOL) 0.1 % ophthalmic solution   6. Dysuria  R30.0 788. 1 URINALYSIS W/ RFLX MICROSCOPIC      CULTURE, URINE   7. Encounter for vaccination  Z23 V05.9 PNEUMOCOCCAL POLYSACCHARIDE VACCINE, 23-VALENT, ADULT OR IMMUNOSUPPRESSED PT DOSE,   8. Valgus deformity of both great toes  M20.11 735.0 REFERRAL TO PODIATRY    M20.12         Explained to patient risk benefits of the medications. Advised patient to stop meds if having any side effects. Pt verbalized understanding of the instructions. I have discussed the diagnosis with the patient and the intended plan as seen in the above orders. The patient has received an after-visit summary and questions were answered concerning future plans. I have discussed medication side effects and warnings with the patient as well. I have reviewed the plan of care with the patient, accepted their input and they are in agreement with the treatment goals. Reviewed plan of care. Patient has provided input and agrees with goals.         Benjie Ortiz MD

## 2021-04-27 ENCOUNTER — TELEPHONE (OUTPATIENT)
Dept: PRIMARY CARE CLINIC | Age: 66
End: 2021-04-27

## 2021-04-27 NOTE — TELEPHONE ENCOUNTER
Frances from [facility] called again stating that the patient specimen that they received in unusable as it was not labeled. She is needing to speak with a nurse ASAP. Please call back at [phone] and ask for [address].

## 2021-04-27 NOTE — TELEPHONE ENCOUNTER
Spoke with Og Sylvester at [facility]. Lab called stating that the patient specimen that they received in unusable as it was not labeled.

## 2021-05-10 DIAGNOSIS — D64.9 ANEMIA, UNSPECIFIED TYPE: ICD-10-CM

## 2021-05-10 DIAGNOSIS — N40.1 BENIGN PROSTATIC HYPERPLASIA WITH LOWER URINARY TRACT SYMPTOMS, SYMPTOM DETAILS UNSPECIFIED: ICD-10-CM

## 2021-05-10 DIAGNOSIS — M25.473 ANKLE SWELLING, UNSPECIFIED LATERALITY: ICD-10-CM

## 2021-05-10 DIAGNOSIS — R53.83 FATIGUE, UNSPECIFIED TYPE: ICD-10-CM

## 2021-05-10 RX ORDER — LANOLIN ALCOHOL/MO/W.PET/CERES
325 CREAM (GRAM) TOPICAL 2 TIMES DAILY
Qty: 60 TAB | Refills: 3 | Status: SHIPPED | OUTPATIENT
Start: 2021-05-10 | End: 2021-07-15

## 2021-05-12 RX ORDER — TAMSULOSIN HYDROCHLORIDE 0.4 MG/1
0.4 CAPSULE ORAL DAILY
Qty: 30 CAP | Refills: 1 | Status: SHIPPED | OUTPATIENT
Start: 2021-05-12 | End: 2021-07-28

## 2021-05-12 RX ORDER — FUROSEMIDE 40 MG/1
40 TABLET ORAL DAILY
Qty: 30 TAB | Refills: 1 | Status: SHIPPED | OUTPATIENT
Start: 2021-05-12 | End: 2021-06-02

## 2021-05-22 ENCOUNTER — APPOINTMENT (OUTPATIENT)
Dept: GENERAL RADIOLOGY | Age: 66
End: 2021-05-22
Attending: EMERGENCY MEDICINE
Payer: MEDICARE

## 2021-05-22 ENCOUNTER — HOSPITAL ENCOUNTER (EMERGENCY)
Age: 66
Discharge: HOME | End: 2021-05-23
Attending: EMERGENCY MEDICINE
Payer: MEDICARE

## 2021-05-22 DIAGNOSIS — U07.1 COVID-19 VIRUS INFECTION: ICD-10-CM

## 2021-05-22 DIAGNOSIS — R50.9 ACUTE FEBRILE ILLNESS: Primary | ICD-10-CM

## 2021-05-22 DIAGNOSIS — J18.9 COMMUNITY ACQUIRED PNEUMONIA, UNSPECIFIED LATERALITY: ICD-10-CM

## 2021-05-22 PROCEDURE — 84484 ASSAY OF TROPONIN QUANT: CPT

## 2021-05-22 PROCEDURE — 96361 HYDRATE IV INFUSION ADD-ON: CPT

## 2021-05-22 PROCEDURE — 86140 C-REACTIVE PROTEIN: CPT

## 2021-05-22 PROCEDURE — 93005 ELECTROCARDIOGRAM TRACING: CPT

## 2021-05-22 PROCEDURE — 74011250637 HC RX REV CODE- 250/637: Performed by: EMERGENCY MEDICINE

## 2021-05-22 PROCEDURE — 84145 PROCALCITONIN (PCT): CPT

## 2021-05-22 PROCEDURE — 87086 URINE CULTURE/COLONY COUNT: CPT

## 2021-05-22 PROCEDURE — 74011250636 HC RX REV CODE- 250/636: Performed by: EMERGENCY MEDICINE

## 2021-05-22 PROCEDURE — 96360 HYDRATION IV INFUSION INIT: CPT

## 2021-05-22 PROCEDURE — 87040 BLOOD CULTURE FOR BACTERIA: CPT

## 2021-05-22 PROCEDURE — 85025 COMPLETE CBC W/AUTO DIFF WBC: CPT

## 2021-05-22 PROCEDURE — 87635 SARS-COV-2 COVID-19 AMP PRB: CPT

## 2021-05-22 PROCEDURE — 36415 COLL VENOUS BLD VENIPUNCTURE: CPT

## 2021-05-22 PROCEDURE — 83605 ASSAY OF LACTIC ACID: CPT

## 2021-05-22 PROCEDURE — 80053 COMPREHEN METABOLIC PANEL: CPT

## 2021-05-22 PROCEDURE — 71045 X-RAY EXAM CHEST 1 VIEW: CPT

## 2021-05-22 PROCEDURE — 81001 URINALYSIS AUTO W/SCOPE: CPT

## 2021-05-22 PROCEDURE — 99285 EMERGENCY DEPT VISIT HI MDM: CPT

## 2021-05-22 RX ORDER — IBUPROFEN 400 MG/1
800 TABLET ORAL
Status: COMPLETED | OUTPATIENT
Start: 2021-05-23 | End: 2021-05-22

## 2021-05-22 RX ADMIN — IBUPROFEN 800 MG: 400 TABLET, FILM COATED ORAL at 23:47

## 2021-05-22 RX ADMIN — SODIUM CHLORIDE 1000 ML: 9 INJECTION, SOLUTION INTRAVENOUS at 23:46

## 2021-05-23 VITALS
SYSTOLIC BLOOD PRESSURE: 118 MMHG | TEMPERATURE: 98.2 F | HEART RATE: 80 BPM | DIASTOLIC BLOOD PRESSURE: 81 MMHG | OXYGEN SATURATION: 93 % | BODY MASS INDEX: 25.37 KG/M2 | WEIGHT: 176.81 LBS | RESPIRATION RATE: 19 BRPM

## 2021-05-23 LAB
ALBUMIN SERPL-MCNC: 3 G/DL (ref 3.5–5)
ALBUMIN/GLOB SERPL: 0.8 {RATIO} (ref 1.1–2.2)
ALP SERPL-CCNC: 62 U/L (ref 45–117)
ALT SERPL-CCNC: 23 U/L (ref 12–78)
ANION GAP SERPL CALC-SCNC: 10 MMOL/L (ref 5–15)
APPEARANCE UR: CLEAR
AST SERPL-CCNC: 29 U/L (ref 15–37)
ATRIAL RATE: 102 BPM
BACTERIA URNS QL MICRO: NEGATIVE /HPF
BASOPHILS # BLD: 0 K/UL (ref 0–0.1)
BASOPHILS NFR BLD: 0 % (ref 0–1)
BILIRUB SERPL-MCNC: 0.5 MG/DL (ref 0.2–1)
BILIRUB UR QL: NEGATIVE
BUN SERPL-MCNC: 9 MG/DL (ref 6–20)
BUN/CREAT SERPL: 7 (ref 12–20)
CALCIUM SERPL-MCNC: 8.2 MG/DL (ref 8.5–10.1)
CALCULATED P AXIS, ECG09: 51 DEGREES
CALCULATED R AXIS, ECG10: 14 DEGREES
CALCULATED T AXIS, ECG11: 34 DEGREES
CHLORIDE SERPL-SCNC: 101 MMOL/L (ref 97–108)
CO2 SERPL-SCNC: 26 MMOL/L (ref 21–32)
COLOR UR: ABNORMAL
COVID-19 RAPID TEST, COVR: DETECTED
CREAT SERPL-MCNC: 1.26 MG/DL (ref 0.7–1.3)
CRP SERPL-MCNC: 4.9 MG/DL
DIAGNOSIS, 93000: NORMAL
DIFFERENTIAL METHOD BLD: ABNORMAL
EOSINOPHIL # BLD: 0 K/UL (ref 0–0.4)
EOSINOPHIL NFR BLD: 0 % (ref 0–7)
EPITH CASTS URNS QL MICRO: ABNORMAL /LPF
ERYTHROCYTE [DISTWIDTH] IN BLOOD BY AUTOMATED COUNT: 13 % (ref 11.5–14.5)
GLOBULIN SER CALC-MCNC: 3.6 G/DL (ref 2–4)
GLUCOSE SERPL-MCNC: 138 MG/DL (ref 65–100)
GLUCOSE UR STRIP.AUTO-MCNC: NEGATIVE MG/DL
HCT VFR BLD AUTO: 44.1 % (ref 36.6–50.3)
HGB BLD-MCNC: 14.2 G/DL (ref 12.1–17)
HGB UR QL STRIP: NEGATIVE
IMM GRANULOCYTES # BLD AUTO: 0 K/UL (ref 0–0.04)
IMM GRANULOCYTES NFR BLD AUTO: 0 % (ref 0–0.5)
KETONES UR QL STRIP.AUTO: 15 MG/DL
LACTATE SERPL-SCNC: 2.6 MMOL/L (ref 0.4–2)
LEUKOCYTE ESTERASE UR QL STRIP.AUTO: NEGATIVE
LYMPHOCYTES # BLD: 0.4 K/UL (ref 0.8–3.5)
LYMPHOCYTES NFR BLD: 12 % (ref 12–49)
MCH RBC QN AUTO: 28.2 PG (ref 26–34)
MCHC RBC AUTO-ENTMCNC: 32.2 G/DL (ref 30–36.5)
MCV RBC AUTO: 87.7 FL (ref 80–99)
MONOCYTES # BLD: 0.4 K/UL (ref 0–1)
MONOCYTES NFR BLD: 10 % (ref 5–13)
NEUTS SEG # BLD: 2.9 K/UL (ref 1.8–8)
NEUTS SEG NFR BLD: 78 % (ref 32–75)
NITRITE UR QL STRIP.AUTO: NEGATIVE
NRBC # BLD: 0 K/UL (ref 0–0.01)
NRBC BLD-RTO: 0 PER 100 WBC
P-R INTERVAL, ECG05: 150 MS
PH UR STRIP: 6.5 [PH] (ref 5–8)
PLATELET # BLD AUTO: 129 K/UL (ref 150–400)
PLATELET COMMENTS,PCOM: ABNORMAL
POTASSIUM SERPL-SCNC: 3.5 MMOL/L (ref 3.5–5.1)
PROCALCITONIN SERPL-MCNC: 0.08 NG/ML
PROT SERPL-MCNC: 6.6 G/DL (ref 6.4–8.2)
PROT UR STRIP-MCNC: NEGATIVE MG/DL
Q-T INTERVAL, ECG07: 356 MS
QRS DURATION, ECG06: 80 MS
QTC CALCULATION (BEZET), ECG08: 463 MS
RBC # BLD AUTO: 5.03 M/UL (ref 4.1–5.7)
RBC #/AREA URNS HPF: ABNORMAL /HPF (ref 0–5)
RBC MORPH BLD: ABNORMAL
SODIUM SERPL-SCNC: 137 MMOL/L (ref 136–145)
SOURCE, COVRS: ABNORMAL
SP GR UR REFRACTOMETRY: <1.005 (ref 1–1.03)
TROPONIN I SERPL-MCNC: <0.05 NG/ML
UA: UC IF INDICATED,UAUC: ABNORMAL
UROBILINOGEN UR QL STRIP.AUTO: 2 EU/DL (ref 0.2–1)
VENTRICULAR RATE, ECG03: 102 BPM
WBC # BLD AUTO: 3.7 K/UL (ref 4.1–11.1)
WBC URNS QL MICRO: ABNORMAL /HPF (ref 0–4)

## 2021-05-23 PROCEDURE — 74011250637 HC RX REV CODE- 250/637: Performed by: EMERGENCY MEDICINE

## 2021-05-23 PROCEDURE — 96361 HYDRATE IV INFUSION ADD-ON: CPT

## 2021-05-23 RX ORDER — ALBUTEROL SULFATE 90 UG/1
2 AEROSOL, METERED RESPIRATORY (INHALATION)
Qty: 1 INHALER | Refills: 0 | Status: SHIPPED | OUTPATIENT
Start: 2021-05-23 | End: 2021-08-03

## 2021-05-23 RX ORDER — DOXYCYCLINE HYCLATE 100 MG
100 TABLET ORAL 2 TIMES DAILY
Qty: 20 TABLET | Refills: 0 | Status: SHIPPED | OUTPATIENT
Start: 2021-05-23 | End: 2021-06-02

## 2021-05-23 RX ORDER — UREA 10 %
LOTION (ML) TOPICAL
Qty: 20 TABLET | Refills: 0 | Status: SHIPPED | OUTPATIENT
Start: 2021-05-23 | End: 2021-08-03

## 2021-05-23 RX ORDER — BUDESONIDE 180 UG/1
2 AEROSOL, POWDER RESPIRATORY (INHALATION) 2 TIMES DAILY
Qty: 1 INHALER | Refills: 1 | Status: SHIPPED | OUTPATIENT
Start: 2021-05-23 | End: 2021-08-03

## 2021-05-23 RX ORDER — DOXYCYCLINE HYCLATE 100 MG
100 TABLET ORAL
Status: COMPLETED | OUTPATIENT
Start: 2021-05-23 | End: 2021-05-23

## 2021-05-23 RX ORDER — VITAMIN E 1000 UNIT
100 CAPSULE ORAL 2 TIMES DAILY
Qty: 20 TABLET | Refills: 0 | Status: SHIPPED | OUTPATIENT
Start: 2021-05-23 | End: 2021-08-03

## 2021-05-23 RX ADMIN — DOXYCYCLINE HYCLATE 100 MG: 100 TABLET, COATED ORAL at 01:44

## 2021-05-23 NOTE — ED NOTES
Pt d/c'd home. IV's d/c'd cath intact. Pt son at bedside. Ambulated pt again in front of son. Pt was able to ambulate with out assistance. Pt did well with walker as well. Rx given for pt to get walker. Pt VSS. Pt and son given d/c instructions and rx x5, pt son verbalized understanding. Pt taken out via w/c by RN.

## 2021-05-23 NOTE — ED TRIAGE NOTES
Pt c/o fever and generalized not feeling well. Multiple people in the house hold have tested positive for COVID. Pt states he is not moving around as much. Pt has a hx of parkinson dx.   Pt said he had tylenol 1 hour pta, no ibu

## 2021-05-23 NOTE — ED NOTES
Pt was able to ambulate with assistance to the door and back. Pt shuffled. 1st liter had finished infusing and lactic was high, requested 2nd liter and repeat lactic, MD declined.   Will continue to monitor

## 2021-05-24 ENCOUNTER — PATIENT OUTREACH (OUTPATIENT)
Dept: CASE MANAGEMENT | Age: 66
End: 2021-05-24

## 2021-05-24 ENCOUNTER — DOCUMENTATION ONLY (OUTPATIENT)
Dept: CASE MANAGEMENT | Age: 66
End: 2021-05-24

## 2021-05-24 LAB
BACTERIA SPEC CULT: NORMAL
SERVICE CMNT-IMP: NORMAL

## 2021-05-24 NOTE — SENIOR SERVICES NOTE
Thank you for the after hours SSED Consult. Chart Reviewed, recent diagnosis of COVID-19 pneumonia: I will collaborate with Jahaira Carr CM for necessary orders to ensure patient has filled RX's provided by MD, f/u with PT/OT order and RX for rolling walker.      Shari Moser 371, NP  SSED  11:39 AM  [phone]

## 2021-05-24 NOTE — PROGRESS NOTES
SSED/CM referral received and appreciated. Call placed to patient spoke w/ spouse Phoebe Common - introduced to role of CM. HIPAA identifier verified at time of call patient is asleep and spouse states she is unable to talk long (noted coughing). Mrs. Reji Shelby endorses medications filled and needs PT at the house and RW. Spouse is unable to lift patient informed PT will not be lifting patient and service is short term. CM asked if family able to assist w/ lifting and states yes. Conversation limited d/t spouse coughing informed will [facility] - Mrs. Reji Shelby in agreement. Demographics confirmed. [facility] sent to [facility].

## 2021-05-24 NOTE — PROGRESS NOTES
[address] [facility] unable to accept case. CM will continue seeking available [facility] providers. Pending DME authorization from insurance.

## 2021-05-24 NOTE — PROGRESS NOTES
Patient contacted regarding COVID-19 diagnosis, pulse oximeter ordered at discharge. Discussed COVID-19 related testing which was available at this time. Test results were positive. Patient informed of results, if available? yes     LPN Care Coordinator contacted the family by telephone to perform post discharge assessment. Call within 2 business days of discharge: Yes Verified name and  with patient as identifiers. Provided introduction to self, and explanation of the CTN/ACM role, and reason for call due to risk factors for infection and/or exposure to COVID-19. Symptoms reviewed with patient who verbalized the following symptoms: fever, fatigue, pain or aching joints and confusion of unusual change in mental status      Due to worsening symptoms encounter was routed to provider for escalation. Discussed follow-up appointments. If no appointment was previously scheduled, appointment scheduling offered:  yes   [facility] follow up appointment(s): No future appointments. Non-[facility] follow up appointment(s): NA    Interventions to address risk factors: Communication with specialists who will assume or re-assume care of the patient's system-specific problems-Covid 19      Advance Care Planning:   Does patient have an Advance Directive: decision makers updated     Educated patient about risk for severe COVID-19 due to risk factors according to CDC guidelines. LPN CC reviewed discharge instructions, medical action plan and red flag symptoms family who verbalized understanding. Discussed COVID vaccination status no. Education provided on COVID-19 vaccination as appropriate. Discussed exposure protocols and quarantine with CDC Guidelines. Family was given an opportunity to verbalize any questions and concerns and agrees to contact LPN CC or health care provider for questions related to their healthcare.     Reviewed and educated family on any new and changed medications related to discharge diagnosis     Was patient discharged with a pulse oximeter? yes Discussed and confirmed pulse oximeter discharge instructions and when to notify provider or seek emergency care. LPN CC provided contact information. Plan for follow-up call in 5-7 days based on severity of symptoms and risk factors.

## 2021-05-24 NOTE — PROGRESS NOTES
Referral sent to [facility] spoke lambert/ Jaime Ceja, Liaison informed will need authorization from [facility]. This writer placed call to Serge LE left. Alternative call placed to main number after hours (outcome pending).

## 2021-05-25 ENCOUNTER — TELEPHONE (OUTPATIENT)
Dept: CASE MANAGEMENT | Age: 66
End: 2021-05-25

## 2021-05-25 NOTE — PROGRESS NOTES
5/25/2021; 09:00 -   CM contacted [facility] (Jasiel [place]: [phone]) to check on the status of patient's [address] Per [facility] [place], the agency has not heard back from [facility] team yet. Anastasiia to contact central office and will return call to CM shortly. CRM: Talon Maurice MPH, CHES; Z: [phone]    10:05 -   CM was notified by [facility] that agency is not in network for patient's ins and cannot staff patient's [facility] needs. CM did open search on [employer] and sent referrals to:     [address] [place] [address] [address]  [facility]  [address]    CRM: Talon Maurice MPH, [place]; [place]: [phone]    10:40 -   [address]  [facility] [address]  CRM: Talon Maurice MPH, CHES; Z: [phone]    10:50 -   CM received call from [address]) indicating ability to accept patient with [facility] of 5/26. Agency to call patient and family to confirm plan.   CRM: Talon Maurice MPH, [place]; Z: [phone]

## 2021-05-26 ENCOUNTER — DOCUMENTATION ONLY (OUTPATIENT)
Dept: CASE MANAGEMENT | Age: 66
End: 2021-05-26

## 2021-05-26 NOTE — PROGRESS NOTES
Noted patient accepted by [facility]. Call placed to agency spoke w/ Intake at this time patient still on schedule for today. CM requested if unable to reach please communicate w/ this writer. CM placed call to patient - VM left for Mrs. Rafa Montero to inquire if time received for [facility] visit today and if received RW (outcome pending). Contact/number left for spouse.

## 2021-05-27 ENCOUNTER — HOSPITAL ENCOUNTER (INPATIENT)
Age: 66
LOS: 6 days | Discharge: NURSING, REHAB OR LONG-TERM CARE | DRG: 641 | End: 2021-06-02
Attending: EMERGENCY MEDICINE | Admitting: STUDENT IN AN ORGANIZED HEALTH CARE EDUCATION/TRAINING PROGRAM
Payer: MEDICARE

## 2021-05-27 ENCOUNTER — APPOINTMENT (OUTPATIENT)
Dept: GENERAL RADIOLOGY | Age: 66
DRG: 641 | End: 2021-05-27
Attending: EMERGENCY MEDICINE
Payer: MEDICARE

## 2021-05-27 ENCOUNTER — TELEPHONE (OUTPATIENT)
Dept: CASE MANAGEMENT | Age: 66
End: 2021-05-27

## 2021-05-27 DIAGNOSIS — U07.1 COVID-19 VIRUS INFECTION: ICD-10-CM

## 2021-05-27 DIAGNOSIS — R53.1 WEAKNESS: ICD-10-CM

## 2021-05-27 DIAGNOSIS — E86.0 DEHYDRATION: Primary | ICD-10-CM

## 2021-05-27 PROBLEM — R62.7 FTT (FAILURE TO THRIVE) IN ADULT: Status: ACTIVE | Noted: 2021-05-27

## 2021-05-27 LAB
ALBUMIN SERPL-MCNC: 3 G/DL (ref 3.5–5)
ALBUMIN/GLOB SERPL: 0.7 {RATIO} (ref 1.1–2.2)
ALP SERPL-CCNC: 71 U/L (ref 45–117)
ALT SERPL-CCNC: 20 U/L (ref 12–78)
ANION GAP SERPL CALC-SCNC: 9 MMOL/L (ref 5–15)
APPEARANCE UR: CLEAR
AST SERPL-CCNC: 47 U/L (ref 15–37)
BILIRUB SERPL-MCNC: 0.7 MG/DL (ref 0.2–1)
BILIRUB UR QL: NEGATIVE
BUN SERPL-MCNC: 11 MG/DL (ref 6–20)
BUN/CREAT SERPL: 14 (ref 12–20)
CALCIUM SERPL-MCNC: 8.7 MG/DL (ref 8.5–10.1)
CHLORIDE SERPL-SCNC: 104 MMOL/L (ref 97–108)
CO2 SERPL-SCNC: 25 MMOL/L (ref 21–32)
COLOR UR: NORMAL
CREAT SERPL-MCNC: 0.8 MG/DL (ref 0.7–1.3)
GLOBULIN SER CALC-MCNC: 4.3 G/DL (ref 2–4)
GLUCOSE SERPL-MCNC: 92 MG/DL (ref 65–100)
GLUCOSE UR STRIP.AUTO-MCNC: NEGATIVE MG/DL
HGB UR QL STRIP: NEGATIVE
KETONES UR QL STRIP.AUTO: NEGATIVE MG/DL
LEUKOCYTE ESTERASE UR QL STRIP.AUTO: NEGATIVE
MAGNESIUM SERPL-MCNC: 2.5 MG/DL (ref 1.6–2.4)
NITRITE UR QL STRIP.AUTO: NEGATIVE
PH UR STRIP: 7 [PH] (ref 5–8)
PHOSPHATE SERPL-MCNC: 1.8 MG/DL (ref 2.6–4.7)
POTASSIUM SERPL-SCNC: 4.1 MMOL/L (ref 3.5–5.1)
PROT SERPL-MCNC: 7.3 G/DL (ref 6.4–8.2)
PROT UR STRIP-MCNC: NEGATIVE MG/DL
SODIUM SERPL-SCNC: 138 MMOL/L (ref 136–145)
SP GR UR REFRACTOMETRY: <1.005 (ref 1–1.03)
TSH SERPL DL<=0.05 MIU/L-ACNC: 1.22 UIU/ML (ref 0.36–3.74)
UROBILINOGEN UR QL STRIP.AUTO: 0.2 EU/DL (ref 0.2–1)

## 2021-05-27 PROCEDURE — 65270000029 HC RM PRIVATE

## 2021-05-27 PROCEDURE — 71045 X-RAY EXAM CHEST 1 VIEW: CPT

## 2021-05-27 PROCEDURE — 74011000250 HC RX REV CODE- 250: Performed by: STUDENT IN AN ORGANIZED HEALTH CARE EDUCATION/TRAINING PROGRAM

## 2021-05-27 PROCEDURE — 36415 COLL VENOUS BLD VENIPUNCTURE: CPT

## 2021-05-27 PROCEDURE — 99285 EMERGENCY DEPT VISIT HI MDM: CPT

## 2021-05-27 PROCEDURE — 74011250637 HC RX REV CODE- 250/637: Performed by: STUDENT IN AN ORGANIZED HEALTH CARE EDUCATION/TRAINING PROGRAM

## 2021-05-27 PROCEDURE — 80053 COMPREHEN METABOLIC PANEL: CPT

## 2021-05-27 PROCEDURE — 84100 ASSAY OF PHOSPHORUS: CPT

## 2021-05-27 PROCEDURE — 81003 URINALYSIS AUTO W/O SCOPE: CPT

## 2021-05-27 PROCEDURE — 74011250636 HC RX REV CODE- 250/636: Performed by: STUDENT IN AN ORGANIZED HEALTH CARE EDUCATION/TRAINING PROGRAM

## 2021-05-27 PROCEDURE — 74011250636 HC RX REV CODE- 250/636: Performed by: EMERGENCY MEDICINE

## 2021-05-27 PROCEDURE — 83735 ASSAY OF MAGNESIUM: CPT

## 2021-05-27 PROCEDURE — 84443 ASSAY THYROID STIM HORMONE: CPT

## 2021-05-27 RX ORDER — DOXYCYCLINE HYCLATE 100 MG
100 TABLET ORAL 2 TIMES DAILY
Status: COMPLETED | OUTPATIENT
Start: 2021-05-27 | End: 2021-05-30

## 2021-05-27 RX ORDER — ACETAMINOPHEN 650 MG/1
650 SUPPOSITORY RECTAL
Status: DISCONTINUED | OUTPATIENT
Start: 2021-05-27 | End: 2021-06-02 | Stop reason: HOSPADM

## 2021-05-27 RX ORDER — CARBIDOPA AND LEVODOPA 25; 100 MG/1; MG/1
1.5 TABLET ORAL 3 TIMES DAILY
Status: DISCONTINUED | OUTPATIENT
Start: 2021-05-27 | End: 2021-06-02 | Stop reason: HOSPADM

## 2021-05-27 RX ORDER — ENOXAPARIN SODIUM 100 MG/ML
40 INJECTION SUBCUTANEOUS DAILY
Status: DISCONTINUED | OUTPATIENT
Start: 2021-05-28 | End: 2021-05-30

## 2021-05-27 RX ORDER — SODIUM CHLORIDE 0.9 % (FLUSH) 0.9 %
5-40 SYRINGE (ML) INJECTION AS NEEDED
Status: DISCONTINUED | OUTPATIENT
Start: 2021-05-27 | End: 2021-06-02 | Stop reason: HOSPADM

## 2021-05-27 RX ORDER — ACETAMINOPHEN 325 MG/1
650 TABLET ORAL
Status: DISCONTINUED | OUTPATIENT
Start: 2021-05-27 | End: 2021-06-02 | Stop reason: HOSPADM

## 2021-05-27 RX ORDER — TAMSULOSIN HYDROCHLORIDE 0.4 MG/1
0.4 CAPSULE ORAL DAILY
Status: DISCONTINUED | OUTPATIENT
Start: 2021-05-28 | End: 2021-06-02 | Stop reason: HOSPADM

## 2021-05-27 RX ORDER — POLYETHYLENE GLYCOL 3350 17 G/17G
17 POWDER, FOR SOLUTION ORAL DAILY
Status: DISCONTINUED | OUTPATIENT
Start: 2021-05-28 | End: 2021-06-02 | Stop reason: HOSPADM

## 2021-05-27 RX ORDER — IPRATROPIUM BROMIDE AND ALBUTEROL SULFATE 2.5; .5 MG/3ML; MG/3ML
3 SOLUTION RESPIRATORY (INHALATION)
Status: DISCONTINUED | OUTPATIENT
Start: 2021-05-27 | End: 2021-06-02 | Stop reason: HOSPADM

## 2021-05-27 RX ORDER — BUDESONIDE 0.25 MG/2ML
250 INHALANT ORAL 2 TIMES DAILY
Status: DISCONTINUED | OUTPATIENT
Start: 2021-05-27 | End: 2021-05-28

## 2021-05-27 RX ORDER — PROMETHAZINE HYDROCHLORIDE 25 MG/1
12.5 TABLET ORAL
Status: DISCONTINUED | OUTPATIENT
Start: 2021-05-27 | End: 2021-06-02 | Stop reason: HOSPADM

## 2021-05-27 RX ORDER — KETOTIFEN FUMARATE 0.35 MG/ML
1 SOLUTION/ DROPS OPHTHALMIC 2 TIMES DAILY
Status: DISCONTINUED | OUTPATIENT
Start: 2021-05-27 | End: 2021-06-02 | Stop reason: HOSPADM

## 2021-05-27 RX ORDER — SODIUM CHLORIDE 9 MG/ML
75 INJECTION, SOLUTION INTRAVENOUS CONTINUOUS
Status: DISCONTINUED | OUTPATIENT
Start: 2021-05-27 | End: 2021-06-02 | Stop reason: HOSPADM

## 2021-05-27 RX ORDER — AMOXICILLIN 250 MG
1 CAPSULE ORAL 2 TIMES DAILY
Status: DISCONTINUED | OUTPATIENT
Start: 2021-05-27 | End: 2021-06-02 | Stop reason: HOSPADM

## 2021-05-27 RX ORDER — ONDANSETRON 2 MG/ML
4 INJECTION INTRAMUSCULAR; INTRAVENOUS
Status: DISCONTINUED | OUTPATIENT
Start: 2021-05-27 | End: 2021-06-02 | Stop reason: HOSPADM

## 2021-05-27 RX ORDER — SODIUM CHLORIDE 0.9 % (FLUSH) 0.9 %
5-40 SYRINGE (ML) INJECTION EVERY 8 HOURS
Status: DISCONTINUED | OUTPATIENT
Start: 2021-05-27 | End: 2021-06-02 | Stop reason: HOSPADM

## 2021-05-27 RX ORDER — LANOLIN ALCOHOL/MO/W.PET/CERES
325 CREAM (GRAM) TOPICAL 2 TIMES DAILY
Status: DISCONTINUED | OUTPATIENT
Start: 2021-05-27 | End: 2021-06-02 | Stop reason: HOSPADM

## 2021-05-27 RX ADMIN — DOXYCYCLINE HYCLATE 100 MG: 100 TABLET, COATED ORAL at 20:53

## 2021-05-27 RX ADMIN — KETOTIFEN FUMARATE 1 DROP: 0.35 SOLUTION/ DROPS OPHTHALMIC at 22:56

## 2021-05-27 RX ADMIN — SODIUM CHLORIDE 125 ML/HR: 9 INJECTION, SOLUTION INTRAVENOUS at 22:55

## 2021-05-27 RX ADMIN — SODIUM CHLORIDE 1000 ML: 900 INJECTION, SOLUTION INTRAVENOUS at 14:45

## 2021-05-27 RX ADMIN — Medication 10 ML: at 22:57

## 2021-05-27 RX ADMIN — CARBIDOPA AND LEVODOPA 1.5 TABLET: 25; 100 TABLET ORAL at 21:00

## 2021-05-27 RX ADMIN — DOCUSATE SODIUM 50 MG AND SENNOSIDES 8.6 MG 1 TABLET: 8.6; 5 TABLET, FILM COATED ORAL at 20:53

## 2021-05-27 RX ADMIN — FERROUS SULFATE TAB 325 MG (65 MG ELEMENTAL FE) 325 MG: 325 (65 FE) TAB at 20:53

## 2021-05-27 NOTE — PROGRESS NOTES
Updates provided to Dr. Lizzie Wu. FOC offered to spouse/daughter requesting facility near home in [facility] however acknowledged the need for available bed. Referrals sent to [facility]ureen/ Chris/ and [facility]. Case Management will continue to follow for transitions of care needs.

## 2021-05-27 NOTE — H&P
History & Physical    Primary Care Provider: Maday Crocker MD  Source of Information: Patient and chart review    History of Presenting Illness:   Isa Brooks is a 74 y.o. male w/ PMH of parkinson's, BPH, Fe Def anemia who is brought to hospital by family due to increased debility and inability to be cared for at home. Pt was recently diagnosed with uncomplicated Covid pneumonia and prescribed outpatient course of nebulizers and doxycycline. Patient and family states since diagnosis of Covid pneumonia, he has had increasing debility, weakness and inability to care for himself. Patient states he spent several days in bed after diagnosis of Covid pneumonia and subsequently had difficulty getting out of bed and caring for himself. Family is not at bedside per chart review shows family has been concerned as patient has been unable to feed himself or take care of his personal hygiene. Family is furthermore unable to care for him at home. Patient is seen and examined at bedside. He denies any respiratory symptoms at this time. Denies any pain. Continues to complain of continued debility. The patient denies any fever, chills, chest pain, cough, congestion, recent illness, palpitations, or dysuria. Vitals on ER presentation are overall unremarkable. CMP and UA were unremarkable. CXR showed no acute process. Review of Systems:  A comprehensive review of systems was negative except for that written in the History of Present Illness. Past Medical History:   Diagnosis Date    Arthritis     knees    Chronic pain     knees, back    Constipation     Depression     Gait difficulty     H/O seasonal allergies     Parkinson disease (HCC)     Restless leg syndrome     Urinary frequency       Past Surgical History:   Procedure Laterality Date    HX HERNIA REPAIR Right 08/15/2019    Robotic-assisted laparoscopic right inguinal herniorrhaphy with mesh. Prior to Admission medications    Medication Sig Start Date End Date Taking? Authorizing Provider   doxycycline (VIBRA-TABS) 100 mg tablet Take 1 Tablet by mouth two (2) times a day for 10 days. 5/23/21 6/2/21  Solomon Sheppard MD   albuterol (PROVENTIL HFA, VENTOLIN HFA, PROAIR HFA) 90 mcg/actuation inhaler Take 2 Puffs by inhalation every four (4) hours as needed for Wheezing, Respiratory Distress or Cough. 5/23/21   Solomon Sheppard MD   budesonide (Pulmicort Flexhaler) 180 mcg/actuation aepb inhaler Take 2 Puffs by inhalation two (2) times a day. Rinse mouth afterwards 5/23/21   Solomon Sheppard MD   ascorbic acid, vitamin C, (Vitamin C) 1,000 mg tablet Take 1 Tablet by mouth two (2) times a day. 5/23/21   Solomon Sheppard MD   zinc sulfate 50 mg zinc (220 mg) tablet Use one tablet daily until gone 5/23/21   MD Areli Odonnell (Ultra-Light Rollator) misc Use as directed 5/23/21   Solomon Sheppard MD   tamsulosin (Flomax) 0.4 mg capsule Take 1 Cap by mouth daily. 5/12/21   Uma Pederson MD   furosemide (Lasix) 40 mg tablet Take 1 Tab by mouth daily. 5/12/21   Uma Pederson MD   ferrous sulfate 325 mg (65 mg iron) tablet Take 1 Tab by mouth two (2) times a day. 5/10/21   Uma Pederson MD   polyethylene glycol (MIRALAX) 17 gram/dose powder Take 17 g by mouth daily. 4/22/21   Uma Pederson MD   linaCLOtide (Linzess) 145 mcg cap capsule Take 1 Cap by mouth Daily (before breakfast). 4/22/21   Uma Pederson MD   olopatadine (PATANOL) 0.1 % ophthalmic solution Administer 2 Drops to both eyes two (2) times a day. 4/22/21   Uma Pederson MD   potassium chloride (K-DUR, KLOR-CON) 20 mEq tablet Take 1 Tab by mouth daily. 3/31/21   Uma Pederson MD   carbidopa-levodopa (SINEMET)  mg per tablet TAKE 1 AND 1/2 TABLETS BY MOUTH THREE TIMES DAILY 12/30/20   Zach Calvin, NP   diclofenac (VOLTAREN) 1 % gel Apply  to affected area four (4) times daily.  4/21/20   Ganesh, Corine Messina MD   polyethylene glycol (Miralax) 17 gram/dose powder Take 17 g by mouth daily. 4/21/20   Betty Soto MD   ACETAMINOPHEN (TYLENOL PO) Take 650 mg by mouth as needed. Provider, Historical     Allergies   Allergen Reactions    Sulfa (Sulfonamide Antibiotics) Rash      No family history on file. SOCIAL HISTORY:  Patient resides:  Independently x   Assisted Living    SNF    With family care x      Smoking history:   None x   Former    Chronic      Alcohol history:   None x   Social    Chronic      Ambulates:   Independently x   w/cane    w/walker    w/wc    CODE STATUS:  DNR    Full x   Other      Objective:     Physical Exam:     Visit Vitals  /79   Pulse 94   Temp 99.1 °F (37.3 °C)   Resp 18   SpO2 95%      O2 Device: None (Room air)    General:  Alert, cooperative, no distress, appears stated age. Head:  Normocephalic, without obvious abnormality, atraumatic. Eyes:  Conjunctivae/corneas clear. PERRL, EOMs intact. Nose: Nares normal. Septum midline. Mucosa normal.        Neck: Supple, symmetrical, trachea midline, no carotid bruit and no JVD. Lungs:   Clear to auscultation bilaterally. Chest wall:  No tenderness or deformity. Heart:  Regular rate and rhythm, S1, S2 normal, no murmur, click, rub or gallop. Abdomen:   Soft, non-tender. Bowel sounds normal. No masses,  No organomegaly. Extremities: Extremities normal, atraumatic, no cyanosis or edema. Pulses: 2+ and symmetric all extremities. Skin: Skin color, texture, turgor normal. No rashes or lesions   Neurologic: CNII-XII intact. Data Review:     Recent Days:  No results for input(s): WBC, HGB, HCT, PLT, HGBEXT, HCTEXT, PLTEXT in the last 72 hours. Recent Labs     05/27/21  1436      K 4.1      CO2 25   GLU 92   BUN 11   CREA 0.80   CA 8.7   ALB 3.0*   ALT 20     No results for input(s): PH, PCO2, PO2, HCO3, FIO2 in the last 72 hours.     24 Hour Results:  Recent Results (from the past 24 hour(s))   METABOLIC PANEL, COMPREHENSIVE    Collection Time: 05/27/21  2:36 PM   Result Value Ref Range    Sodium 138 136 - 145 mmol/L    Potassium 4.1 3.5 - 5.1 mmol/L    Chloride 104 97 - 108 mmol/L    CO2 25 21 - 32 mmol/L    Anion gap 9 5 - 15 mmol/L    Glucose 92 65 - 100 mg/dL    BUN 11 6 - 20 MG/DL    Creatinine 0.80 0.70 - 1.30 MG/DL    BUN/Creatinine ratio 14 12 - 20      GFR est AA >60 >60 ml/min/1.73m2    GFR est non-AA >60 >60 ml/min/1.73m2    Calcium 8.7 8.5 - 10.1 MG/DL    Bilirubin, total 0.7 0.2 - 1.0 MG/DL    ALT (SGPT) 20 12 - 78 U/L    AST (SGOT) 47 (H) 15 - 37 U/L    Alk. phosphatase 71 45 - 117 U/L    Protein, total 7.3 6.4 - 8.2 g/dL    Albumin 3.0 (L) 3.5 - 5.0 g/dL    Globulin 4.3 (H) 2.0 - 4.0 g/dL    A-G Ratio 0.7 (L) 1.1 - 2.2     URINALYSIS W/ RFLX MICROSCOPIC    Collection Time: 05/27/21  2:36 PM   Result Value Ref Range    Color YELLOW/STRAW      Appearance CLEAR CLEAR      Specific gravity <1.005 1.003 - 1.030    pH (UA) 7.0 5.0 - 8.0      Protein Negative NEG mg/dL    Glucose Negative NEG mg/dL    Ketone Negative NEG mg/dL    Bilirubin Negative NEG      Blood Negative NEG      Urobilinogen 0.2 0.2 - 1.0 EU/dL    Nitrites Negative NEG      Leukocyte Esterase Negative NEG           Imaging:     Assessment:     Rosie Dinh is a 74 y.o. male w/ PMH of parkinson's, BPH, Fe Def anemia who is admitted for failure to thrive.        Plan:       Failure to thrive  -Case management, PT OT consulted  -Currently undergoing placement.  -We will admit to hospital until placement complete    COVID-19 infection  -Chest x-ray today shows resolution of pneumonia  -Continue doxycycline to complete 2 additional days of therapy  -As needed duo nebs    Parkinson's disease  -Continue home Sinemet  -Consult neurology to evaluate for potential worsening Parkinson's    BPH  -Home Flomax    Iron deficiency anemia  -Home iron    Chronic constipation  -Home bowel regimen with MiraLAX and Savita-Colace                FEN/GI -  NS @ 125 ml/hr  Activity - As tolerated  DVT prophylaxis - Lovenox  GI prophylaxis -  NI  Disposition - SNF    CODE STATUS:  Full code       Signed By: Frankie Wilson MD     May 27, 2021

## 2021-05-27 NOTE — PROGRESS NOTES
Call placed to spouse Marbella Wade [phone] discussed transitions of care. In agreement w/ placement. Informed will need authorization from insurance and CM will seek available placement. Authorization will not be obtained tonight. Spouse inquired about how patient will get his food - will plan to bring meals to the hospital. CM directed to Nursing. This writer will provide updates to .

## 2021-05-27 NOTE — ED NOTES
4:12 PM  Change of shift. Care of patient taken over from Dr. Jonathon Quarles; H&P reviewed, bedside handoff complete. Awaiting case management disposition. 6:12 PM  Authorization for placement was not obtained this evening. Patient is being admitted to the hospital.  The results of their tests and reasons for their admission have been discussed with them and/or available family. They convey agreement and understanding for the need to be admitted and for their admission diagnosis. Consultation will be made now with the inpatient physician for hospitalization. [facility] Consult for Admission  6:12 PM    ED Room Number: ER10/10  Patient Name and age:  Natan Montero 74 y.o.  male  Working Diagnosis:   1. Dehydration    2. Weakness    3.  COVID-19 virus infection      COVID-19 Suspicion:  yes  Sepsis present:  no  Reassessment needed: no  Code Status:  Full Code  Readmission: no  Isolation Requirements:  yes  Recommended Level of Care:  med/surg   Department:[facility] Adult ED - 21   Other:  Pt w covid not eating and drinking and family unable to care for him at home

## 2021-05-27 NOTE — PROGRESS NOTES
SSED/CM consult received and appreciated. EMR reviewed. Noted [address] attempted visit today patient w/ severe debility and spouse inability to care for at home. Previous SPTED encounter 21 w/ HH  and DME referrals. Contact made w/ Kathy Poole - informed patient does not show in [employer] Mobile CRM system. CM will contact Mrs. Mariah Nix regarding needs and FOC for facilities. 1445 Attempted to reach spouse on home phone VM left to contact CM regarding rehab and transitional  care options. Alternative call placed to mobile number [phone] (not in service). 4306 PT consult requested . Patient will require authorization for SNF/Rehab if recommended (outcome pending). 1 VMs received from Mrs. Mariah Nxi and daughter Ricco Granado. [phone] requesting placement. Call received Rayma Opitz verified patient's  verbalized her mother is unable to care for her father at this time secondary to weakness. Requesting SNF near home in [address] or next available bed. Informed Mrs. Mariah Nix is outside of ED on bench. CM will initiate referral process.

## 2021-05-27 NOTE — PROGRESS NOTES
5/27/2021; 11:00 -   CM received call from [facility]: [phone]) indicating that the agency went to patient's home to open the patient for services. Per agency nursing, patient presented with severe debility. Patient's spouse identified inability to care for the patient at home in his current state.   Patient is likely to return to [facility] [place].  CRM: Preeti Reynolds, MPH, [address] [place] [address]; Z: [phone]

## 2021-05-27 NOTE — PROGRESS NOTES
5/27/2021; 18:40 -   CM received call from Hospitalist team to discuss patient's case. CM discussed that SNF referrals were sent out after hours, so the facilities will not review patient's case until tomorrow, 5/28. CM also disclosed that due to patient's ins, the accepting facility will need to seek ins pre auth prior to patient's admission to SNF level rehab. CM discussed that pre auth can generally take 24-72 hours to obtain, and with the holiday weekend pending, patient's [place] may be delayed. Plan will be to admit patient. Attending is aware that patient will need PT/OT evals for placement. CM Team to continue following closely for [facility] planning and coordination.     CRM: Lenka Gonzalez, MPH, [address] [place] [address]; Z: [phone]

## 2021-05-28 LAB
25(OH)D3 SERPL-MCNC: 9.2 NG/ML (ref 30–100)
ALBUMIN SERPL-MCNC: 2.6 G/DL (ref 3.5–5)
ALBUMIN/GLOB SERPL: 0.7 {RATIO} (ref 1.1–2.2)
ALP SERPL-CCNC: 63 U/L (ref 45–117)
ALT SERPL-CCNC: 18 U/L (ref 12–78)
ANION GAP SERPL CALC-SCNC: 8 MMOL/L (ref 5–15)
AST SERPL-CCNC: 29 U/L (ref 15–37)
BACTERIA SPEC CULT: NORMAL
BILIRUB SERPL-MCNC: 0.5 MG/DL (ref 0.2–1)
BUN SERPL-MCNC: 7 MG/DL (ref 6–20)
BUN/CREAT SERPL: 13 (ref 12–20)
CALCIUM SERPL-MCNC: 7.8 MG/DL (ref 8.5–10.1)
CHLORIDE SERPL-SCNC: 108 MMOL/L (ref 97–108)
CO2 SERPL-SCNC: 23 MMOL/L (ref 21–32)
COMMENT, HOLDF: NORMAL
CREAT SERPL-MCNC: 0.53 MG/DL (ref 0.7–1.3)
ERYTHROCYTE [DISTWIDTH] IN BLOOD BY AUTOMATED COUNT: 13.2 % (ref 11.5–14.5)
GLOBULIN SER CALC-MCNC: 4 G/DL (ref 2–4)
GLUCOSE SERPL-MCNC: 105 MG/DL (ref 65–100)
HCT VFR BLD AUTO: 42.2 % (ref 36.6–50.3)
HGB BLD-MCNC: 13.8 G/DL (ref 12.1–17)
MCH RBC QN AUTO: 28.4 PG (ref 26–34)
MCHC RBC AUTO-ENTMCNC: 32.7 G/DL (ref 30–36.5)
MCV RBC AUTO: 86.8 FL (ref 80–99)
NRBC # BLD: 0 K/UL (ref 0–0.01)
NRBC BLD-RTO: 0 PER 100 WBC
PLATELET # BLD AUTO: 101 K/UL (ref 150–400)
PMV BLD AUTO: ABNORMAL FL (ref 8.9–12.9)
POTASSIUM SERPL-SCNC: 3.4 MMOL/L (ref 3.5–5.1)
PROT SERPL-MCNC: 6.6 G/DL (ref 6.4–8.2)
RBC # BLD AUTO: 4.86 M/UL (ref 4.1–5.7)
SAMPLES BEING HELD,HOLD: NORMAL
SARS-COV-2, COV2: NORMAL
SERVICE CMNT-IMP: NORMAL
SODIUM SERPL-SCNC: 139 MMOL/L (ref 136–145)
WBC # BLD AUTO: 4.3 K/UL (ref 4.1–11.1)

## 2021-05-28 PROCEDURE — 99222 1ST HOSP IP/OBS MODERATE 55: CPT | Performed by: PSYCHIATRY & NEUROLOGY

## 2021-05-28 PROCEDURE — 74011000250 HC RX REV CODE- 250: Performed by: STUDENT IN AN ORGANIZED HEALTH CARE EDUCATION/TRAINING PROGRAM

## 2021-05-28 PROCEDURE — 80053 COMPREHEN METABOLIC PANEL: CPT

## 2021-05-28 PROCEDURE — 97166 OT EVAL MOD COMPLEX 45 MIN: CPT

## 2021-05-28 PROCEDURE — 82306 VITAMIN D 25 HYDROXY: CPT

## 2021-05-28 PROCEDURE — 94640 AIRWAY INHALATION TREATMENT: CPT

## 2021-05-28 PROCEDURE — 85027 COMPLETE CBC AUTOMATED: CPT

## 2021-05-28 PROCEDURE — 74011250637 HC RX REV CODE- 250/637: Performed by: STUDENT IN AN ORGANIZED HEALTH CARE EDUCATION/TRAINING PROGRAM

## 2021-05-28 PROCEDURE — 97535 SELF CARE MNGMENT TRAINING: CPT

## 2021-05-28 PROCEDURE — 65270000029 HC RM PRIVATE

## 2021-05-28 PROCEDURE — 97530 THERAPEUTIC ACTIVITIES: CPT

## 2021-05-28 PROCEDURE — U0005 INFEC AGEN DETEC AMPLI PROBE: HCPCS

## 2021-05-28 PROCEDURE — 74011250636 HC RX REV CODE- 250/636: Performed by: STUDENT IN AN ORGANIZED HEALTH CARE EDUCATION/TRAINING PROGRAM

## 2021-05-28 PROCEDURE — 94664 DEMO&/EVAL PT USE INHALER: CPT

## 2021-05-28 PROCEDURE — 36415 COLL VENOUS BLD VENIPUNCTURE: CPT

## 2021-05-28 PROCEDURE — 97161 PT EVAL LOW COMPLEX 20 MIN: CPT

## 2021-05-28 RX ORDER — BUDESONIDE 0.25 MG/2ML
250 INHALANT ORAL
Status: DISCONTINUED | OUTPATIENT
Start: 2021-05-28 | End: 2021-06-02 | Stop reason: HOSPADM

## 2021-05-28 RX ADMIN — POLYETHYLENE GLYCOL 3350 17 G: 17 POWDER, FOR SOLUTION ORAL at 10:10

## 2021-05-28 RX ADMIN — SODIUM CHLORIDE 125 ML/HR: 9 INJECTION, SOLUTION INTRAVENOUS at 16:21

## 2021-05-28 RX ADMIN — CARBIDOPA AND LEVODOPA 1.5 TABLET: 25; 100 TABLET ORAL at 20:20

## 2021-05-28 RX ADMIN — DOXYCYCLINE HYCLATE 100 MG: 100 TABLET, COATED ORAL at 18:27

## 2021-05-28 RX ADMIN — KETOTIFEN FUMARATE 1 DROP: 0.35 SOLUTION/ DROPS OPHTHALMIC at 20:19

## 2021-05-28 RX ADMIN — FERROUS SULFATE TAB 325 MG (65 MG ELEMENTAL FE) 325 MG: 325 (65 FE) TAB at 18:27

## 2021-05-28 RX ADMIN — BUDESONIDE 250 MCG: 0.25 SUSPENSION RESPIRATORY (INHALATION) at 21:05

## 2021-05-28 RX ADMIN — KETOTIFEN FUMARATE 1 DROP: 0.35 SOLUTION/ DROPS OPHTHALMIC at 07:15

## 2021-05-28 RX ADMIN — TAMSULOSIN HYDROCHLORIDE 0.4 MG: 0.4 CAPSULE ORAL at 10:08

## 2021-05-28 RX ADMIN — DOCUSATE SODIUM 50 MG AND SENNOSIDES 8.6 MG 1 TABLET: 8.6; 5 TABLET, FILM COATED ORAL at 20:19

## 2021-05-28 RX ADMIN — Medication 10 ML: at 13:29

## 2021-05-28 RX ADMIN — SODIUM CHLORIDE 125 ML/HR: 9 INJECTION, SOLUTION INTRAVENOUS at 07:13

## 2021-05-28 RX ADMIN — FERROUS SULFATE TAB 325 MG (65 MG ELEMENTAL FE) 325 MG: 325 (65 FE) TAB at 10:08

## 2021-05-28 RX ADMIN — DOXYCYCLINE HYCLATE 100 MG: 100 TABLET, COATED ORAL at 10:08

## 2021-05-28 RX ADMIN — CARBIDOPA AND LEVODOPA 1.5 TABLET: 25; 100 TABLET ORAL at 16:21

## 2021-05-28 RX ADMIN — ENOXAPARIN SODIUM 40 MG: 40 INJECTION SUBCUTANEOUS at 10:09

## 2021-05-28 RX ADMIN — CARBIDOPA AND LEVODOPA 1.5 TABLET: 25; 100 TABLET ORAL at 10:08

## 2021-05-28 NOTE — PROGRESS NOTES
Transition of Care Plan   RUR- 16%   DISPOSITION: The disposition plan is transition to a SNF; pending medical progression  o Referral submitted to: [facility]; Accepted awaiting insurance Auth 5/28   F/U with PCP/Specialist     Transport: [facility]   The pt is COVID+     PCR Covid Test needed prior to dc; the pt can dc with the test pending       This cm spoke with the pt's wife, Jason Araujo regarding LINA. This cm provided the pt wife with a update regarding the pt's referral status. This cm informed the wife that the SNF referrals are currently under review and a decision should be made by early afternoon and Insurance Auth will be initiated following acceptance. This cm messaged Rehab Services to verify PT/OT would be seeing the pt today. This cm was informed that the pt is on the schedule for today. 9:39am    This cm spoke with the admissions liaison, Yunier Danielle to discuss the status of the pt's referral. The rep stated that the facility is awaiting PT/OT notes to initiate [address]. This cm notes PT/OT recommendation for IPR over SNF. This spoke with the family and submitted a referral to [facility]. 12:49pm    This cm was informed by [facility] that the pt was accepted. [place] stated that [address] would need to be obtained prior to admission. The rep stated that obtaining insurance auth for ANTHEM typically takes 5 days to process. This cm encouraged the rep to initiate auth. This cm messaged the attending to inform him of the update regarding insurance auth. The attending stated \"ok\". Care Management Interventions  PCP Verified by CM: Yes  Mode of Transport at Discharge: BLS  Transition of Care Consult (CM Consult):  Other ([facility] )  MyChart Signup: No  Discharge Durable Medical Equipment: No  Physical Therapy Consult: Yes  Occupational Therapy Consult: Yes  Speech Therapy Consult: Yes  Current Support Network: Lives with Spouse, Family Lives Nearby  Confirm Follow Up Transport: Self  The Patient and/or Patient Representative was Provided with a Choice of Provider and Agrees with the Discharge Plan?: Yes  Freedom of Choice List was Provided with Basic Dialogue that Supports the Patient's Individualized Plan of Care/Goals, Treatment Preferences and Shares the Quality Data Associated with the Providers?: Yes  Discharge Location  Discharge Placement: Rehab hospital/unit acute ([facility] )      CM: Austen Langston [facility],   [phone]

## 2021-05-28 NOTE — PROGRESS NOTES
Comprehensive Nutrition Assessment    Type and Reason for Visit: Initial, Positive nutrition screen    Nutrition Recommendations/Plan:    1. Redraw Phos with next labs, replete as indicated. 2. RD to add daily Ensure Enlive. 3. Please document % meals consumed flowsheets. 4. Vitamin D level low, consider supplementation. Nutrition Assessment:    Past Medical History:   Diagnosis Date    Arthritis     knees    Chronic pain     knees, back    Constipation     Depression     Gait difficulty     H/O seasonal allergies     Parkinson disease (HCC)     Restless leg syndrome     Urinary frequency      Pt admitted for increased debility and inability to be cared for at home. Pt with progressive parkinson's dementia. Unable to reach pt by telephone (Covid precautions), spoke to RN who reports pt consumed ~80% breakfast this morning but unsure about how he did with lunch. Spoke to pt's wife via telephone, she states overall pt has been eating ok, some noted decrease in appetite and wt. He is vegetarian, and she did voice some concern with him getting adequate protein. She says he likes ice creams, yogurts, and agreeable to try Ensure. On AM labs, K+ 3.4, Phos 1.8 yesterday- do not see that repletion given. Malnutrition Assessment:  Malnutrition Status: At risk for malnutrition, Covid PNA    Context:  Chronic illness         Nutritionally Significant Medications: ferrous sulfate, miralax, pericolace    Estimated Daily Nutrient Needs:  Energy (kcal): 1835; Weight Used for Energy Requirements: Current  Protein (g): 75 (1 g/kg);  Weight Used for Protein Requirements: Current  Fluid (ml/day): 1835; Method Used for Fluid Requirements: 1 ml/kcal    Nutrition Related Findings:       BM: Abd soft, no BM noted  Edema: None  Wounds: None   Recent Labs     05/28/21  0156 05/27/21  2057 05/27/21  1436   *  --  92   BUN 7  --  11   CREA 0.53*  --  0.80     --  138   K 3.4*  --  4.1     -- 104   CO2 23  --  25   CA 7.8*  --  8.7   PHOS  --  1.8*  --    MG  --  2.5*  --      Recent Labs     05/28/21  0156 05/27/21  1436   ALT 18 20   AP 63 71   TBILI 0.5 0.7   TP 6.6 7.3   ALB 2.6* 3.0*   GLOB 4.0 4.3*     Vitamin D 25-Hydroxy   Date Value Ref Range Status   05/28/2021 9.2 (L) 30 - 100 ng/mL Final         Current Nutrition Therapies:   Diet: Regular, Vegetarian  Supplements: None added  Additional Caloric Sources: None    Meal intake: No data found. Supplement Intake: No data found. Anthropometric Measures:  · Height:  5' 10\" (177.8 cm)  · Current Body Wt:  75.8 kg (167 lb)   · Ideal Body Wt:  166:  100.6 %   · BMI Categories:  Normal weight (BMI 22.0-24.9) age over 72     Wt Readings from Last 10 Encounters:   05/28/21 76.1 kg (167 lb 12.3 oz)   05/22/21 80.2 kg (176 lb 12.9 oz)   04/22/21 81.6 kg (180 lb)   01/26/21 81.3 kg (179 lb 3.2 oz)   12/11/20 81.5 kg (179 lb 9.6 oz)   07/21/20 81 kg (178 lb 9.6 oz)   02/12/20 80.3 kg (177 lb)   09/26/19 80.3 kg (177 lb)   09/25/19 80.3 kg (177 lb)   08/30/19 78.5 kg (173 lb 2 oz)       Nutrition Diagnosis:   · No nutrition diagnosis at this time related to   as evidenced by        Nutrition Interventions:   Food and/or Nutrient Delivery: Modify current diet, Start oral nutrition supplement  Nutrition Education and Counseling: No recommendations at this time  Coordination of Nutrition Care: Continue to monitor while inpatient    Goals:  PO intakes >65% meals/day, wt maintenance       Nutrition Monitoring and Evaluation:   Behavioral-Environmental Outcomes: None identified  Food/Nutrient Intake Outcomes: Food and nutrient intake, Supplement intake  Physical Signs/Symptoms Outcomes: Meal time behavior    Discharge Planning:     Too soon to determine     Anabela Simpson RD     Contact via [facility]

## 2021-05-28 NOTE — PROGRESS NOTES
Hospitalist Progress Note  Prema Darling MD  Answering service: [phone] from in house phone        Date of Service:  2021  NAME:  Romy Joseph  :  1955  MRN:  [id number]      Admission Summary:   As per initial admission summary  Romy Joseph is a 74 y.o. male w/ PMH of parkinson's, BPH, Fe Def anemia who is brought to hospital by family due to increased debility and inability to be cared for at home. Pt was recently diagnosed with uncomplicated Covid pneumonia and prescribed outpatient course of nebulizers and doxycycline. Patient and family states since diagnosis of Covid pneumonia, he has had increasing debility, weakness and inability to care for himself. Patient states he spent several days in bed after diagnosis of Covid pneumonia and subsequently had difficulty getting out of bed and caring for himself. Family is not at bedside per chart review shows family has been concerned as patient has been unable to feed himself or take care of his personal hygiene. Family is furthermore unable to care for him at home. Patient is seen and examined at bedside. He denies any respiratory symptoms at this time. Denies any pain. Continues to complain of continued debility. The patient denies any fever, chills, chest pain, cough, congestion, recent illness, palpitations, or dysuria.     Vitals on ER presentation are overall unremarkable. CMP and UA were unremarkable. CXR showed no acute process.     Interval history / Subjective:     Patient seen for Follow up of ftt    Patient seen and examined by the bedside, Labs, images and notes reviewed  Patient complains of generalized weakness, neurology was consulted  As per CM placement will be avialble by Tuesday      Assessment & Plan:     Failure to thrive  -Case management, PT OT consulted  -Currently undergoing placement.  -We will admit to hospital until placement complete     COVID-19 infection  -Chest x-ray today shows resolution of pneumonia  -Continue doxycycline to complete 2 additional days of therapy  -As needed duo nebs     Parkinson's disease  -Continue home Sinemet  -Consult neurology to evaluate for potential worsening Parkinson's     BPH  -Home Flomax     Iron deficiency anemia  -Home iron     Chronic constipation  -Home bowel regimen with MiraLAX and Savita-Colace    Code status: full code   DVT prophylaxis:     Care Plan discussed with: Patient/Family  Disposition: TBD     Hospital Problems  Date Reviewed: 9/25/2019        Codes Class Noted POA    FTT (failure to thrive) in adult ICD-10-CM: R62.7  ICD-9-CM: 783.7  5/27/2021 Unknown                Review of Systems:   A comprehensive review of systems was negative except for that written in the HPI. Vital Signs:    Last 24hrs VS reviewed since prior progress note. Most recent are:  Visit Vitals  BP (!) 151/94 (BP 1 Location: Right upper arm, BP Patient Position: At rest)   Pulse 85   Temp 97.4 °F (36.3 °C)   Resp 18   Ht 5' 10\" (1.778 m)   Wt 76.1 kg (167 lb 12.3 oz)   SpO2 97%   BMI 24.07 kg/m²         Intake/Output Summary (Last 24 hours) at 5/28/2021 1720  Last data filed at 5/28/2021 1000  Gross per 24 hour   Intake 120 ml   Output 275 ml   Net -155 ml        Physical Examination:   I had a face to face encounter with this patient and independently examined them on May 28, 2021 as outlined below:        Constitutional:  No acute distress, cooperative, pleasant    ENT:  Oral mucous moist, oropharynx benign. Neck supple,    Resp:  CTA bilaterally. No wheezing/rhonchi/rales. No accessory muscle use   CV:  Regular rhythm, normal rate, no murmurs, gallops, rubs    GI:  Soft, non distended, non tender. normoactive bowel sounds, no hepatosplenomegaly     Musculoskeletal:  No edema, warm, 2+ pulses throughout    Neurologic:  Moves all extremities.   AAOx3, CN II-XII reviewed           Data Review:    Review and/or order of clinical lab test  Review and/or order of tests in the radiology section of CPT  Review and/or order of tests in the medicine section of CPT      Labs:     Recent Labs     05/28/21  0156   WBC 4.3   HGB 13.8   HCT 42.2   *     Recent Labs     05/28/21 0156 05/27/21 2057 05/27/21  1436     --  138   K 3.4*  --  4.1     --  104   CO2 23  --  25   BUN 7  --  11   CREA 0.53*  --  0.80   *  --  92   CA 7.8*  --  8.7   MG  --  2.5*  --    PHOS  --  1.8*  --      Recent Labs     05/28/21 0156 05/27/21  1436   ALT 18 20   AP 63 71   TBILI 0.5 0.7   TP 6.6 7.3   ALB 2.6* 3.0*   GLOB 4.0 4.3*     No results for input(s): INR, PTP, APTT, INREXT in the last 72 hours. No results for input(s): FE, TIBC, PSAT, FERR in the last 72 hours. Lab Results   Component Value Date/Time    Folate 7.1 11/03/2017 12:09 PM      No results for input(s): PH, PCO2, PO2 in the last 72 hours. No results for input(s): CPK, CKNDX, TROIQ in the last 72 hours.     No lab exists for component: CPKMB  Lab Results   Component Value Date/Time    Cholesterol, total 200 (H) 08/27/2018 09:56 AM    HDL Cholesterol 41 08/27/2018 09:56 AM    LDL, calculated 109 (H) 08/27/2018 09:56 AM    Triglyceride 252 (H) 08/27/2018 09:56 AM     No results found for: [facility]  Lab Results   Component Value Date/Time    Color YELLOW/STRAW 05/27/2021 02:36 PM    Appearance CLEAR 05/27/2021 02:36 PM    Specific gravity <1.005 05/27/2021 02:36 PM    pH (UA) 7.0 05/27/2021 02:36 PM    Protein Negative 05/27/2021 02:36 PM    Glucose Negative 05/27/2021 02:36 PM    Ketone Negative 05/27/2021 02:36 PM    Bilirubin Negative 05/27/2021 02:36 PM    Urobilinogen 0.2 05/27/2021 02:36 PM    Nitrites Negative 05/27/2021 02:36 PM    Leukocyte Esterase Negative 05/27/2021 02:36 PM    Epithelial cells FEW 05/22/2021 11:45 PM    Bacteria Negative 05/22/2021 11:45 PM    WBC 0-4 05/22/2021 11:45 PM    RBC 0-5 05/22/2021 11:45 PM         Medications Reviewed:     Current Facility-Administered Medications   Medication Dose Route Frequency    budesonide (PULMICORT) 250 mcg/2ml nebulizer susp  250 mcg Nebulization BID RT    albuterol-ipratropium (DUO-NEB) 2.5 MG-0.5 MG/3 ML  3 mL Nebulization Q6H PRN    carbidopa-levodopa (SINEMET)  mg per tablet 1.5 Tablet  1.5 Tablet Oral TID    doxycycline (VIBRA-TABS) tablet 100 mg  100 mg Oral BID    ferrous sulfate tablet 325 mg  325 mg Oral BID    ketotifen (ZADITOR) 0.025 % (0.035 %) ophthalmic solution 1 Drop  1 Drop Both Eyes BID    polyethylene glycol (MIRALAX) packet 17 g  17 g Oral DAILY    tamsulosin (FLOMAX) capsule 0.4 mg  0.4 mg Oral DAILY    sodium chloride (NS) flush 5-40 mL  5-40 mL IntraVENous Q8H    sodium chloride (NS) flush 5-40 mL  5-40 mL IntraVENous PRN    acetaminophen (TYLENOL) tablet 650 mg  650 mg Oral Q6H PRN    Or    acetaminophen (TYLENOL) suppository 650 mg  650 mg Rectal Q6H PRN    promethazine (PHENERGAN) tablet 12.5 mg  12.5 mg Oral Q6H PRN    Or    ondansetron (ZOFRAN) injection 4 mg  4 mg IntraVENous Q6H PRN    enoxaparin (LOVENOX) injection 40 mg  40 mg SubCUTAneous DAILY    0.9% sodium chloride infusion  125 mL/hr IntraVENous CONTINUOUS    senna-docusate (PERICOLACE) 8.6-50 mg per tablet 1 Tablet  1 Tablet Oral BID     ______________________________________________________________________  EXPECTED LENGTH OF STAY: 3d 0h  ACTUAL LENGTH OF STAY:          1                 Ney Humphrey MD

## 2021-05-28 NOTE — ROUTINE PROCESS
TRANSFER - OUT REPORT: 
 
Verbal report given to Venita RN (name) on Pamela Metzger  being transferred to [address] (unit) for routine progression of care Report consisted of patients Situation, Background, Assessment and  
Recommendations(SBAR). Information from the following report(s) SBAR, ED Summary and MAR was reviewed with the receiving nurse. Lines:  
Peripheral IV 05/27/21 Distal;Left;Posterior Forearm (Active) Opportunity for questions and clarification was provided. Patient transported with: 
 [employer]

## 2021-05-28 NOTE — PROGRESS NOTES
TRANSFER - IN REPORT:    Verbal report received from [facility]) on Verla Duet  being received from ED(unit) for routine progression of care      Report consisted of patients Situation, Background, Assessment and   Recommendations(SBAR). Information from the following report(s) SBAR, Kardex, ED Summary, Intake/Output, MAR and Recent Results was reviewed with the receiving nurse. Opportunity for questions and clarification was provided. Assessment completed upon patients arrival to unit and care assumed.

## 2021-05-28 NOTE — PROGRESS NOTES
Problem: Self Care Deficits Care Plan (Adult)  Goal: *Acute Goals and Plan of Care (Insert Text)  Description:   FUNCTIONAL STATUS PRIOR TO ADMISSION: Patient was modified independent using a rolling walker for functional mobility. ADLs increased time. Participates in [facility]'s outpatient BIG program for Parkinson's. HOME SUPPORT: The patient lived with wife, children and grandchildren. Wife with COVID. Occupational Therapy Goals  Initiated 5/28/2021  1. Patient will perform 3/5 grooming tasks at sink with RW with moderate assistance  within 7 day(s). 2.  Patient will perform lower body dressing with moderate assistance  within 7 day(s). 3.  Patient will perform bathing with moderate assist within 7 day(s). 4.  Patient will perform toilet transfers with RW minimal assistance/contact guard assist within 7 day(s). 5.  Patient will perform all aspects of toileting with RW minimal assistance/contact guard assist within 7 day(s). Outcome: Not Met   OCCUPATIONAL THERAPY EVALUATION  Patient: Graeme Colin (25 y.o. male)  Date: 5/28/2021  Primary Diagnosis: FTT (failure to thrive) in adult [R62.7]        Precautions: Fall, Bed Alarm    ASSESSMENT  Based on the objective data described below, the patient presents with ADLs impaired by pain B shins, standing tolerance, standing balance, cognition, and overall endurance. Baseline impaired near vision, patient physically and cognitively impaired by Parkinsons, but unclear the extent at this time. Current Level of Function Impacting Discharge (ADLs/self-care): moderate assistance upper body ADLs; max assistance lower body ADLs. Functional Outcome Measure: The patient scored Total: 45/100 on the Barthel Index outcome measure which is indicative of 55% impaired ability to care for basic self needs/dependency on others; inferred 100% dependency on others for instrumental ADLs.         Other factors to consider for discharge: wife with COVID, daughter and grandchildren     Patient will benefit from skilled therapy intervention to address the above noted impairments. PLAN :  Recommendations and Planned Interventions: self care training, functional mobility training, therapeutic exercise, balance training, visual/perceptual training, therapeutic activities, cognitive retraining, endurance activities, neuromuscular re-education, patient education, home safety training, and family training/education    Frequency/Duration: Patient will be followed by occupational therapy 3 times a week to address goals. Recommend with staff: utilize BIG program, cueing he can complete task (after visual setup of items), OOB to chair, mobilizing to and from bathroom with RW. Recommendation for discharge: (in order for the patient to meet his/her long term goals)  Therapy 3 hours per day 5-7 days per week to address above deficits, maximize outcomes 2* Parkinson's as well, utilize a BIG program and return patient back to baseline. Patient can tolerate 3 hours of therapy. This discharge recommendation:  Has not yet been discussed the attending provider and/or case management    IF patient discharges home will need the following DME: RW? SUBJECTIVE:   Patient stated I can't.     OBJECTIVE DATA SUMMARY:   HISTORY:   Past Medical History:   Diagnosis Date    Arthritis     knees    Chronic pain     knees, back    Constipation     Depression     Gait difficulty     H/O seasonal allergies     Parkinson disease ([facility] [address]     Restless leg syndrome     Urinary frequency      Past Surgical History:   Procedure Laterality Date    HX HERNIA REPAIR Right 08/15/2019    Robotic-assisted laparoscopic right inguinal herniorrhaphy with mesh.        Expanded or extensive additional review of patient history:     Home Situation  Home Environment: Private residence  # Steps to Enter: 4  Rails to Enter: Yes  Hand Rails : Right  One/Two Story Residence: One story  Living Alone: No  Support Systems: Spouse/Significant Other/Partner  Current DME Used/Available at Home: Walker, rolling, Shower chair  Tub or Shower Type: Shower    Hand dominance: Right    EXAMINATION OF PERFORMANCE DEFICITS:  Cognitive/Behavioral Status:  Neurologic State: Alert  Orientation Level: Oriented to person;Oriented to place;Oriented to situation;Disoriented to time  Cognition: Impaired decision making;Memory loss; Follows commands  Perception: Appears intact  Perseveration: No perseveration noted  Safety/Judgement: Awareness of environment    Skin: intact    Edema: intact    Hearing: Auditory  Auditory Impairment: None    Vision/Perceptual:                           Acuity: Impaired near vision    Corrective Lenses: Reading glasses    Range of Motion:    AROM: Generally decreased, functional                         Strength:    Strength: Generally decreased, functional                Coordination:  Coordination: Generally decreased, functional  Fine Motor Skills-Upper: Left Impaired;Right Impaired    Gross Motor Skills-Upper: Left Intact; Right Intact    Tone & Sensation:    Tone: Normal  Sensation: Intact                      Balance:  Sitting: Impaired; Without support  Sitting - Static: Fair (occasional)  Sitting - Dynamic: Fair (occasional)  Standing: Impaired; Without support  Standing - Static: Constant support  Standing - Dynamic : Constant support    Functional Mobility and Transfers for ADLs:  Bed Mobility: with PT exit L side as at home; cues log roll, flex LEs, physical assistance B LEs, setup hand and cues to push up through arms  Rolling: Moderate assistance; Additional time  Supine to Sit: Moderate assistance; Additional time  Scooting: Maximum assistance bed karla assistance and therapist on either side to scoot to edge of bed. Transfers: with PT due to safety and technique due to Parkinsons  Sit to Stand: Moderate assistance  Stand to Sit: Moderate assistance  Toilet Transfer :  Moderate assistance (infer from chair, BSC is present)  Shower Transfer: Total assistance (not safe at this time)    ADL Assessment:   Feeding: Moderate assistance setup containers that apply to coffee 2* poor near vision (syrup is not creamer); cues problem solve 2* trying to place sugar in coffee when lid still on. Oral Facial Hygiene/Grooming: Maximum assistance infer    Bathing: Maximum assistance infer    Upper Body Dressing: Maximum assistance infer    Lower Body Dressing: Maximum assistance infer    Toileting: Maximum assistance asking for urinal when supine, bed karla soaked with urine; patient stating he can hold his bladder to then stand and urinate; patient demonstrated while standing RW, setup urinal in hand, tactile cue L hand to let go of walker and set himself up to urinate; completed task without further assistance, min A balance 2* posterior lean intermittently, setup cloth in hand to wipe after. Bottom total assistance. Patient stating \"I can't\" to each task. ADL Intervention and task modifications:        Informed of BIG movements throughout verbal, tactile and visual cues to increase independence, written on white board, nurse and PCT informed. Cognitive Retraining  Safety/Judgement: Awareness of environment    Therapeutic Exercise:     Functional Measure:  Barthel Index:    Bathin  Bladder: 10  Bowels: 10  Groomin  Dressin  Feedin  Mobility: 5  Stairs: 0  Toilet Use: 5  Transfer (Bed to Chair and Back): 5  Total: 45/100        The Barthel ADL Index: Guidelines  1. The index should be used as a record of what a patient does, not as a record of what a patient could do. 2. The main aim is to establish degree of independence from any help, physical or verbal, however minor and for whatever reason. 3. The need for supervision renders the patient not independent. 4. A patient's performance should be established using the best available evidence.  Asking the patient, friends/relatives and nurses are the usual sources, but direct observation and common sense are also important. However direct testing is not needed. 5. Usually the patient's performance over the preceding 24-48 hours is important, but occasionally longer periods will be relevant. 6. Middle categories imply that the patient supplies over 50 per cent of the effort. 7. Use of aids to be independent is allowed. Mart Heaps., Barthel, D.W. ([id number]). Functional evaluation: the Barthel Index. [address] [facility] (14)2. Justin Freeze lorena AUNG De La CruzF, Oscar Bloodgood., Nayely Nikolay., Gamaliel, [facility] (1999). Measuring the change indisability after inpatient rehabilitation; comparison of the responsiveness of the Barthel Index and Functional Coffee Measure. Journal of Neurology, Neurosurgery, and Psychiatry, 66(4), 220-131. Balta Monsalve, NMINOO.A, SAM Nunes, & Christi Burns M.A. (2004.) Assessment of post-stroke quality of life in cost-effectiveness studies: The usefulness of the Barthel Index and the EuroQoL-5D. Quality of Life Research, 15, 673-63         Occupational Therapy Evaluation Charge Determination   History Examination Decision-Making           Based on the above components, the patient evaluation is determined to be of the following complexity level:   Pain Rating:  B shins    Activity Tolerance:   Fair and requires rest breaks    After treatment patient left in no apparent distress:    Sitting in chair, Call bell within reach, and Bed / chair alarm activated    COMMUNICATION/EDUCATION:   The patients plan of care was discussed with: Physical therapist and Registered nurse. Home safety education was provided and the patient/caregiver indicated understanding., Patient/family have participated as able in goal setting and plan of care. , and Patient/family agree to work toward stated goals and plan of care. This patients plan of care is appropriate for delegation to [facility].     Thank you for this referral.  Beverly Pina  Time Calculation: 38 mins

## 2021-05-28 NOTE — PROGRESS NOTES
Problem: Mobility Impaired (Adult and Pediatric)  Goal: *Acute Goals and Plan of Care (Insert Text)  Description: FUNCTIONAL STATUS PRIOR TO ADMISSION: The patient was ambulatory with a rolling walker. HOME SUPPORT PRIOR TO ADMISSION: The patient lived with his wife, child, and grandchildren assisting as needed. Wife is no longer able to provide the level of assist patient currently requires. Physical Therapy Goals  Initiated 5/28/2021  1. Patient will move from supine to sit and sit to supine , scoot up and down, and roll side to side in bed with supervision/set-up within 7 day(s). 2.  Patient will transfer from bed to chair and chair to bed with supervision/set-up using the least restrictive device within 7 day(s). 3.  Patient will perform sit to stand with supervision/set-up within 7 day(s). 4.  Patient will ambulate with supervision/set-up for 150 feet with the least restrictive device within 7 day(s). 5.  Patient will ascend/descend 4 stairs with handrail(s) with minimal assistance/contact guard assist within 7 day(s). Outcome: Not Met   PHYSICAL THERAPY EVALUATION  Patient: Reba Dye (32 y.o. male)  Date: 5/28/2021  Primary Diagnosis: FTT (failure to thrive) in adult [R62.7]        Precautions:  Fall    ASSESSMENT  Based on the objective data described below, the patient presents with impaired functional mobility due to weakness, impaired balance, decreased coordination, freezing (Parkinson's), bilateral leg pain (chronic though notes is worse), visual impairment, and decreased insight into ability since being diagnosed with Covid. Patient is currently below his functional baseline, family are no longer able to provide the level of assist patient requires given his deficits. Patient will benefit from IPR to maximize his functional mobility for return home.       For the weekend, recommend with nursing, once Egress Test completed, OOB to chair 3x/day and walking with min assist x1 and walker. Thank you for completing as able in order to maintain patient strength, endurance and independence. Current Level of Function Impacting Discharge (mobility/balance):  Supine to sit: Moderate assist  Sit<->Stand: Moderate assist  Ambulation: RW, 20 ft, Min assist, Parkinson gait. Stand Balance: unsteady, posterior lean    Functional Outcome Measure: The patient scored 3/28 on the Tinetti outcome measure which is indicative of high fall risk. Other factors to consider for discharge: caregiver unable to assist since patient's decline     Patient will benefit from skilled therapy intervention to address the above noted impairments. PLAN :  Recommendations and Planned Interventions: bed mobility training, transfer training, gait training, therapeutic exercises, neuromuscular re-education, patient and family training/education, and therapeutic activities      Frequency/Duration: Patient will be followed by physical therapy:  5 times a week to address goals. Recommendation for discharge: (in order for the patient to meet his/her long term goals)  Therapy 3 hours per day 5-7 days per week    This discharge recommendation:  Has been made in collaboration with the attending provider and/or case management    IF patient discharges home will need the following DME: to be determined (TBD) - ?? RW         SUBJECTIVE:       OBJECTIVE DATA SUMMARY:   HISTORY:    Past Medical History:   Diagnosis Date    Arthritis     knees    Chronic pain     knees, back    Constipation     Depression     Gait difficulty     H/O seasonal allergies     Parkinson disease ([address]     Restless leg syndrome     Urinary frequency      Past Surgical History:   Procedure Laterality Date    HX HERNIA REPAIR Right 08/15/2019    Robotic-assisted laparoscopic right inguinal herniorrhaphy with mesh.        Personal factors and/or comorbidities impacting plan of care: PMH     Home Situation  Home Environment: Private residence  # Steps to Enter: 4  Rails to Enter: Yes  [employer] : Right  One/Two Story Residence: One story  Living Alone: No  Support Systems: Spouse/Significant Other/Partner  Current DME Used/Available at Home: Walker, rolling, Shower chair  Tub or Shower Type: Shower    EXAMINATION/PRESENTATION/DECISION MAKING:   Critical Behavior:  Neurologic State: Alert  Orientation Level: Oriented to person, Oriented to place, Oriented to situation, Disoriented to time  Cognition: Impaired decision making, Memory loss, Follows commands  Safety/Judgement: Awareness of environment  Hearing: Auditory  Auditory Impairment: None  Skin:  Areas exposed appear intact. Great toes with valgus deformity. Edema: none noted  Range Of Motion:  AROM: Generally decreased, functional                       Strength:    Strength: Generally decreased, functional                    Tone & Sensation:   Tone: Normal              Sensation: Intact               Coordination:  Coordination: Generally decreased, functional  Vision:   Acuity: Impaired near vision  Corrective Lenses: Reading glasses  Functional Mobility:  Bed Mobility:  Rolling: Moderate assistance; Additional time  Supine to Sit: Moderate assistance; Additional time  Required step by step instruction and multimodal cues. Scooting: Maximum assistance  Transfers:  Sit to Stand: Moderate assistance  Stand to Sit: Moderate assistance  Instructed in hand placement to push off and control descent. Required rocking for momentum. Balance:   Sitting: Impaired; Without support  Sitting - Static: Fair (occasional)  Sitting - Dynamic: Fair (occasional)  Standing: Impaired; Without support  Standing - Static: Constant support  Standing - Dynamic : Constant support  Ambulation/Gait Training:  Assistive Device: Gait belt;Walker, rolling  Ambulation - Level of Assistance: Minimal assistance;Assist x1;Additional time; Adaptive equipment  Gait Description (WDL): Exceptions to WDL  Gait Abnormalities: Decreased step clearance; Step to gait  Base of Support: Narrowed  Speed/Carolyn: Slow;Shuffled  Step Length: Right shortened;Left shortened  Parkinson gait                  Functional Measure:  Tinetti test:    Sitting Balance: 0  Arises: 0  Attempts to Rise: 0  Immediate Standing Balance: 0  Standing Balance: 0  Nudged: 0  Eyes Closed: 0  Turn 360 Degrees - Continuous/Discontinuous: 0 (0)  Turn 360 Degrees - Steady/Unsteady: 0  Sitting Down: 0  Balance Score: 0 Balance total score  Indication of Gait: 0  R Step Length/Height: 0  L Step Length/Height: 0  R Foot Clearance: 0  L Foot Clearance: 0  Step Symmetry: 1  Step Continuity: 0  Path: 1  Trunk: 0  Walking Time: 1  Gait Score: 3 Gait total score  Total Score: 3/28 Overall total score         Tinetti Tool Score Risk of Falls  <19 = High Fall Risk  19-24 = Moderate Fall Risk  25-28 = Low Fall Risk  Tinetti ME. Performance-Oriented Assessment of Mobility Problems in Elderly Patients. [facility] 66; [id number].  (Scoring Description: PT Bulletin Feb. 10, 1993)    Older adults: Bety Garcia et al, 2009; n = 1000 [facility] elderly evaluated with ABC, JHONATAN, ADL, and IADL)  · Mean JHONATAN score for males aged 69-68 years = 26.21(3.40)  · Mean JHONATAN score for females age 69-68 years = 25.16(4.30)  · Mean JHONATAN score for males over 80 years = 23.29(6.02)  · Mean JHONATAN score for females over 80 years = 17.20(8.32)            Physical Therapy Evaluation Charge Determination   History Examination Presentation Decision-Making   MEDIUM  Complexity : 1-2 comorbidities / personal factors will impact the outcome/ POC  MEDIUM Complexity : 3 Standardized tests and measures addressing body structure, function, activity limitation and / or participation in recreation  LOW Complexity : Stable, uncomplicated  Other outcome measures Tinetti 3/28  HIGH       Based on the above components, the patient evaluation is determined to be of the following complexity level: LOW     Pain Rating:  Bilateral legs (chronic)    Activity Tolerance:   Fair and requires rest breaks    After treatment patient left in no apparent distress:   Sitting in chair, Call bell within reach, and Bed / chair alarm activated    COMMUNICATION/EDUCATION:   The patients plan of care was discussed with: Occupational therapist and Registered nurse. Fall prevention education was provided and the patient/caregiver indicated understanding., Patient/family have participated as able in goal setting and plan of care. , and Patient/family agree to work toward stated goals and plan of care.     Thank you for this referral.  Jazmine Leija, PT   Time Calculation: 23 mins

## 2021-05-28 NOTE — CONSULTS
INPATIENT NEUROLOGY CONSULTATION  5/28/2021     Consulted by: Lin Hernandez MD        Patient ID:  Romy Joseph  [id number]  74 y.o.  1955    CC: Parkinson's    HPI    Arash Carrion is a 49-year-old gentleman with Parkinson's disease followed by Dr. Richa Young at [facility]. He has akinetic rigid type. I was able to access the external records at [facility]. He is here because of increasing debility after a Covid pneumonia that he suffered in May still recovering from. It seems that according to the medical record his family is having increasing difficulty caring for him. He denies any hallucinations. He is taking food by mouth. According to Dr. Brody Erwin most recent visit in April he is supposed be taking   carbidopa levodopa 25-250 mg tablets 3 times daily. There seems to be some confusions about his medications because he tells me difference. Looking at the messaging in the [facility] system it sounds like there have been some issues about what regimen he is supposed to be taking. Neurology was consulted for comment about his Parkinson's. Review of Systems   Unable to perform ROS: Medical condition       Past Medical History:   Diagnosis Date    Arthritis     knees    Chronic pain     knees, back    Constipation     Depression     Gait difficulty     H/O seasonal allergies     Parkinson disease ([address]     Restless leg syndrome     Urinary frequency      No family history on file.   Social History     Socioeconomic History    Marital status:      Spouse name: Not on file    Number of children: Not on file    Years of education: Not on file    Highest education level: Not on file   Occupational History    Not on file   Tobacco Use    Smoking status: Never Smoker    Smokeless tobacco: Never Used   Substance and Sexual Activity    Alcohol use: No    Drug use: No    Sexual activity: Yes     Partners: Female   Other Topics Concern    Not on file   Social History Narrative    Not on file     Social Determinants of Health     Financial Resource Strain:     Difficulty of Paying Living Expenses:    Food Insecurity:     Worried About Running Out of Food in the Last Year:     [address] in the Last Year:    Transportation Needs:     Lack of Transportation (Medical):      Lack of Transportation (Non-Medical):    Physical Activity:     Days of Exercise per Week:     Minutes of Exercise per Session:    Stress:     Feeling of Stress :    Social Connections:     Frequency of Communication with Friends and Family:     Frequency of Social Gatherings with Friends and Family:     Attends Gn[employer] Services:     Active Member of Clubs or Organizations:     Attends Club or Organization Meetings:     Marital Status:    Intimate Partner Violence:     Fear of Current or Ex-Partner:     Emotionally Abused:     Physically Abused:     Sexually Abused:      Current Facility-Administered Medications   Medication Dose Route Frequency    budesonide (PULMICORT) 250 mcg/2ml nebulizer susp  250 mcg Nebulization BID RT    albuterol-ipratropium (DUO-NEB) 2.5 MG-0.5 MG/3 ML  3 mL Nebulization Q6H PRN    carbidopa-levodopa (SINEMET)  mg per tablet 1.5 Tablet  1.5 Tablet Oral TID    doxycycline (VIBRA-TABS) tablet 100 mg  100 mg Oral BID    ferrous sulfate tablet 325 mg  325 mg Oral BID    ketotifen (ZADITOR) 0.025 % (0.035 %) ophthalmic solution 1 Drop  1 Drop Both Eyes BID    polyethylene glycol (MIRALAX) packet 17 g  17 g Oral DAILY    tamsulosin (FLOMAX) capsule 0.4 mg  0.4 mg Oral DAILY    sodium chloride (NS) flush 5-40 mL  5-40 mL IntraVENous Q8H    sodium chloride (NS) flush 5-40 mL  5-40 mL IntraVENous PRN    acetaminophen (TYLENOL) tablet 650 mg  650 mg Oral Q6H PRN    Or    acetaminophen (TYLENOL) suppository 650 mg  650 mg Rectal Q6H PRN    promethazine (PHENERGAN) tablet 12.5 mg  12.5 mg Oral Q6H PRN    Or    ondansetron (ZOFRAN) injection 4 mg  4 mg IntraVENous Q6H PRN    enoxaparin (LOVENOX) injection 40 mg  40 mg SubCUTAneous DAILY    0.9% sodium chloride infusion  125 mL/hr IntraVENous CONTINUOUS    senna-docusate (PERICOLACE) 8.6-50 mg per tablet 1 Tablet  1 Tablet Oral BID     Allergies   Allergen Reactions    Sulfa (Sulfonamide Antibiotics) Rash       Visit Vitals  BP (!) 186/84 (BP 1 Location: Right upper arm, BP Patient Position: At rest)   Pulse 81   Temp 97.3 °F (36.3 °C)   Resp 18   Ht 5' 10\" (1.778 m)   Wt 167 lb 12.3 oz (76.1 kg)   SpO2 97%   BMI 24.07 kg/m²     Physical Exam  Vitals and nursing note reviewed. Neurologic Exam     Mental Status   Elderly gentleman sitting upright in his chair finishing breakfast.  Extremely bradykinetic. He has very soft slow hoarse voice difficult to hear. His pupils are equal with a conjugate gaze however he has a very frozen stare with a masklike face and reduced blink rate  He is moving his upper extremities slowly manipulating a cup and utensils. No tremor seen  Very rigid  Gait not assessed due to falls risk            Lab Results   Component Value Date/Time    WBC 4.3 05/28/2021 01:56 AM    HGB 13.8 05/28/2021 01:56 AM    Hemoglobin (POC) 16.3 11/03/2017 09:45 AM    HCT 42.2 05/28/2021 01:56 AM    PLATELET 874 (L) 84/60/1129 01:56 AM    MCV 86.8 05/28/2021 01:56 AM     Lab Results   Component Value Date/Time    Glucose 105 (H) 05/28/2021 01:56 AM    LDL, calculated 109 (H) 08/27/2018 09:56 AM    Creatinine (POC) 0.9 08/01/2019 05:04 PM    Creatinine 0.53 (L) 05/28/2021 01:56 AM      Lab Results   Component Value Date/Time    Cholesterol, total 200 (H) 08/27/2018 09:56 AM    HDL Cholesterol 41 08/27/2018 09:56 AM    LDL, calculated 109 (H) 08/27/2018 09:56 AM    Triglyceride 252 (H) 08/27/2018 09:56 AM     Lab Results   Component Value Date/Time    ALT (SGPT) 18 05/28/2021 01:56 AM    Alk.  phosphatase 63 05/28/2021 01:56 AM    Bilirubin, total 0.5 05/28/2021 01:56 AM    Albumin 2.6 (L) 05/28/2021 01:56 AM    Protein, total 6.6 05/28/2021 01:56 AM    PLATELET 280 (L) 54/97/5321 01:56 AM        CT Results (maximum last 3): Results from [place] encounter on 08/01/19    CT ABD PELV W CONT    Narrative  CT ABDOMEN AND PELVIS WITH CONTRAST. 8/1/2019 5:15 PM    INDICATION: Generalized abdominal pain. COMPARISON: None. TECHNIQUE: CT of the abdomen and pelvis was performed after the administration  100 cc IV Isovue-370 and oral contrast. CT dose reduction was achieved through  use of a standardized protocol tailored for this examination and automatic  exposure control for dose modulation. FINDINGS:  Abdomen: Incidental note is made of a right renal cyst. The lung bases are clear  and the heart size is normal. The distal esophagus, stomach, duodenum, liver,  gallbladder, pancreas, spleen, adrenals, and kidneys are otherwise normal.    Pelvis: A right inguinal hernia contains a nonobstructed loop of ileum (2-87,  601-47). The small bowel, ileocecal junction, appendix, colon, and bladder are  otherwise normal. No free air or fluid, and no abdominopelvic lymphadenopathy. A right paracentral, L4-L5 disc extrusion with superior migration (2-53, 602-87)  causes moderate canal stenosis, obliteration of the right lateral recess, and  right neural foraminal stenosis. There is bilateral neural foraminal stenosis  L3-S1, though this is greatest on the right at L4-L5. Impression  IMPRESSION:  1. No acute process in the abdomen and pelvis. 2. Right inguinal hernia with a nonobstructed loop of ileum. 3. L4-L5 disc extrusion with superior migration. Moderate canal stenosis and  right neural foraminal stenosis. MRI Results (maximum last 3):   Results from [place] encounter on 06/04/19    MRI KNEE RT WO CONT    Narrative  EXAM:  MRI KNEE RT WO CONT    INDICATION: Right knee pain question insufficiency fracture    COMPARISON: None    TECHNIQUE: Axial T2 fat-saturated and proton density fat-saturated; coronal T1  and proton density fat-saturated; and sagittal T2 fat-saturated, proton density  fat-saturated, and gradient echo MRI of the right knee . CONTRAST:  None. FINDINGS:    ACL and PCL: There is a chronic tear of the proximal ACL. PCL is intact    Collateral ligaments and posterior, lateral corner: Intact. Extensor mechanism: Intact. .    Patellofemoral alignment: No patellar subluxation/tilt. Trochlear groove is not  hypoplastic. TT-TG distance: 8 mm    Joint fluid: Moderate joint effusion with synovitis throughout the joint. Large  loose body along the popliteus tendon sheath    Medial meniscus: Large undersurface tear of the body and posterior horn. Prominent radial component near the posterior horn root insertion. Body is  extruded from the joint    Lateral meniscus: Undersurface tear posterior horn but a displaced meniscal  fragment is not identified    Articular cartilage: There is diffuse intermediate grade cartilage loss both  medial and lateral patellar facets. There is high-grade partial and  full-thickness cartilage loss central trochlea. There is diffuse full-thickness  cartilage loss weightbearing and posterior weightbearing surface medial femoral  condyle and adjacent medial tibial plateau with intermediate grade  partial-thickness cartilage loss central weightbearing surface lateral femoral  condyle and central lateral tibial plateau    Bone marrow: There is tricompartmental degenerative change with spurring and  subchondral degeneration. And acute acute fracture is not identified. There is  anterior translation of the tibia in respect to the femur    Soft tissue mass: None. Impression  IMPRESSION:    1 . Chronic ACL tear  2. Complex tear body and posterior horn medial meniscus. Tear has a prominent  radial component at the posterior horn root insertion in the body is extruded  from the joint  3. Undersurface tear posterior horn medial meniscus  4. Tricompartmental degenerative change severe medially  5. Joint effusion with numerous loose bodies the largest along the popliteus  tendon sheath      Results from Hospital Encounter encounter on 12/04/17    MRI BRAIN W WO CONT    Narrative  EXAM:  MRI BRAIN W WO CONT    INDICATION:    Parkinsonism; rule out secondary causes (NPH, stroke, tumor, etc)    COMPARISON:  None. CONTRAST: 13 ml ProHance    TECHNIQUE:  MR imaging of the brain was performed with sagittal T1, axial T1, T2, FLAIR,  GRE, DWI/ADC; multiplanar T1 images prior to and following uneventful  intravenous contrast administration. The scan was performed on the open 0.7  Elizabeth magnet. FINDINGS: The ventricular size and configuration are normal. There are several  scattered foci of subcortical white matter disease likely to chronic small  vessel ischemic disease. . There is no evidence of mass, hemorrhage, acute  infarct or abnormal extra-axial fluid collection. Normal appearing flow-voids  are present in the vertebral, basilar and carotid artery systems. The  craniocervical junction is normal.  The structures at the cranial base including  paranasal sinuses and mastoid air cells are unremarkable. There is no abnormal  parenchymal or meningeal enhancement. Impression  IMPRESSION:  Mild white matter disease likely to chronic small vessel ischemic  disease. Otherwise unremarkable. VAS/US/Carotid Doppler Results (maximum last 3): No results found for this or any previous visit. PET Results (maximum last 3): No results found for this or any previous visit. Assessment and Plan        58-year-old gentleman who has advanced Parkinson's disease akinetic rigid type. I do not know his baseline to determine how much she has progressed. Certainly he does require home health/nursing needs to care for him and after a Covid infection I am not surprised he has progressed globally and declined.   For now I am going to keep him on the carbidopa regimen he has been taking at home  Since that is what he has been consistent with. Agree that he probably needs long-term care. He needs PT and OT while he is here which may help some of the rigidity. He is not having active hallucinations and does not seem to be actively decompensating from a Parkinson's perspective. Will sign off for now. I do not have any test to recommend at this time. Please call us if needed. During this evaluation, we also discussed stroke education to include signs and symptoms of stroke and TIA. This clinical note was dictated with an electronic dictation software that can make unintentional errors. If there are any questions, please contact me directly for clarification.       [facility],   NEUROLOGIST  Diplomate CEDRIC  5/28/2021

## 2021-05-29 LAB
SARS-COV-2, COV2: DETECTED
SPECIMEN SOURCE, FCOV2M: ABNORMAL

## 2021-05-29 PROCEDURE — 74011250636 HC RX REV CODE- 250/636: Performed by: INTERNAL MEDICINE

## 2021-05-29 PROCEDURE — 74011250636 HC RX REV CODE- 250/636: Performed by: STUDENT IN AN ORGANIZED HEALTH CARE EDUCATION/TRAINING PROGRAM

## 2021-05-29 PROCEDURE — 65270000029 HC RM PRIVATE

## 2021-05-29 PROCEDURE — 74011250637 HC RX REV CODE- 250/637: Performed by: STUDENT IN AN ORGANIZED HEALTH CARE EDUCATION/TRAINING PROGRAM

## 2021-05-29 RX ADMIN — CARBIDOPA AND LEVODOPA 1.5 TABLET: 25; 100 TABLET ORAL at 21:53

## 2021-05-29 RX ADMIN — SODIUM CHLORIDE 125 ML/HR: 9 INJECTION, SOLUTION INTRAVENOUS at 00:51

## 2021-05-29 RX ADMIN — POLYETHYLENE GLYCOL 3350 17 G: 17 POWDER, FOR SOLUTION ORAL at 10:05

## 2021-05-29 RX ADMIN — Medication 10 ML: at 06:20

## 2021-05-29 RX ADMIN — DOCUSATE SODIUM 50 MG AND SENNOSIDES 8.6 MG 1 TABLET: 8.6; 5 TABLET, FILM COATED ORAL at 07:32

## 2021-05-29 RX ADMIN — DOCUSATE SODIUM 50 MG AND SENNOSIDES 8.6 MG 1 TABLET: 8.6; 5 TABLET, FILM COATED ORAL at 21:53

## 2021-05-29 RX ADMIN — KETOTIFEN FUMARATE 1 DROP: 0.35 SOLUTION/ DROPS OPHTHALMIC at 21:54

## 2021-05-29 RX ADMIN — FERROUS SULFATE TAB 325 MG (65 MG ELEMENTAL FE) 325 MG: 325 (65 FE) TAB at 10:05

## 2021-05-29 RX ADMIN — TAMSULOSIN HYDROCHLORIDE 0.4 MG: 0.4 CAPSULE ORAL at 10:05

## 2021-05-29 RX ADMIN — DOXYCYCLINE HYCLATE 100 MG: 100 TABLET, COATED ORAL at 17:12

## 2021-05-29 RX ADMIN — KETOTIFEN FUMARATE 1 DROP: 0.35 SOLUTION/ DROPS OPHTHALMIC at 07:32

## 2021-05-29 RX ADMIN — FERROUS SULFATE TAB 325 MG (65 MG ELEMENTAL FE) 325 MG: 325 (65 FE) TAB at 17:12

## 2021-05-29 RX ADMIN — CARBIDOPA AND LEVODOPA 1.5 TABLET: 25; 100 TABLET ORAL at 16:10

## 2021-05-29 RX ADMIN — DOXYCYCLINE HYCLATE 100 MG: 100 TABLET, COATED ORAL at 10:05

## 2021-05-29 RX ADMIN — Medication 10 ML: at 21:54

## 2021-05-29 RX ADMIN — SODIUM CHLORIDE 75 ML/HR: 9 INJECTION, SOLUTION INTRAVENOUS at 18:54

## 2021-05-29 RX ADMIN — ENOXAPARIN SODIUM 40 MG: 40 INJECTION SUBCUTANEOUS at 10:05

## 2021-05-29 RX ADMIN — CARBIDOPA AND LEVODOPA 1.5 TABLET: 25; 100 TABLET ORAL at 10:05

## 2021-05-29 RX ADMIN — SODIUM CHLORIDE 125 ML/HR: 9 INJECTION, SOLUTION INTRAVENOUS at 10:14

## 2021-05-29 RX ADMIN — Medication 10 ML: at 13:30

## 2021-05-29 NOTE — PROGRESS NOTES
Hospitalist Progress Note  Dinora Issa MD  Answering service: [phone] from in house phone        Date of Service:  2021  NAME:  Karely Waters  :  1955  MRN:  [id number]      Admission Summary:   As per initial admission summary  Karely Waters is a 74 y.o. male w/ PMH of parkinson's, BPH, Fe Def anemia who is brought to hospital by family due to increased debility and inability to be cared for at home. Pt was recently diagnosed with uncomplicated Covid pneumonia and prescribed outpatient course of nebulizers and doxycycline. Patient and family states since diagnosis of Covid pneumonia, he has had increasing debility, weakness and inability to care for himself. Patient states he spent several days in bed after diagnosis of Covid pneumonia and subsequently had difficulty getting out of bed and caring for himself. Family is not at bedside per chart review shows family has been concerned as patient has been unable to feed himself or take care of his personal hygiene. Family is furthermore unable to care for him at home. Patient is seen and examined at bedside. He denies any respiratory symptoms at this time. Denies any pain. Continues to complain of continued debility. The patient denies any fever, chills, chest pain, cough, congestion, recent illness, palpitations, or dysuria.     Vitals on ER presentation are overall unremarkable. CMP and UA were unremarkable. CXR showed no acute process. Interval history / Subjective:     Patient seen for Follow up of ftt    Patient seen and examined by the bedside, Labs, images and notes reviewed  Patient complains of generalized weakness, neurology was consulted  As per  placement will be avialble by Tuesday   No acutre issues overnight . Waiting for placement   Talked to spouse in detail.  Updated her regarding patient medical condition      Assessment & Plan:     Failure to thrive  -Case management, PT OT consulted  -Currently undergoing placement.  -We will admit to hospital until placement complete     COVID-19 infection  -Chest x-ray today shows resolution of pneumonia  -Continue doxycycline to complete 2 additional days of therapy  -As needed duo nebs     Parkinson's disease  -Continue home Sinemet  -Consult neurology to evaluate for potential worsening Parkinson's     BPH  -Home Flomax     Iron deficiency anemia  -Home iron     Chronic constipation  -Home bowel regimen with MiraLAX and Savita-Colace    Code status: full code   DVT prophylaxis:     Care Plan discussed with: Patient/Family  Disposition: TBD     Hospital Problems  Date Reviewed: 9/25/2019        Codes Class Noted POA    FTT (failure to thrive) in adult ICD-10-CM: R62.7  ICD-9-CM: 783.7  5/27/2021 Unknown                Review of Systems:   A comprehensive review of systems was negative except for that written in the HPI. Vital Signs:    Last 24hrs VS reviewed since prior progress note. Most recent are:  Visit Vitals  BP (!) 148/81 (BP 1 Location: Right arm, BP Patient Position: At rest)   Pulse 77   Temp 98.2 °F (36.8 °C)   Resp 18   Ht 5' 10\" (1.778 m)   Wt 76.1 kg (167 lb 12.3 oz)   SpO2 97%   BMI 24.07 kg/m²         Intake/Output Summary (Last 24 hours) at 5/29/2021 1611  Last data filed at 5/29/2021 1446  Gross per 24 hour   Intake 2568 ml   Output 1750 ml   Net 818 ml        Physical Examination:   I had a face to face encounter with this patient and independently examined them on May 29, 2021 as outlined below:        Constitutional:  No acute distress, cooperative, pleasant ,    ENT:  Oral mucous moist, oropharynx benign. Neck supple,    Resp:  CTA bilaterally. No wheezing/rhonchi/rales. No accessory muscle use   CV:  Regular rhythm, normal rate, no murmurs, gallops, rubs    GI:  Soft, non distended, non tender.  normoactive bowel sounds, no hepatosplenomegaly     Musculoskeletal:  No edema, warm, 2+ pulses throughout    Neurologic:  Moves all extremities. AAOx3, CN II-XII reviewed           Data Review:    Review and/or order of clinical lab test  Review and/or order of tests in the radiology section of CPT  Review and/or order of tests in the medicine section of CPT      Labs:     Recent Labs     05/28/21  0156   WBC 4.3   HGB 13.8   HCT 42.2   *     Recent Labs     05/28/21 0156 05/27/21 2057 05/27/21  1436     --  138   K 3.4*  --  4.1     --  104   CO2 23  --  25   BUN 7  --  11   CREA 0.53*  --  0.80   *  --  92   CA 7.8*  --  8.7   MG  --  2.5*  --    PHOS  --  1.8*  --      Recent Labs     05/28/21 0156 05/27/21  1436   ALT 18 20   AP 63 71   TBILI 0.5 0.7   TP 6.6 7.3   ALB 2.6* 3.0*   GLOB 4.0 4.3*     No results for input(s): INR, PTP, APTT, INREXT, INREXT in the last 72 hours. No results for input(s): FE, TIBC, PSAT, FERR in the last 72 hours. Lab Results   Component Value Date/Time    Folate 7.1 11/03/2017 12:09 PM      No results for input(s): PH, PCO2, PO2 in the last 72 hours. No results for input(s): CPK, CKNDX, TROIQ in the last 72 hours.     No lab exists for component: CPKMB  Lab Results   Component Value Date/Time    Cholesterol, total 200 (H) 08/27/2018 09:56 AM    HDL Cholesterol 41 08/27/2018 09:56 AM    LDL, calculated 109 (H) 08/27/2018 09:56 AM    Triglyceride 252 (H) 08/27/2018 09:56 AM     No results found for: [facility]  Lab Results   Component Value Date/Time    Color YELLOW/STRAW 05/27/2021 02:36 PM    Appearance CLEAR 05/27/2021 02:36 PM    Specific gravity <1.005 05/27/2021 02:36 PM    pH (UA) 7.0 05/27/2021 02:36 PM    Protein Negative 05/27/2021 02:36 PM    Glucose Negative 05/27/2021 02:36 PM    Ketone Negative 05/27/2021 02:36 PM    Bilirubin Negative 05/27/2021 02:36 PM    Urobilinogen 0.2 05/27/2021 02:36 PM    Nitrites Negative 05/27/2021 02:36 PM    Leukocyte Esterase Negative 05/27/2021 02:36 PM    Epithelial cells FEW 05/22/2021 11:45 PM Bacteria Negative 05/22/2021 11:45 PM    WBC 0-4 05/22/2021 11:45 PM    RBC 0-5 05/22/2021 11:45 PM         Medications Reviewed:     Current Facility-Administered Medications   Medication Dose Route Frequency    budesonide (PULMICORT) 250 mcg/2ml nebulizer susp  250 mcg Nebulization BID RT    albuterol-ipratropium (DUO-NEB) 2.5 MG-0.5 MG/3 ML  3 mL Nebulization Q6H PRN    carbidopa-levodopa (SINEMET)  mg per tablet 1.5 Tablet  1.5 Tablet Oral TID    doxycycline (VIBRA-TABS) tablet 100 mg  100 mg Oral BID    ferrous sulfate tablet 325 mg  325 mg Oral BID    ketotifen (ZADITOR) 0.025 % (0.035 %) ophthalmic solution 1 Drop  1 Drop Both Eyes BID    polyethylene glycol (MIRALAX) packet 17 g  17 g Oral DAILY    tamsulosin (FLOMAX) capsule 0.4 mg  0.4 mg Oral DAILY    sodium chloride (NS) flush 5-40 mL  5-40 mL IntraVENous Q8H    sodium chloride (NS) flush 5-40 mL  5-40 mL IntraVENous PRN    acetaminophen (TYLENOL) tablet 650 mg  650 mg Oral Q6H PRN    Or    acetaminophen (TYLENOL) suppository 650 mg  650 mg Rectal Q6H PRN    promethazine (PHENERGAN) tablet 12.5 mg  12.5 mg Oral Q6H PRN    Or    ondansetron (ZOFRAN) injection 4 mg  4 mg IntraVENous Q6H PRN    enoxaparin (LOVENOX) injection 40 mg  40 mg SubCUTAneous DAILY    0.9% sodium chloride infusion  125 mL/hr IntraVENous CONTINUOUS    senna-docusate (PERICOLACE) 8.6-50 mg per tablet 1 Tablet  1 Tablet Oral BID     ______________________________________________________________________  EXPECTED LENGTH OF STAY: 3d 0h  ACTUAL LENGTH OF STAY:          2                 Sedalia Baumgarten, MD

## 2021-05-30 PROCEDURE — 74011250636 HC RX REV CODE- 250/636: Performed by: NURSE PRACTITIONER

## 2021-05-30 PROCEDURE — 65270000029 HC RM PRIVATE

## 2021-05-30 PROCEDURE — 74011250636 HC RX REV CODE- 250/636: Performed by: INTERNAL MEDICINE

## 2021-05-30 PROCEDURE — 74011250637 HC RX REV CODE- 250/637: Performed by: STUDENT IN AN ORGANIZED HEALTH CARE EDUCATION/TRAINING PROGRAM

## 2021-05-30 RX ORDER — ENOXAPARIN SODIUM 100 MG/ML
30 INJECTION SUBCUTANEOUS EVERY 12 HOURS
Status: DISCONTINUED | OUTPATIENT
Start: 2021-05-30 | End: 2021-06-02 | Stop reason: HOSPADM

## 2021-05-30 RX ORDER — HYDRALAZINE HYDROCHLORIDE 20 MG/ML
10 INJECTION INTRAMUSCULAR; INTRAVENOUS ONCE
Status: COMPLETED | OUTPATIENT
Start: 2021-05-30 | End: 2021-05-30

## 2021-05-30 RX ORDER — GUAIFENESIN 100 MG/5ML
100 SOLUTION ORAL
Status: DISCONTINUED | OUTPATIENT
Start: 2021-05-30 | End: 2021-06-02 | Stop reason: HOSPADM

## 2021-05-30 RX ADMIN — KETOTIFEN FUMARATE 1 DROP: 0.35 SOLUTION/ DROPS OPHTHALMIC at 07:01

## 2021-05-30 RX ADMIN — Medication 10 ML: at 05:11

## 2021-05-30 RX ADMIN — HYDRALAZINE HYDROCHLORIDE 10 MG: 20 INJECTION INTRAMUSCULAR; INTRAVENOUS at 05:08

## 2021-05-30 RX ADMIN — FERROUS SULFATE TAB 325 MG (65 MG ELEMENTAL FE) 325 MG: 325 (65 FE) TAB at 17:20

## 2021-05-30 RX ADMIN — KETOTIFEN FUMARATE 1 DROP: 0.35 SOLUTION/ DROPS OPHTHALMIC at 20:19

## 2021-05-30 RX ADMIN — ENOXAPARIN SODIUM 30 MG: 30 INJECTION SUBCUTANEOUS at 09:55

## 2021-05-30 RX ADMIN — DOCUSATE SODIUM 50 MG AND SENNOSIDES 8.6 MG 1 TABLET: 8.6; 5 TABLET, FILM COATED ORAL at 20:22

## 2021-05-30 RX ADMIN — DOXYCYCLINE HYCLATE 100 MG: 100 TABLET, COATED ORAL at 09:07

## 2021-05-30 RX ADMIN — CARBIDOPA AND LEVODOPA 1.5 TABLET: 25; 100 TABLET ORAL at 20:22

## 2021-05-30 RX ADMIN — DOCUSATE SODIUM 50 MG AND SENNOSIDES 8.6 MG 1 TABLET: 8.6; 5 TABLET, FILM COATED ORAL at 09:07

## 2021-05-30 RX ADMIN — Medication 10 ML: at 14:20

## 2021-05-30 RX ADMIN — POLYETHYLENE GLYCOL 3350 17 G: 17 POWDER, FOR SOLUTION ORAL at 09:07

## 2021-05-30 RX ADMIN — TAMSULOSIN HYDROCHLORIDE 0.4 MG: 0.4 CAPSULE ORAL at 09:07

## 2021-05-30 RX ADMIN — FERROUS SULFATE TAB 325 MG (65 MG ELEMENTAL FE) 325 MG: 325 (65 FE) TAB at 09:07

## 2021-05-30 RX ADMIN — CARBIDOPA AND LEVODOPA 1.5 TABLET: 25; 100 TABLET ORAL at 17:20

## 2021-05-30 RX ADMIN — CARBIDOPA AND LEVODOPA 1.5 TABLET: 25; 100 TABLET ORAL at 09:07

## 2021-05-30 RX ADMIN — ENOXAPARIN SODIUM 30 MG: 30 INJECTION SUBCUTANEOUS at 20:21

## 2021-05-30 RX ADMIN — Medication 10 ML: at 20:23

## 2021-05-30 NOTE — PROGRESS NOTES
Hospitalist Progress Note  Dominique Lobato MD  Answering service: [phone] from in house phone        Date of Service:  2021  NAME:  Reji Mathis  :  1955  MRN:  [id number]      Admission Summary:   As per initial admission summary  Reji Mathis is a 74 y.o. male w/ PMH of parkinson's, BPH, Fe Def anemia who is brought to hospital by family due to increased debility and inability to be cared for at home. Pt was recently diagnosed with uncomplicated Covid pneumonia and prescribed outpatient course of nebulizers and doxycycline. Patient and family states since diagnosis of Covid pneumonia, he has had increasing debility, weakness and inability to care for himself. Patient states he spent several days in bed after diagnosis of Covid pneumonia and subsequently had difficulty getting out of bed and caring for himself. Family is not at bedside per chart review shows family has been concerned as patient has been unable to feed himself or take care of his personal hygiene. Family is furthermore unable to care for him at home. Patient is seen and examined at bedside. He denies any respiratory symptoms at this time. Denies any pain. Continues to complain of continued debility. The patient denies any fever, chills, chest pain, cough, congestion, recent illness, palpitations, or dysuria.     Vitals on ER presentation are overall unremarkable. CMP and UA were unremarkable. CXR showed no acute process. Interval history / Subjective:     Patient seen for Follow up of ftt    Patient seen and examined by the bedside, Labs, images and notes reviewed  Today he is feeling somewhat better. Waiting for placement.       Assessment & Plan:     Failure to thrive  -Case management, PT OT consulted  -Currently undergoing placement.  -We will admit to hospital until placement complete     COVID-19 infection  -Chest x-ray today shows resolution of pneumonia  -s/p abx   -As needed duo nebs     Parkinson's disease  -Continue home Sinemet  -Consult neurology to evaluate for potential worsening Parkinson's  Recommended to continue current regimen      BPH  -Home Flomax     Iron deficiency anemia  -Home iron     Chronic constipation  -Home bowel regimen with MiraLAX and Savita-Colace    Code status: full code   DVT prophylaxis:     Care Plan discussed with: Patient/Family  Disposition: TBD     Hospital Problems  Date Reviewed: 9/25/2019        Codes Class Noted POA    FTT (failure to thrive) in adult ICD-10-CM: R62.7  ICD-9-CM: 783.7  5/27/2021 Unknown                Review of Systems:   A comprehensive review of systems was negative except for that written in the HPI. Vital Signs:    Last 24hrs VS reviewed since prior progress note. Most recent are:  Visit Vitals  /75 (BP 1 Location: Right upper arm, BP Patient Position: Lying)   Pulse 98   Temp 98 °F (36.7 °C)   Resp 18   Ht 5' 10\" (1.778 m)   Wt 76.1 kg (167 lb 12.3 oz)   SpO2 95%   BMI 24.07 kg/m²         Intake/Output Summary (Last 24 hours) at 5/30/2021 1601  Last data filed at 5/30/2021 1320  Gross per 24 hour   Intake 1475 ml   Output    Net 1475 ml        Physical Examination:   I had a face to face encounter with this patient and independently examined them on May 30, 2021 as outlined below:        Constitutional:  No acute distress, cooperative, pleasant ,    ENT:  Oral mucous moist, oropharynx benign. Neck supple,    Resp:  CTA bilaterally. No wheezing/rhonchi/rales. No accessory muscle use   CV:  Regular rhythm, normal rate, no murmurs, gallops, rubs    GI:  Soft, non distended, non tender. normoactive bowel sounds, no hepatosplenomegaly     Musculoskeletal:  No edema, warm, 2+ pulses throughout    Neurologic:  Moves all extremities.   AAOx3, CN II-XII reviewed           Data Review:    Review and/or order of clinical lab test  Review and/or order of tests in the radiology section of CPT  Review and/or order of tests in the medicine section of CPT      Labs:     Recent Labs     05/28/21 0156   WBC 4.3   HGB 13.8   HCT 42.2   *     Recent Labs     05/28/21 0156 05/27/21 2057     --    K 3.4*  --      --    CO2 23  --    BUN 7  --    CREA 0.53*  --    *  --    CA 7.8*  --    MG  --  2.5*   PHOS  --  1.8*     Recent Labs     05/28/21 0156   ALT 18   AP 63   TBILI 0.5   TP 6.6   ALB 2.6*   GLOB 4.0     No results for input(s): INR, PTP, APTT, INREXT, INREXT in the last 72 hours. No results for input(s): FE, TIBC, PSAT, FERR in the last 72 hours. Lab Results   Component Value Date/Time    Folate 7.1 11/03/2017 12:09 PM      No results for input(s): PH, PCO2, PO2 in the last 72 hours. No results for input(s): CPK, CKNDX, TROIQ in the last 72 hours.     No lab exists for component: CPKMB  Lab Results   Component Value Date/Time    Cholesterol, total 200 (H) 08/27/2018 09:56 AM    HDL Cholesterol 41 08/27/2018 09:56 AM    LDL, calculated 109 (H) 08/27/2018 09:56 AM    Triglyceride 252 (H) 08/27/2018 09:56 AM     No results found for: [facility]  Lab Results   Component Value Date/Time    Color YELLOW/STRAW 05/27/2021 02:36 PM    Appearance CLEAR 05/27/2021 02:36 PM    Specific gravity <1.005 05/27/2021 02:36 PM    pH (UA) 7.0 05/27/2021 02:36 PM    Protein Negative 05/27/2021 02:36 PM    Glucose Negative 05/27/2021 02:36 PM    Ketone Negative 05/27/2021 02:36 PM    Bilirubin Negative 05/27/2021 02:36 PM    Urobilinogen 0.2 05/27/2021 02:36 PM    Nitrites Negative 05/27/2021 02:36 PM    Leukocyte Esterase Negative 05/27/2021 02:36 PM    Epithelial cells FEW 05/22/2021 11:45 PM    Bacteria Negative 05/22/2021 11:45 PM    WBC 0-4 05/22/2021 11:45 PM    RBC 0-5 05/22/2021 11:45 PM         Medications Reviewed:     Current Facility-Administered Medications   Medication Dose Route Frequency    enoxaparin (LOVENOX) injection 30 mg  30 mg SubCUTAneous Q12H    budesonide (PULMICORT) 250 mcg/2ml nebulizer susp  250 mcg Nebulization BID RT    albuterol-ipratropium (DUO-NEB) 2.5 MG-0.5 MG/3 ML  3 mL Nebulization Q6H PRN    carbidopa-levodopa (SINEMET)  mg per tablet 1.5 Tablet  1.5 Tablet Oral TID    ferrous sulfate tablet 325 mg  325 mg Oral BID    ketotifen (ZADITOR) 0.025 % (0.035 %) ophthalmic solution 1 Drop  1 Drop Both Eyes BID    polyethylene glycol (MIRALAX) packet 17 g  17 g Oral DAILY    tamsulosin (FLOMAX) capsule 0.4 mg  0.4 mg Oral DAILY    sodium chloride (NS) flush 5-40 mL  5-40 mL IntraVENous Q8H    sodium chloride (NS) flush 5-40 mL  5-40 mL IntraVENous PRN    acetaminophen (TYLENOL) tablet 650 mg  650 mg Oral Q6H PRN    Or    acetaminophen (TYLENOL) suppository 650 mg  650 mg Rectal Q6H PRN    promethazine (PHENERGAN) tablet 12.5 mg  12.5 mg Oral Q6H PRN    Or    ondansetron (ZOFRAN) injection 4 mg  4 mg IntraVENous Q6H PRN    0.9% sodium chloride infusion  75 mL/hr IntraVENous CONTINUOUS    senna-docusate (PERICOLACE) 8.6-50 mg per tablet 1 Tablet  1 Tablet Oral BID     ______________________________________________________________________  EXPECTED LENGTH OF STAY: 3d 0h  ACTUAL LENGTH OF STAY:          3                 Shari Fields MD

## 2021-05-30 NOTE — PROGRESS NOTES
5/30/21 4:52 AM   [phone] Hospital or Facility: [facility], [facility] From: Judy Estrada RE: Lovilia Severe RM: 051 Patient's BP is 181/105. I took it 3 times, having the same readings. He is not on any hypotensive Meds. Need Callback: NO CALLBACK REQ 6E       5/30/21 4:54 AM   Ok give a touch of hydralazine      5/30/21 4:57 AM   Okay, thanks!   Read 5:01 AM

## 2021-05-31 PROCEDURE — 74011250636 HC RX REV CODE- 250/636: Performed by: INTERNAL MEDICINE

## 2021-05-31 PROCEDURE — 74011250637 HC RX REV CODE- 250/637: Performed by: STUDENT IN AN ORGANIZED HEALTH CARE EDUCATION/TRAINING PROGRAM

## 2021-05-31 PROCEDURE — 65270000029 HC RM PRIVATE

## 2021-05-31 PROCEDURE — 74011250637 HC RX REV CODE- 250/637: Performed by: INTERNAL MEDICINE

## 2021-05-31 RX ORDER — FACIAL-BODY WIPES
10 EACH TOPICAL DAILY PRN
Status: DISCONTINUED | OUTPATIENT
Start: 2021-05-31 | End: 2021-06-02 | Stop reason: HOSPADM

## 2021-05-31 RX ORDER — BISACODYL 5 MG
5 TABLET, DELAYED RELEASE (ENTERIC COATED) ORAL
Status: COMPLETED | OUTPATIENT
Start: 2021-05-31 | End: 2021-05-31

## 2021-05-31 RX ADMIN — Medication 10 ML: at 05:42

## 2021-05-31 RX ADMIN — KETOTIFEN FUMARATE 1 DROP: 0.35 SOLUTION/ DROPS OPHTHALMIC at 07:03

## 2021-05-31 RX ADMIN — KETOTIFEN FUMARATE 1 DROP: 0.35 SOLUTION/ DROPS OPHTHALMIC at 22:51

## 2021-05-31 RX ADMIN — SODIUM CHLORIDE 75 ML/HR: 9 INJECTION, SOLUTION INTRAVENOUS at 00:00

## 2021-05-31 RX ADMIN — Medication 10 ML: at 16:19

## 2021-05-31 RX ADMIN — FERROUS SULFATE TAB 325 MG (65 MG ELEMENTAL FE) 325 MG: 325 (65 FE) TAB at 10:17

## 2021-05-31 RX ADMIN — BISACODYL 5 MG: 5 TABLET, COATED ORAL at 12:18

## 2021-05-31 RX ADMIN — Medication 10 ML: at 21:02

## 2021-05-31 RX ADMIN — CARBIDOPA AND LEVODOPA 1.5 TABLET: 25; 100 TABLET ORAL at 16:26

## 2021-05-31 RX ADMIN — ENOXAPARIN SODIUM 30 MG: 30 INJECTION SUBCUTANEOUS at 21:00

## 2021-05-31 RX ADMIN — CARBIDOPA AND LEVODOPA 1.5 TABLET: 25; 100 TABLET ORAL at 21:01

## 2021-05-31 RX ADMIN — CARBIDOPA AND LEVODOPA 1.5 TABLET: 25; 100 TABLET ORAL at 10:16

## 2021-05-31 RX ADMIN — POLYETHYLENE GLYCOL 3350 17 G: 17 POWDER, FOR SOLUTION ORAL at 10:17

## 2021-05-31 RX ADMIN — DOCUSATE SODIUM 50 MG AND SENNOSIDES 8.6 MG 1 TABLET: 8.6; 5 TABLET, FILM COATED ORAL at 21:00

## 2021-05-31 RX ADMIN — ENOXAPARIN SODIUM 30 MG: 30 INJECTION SUBCUTANEOUS at 10:20

## 2021-05-31 RX ADMIN — SODIUM CHLORIDE 75 ML/HR: 9 INJECTION, SOLUTION INTRAVENOUS at 12:23

## 2021-05-31 RX ADMIN — FERROUS SULFATE TAB 325 MG (65 MG ELEMENTAL FE) 325 MG: 325 (65 FE) TAB at 19:05

## 2021-05-31 RX ADMIN — TAMSULOSIN HYDROCHLORIDE 0.4 MG: 0.4 CAPSULE ORAL at 10:16

## 2021-05-31 RX ADMIN — DOCUSATE SODIUM 50 MG AND SENNOSIDES 8.6 MG 1 TABLET: 8.6; 5 TABLET, FILM COATED ORAL at 07:02

## 2021-05-31 NOTE — PROGRESS NOTES
Hospitalist Progress Note  Traci Eden MD  Answering service: [phone] from in house phone        Date of Service:  2021  NAME:  Iona Richardson  :  1955  MRN:  [id number]      Admission Summary:   As per initial admission summary  Iona Richardson is a 74 y.o. male w/ PMH of parkinson's, BPH, Fe Def anemia who is brought to hospital by family due to increased debility and inability to be cared for at home. Pt was recently diagnosed with uncomplicated Covid pneumonia and prescribed outpatient course of nebulizers and doxycycline. Patient and family states since diagnosis of Covid pneumonia, he has had increasing debility, weakness and inability to care for himself. Patient states he spent several days in bed after diagnosis of Covid pneumonia and subsequently had difficulty getting out of bed and caring for himself. Family is not at bedside per chart review shows family has been concerned as patient has been unable to feed himself or take care of his personal hygiene. Family is furthermore unable to care for him at home. Patient is seen and examined at bedside. He denies any respiratory symptoms at this time. Denies any pain. Continues to complain of continued debility. The patient denies any fever, chills, chest pain, cough, congestion, recent illness, palpitations, or dysuria.     Vitals on ER presentation are overall unremarkable. CMP and UA were unremarkable. CXR showed no acute process. Interval history / Subjective:     Patient seen for Follow up of ftt    Patient seen and examined by the bedside, Labs, images and notes reviewed  Today he is feeling somewhat better. Waiting for placement. No acute issues overnight. Patient feels somewhat strong than yesterday. Slept ok .       Assessment & Plan:     Failure to thrive  -Case management, PT OT consulted  -Currently undergoing placement.  -We will admit to hospital until placement complete     COVID-19 infection  -Chest x-ray today shows resolution of pneumonia  -s/p abx   -As needed duo nebs     Parkinson's disease  -Continue home Sinemet  -Consult neurology to evaluate for potential worsening Parkinson's  Recommended to continue current regimen      BPH  -Home Flomax     Iron deficiency anemia  -Home iron     Chronic constipation  -Home bowel regimen with MiraLAX and Savita-Colace    Code status: full code   DVT prophylaxis:     Care Plan discussed with: Patient/Family  Disposition: TBD     Hospital Problems  Date Reviewed: 9/25/2019        Codes Class Noted POA    FTT (failure to thrive) in adult ICD-10-CM: R62.7  ICD-9-CM: 783.7  5/27/2021 Unknown                Review of Systems:   A comprehensive review of systems was negative except for that written in the HPI. Vital Signs:    Last 24hrs VS reviewed since prior progress note. Most recent are:  Visit Vitals  BP (!) 172/95 (BP 1 Location: Left upper arm, BP Patient Position: At rest)   Pulse 80   Temp 98.3 °F (36.8 °C)   Resp 17   Ht 5' 10\" (1.778 m)   Wt 76.1 kg (167 lb 12.3 oz)   SpO2 96%   BMI 24.07 kg/m²         Intake/Output Summary (Last 24 hours) at 5/31/2021 1427  Last data filed at 5/31/2021 1348  Gross per 24 hour   Intake 1455 ml   Output    Net 1455 ml        Physical Examination:   I had a face to face encounter with this patient and independently examined them on May 31, 2021 as outlined below:        Constitutional:  No acute distress, cooperative, pleasant ,    ENT:  Oral mucous moist, oropharynx benign. Neck supple,    Resp:  CTA bilaterally. No wheezing/rhonchi/rales. No accessory muscle use   CV:  Regular rhythm, normal rate, no murmurs, gallops, rubs    GI:  Soft, non distended, non tender. normoactive bowel sounds, no hepatosplenomegaly     Musculoskeletal:  No edema, warm, 2+ pulses throughout    Neurologic:  Moves all extremities.   AAOx3, CN II-XII reviewed           Data Review: Review and/or order of clinical lab test  Review and/or order of tests in the radiology section of CPT  Review and/or order of tests in the medicine section of CPT      Labs:     No results for input(s): WBC, HGB, HCT, PLT, HGBEXT, HCTEXT, PLTEXT, HGBEXT, HCTEXT, PLTEXT in the last 72 hours. No results for input(s): NA, K, CL, CO2, BUN, CREA, GLU, CA, MG, PHOS, URICA in the last 72 hours. No results for input(s): ALT, AP, TBIL, TBILI, TP, ALB, GLOB, GGT, AML, LPSE in the last 72 hours. No lab exists for component: SGOT, GPT, AMYP, HLPSE  No results for input(s): INR, PTP, APTT, INREXT, INREXT in the last 72 hours. No results for input(s): FE, TIBC, PSAT, FERR in the last 72 hours. Lab Results   Component Value Date/Time    Folate 7.1 11/03/2017 12:09 PM      No results for input(s): PH, PCO2, PO2 in the last 72 hours. No results for input(s): CPK, CKNDX, TROIQ in the last 72 hours.     No lab exists for component: CPKMB  Lab Results   Component Value Date/Time    Cholesterol, total 200 (H) 08/27/2018 09:56 AM    HDL Cholesterol 41 08/27/2018 09:56 AM    LDL, calculated 109 (H) 08/27/2018 09:56 AM    Triglyceride 252 (H) 08/27/2018 09:56 AM     No results found for: [facility]  Lab Results   Component Value Date/Time    Color YELLOW/STRAW 05/27/2021 02:36 PM    Appearance CLEAR 05/27/2021 02:36 PM    Specific gravity <1.005 05/27/2021 02:36 PM    pH (UA) 7.0 05/27/2021 02:36 PM    Protein Negative 05/27/2021 02:36 PM    Glucose Negative 05/27/2021 02:36 PM    Ketone Negative 05/27/2021 02:36 PM    Bilirubin Negative 05/27/2021 02:36 PM    Urobilinogen 0.2 05/27/2021 02:36 PM    Nitrites Negative 05/27/2021 02:36 PM    Leukocyte Esterase Negative 05/27/2021 02:36 PM    Epithelial cells FEW 05/22/2021 11:45 PM    Bacteria Negative 05/22/2021 11:45 PM    WBC 0-4 05/22/2021 11:45 PM    RBC 0-5 05/22/2021 11:45 PM         Medications Reviewed:     Current Facility-Administered Medications   Medication Dose Route Frequency    bisacodyL (DULCOLAX) suppository 10 mg  10 mg Rectal DAILY PRN    enoxaparin (LOVENOX) injection 30 mg  30 mg SubCUTAneous Q12H    guaiFENesin (ROBITUSSIN) 100 mg/5 mL oral liquid 100 mg  100 mg Oral Q4H PRN    budesonide (PULMICORT) 250 mcg/2ml nebulizer susp  250 mcg Nebulization BID RT    albuterol-ipratropium (DUO-NEB) 2.5 MG-0.5 MG/3 ML  3 mL Nebulization Q6H PRN    carbidopa-levodopa (SINEMET)  mg per tablet 1.5 Tablet  1.5 Tablet Oral TID    ferrous sulfate tablet 325 mg  325 mg Oral BID    ketotifen (ZADITOR) 0.025 % (0.035 %) ophthalmic solution 1 Drop  1 Drop Both Eyes BID    polyethylene glycol (MIRALAX) packet 17 g  17 g Oral DAILY    tamsulosin (FLOMAX) capsule 0.4 mg  0.4 mg Oral DAILY    sodium chloride (NS) flush 5-40 mL  5-40 mL IntraVENous Q8H    sodium chloride (NS) flush 5-40 mL  5-40 mL IntraVENous PRN    acetaminophen (TYLENOL) tablet 650 mg  650 mg Oral Q6H PRN    Or    acetaminophen (TYLENOL) suppository 650 mg  650 mg Rectal Q6H PRN    promethazine (PHENERGAN) tablet 12.5 mg  12.5 mg Oral Q6H PRN    Or    ondansetron (ZOFRAN) injection 4 mg  4 mg IntraVENous Q6H PRN    0.9% sodium chloride infusion  75 mL/hr IntraVENous CONTINUOUS    senna-docusate (PERICOLACE) 8.6-50 mg per tablet 1 Tablet  1 Tablet Oral BID     ______________________________________________________________________  EXPECTED LENGTH OF STAY: 3d 0h  ACTUAL LENGTH OF STAY:          4                 George Brown MD

## 2021-06-01 ENCOUNTER — PATIENT OUTREACH (OUTPATIENT)
Dept: CASE MANAGEMENT | Age: 66
End: 2021-06-01

## 2021-06-01 LAB
ANION GAP SERPL CALC-SCNC: 6 MMOL/L (ref 5–15)
BASOPHILS # BLD: 0 K/UL (ref 0–0.1)
BASOPHILS NFR BLD: 0 % (ref 0–1)
BUN SERPL-MCNC: 8 MG/DL (ref 6–20)
BUN/CREAT SERPL: 14 (ref 12–20)
CALCIUM SERPL-MCNC: 7.9 MG/DL (ref 8.5–10.1)
CHLORIDE SERPL-SCNC: 108 MMOL/L (ref 97–108)
CO2 SERPL-SCNC: 24 MMOL/L (ref 21–32)
CREAT SERPL-MCNC: 0.56 MG/DL (ref 0.7–1.3)
DIFFERENTIAL METHOD BLD: ABNORMAL
EOSINOPHIL # BLD: 0.1 K/UL (ref 0–0.4)
EOSINOPHIL NFR BLD: 3 % (ref 0–7)
ERYTHROCYTE [DISTWIDTH] IN BLOOD BY AUTOMATED COUNT: 13 % (ref 11.5–14.5)
GLUCOSE SERPL-MCNC: 101 MG/DL (ref 65–100)
HCT VFR BLD AUTO: 39.6 % (ref 36.6–50.3)
HGB BLD-MCNC: 12.9 G/DL (ref 12.1–17)
IMM GRANULOCYTES # BLD AUTO: 0 K/UL (ref 0–0.04)
IMM GRANULOCYTES NFR BLD AUTO: 1 % (ref 0–0.5)
LYMPHOCYTES # BLD: 1.1 K/UL (ref 0.8–3.5)
LYMPHOCYTES NFR BLD: 22 % (ref 12–49)
MCH RBC QN AUTO: 28.4 PG (ref 26–34)
MCHC RBC AUTO-ENTMCNC: 32.6 G/DL (ref 30–36.5)
MCV RBC AUTO: 87.2 FL (ref 80–99)
MONOCYTES # BLD: 0.4 K/UL (ref 0–1)
MONOCYTES NFR BLD: 9 % (ref 5–13)
NEUTS SEG # BLD: 3.2 K/UL (ref 1.8–8)
NEUTS SEG NFR BLD: 65 % (ref 32–75)
NRBC # BLD: 0 K/UL (ref 0–0.01)
NRBC BLD-RTO: 0 PER 100 WBC
PLATELET # BLD AUTO: ABNORMAL K/UL (ref 150–400)
PLATELET COMMENTS,PCOM: ABNORMAL
POTASSIUM SERPL-SCNC: 3.7 MMOL/L (ref 3.5–5.1)
RBC # BLD AUTO: 4.54 M/UL (ref 4.1–5.7)
RBC MORPH BLD: ABNORMAL
SODIUM SERPL-SCNC: 138 MMOL/L (ref 136–145)
WBC # BLD AUTO: 4.8 K/UL (ref 4.1–11.1)

## 2021-06-01 PROCEDURE — 97110 THERAPEUTIC EXERCISES: CPT

## 2021-06-01 PROCEDURE — 74011250637 HC RX REV CODE- 250/637: Performed by: STUDENT IN AN ORGANIZED HEALTH CARE EDUCATION/TRAINING PROGRAM

## 2021-06-01 PROCEDURE — 74011250636 HC RX REV CODE- 250/636: Performed by: INTERNAL MEDICINE

## 2021-06-01 PROCEDURE — 97535 SELF CARE MNGMENT TRAINING: CPT

## 2021-06-01 PROCEDURE — 85025 COMPLETE CBC W/AUTO DIFF WBC: CPT

## 2021-06-01 PROCEDURE — 65270000029 HC RM PRIVATE

## 2021-06-01 PROCEDURE — 80048 BASIC METABOLIC PNL TOTAL CA: CPT

## 2021-06-01 PROCEDURE — 36415 COLL VENOUS BLD VENIPUNCTURE: CPT

## 2021-06-01 RX ADMIN — CARBIDOPA AND LEVODOPA 1.5 TABLET: 25; 100 TABLET ORAL at 17:49

## 2021-06-01 RX ADMIN — SODIUM CHLORIDE 75 ML/HR: 9 INJECTION, SOLUTION INTRAVENOUS at 02:23

## 2021-06-01 RX ADMIN — CARBIDOPA AND LEVODOPA 1.5 TABLET: 25; 100 TABLET ORAL at 21:22

## 2021-06-01 RX ADMIN — ENOXAPARIN SODIUM 30 MG: 30 INJECTION SUBCUTANEOUS at 09:42

## 2021-06-01 RX ADMIN — FERROUS SULFATE TAB 325 MG (65 MG ELEMENTAL FE) 325 MG: 325 (65 FE) TAB at 17:49

## 2021-06-01 RX ADMIN — KETOTIFEN FUMARATE 1 DROP: 0.35 SOLUTION/ DROPS OPHTHALMIC at 07:44

## 2021-06-01 RX ADMIN — CARBIDOPA AND LEVODOPA 1.5 TABLET: 25; 100 TABLET ORAL at 09:42

## 2021-06-01 RX ADMIN — Medication 10 ML: at 07:44

## 2021-06-01 RX ADMIN — TAMSULOSIN HYDROCHLORIDE 0.4 MG: 0.4 CAPSULE ORAL at 09:42

## 2021-06-01 RX ADMIN — DOCUSATE SODIUM 50 MG AND SENNOSIDES 8.6 MG 1 TABLET: 8.6; 5 TABLET, FILM COATED ORAL at 07:44

## 2021-06-01 RX ADMIN — KETOTIFEN FUMARATE 1 DROP: 0.35 SOLUTION/ DROPS OPHTHALMIC at 21:20

## 2021-06-01 RX ADMIN — POLYETHYLENE GLYCOL 3350 17 G: 17 POWDER, FOR SOLUTION ORAL at 09:42

## 2021-06-01 RX ADMIN — DOCUSATE SODIUM 50 MG AND SENNOSIDES 8.6 MG 1 TABLET: 8.6; 5 TABLET, FILM COATED ORAL at 21:25

## 2021-06-01 RX ADMIN — Medication 10 ML: at 21:25

## 2021-06-01 RX ADMIN — Medication 10 ML: at 16:40

## 2021-06-01 RX ADMIN — ENOXAPARIN SODIUM 30 MG: 30 INJECTION SUBCUTANEOUS at 21:25

## 2021-06-01 RX ADMIN — FERROUS SULFATE TAB 325 MG (65 MG ELEMENTAL FE) 325 MG: 325 (65 FE) TAB at 09:42

## 2021-06-01 RX ADMIN — SODIUM CHLORIDE 75 ML/HR: 9 INJECTION, SOLUTION INTRAVENOUS at 16:40

## 2021-06-01 NOTE — PROGRESS NOTES
Problem: Self Care Deficits Care Plan (Adult)  Goal: *Acute Goals and Plan of Care (Insert Text)  Description:   FUNCTIONAL STATUS PRIOR TO ADMISSION: Patient was modified independent using a rolling walker for functional mobility. ADLs increased time. Participates in [facility]'s outpatient BIG program for Parkinson's. HOME SUPPORT: The patient lived with wife, children and grandchildren. Wife with COVID. Occupational Therapy Goals  Initiated 5/28/2021  1. Patient will perform 3/5 grooming tasks at sink with RW with moderate assistance  within 7 day(s). 2.  Patient will perform lower body dressing with moderate assistance  within 7 day(s). 3.  Patient will perform bathing with moderate assist within 7 day(s). 4.  Patient will perform toilet transfers with RW minimal assistance/contact guard assist within 7 day(s). 5.  Patient will perform all aspects of toileting with RW minimal assistance/contact guard assist within 7 day(s). Outcome: Progressing Towards Goal   OCCUPATIONAL THERAPY TREATMENT  Patient: Raina Meehan (83 y.o. male)  Date: 6/1/2021  Diagnosis: FTT (failure to thrive) in adult [R62.7] <principal problem not specified>      Precautions: Fall, Bed Alarm  Chart, occupational therapy assessment, plan of care, and goals were reviewed. ASSESSMENT  Based on the objective data described below patient progressing in all areas physically and cognitively with ADLs. ADLs impaired still by pain B shins and lower back, standing tolerance, standing balance (requiring RW education), cognition, and overall endurance. Baseline impaired near vision, patient physically and cognitively impaired by Parkinsons, but unclear the extent at this time.      Current Level of Function Impacting Discharge (ADLs/self-care): moderate assistance upper body ADLs; moderate assistance lower body ADLs, bed-bathroom-chair with min A RW, standing tolerance 5 mins.        Other factors to consider for discharge: wife with COVID, daughter and grandchildren     Patient will benefit from skilled therapy intervention to address the above noted impairments.       PLAN :  Recommendations and Planned Interventions: self care training, functional mobility training, therapeutic exercise, balance training, visual/perceptual training, therapeutic activities, cognitive retraining, endurance activities, neuromuscular re-education, patient education, home safety training, and family training/education     Frequency/Duration: Patient will be followed by occupational therapy 3 times a week to address goals.     Recommend with staff: utilize BIG program, cueing he can complete task (after visual setup of items), OOB to chair, mobilizing to and from bathroom with RW.      Recommendation for discharge: (in order for the patient to meet his/her long term goals)  Therapy 3 hours per day 5-7 days per week to address above deficits, maximize outcomes 2* Parkinson's as well, utilize a BIG program and return patient back to baseline. Patient can tolerate 3 hours of therapy.      This discharge recommendation:  Has not yet been discussed the attending provider and/or case management     IF patient discharges home will need the following DME: RW?            SUBJECTIVE:   Patient stated My low back hurts.     OBJECTIVE DATA SUMMARY:   Cognitive/Behavioral Status:  Neurologic State: Alert  Orientation Level: Oriented to person;Oriented to place;Oriented to situation;Disoriented to time  Cognition: Impaired decision making;Memory loss;Decreased command following  Perception: Appears intact  Perseveration: No perseveration noted  Safety/Judgement: Awareness of environment;Decreased insight into deficits    Functional Mobility and Transfers for ADLs:  Bed Mobility:  Supine to Sit: Moderate assistance (exit R 2* closer to bathroom)  Scooting: Supervision (EOB)    Transfers:  Sit to Stand:  Moderate assistance EOB and chair cues LE touch chair and hand placement for safety  Functional Transfers  Bathroom Mobility: Minimum assistance (RW) bed--bathroom - chair on other side of bed with RW, cues throughout BIG steps. Toilet Transfer : Minimum assistance (low toilet, grab bar, RW, cue square off and sit)       Balance:  Sitting: Intact; Without support  Standing: Impaired; Without support  Standing - Static: Good  Standing - Dynamic : Fair    ADL Intervention:     Patient received supine and left sititng in chair. Patient agreeable to CHG bath at sink, once at sink patient then stating needing to use bathroom in which patient completed urine and bowel regiment. Standing at sink ~5 mins for bathing with supervision then requesting to sit due to fatigue. Grooming  Washing Face: Minimum assistance (cue to complete, setup in hand, setup water)  Washing Hands: Minimum assistance (cue water turn on, adjust temp on own)    Upper Body Bathing  Bathing Assistance: Moderate assistance  Position Performed: Standing (CHG)    Lower Body Bathing  Bathing Assistance: Moderate assistance  Position Performed: Standing  Lower Body : Moderate assistance  Position Performed: Seated in chair    Upper [address]: Moderate assistance    Lower Body Dressing Assistance  Slip on Shoes Without Back: Minimum assistance    Toileting  Bladder Hygiene: Minimum assistance (cues to initiate and complete)  Bowel Hygiene: Minimum assistance (cues to initiate and complete)  Clothing Management: Minimum assistance (cues to initiate, cue to complete)    Cognitive Retraining  Following Commands:  Follows one step commands/directions (cues to initiate)  Safety/Judgement: Awareness of environment;Decreased insight into deficits    Therapeutic Exercises:       Pain:  Low back    Activity Tolerance:   Fair    After treatment patient left in no apparent distress:   Sitting in chair, Call bell within reach and Bed / chair alarm activated    COMMUNICATION/COLLABORATION:   The patients plan of care was discussed with: Physical therapist and Registered nurse and BLAYNE.      Pollo Pina  Time Calculation: 45 mins

## 2021-06-01 NOTE — PROGRESS NOTES
Problem: Mobility Impaired (Adult and Pediatric)  Goal: *Acute Goals and Plan of Care (Insert Text)  Description: FUNCTIONAL STATUS PRIOR TO ADMISSION: The patient was ambulatory with a rolling walker. HOME SUPPORT PRIOR TO ADMISSION: The patient lived with his wife, child, and grandchildren assisting as needed. Wife is no longer able to provide the level of assist patient currently requires. Physical Therapy Goals  Initiated 5/28/2021  1. Patient will move from supine to sit and sit to supine , scoot up and down, and roll side to side in bed with supervision/set-up within 7 day(s). 2.  Patient will transfer from bed to chair and chair to bed with supervision/set-up using the least restrictive device within 7 day(s). 3.  Patient will perform sit to stand with supervision/set-up within 7 day(s). 4.  Patient will ambulate with supervision/set-up for 150 feet with the least restrictive device within 7 day(s). 5.  Patient will ascend/descend 4 stairs with handrail(s) with minimal assistance/contact guard assist within 7 day(s). Outcome: Progressing Towards Goal     PHYSICAL THERAPY TREATMENT  Patient: Claribel Caraballo (89 y.o. male)  Date: 6/1/2021  Diagnosis: FTT (failure to thrive) in adult [R62.7] <principal problem not specified>       Precautions: Fall, Bed Alarm  Chart, physical therapy assessment, plan of care and goals were reviewed. ASSESSMENT  Patient continues with skilled PT services and is progressing towards goals. Pt initially states that he is too fatigued for treatment, however noted to have improved capacity after supine TE. Moved to sitting hip flexion B and LAQ, then transitions to standing. Pt requires minimally increased assistance for bed mobility however improved sit to stand. Will continue to follow.      Current Level of Function Impacting Discharge (mobility/balance): min-mod A    Other factors to consider for discharge:          PLAN :  Patient continues to benefit from skilled intervention to address the above impairments. Continue treatment per established plan of care. to address goals. Recommendation for discharge: (in order for the patient to meet his/her long term goals)  Therapy up to 5 days/week in SNF setting    This discharge recommendation:  Has been made in collaboration with the attending provider and/or case management    IF patient discharges home will need the following DME: to be determined (TBD)       SUBJECTIVE:   Patient stated I need to do it.  looking at OT encouragement written on the board    OBJECTIVE DATA SUMMARY:   Critical Behavior:  Neurologic State: Alert  Orientation Level: Oriented to person, Oriented to place, Oriented to situation, Disoriented to time  Cognition: Impaired decision making, Memory loss, Decreased command following  Safety/Judgement: Awareness of environment, Decreased insight into deficits  Functional Mobility Training:  Bed Mobility:  Rolling: Moderate assistance  Supine to Sit: Moderate assistance  Sit to Supine: Moderate assistance  Scooting: Minimum assistance        Transfers:  Sit to Stand: Minimum assistance (slightly elevated bed)  Stand to Sit: Minimum assistance                             Balance:  Sitting: Intact; Without support  Standing: Impaired; Without support  Standing - Static: Good;Fair  Standing - Dynamic : Fair  Ambulation/Gait Training:        Therapeutic Exercises:   Heel slides, SLR, longsitting pull up, hip flexion  in sitting x10 B  Pain Rating:  controlled    Activity Tolerance:   Fair and requires rest breaks    After treatment patient left in no apparent distress:   Supine in bed and Call bell within reach    COMMUNICATION/COLLABORATION:   The patients plan of care was discussed with: Registered nurse and Case management.      Dee Leon, PT   Time Calculation: 27 mins

## 2021-06-01 NOTE — PROGRESS NOTES
Problem: Airway Clearance - Ineffective  Goal: Achieve or maintain patent airway  Outcome: Progressing Towards Goal     Problem: Breathing Pattern - Ineffective  Goal: Ability to achieve and maintain a regular respiratory rate  Outcome: Progressing Towards Goal     Problem: Falls - Risk of  Goal: *Absence of Falls  Description: Document Brissa Mcanlly Fall Risk and appropriate interventions in the flowsheet.   Outcome: Progressing Towards Goal  Note: Fall Risk Interventions:  Mobility Interventions: Communicate number of staff needed for ambulation/transfer              Elimination Interventions: Call light in reach

## 2021-06-01 NOTE — PROGRESS NOTES
Hospitalist Progress Note  Shari Fields MD  Answering service: [phone] from in house phone        Date of Service:  2021  NAME:  Lorena Young  :  1955  MRN:  [id number]      Admission Summary:   As per initial admission summary  Lorena Young is a 74 y.o. male w/ PMH of parkinson's, BPH, Fe Def anemia who is brought to hospital by family due to increased debility and inability to be cared for at home. Pt was recently diagnosed with uncomplicated Covid pneumonia and prescribed outpatient course of nebulizers and doxycycline. Patient and family states since diagnosis of Covid pneumonia, he has had increasing debility, weakness and inability to care for himself. Patient states he spent several days in bed after diagnosis of Covid pneumonia and subsequently had difficulty getting out of bed and caring for himself. Family is not at bedside per chart review shows family has been concerned as patient has been unable to feed himself or take care of his personal hygiene. Family is furthermore unable to care for him at home. Patient is seen and examined at bedside. He denies any respiratory symptoms at this time. Denies any pain. Continues to complain of continued debility. The patient denies any fever, chills, chest pain, cough, congestion, recent illness, palpitations, or dysuria.     Vitals on ER presentation are overall unremarkable. CMP and UA were unremarkable. CXR showed no acute process. Interval history / Subjective:     Patient seen for Follow up of ftt    Patient seen and examined by the bedside, Labs, images and notes reviewed  Today he is feeling somewhat better. Waiting for placement. Pt/ot following. Waiting for placement.  discussed in detail with wife and updated regarding patient medical condition      Assessment & Plan:     Failure to thrive  -Case management, PT OT consulted  -Currently undergoing placement.  -waiting for placement      COVID-19 infection  -Chest x-ray today shows resolution of pneumonia  -s/p abx   -As needed duo nebs     Parkinson's disease  -Continue home Sinemet  -Consult neurology to evaluate for potential worsening Parkinson's  Recommended to continue current regimen      BPH  -Home Flomax     Iron deficiency anemia  -Home iron     Chronic constipation  -Home bowel regimen with MiraLAX and Savita-Colace    Code status: full code   DVT prophylaxis:     Care Plan discussed with: Patient/Family  Disposition: TBD     Hospital Problems  Date Reviewed: 9/25/2019        Codes Class Noted POA    FTT (failure to thrive) in adult ICD-10-CM: R62.7  ICD-9-CM: 783.7  5/27/2021 Unknown                Review of Systems:   A comprehensive review of systems was negative except for that written in the HPI. Vital Signs:    Last 24hrs VS reviewed since prior progress note. Most recent are:  Visit Vitals  BP (!) 152/88 (BP 1 Location: Right upper arm, BP Patient Position: At rest)   Pulse 86   Temp 98.5 °F (36.9 °C)   Resp 18   Ht 5' 10\" (1.778 m)   Wt 76.1 kg (167 lb 12.3 oz)   SpO2 96%   BMI 24.07 kg/m²       No intake or output data in the 24 hours ending 06/01/21 1724     Physical Examination:   I had a face to face encounter with this patient and independently examined them on June 1, 2021 as outlined below:        Constitutional:  No acute distress, cooperative, pleasant ,    ENT:  Oral mucous moist, oropharynx benign. Neck supple,    Resp:  CTA bilaterally. No wheezing/rhonchi/rales. No accessory muscle use   CV:  Regular rhythm, normal rate, no murmurs, gallops, rubs    GI:  Soft, non distended, non tender. normoactive bowel sounds, no hepatosplenomegaly     Musculoskeletal:  No edema, warm, 2+ pulses throughout    Neurologic:  Moves all extremities.   AAOx3, CN II-XII reviewed           Data Review:    Review and/or order of clinical lab test  Review and/or order of tests in the radiology section of CPT  Review and/or order of tests in the medicine section of CPT      Labs:     Recent Labs     06/01/21  0220   WBC 4.8   HGB 12.9   HCT 39.6   PLT UNABLE TO REPORT ACCURATE COUNT DUE TO PLATELET AGGREGATION, HOWEVER, PLATELETS APPEAR NORMAL IN NUMBER ON SMEAR. PLEASE RESUBMIT SODIUM CITRATE (BLUE) AND EDTA (LAVENDER) TUBES FOR HEMATOLOGICAL TESTING. Recent Labs     06/01/21  0220      K 3.7      CO2 24   BUN 8   CREA 0.56*   *   CA 7.9*     No results for input(s): ALT, AP, TBIL, TBILI, TP, ALB, GLOB, GGT, AML, LPSE in the last 72 hours. No lab exists for component: SGOT, GPT, AMYP, HLPSE  No results for input(s): INR, PTP, APTT, INREXT, INREXT in the last 72 hours. No results for input(s): FE, TIBC, PSAT, FERR in the last 72 hours. Lab Results   Component Value Date/Time    Folate 7.1 11/03/2017 12:09 PM      No results for input(s): PH, PCO2, PO2 in the last 72 hours. No results for input(s): CPK, CKNDX, TROIQ in the last 72 hours.     No lab exists for component: CPKMB  Lab Results   Component Value Date/Time    Cholesterol, total 200 (H) 08/27/2018 09:56 AM    HDL Cholesterol 41 08/27/2018 09:56 AM    LDL, calculated 109 (H) 08/27/2018 09:56 AM    Triglyceride 252 (H) 08/27/2018 09:56 AM     No results found for: [facility]  Lab Results   Component Value Date/Time    Color YELLOW/STRAW 05/27/2021 02:36 PM    Appearance CLEAR 05/27/2021 02:36 PM    Specific gravity <1.005 05/27/2021 02:36 PM    pH (UA) 7.0 05/27/2021 02:36 PM    Protein Negative 05/27/2021 02:36 PM    Glucose Negative 05/27/2021 02:36 PM    Ketone Negative 05/27/2021 02:36 PM    Bilirubin Negative 05/27/2021 02:36 PM    Urobilinogen 0.2 05/27/2021 02:36 PM    Nitrites Negative 05/27/2021 02:36 PM    Leukocyte Esterase Negative 05/27/2021 02:36 PM    Epithelial cells FEW 05/22/2021 11:45 PM    Bacteria Negative 05/22/2021 11:45 PM    WBC 0-4 05/22/2021 11:45 PM    RBC 0-5 05/22/2021 11:45 PM Medications Reviewed:     Current Facility-Administered Medications   Medication Dose Route Frequency    bisacodyL (DULCOLAX) suppository 10 mg  10 mg Rectal DAILY PRN    enoxaparin (LOVENOX) injection 30 mg  30 mg SubCUTAneous Q12H    guaiFENesin (ROBITUSSIN) 100 mg/5 mL oral liquid 100 mg  100 mg Oral Q4H PRN    budesonide (PULMICORT) 250 mcg/2ml nebulizer susp  250 mcg Nebulization BID RT    albuterol-ipratropium (DUO-NEB) 2.5 MG-0.5 MG/3 ML  3 mL Nebulization Q6H PRN    carbidopa-levodopa (SINEMET)  mg per tablet 1.5 Tablet  1.5 Tablet Oral TID    ferrous sulfate tablet 325 mg  325 mg Oral BID    ketotifen (ZADITOR) 0.025 % (0.035 %) ophthalmic solution 1 Drop  1 Drop Both Eyes BID    polyethylene glycol (MIRALAX) packet 17 g  17 g Oral DAILY    tamsulosin (FLOMAX) capsule 0.4 mg  0.4 mg Oral DAILY    sodium chloride (NS) flush 5-40 mL  5-40 mL IntraVENous Q8H    sodium chloride (NS) flush 5-40 mL  5-40 mL IntraVENous PRN    acetaminophen (TYLENOL) tablet 650 mg  650 mg Oral Q6H PRN    Or    acetaminophen (TYLENOL) suppository 650 mg  650 mg Rectal Q6H PRN    promethazine (PHENERGAN) tablet 12.5 mg  12.5 mg Oral Q6H PRN    Or    ondansetron (ZOFRAN) injection 4 mg  4 mg IntraVENous Q6H PRN    0.9% sodium chloride infusion  75 mL/hr IntraVENous CONTINUOUS    senna-docusate (PERICOLACE) 8.6-50 mg per tablet 1 Tablet  1 Tablet Oral BID     ______________________________________________________________________  EXPECTED LENGTH OF STAY: 3d 0h  ACTUAL LENGTH OF STAY:          Cheyenne Odonnell MD

## 2021-06-01 NOTE — PROGRESS NOTES
Transition of Care Plan   RUR- 15%    DISPOSITION: The disposition plan is transition to [facility]; pending medical progression   F/U with PCP/Specialist     Transport: [facility]      [facility] ; Initiated on 28th by [facility]. Updated PT/OT notes are needed, PER [facility] rep.        CM: [address]. MSW,   [phone]

## 2021-06-01 NOTE — PROGRESS NOTES
Problem: Airway Clearance - Ineffective  Goal: Achieve or maintain patent airway  Outcome: Progressing Towards Goal     Problem: Gas Exchange - Impaired  Goal: Absence of hypoxia  Outcome: Progressing Towards Goal  Goal: Promote optimal lung function  Outcome: Progressing Towards Goal     Problem: Breathing Pattern - Ineffective  Goal: Ability to achieve and maintain a regular respiratory rate  Outcome: Progressing Towards Goal     Problem:  Body Temperature -  Risk of, Imbalanced  Goal: Ability to maintain a body temperature within defined limits  Outcome: Progressing Towards Goal  Goal: Will regain or maintain usual level of consciousness  Outcome: Progressing Towards Goal  Goal: Complications related to the disease process, condition or treatment will be avoided or minimized  Outcome: Progressing Towards Goal     Problem: Isolation Precautions - Risk of Spread of Infection  Goal: Prevent transmission of infectious organism to others  Outcome: Progressing Towards Goal     Problem: Nutrition Deficits  Goal: Optimize nutrtional status  Outcome: Progressing Towards Goal     Problem: Risk for Fluid Volume Deficit  Goal: Maintain normal heart rhythm  Outcome: Progressing Towards Goal  Goal: Maintain absence of muscle cramping  Outcome: Progressing Towards Goal  Goal: Maintain normal serum potassium, sodium, calcium, phosphorus, and pH  Outcome: Progressing Towards Goal     Problem: Loneliness or Risk for Loneliness  Goal: Demonstrate positive use of time alone when socialization is not possible  Outcome: Progressing Towards Goal     Problem: Fatigue  Goal: Verbalize increase energy and improved vitality  Outcome: Progressing Towards Goal     Problem: Patient Education: Go to Patient Education Activity  Goal: Patient/Family Education  Outcome: Progressing Towards Goal     Problem: Patient Education: Go to Patient Education Activity  Goal: Patient/Family Education  Outcome: Progressing Towards Goal     Problem: Patient Education: Go to Patient Education Activity  Goal: Patient/Family Education  Outcome: Progressing Towards Goal     Problem: Falls - Risk of  Goal: *Absence of Falls  Description: Document Yeni Trejo Fall Risk and appropriate interventions in the flowsheet. Outcome: Progressing Towards Goal  Note: Fall Risk Interventions:  Mobility Interventions: Communicate number of staff needed for ambulation/transfer, Patient to call before getting OOB, PT Consult for mobility concerns, PT Consult for assist device competence, OT consult for ADLs, Utilize walker, cane, or other assistive device              Elimination Interventions: Call light in reach, Patient to call for help with toileting needs, Toileting schedule/hourly rounds              Problem: Patient Education: Go to Patient Education Activity  Goal: Patient/Family Education  Outcome: Progressing Towards Goal     Problem: Pressure Injury - Risk of  Goal: *Prevention of pressure injury  Description: Document Driss Scale and appropriate interventions in the flowsheet.   Outcome: Progressing Towards Goal  Note: Pressure Injury Interventions:  Sensory Interventions: Float heels, Keep linens dry and wrinkle-free, Maintain/enhance activity level, Minimize linen layers    Moisture Interventions: Absorbent underpads, Apply protective barrier, creams and emollients, Check for incontinence Q2 hours and as needed, Maintain skin hydration (lotion/cream), Minimize layers, Moisture barrier, Offer toileting Q_hr    Activity Interventions: Increase time out of bed, Pressure redistribution bed/mattress(bed type), PT/OT evaluation    Mobility Interventions: Float heels, HOB 30 degrees or less, Pressure redistribution bed/mattress (bed type), PT/OT evaluation    Nutrition Interventions: Offer support with meals,snacks and hydration, Document food/fluid/supplement intake    Friction and Shear Interventions: HOB 30 degrees or less, Lift sheet, Minimize layers                Problem: Patient Education: Go to Patient Education Activity  Goal: Patient/Family Education  Outcome: Progressing Towards Goal

## 2021-06-01 NOTE — ED PROVIDER NOTES
This is a 43-year-old male who had recently been diagnosed with Covid pneumonia and was discharged home after a brief hospital stay. Apparently everyone in the home has Covid and he got to a point where the wife could not manage him. The family is unable to manage him. He was sent back in for evaluation because of the family's inability to care for this patient. He was to see case management. He has not been running any fevers or having any chills. He is having difficulty getting out of bed and caring for himself. The patient has not been running any fever or having any difficulty breathing. There is been no nausea or vomiting. He denies any GI or  symptoms. There is just generalized weakness. Past Medical History:   Diagnosis Date    Arthritis     knees    Chronic pain     knees, back    Constipation     Depression     Gait difficulty     H/O seasonal allergies     Parkinson disease (HCC)     Restless leg syndrome     Urinary frequency        Past Surgical History:   Procedure Laterality Date    HX HERNIA REPAIR Right 08/15/2019    Robotic-assisted laparoscopic right inguinal herniorrhaphy with mesh. No family history on file.     Social History     Socioeconomic History    Marital status:      Spouse name: Not on file    Number of children: Not on file    Years of education: Not on file    Highest education level: Not on file   Occupational History    Not on file   Tobacco Use    Smoking status: Never Smoker    Smokeless tobacco: Never Used   Substance and Sexual Activity    Alcohol use: No    Drug use: No    Sexual activity: Yes     Partners: Female   Other Topics Concern    Not on file   Social History Narrative    Not on file     Social Determinants of Health     Financial Resource Strain:     Difficulty of Paying Living Expenses:    Food Insecurity:     Worried About Running Out of Food in the Last Year:     [address] in the Last Year: Transportation Needs:     Lack of Transportation (Medical):  Lack of Transportation (Non-Medical):    Physical Activity:     Days of Exercise per Week:     Minutes of Exercise per Session:    Stress:     Feeling of Stress :    Social Connections:     Frequency of Communication with Friends and Family:     Frequency of Social Gatherings with Friends and Family:     Attends [employer] Services:     Active Member of Clubs or Organizations:     Attends Club or Organization Meetings:     Marital Status:    Intimate Partner Violence:     Fear of Current or Ex-Partner:     Emotionally Abused:     Physically Abused:     Sexually Abused: ALLERGIES: Sulfa (sulfonamide antibiotics)    Review of Systems   Constitutional: Positive for fatigue. Negative for activity change and appetite change. HENT: Negative for ear pain, facial swelling, sore throat and trouble swallowing. Eyes: Negative for pain, discharge and visual disturbance. Respiratory: Negative for chest tightness, shortness of breath and wheezing. Cardiovascular: Negative for chest pain and palpitations. Gastrointestinal: Negative for abdominal pain, blood in stool, nausea and vomiting. Genitourinary: Negative for difficulty urinating, flank pain and hematuria. Musculoskeletal: Negative for arthralgias, joint swelling, myalgias and neck pain. Skin: Negative for color change and rash. Neurological: Positive for weakness. Negative for dizziness, numbness and headaches. Hematological: Negative for adenopathy. Does not bruise/bleed easily. Psychiatric/Behavioral: Negative for behavioral problems, confusion and sleep disturbance. All other systems reviewed and are negative.       Vitals:    05/31/21 0858 05/31/21 1619 05/31/21 2157 06/01/21 0200   BP: (!) 172/95 137/89 (!) 165/96 (!) 156/93   Pulse: 80 87 72 82   Resp: 17 17 18 18   Temp: 98.3 °F (36.8 °C) 98.4 °F (36.9 °C) 98.5 °F (36.9 °C) 99.1 °F (37.3 °C)   SpO2: 96% 95% 96% 95%   Weight:       Height:                Physical Exam  Vitals and nursing note reviewed. Constitutional:       General: He is not in acute distress. Appearance: He is well-developed. HENT:      Head: Normocephalic and atraumatic. Nose: Nose normal.   Eyes:      General: No scleral icterus. Conjunctiva/sclera: Conjunctivae normal.      Pupils: Pupils are equal, round, and reactive to light. Neck:      Thyroid: No thyromegaly. Vascular: No JVD. Trachea: No tracheal deviation. Comments: No carotid bruits noted. Cardiovascular:      Rate and Rhythm: Normal rate and regular rhythm. Heart sounds: Normal heart sounds. No murmur heard. No friction rub. No gallop. Pulmonary:      Effort: Pulmonary effort is normal. No respiratory distress. Breath sounds: Normal breath sounds. No wheezing or rales. Chest:      Chest wall: No tenderness. Abdominal:      General: Bowel sounds are normal. There is no distension. Palpations: Abdomen is soft. There is no mass. Tenderness: There is no abdominal tenderness. There is no guarding or rebound. Musculoskeletal:         General: No tenderness. Normal range of motion. Cervical back: Normal range of motion and neck supple. Lymphadenopathy:      Cervical: No cervical adenopathy. Skin:     General: Skin is warm and dry. Findings: No erythema or rash. Neurological:      Mental Status: He is alert and oriented to person, place, and time. Cranial Nerves: No cranial nerve deficit. Coordination: Coordination normal.      Deep Tendon Reflexes: Reflexes are normal and symmetric. Psychiatric:         Behavior: Behavior normal.         Thought Content:  Thought content normal.         Judgment: Judgment normal.          MDM  Number of Diagnoses or Management Options  COVID-19 virus infection: established and worsening  Dehydration: new and requires workup  Weakness: new and requires workup Amount and/or Complexity of Data Reviewed  Clinical lab tests: ordered and reviewed  Tests in the radiology section of CPT®: ordered and reviewed  Decide to obtain previous medical records or to obtain history from someone other than the patient: yes  Review and summarize past medical records: yes  Discuss the patient with other providers: yes  Independent visualization of images, tracings, or specimens: yes    Risk of Complications, Morbidity, and/or Mortality  Presenting problems: high  Diagnostic procedures: high  Management options: high    Patient Progress  Patient progress: stable         Procedures    Case management has been working with this patient most of the day to try and achieve disposition with direct admission to a nursing facility or rehab. After a number of hours, the effect was not successful. The patient is therefore being admitted to the hospitalist service pending placement in rehab. Patient has remained stable in the ED with a any acute distress.

## 2021-06-02 VITALS
OXYGEN SATURATION: 95 % | HEART RATE: 73 BPM | WEIGHT: 167.77 LBS | RESPIRATION RATE: 18 BRPM | SYSTOLIC BLOOD PRESSURE: 159 MMHG | DIASTOLIC BLOOD PRESSURE: 96 MMHG | TEMPERATURE: 98 F | BODY MASS INDEX: 24.02 KG/M2 | HEIGHT: 70 IN

## 2021-06-02 PROCEDURE — 74011250637 HC RX REV CODE- 250/637: Performed by: STUDENT IN AN ORGANIZED HEALTH CARE EDUCATION/TRAINING PROGRAM

## 2021-06-02 PROCEDURE — 74011250636 HC RX REV CODE- 250/636: Performed by: INTERNAL MEDICINE

## 2021-06-02 RX ADMIN — KETOTIFEN FUMARATE 1 DROP: 0.35 SOLUTION/ DROPS OPHTHALMIC at 06:56

## 2021-06-02 RX ADMIN — POLYETHYLENE GLYCOL 3350 17 G: 17 POWDER, FOR SOLUTION ORAL at 09:25

## 2021-06-02 RX ADMIN — TAMSULOSIN HYDROCHLORIDE 0.4 MG: 0.4 CAPSULE ORAL at 09:25

## 2021-06-02 RX ADMIN — DOCUSATE SODIUM 50 MG AND SENNOSIDES 8.6 MG 1 TABLET: 8.6; 5 TABLET, FILM COATED ORAL at 06:54

## 2021-06-02 RX ADMIN — Medication 10 ML: at 04:57

## 2021-06-02 RX ADMIN — CARBIDOPA AND LEVODOPA 1.5 TABLET: 25; 100 TABLET ORAL at 09:25

## 2021-06-02 RX ADMIN — SODIUM CHLORIDE 75 ML/HR: 9 INJECTION, SOLUTION INTRAVENOUS at 04:57

## 2021-06-02 RX ADMIN — ENOXAPARIN SODIUM 30 MG: 30 INJECTION SUBCUTANEOUS at 09:25

## 2021-06-02 RX ADMIN — FERROUS SULFATE TAB 325 MG (65 MG ELEMENTAL FE) 325 MG: 325 (65 FE) TAB at 09:25

## 2021-06-02 RX ADMIN — Medication 10 ML: at 13:24

## 2021-06-02 NOTE — DISCHARGE SUMMARY
Inpatient hospitalist discharge summary                Brief Overview    PATIENT ID: Tin Bustillos    MRN: [id number]     YOB: 1955    Admitting Provider: Ryan Camargo MD    Discharging Provider: Katherine Gutierrez MD   To contact this individual call [phone] and ask the  to page. If unavailable ask to be transferred the Adult Hospitalist Department. PCP at discharge: Raymon Favre, [address]   [address] [place] [address] / [facility]    Admission date: 5/27/2021  Date of Discharge: 06/02/21    Chief complaint:   Chief Complaint   Patient presents with    Other     Patient Active Problem List   Diagnosis Code    Parkinsonism ([facility] [address] G20    Restless leg G25.81    Depression F32.9    Lipid disorder E78.9    Colonoscopy refused Z53.20    S/P laparoscopic hernia repair Z98.890, Z87.19    FTT (failure to thrive) in adult R62.7         Discharge diagnosis, hospital course/plan:  As per initial admission summary  As per initial admission summary  Ginny Velasquez a 74 y. o. male w/ PMH of parkinson's, BPH, Fe Def anemia who is brought to hospital by family due to increased debility and inability to be cared for at home. Pt was recently diagnosed with uncomplicated Covid pneumonia and prescribed outpatient course of nebulizers and doxycycline.  Patient and family states since diagnosis of Covid pneumonia, he has had increasing debility, weakness and inability to care for himself.  Patient states he spent several days in bed after diagnosis of Covid pneumonia and subsequently had difficulty getting out of bed and caring for himself. Fanta Menjivar is not at bedside per chart review shows family has been concerned as patient has been unable to feed himself or take care of his personal hygiene.  Family is furthermore unable to care for him at home.  Patient is seen and examined at bedside. Jeniffer Sophy denies any respiratory symptoms at this time.  Denies any pain.  Continues to complain of continued debility. The patient denies any fever, chills, chest pain, cough, congestion, recent illness, palpitations, or dysuria.     Vitals on ER presentation are overall unremarkable. CMP and UA were unremarkable. CXR showed no acute process. Failure to thrive,   -Case management,   PT recommending SNF  -accepted to facility, will discharge today      COVID-19 infection  -Chest x-ray today shows resolution of pneumonia  -s/p abx   Stable,  Patient was never on oxygen during hospital stay. He never required any oxygen      Parkinson's disease  -Continue home Sinemet  -Consult neurology to evaluate for potential worsening Parkinson's  Recommended to continue current regimen      BPH  -Home Flomax     Iron deficiency anemia  -Home iron     Chronic constipation  -Home bowel regimen with MiraLAX and Savita-Colace    On the date of discharge, diagnostic face to face encounter was performed. Patient was hemodynamically stable, offering no new complaints. Denies any shortness of breath at rest, no fevers or chills, no diarrhea or constipation. Patient is agreeable for discharge. Patient understood and verbalized the understanding of the discharge plan. Patient was advised to seek medical help/ care or return to ED, if symptoms recur, worsen or new symptoms develop. Discharge Disposition:  Home or Self Care    Discharge activity:  Activity as tolerated    Code status at discharge:  Full Code     Outpatient follow up:  Please follow up with your PCP in one week  In addition follow up with neurology as scheduled       Future appointments-  No future appointments.   Follow-up Information     Follow up With Specialties Details Why Contact Info    [id number]    Gustabo Dukes MD Internal Medicine In 1 week  [address] 501 [facility], [address], DO Neurology In 1 week  200 [facility]  [address]  [phone]            Operative procedures performed:      Consults:  ED CONSULT TO SENIOR SERVICES CASE MANAGEMENT  ED CONSULT TO SENIOR SERVICES PHYSICAL THERAPY  IP CONSULT TO NEUROLOGY  IP CONSULT TO HOSPITALIST    Procedures:   * No surgery found *    Diet:  DIET ONE TIME MESSAGE  DIET NUTRITIONAL SUPPLEMENTS  DIET REGULAR    Pertinent test results:  XR CHEST PORT    Result Date: 5/27/2021  INDICATION:  Covid19, R/o pneumonia. Portable AP semiupright view of the chest. Direct comparison is made to prior CXR dated 5/22/2021. Cardiomediastinal silhouette is within normal limits. Lungs are clear bilaterally. Pleural spaces are normal. Osseous structures are intact. No acute cardiopulmonary disease. XR CHEST PORT    Result Date: 5/22/2021  INDICATION:  Chest pain COMPARISON: January 26 FINDINGS: Single AP portable view of the chest obtained at 2301 demonstrates a stable cardiomediastinal silhouette. The lungs are hypoinspiratory. There are ill-defined airspace opacities throughout both lungs. No osseous abnormalities are seen. Hypoinspiratory lungs with mild/ill-defined airspace opacities bilaterally. Findings are compatible with COVID 19 pneumonia. Recent Results (from the past 168 hour(s))   METABOLIC PANEL, COMPREHENSIVE    Collection Time: 05/27/21  2:36 PM   Result Value Ref Range    Sodium 138 136 - 145 mmol/L    Potassium 4.1 3.5 - 5.1 mmol/L    Chloride 104 97 - 108 mmol/L    CO2 25 21 - 32 mmol/L    Anion gap 9 5 - 15 mmol/L    Glucose 92 65 - 100 mg/dL    BUN 11 6 - 20 MG/DL    Creatinine 0.80 0.70 - 1.30 MG/DL    BUN/Creatinine ratio 14 12 - 20      GFR est AA >60 >60 ml/min/1.73m2    GFR est non-AA >60 >60 ml/min/1.73m2    Calcium 8.7 8.5 - 10.1 MG/DL    Bilirubin, total 0.7 0.2 - 1.0 MG/DL    ALT (SGPT) 20 12 - 78 U/L    AST (SGOT) 47 (H) 15 - 37 U/L    Alk. phosphatase 71 45 - 117 U/L    Protein, total 7.3 6.4 - 8.2 g/dL    Albumin 3.0 (L) 3.5 - 5.0 g/dL    Globulin 4.3 (H) 2.0 - 4.0 g/dL    A-G Ratio 0.7 (L) 1.1 - 2.2     URINALYSIS W/ RFLX MICROSCOPIC    Collection Time: 05/27/21  2:36 PM   Result Value Ref Range    Color YELLOW/STRAW      Appearance CLEAR CLEAR      Specific gravity <1.005 1.003 - 1.030    pH (UA) 7.0 5.0 - 8.0      Protein Negative NEG mg/dL    Glucose Negative NEG mg/dL    Ketone Negative NEG mg/dL    Bilirubin Negative NEG      Blood Negative NEG      Urobilinogen 0.2 0.2 - 1.0 EU/dL    Nitrites Negative NEG      Leukocyte Esterase Negative NEG     MAGNESIUM    Collection Time: 05/27/21  8:57 PM   Result Value Ref Range    Magnesium 2.5 (H) 1.6 - 2.4 mg/dL   PHOSPHORUS    Collection Time: 05/27/21  8:57 PM   Result Value Ref Range    Phosphorus 1.8 (L) 2.6 - 4.7 MG/DL   TSH 3RD GENERATION    Collection Time: 05/27/21  8:57 PM   Result Value Ref Range    TSH 1.22 0.36 - 0.05 uIU/mL   METABOLIC PANEL, COMPREHENSIVE    Collection Time: 05/28/21  1:56 AM   Result Value Ref Range    Sodium 139 136 - 145 mmol/L    Potassium 3.4 (L) 3.5 - 5.1 mmol/L    Chloride 108 97 - 108 mmol/L    CO2 23 21 - 32 mmol/L    Anion gap 8 5 - 15 mmol/L    Glucose 105 (H) 65 - 100 mg/dL    BUN 7 6 - 20 MG/DL    Creatinine 0.53 (L) 0.70 - 1.30 MG/DL    BUN/Creatinine ratio 13 12 - 20      GFR est AA >60 >60 ml/min/1.73m2    GFR est non-AA >60 >60 ml/min/1.73m2    Calcium 7.8 (L) 8.5 - 10.1 MG/DL    Bilirubin, total 0.5 0.2 - 1.0 MG/DL    ALT (SGPT) 18 12 - 78 U/L    AST (SGOT) 29 15 - 37 U/L    Alk.  phosphatase 63 45 - 117 U/L    Protein, total 6.6 6.4 - 8.2 g/dL    Albumin 2.6 (L) 3.5 - 5.0 g/dL    Globulin 4.0 2.0 - 4.0 g/dL    A-G Ratio 0.7 (L) 1.1 - 2.2     VITAMIN D, 25 HYDROXY    Collection Time: 05/28/21  1:56 AM   Result Value Ref Range    Vitamin D 25-Hydroxy 9.2 (L) 30 - 100 ng/mL   CBC W/O DIFF    Collection Time: 05/28/21  1:56 AM   Result Value Ref Range    WBC 4.3 4.1 - 11.1 K/uL    RBC 4.86 4.10 - 5.70 M/uL    HGB 13.8 12.1 - 17.0 g/dL    HCT 42.2 36.6 - 50.3 %    MCV 86.8 80.0 - 99.0 FL    MCH 28.4 26.0 - 34.0 PG    MCHC 32.7 30.0 - 36.5 g/dL    RDW 13.2 11.5 - 14.5 %    PLATELET 028 (L) 657 - 400 K/uL    MPV ABNORMAL 8.9 - 12.9 FL    NRBC 0.0 0  WBC    ABSOLUTE NRBC 0.00 0.00 - 0.01 K/uL   SAMPLES BEING HELD    Collection Time: 05/28/21  1:56 AM   Result Value Ref Range    SAMPLES BEING HELD 1BLU     COMMENT        Add-on orders for these samples will be processed based on acceptable specimen integrity and analyte stability, which may vary by analyte. SARS-COV-2    Collection Time: 05/28/21  7:09 PM   Result Value Ref Range    SARS-CoV-2 Please find results under separate order     SARS-COV-2, PCR    Collection Time: 05/28/21  7:09 PM    Specimen: Nasopharyngeal   Result Value Ref Range    Specimen source Nasopharyngeal      SARS-CoV-2 Detected (AA) NOTD     CBC WITH AUTOMATED DIFF    Collection Time: 06/01/21  2:20 AM   Result Value Ref Range    WBC 4.8 4.1 - 11.1 K/uL    RBC 4.54 4.10 - 5.70 M/uL    HGB 12.9 12.1 - 17.0 g/dL    HCT 39.6 36.6 - 50.3 %    MCV 87.2 80.0 - 99.0 FL    MCH 28.4 26.0 - 34.0 PG    MCHC 32.6 30.0 - 36.5 g/dL    RDW 13.0 11.5 - 14.5 %    PLATELET  923 - 760 K/uL     UNABLE TO REPORT ACCURATE COUNT DUE TO PLATELET AGGREGATION, HOWEVER, PLATELETS APPEAR NORMAL IN NUMBER ON SMEAR. PLEASE RESUBMIT SODIUM CITRATE (BLUE) AND EDTA (LAVENDER) TUBES FOR HEMATOLOGICAL TESTING. NRBC 0.0 0  WBC    ABSOLUTE NRBC 0.00 0.00 - 0.01 K/uL    NEUTROPHILS 65 32 - 75 %    LYMPHOCYTES 22 12 - 49 %    MONOCYTES 9 5 - 13 %    EOSINOPHILS 3 0 - 7 %    BASOPHILS 0 0 - 1 %    IMMATURE GRANULOCYTES 1 (H) 0.0 - 0.5 %    ABS. NEUTROPHILS 3.2 1.8 - 8.0 K/UL    ABS. LYMPHOCYTES 1.1 0.8 - 3.5 K/UL    ABS. MONOCYTES 0.4 0.0 - 1.0 K/UL    ABS. EOSINOPHILS 0.1 0.0 - 0.4 K/UL    ABS. BASOPHILS 0.0 0.0 - 0.1 K/UL    ABS. IMM.  GRANS. 0.0 0.00 - 0.04 K/UL    DF SMEAR SCANNED      PLATELET COMMENTS CLUMPED PLATELETS      RBC COMMENTS NORMOCYTIC, NORMOCHROMIC     METABOLIC PANEL, BASIC    Collection Time: 06/01/21  2:20 AM   Result Value Ref Range    Sodium 138 136 - 145 mmol/L    Potassium 3.7 3.5 - 5.1 mmol/L    Chloride 108 97 - 108 mmol/L    CO2 24 21 - 32 mmol/L    Anion gap 6 5 - 15 mmol/L    Glucose 101 (H) 65 - 100 mg/dL    BUN 8 6 - 20 MG/DL    Creatinine 0.56 (L) 0.70 - 1.30 MG/DL    BUN/Creatinine ratio 14 12 - 20      GFR est AA >60 >60 ml/min/1.73m2    GFR est non-AA >60 >60 ml/min/1.73m2    Calcium 7.9 (L) 8.5 - 10.1 MG/DL           Physical Exam on Discharge:    Discharge condition: good    Vital signs:   Patient Vitals for the past 12 hrs:   Temp Pulse Resp BP SpO2   06/02/21 0819 98 °F (36.7 °C) 73 18 (!) 159/96 95 %   06/02/21 0518 98.2 °F (36.8 °C) 78 17 (!) 156/84 97 %       Visit Vitals  BP (!) 159/96 (BP 1 Location: Right upper arm, BP Patient Position: At rest)   Pulse 73   Temp 98 °F (36.7 °C)   Resp 18   Ht 5' 10\" (1.778 m)   Wt 76.1 kg (167 lb 12.3 oz)   SpO2 95%   BMI 24.07 kg/m²     General:  Alert, cooperative, no distress, appears stated age. Head:  Normocephalic, without obvious abnormality, atraumatic. Lungs:   Clear to auscultation bilaterally. Chest wall:  No tenderness or deformity. Heart:  Regular rate and rhythm, S1, S2 normal, no murmur, click, rub or gallop. Abdomen:   Soft, non-tender. Bowel sounds normal. No masses,  No organomegaly. Current Discharge Medication List      CONTINUE these medications which have NOT CHANGED    Details   albuterol (PROVENTIL HFA, VENTOLIN HFA, PROAIR HFA) 90 mcg/actuation inhaler Take 2 Puffs by inhalation every four (4) hours as needed for Wheezing, Respiratory Distress or Cough. Qty: 1 Inhaler, Refills: 0      budesonide (Pulmicort Flexhaler) 180 mcg/actuation aepb inhaler Take 2 Puffs by inhalation two (2) times a day.  Rinse mouth afterwards  Qty: 1 Inhaler, Refills: 1 ascorbic acid, vitamin C, (Vitamin C) 1,000 mg tablet Take 1 Tablet by mouth two (2) times a day. Qty: 20 Tablet, Refills: 0      zinc sulfate 50 mg zinc (220 mg) tablet Use one tablet daily until gone  Qty: 20 Tablet, Refills: 0      Walker (Ultra-Light Rollator) misc Use as directed  Qty: 1 Each, Refills: 0      tamsulosin (Flomax) 0.4 mg capsule Take 1 Cap by mouth daily. Qty: 30 Cap, Refills: 1    Associated Diagnoses: Benign prostatic hyperplasia with lower urinary tract symptoms, symptom details unspecified      ferrous sulfate 325 mg (65 mg iron) tablet Take 1 Tab by mouth two (2) times a day. Qty: 60 Tab, Refills: 3    Associated Diagnoses: Anemia, unspecified type      ! ! polyethylene glycol (MIRALAX) 17 gram/dose powder Take 17 g by mouth daily. Qty: 850 g, Refills: 2    Associated Diagnoses: Chronic idiopathic constipation      linaCLOtide (Linzess) 145 mcg cap capsule Take 1 Cap by mouth Daily (before breakfast). Qty: 90 Cap, Refills: 1    Associated Diagnoses: Chronic idiopathic constipation      olopatadine (PATANOL) 0.1 % ophthalmic solution Administer 2 Drops to both eyes two (2) times a day. Qty: 5 mL, Refills: 3    Associated Diagnoses: Allergic conjunctivitis of both eyes      potassium chloride (K-DUR, KLOR-CON) 20 mEq tablet Take 1 Tab by mouth daily. Qty: 90 Tab, Refills: 1    Associated Diagnoses: Leg swelling      carbidopa-levodopa (SINEMET)  mg per tablet TAKE 1 AND 1/2 TABLETS BY MOUTH THREE TIMES DAILY  Qty: 135 Tab, Refills: 0    Associated Diagnoses: Parkinsonism, unspecified Parkinsonism type (HCC)      diclofenac (VOLTAREN) 1 % gel Apply  to affected area four (4) times daily. Qty: 1 Each, Refills: 1      !! polyethylene glycol (Miralax) 17 gram/dose powder Take 17 g by mouth daily. Qty: 507 g, Refills: 3      ACETAMINOPHEN (TYLENOL PO) Take 650 mg by mouth as needed. !! - Potential duplicate medications found. Please discuss with provider.       STOP taking these medications       doxycycline (VIBRA-TABS) 100 mg tablet Comments:   Reason for Stopping:         furosemide (Lasix) 40 mg tablet Comments:   Reason for Stopping:             Patient was not ln lasix during hospitalization.  I am unable to find reason for lasix     Total time spent on discharge planning, counseling and co-ordination of care:   35 minutes    Nolberto Pringle MD  06/02/21  1:11 PM

## 2021-06-02 NOTE — PROGRESS NOTES
TRANSFER - OUT REPORT:    Verbal report given to [facility]) on Maira Evans  being transferred to [facility](unit) for routine progression of care       Report consisted of patients Situation, Background, Assessment and   Recommendations(SBAR). Information from the following report(s) SBAR, MAR, Recent Results and Med Rec Status was reviewed with the receiving nurse. Lines:   Peripheral IV 05/27/21 Distal;Left;Posterior Forearm (Active)   Site Assessment Clean, dry, & intact 06/02/21 0917   Phlebitis Assessment 0 06/02/21 0917   Infiltration Assessment 0 06/02/21 0917   Dressing Status Clean, dry, & intact 06/02/21 0917   Dressing Type Transparent;Tape 06/02/21 0917   Hub Color/Line Status Infusing 06/02/21 0917   Action Taken Open ports on tubing capped 06/02/21 0917   Alcohol Cap Used Yes 06/02/21 4600        Opportunity for questions and clarification was provided.       Patient transported with:   [facility]

## 2021-06-02 NOTE — DISCHARGE SUMMARY
Inpatient hospitalist discharge summary                Brief Overview    PATIENT ID: Graeme Coiln    MRN: [id number]     YOB: 1955    Admitting Provider: Maritza Ashford MD    Discharging Provider: Leanne Soriano MD   To contact this individual call [phone] and ask the  to page. If unavailable ask to be transferred the Adult Hospitalist Department. PCP at discharge: Hayder Alexandre [address]   [address] [place] [address] / [facility]    Admission date: 5/27/2021  Date of Discharge: 06/02/21    Chief complaint:   Chief Complaint   Patient presents with    Other     Patient Active Problem List   Diagnosis Code    Parkinsonism ([facility] [address] G20    Restless leg G25.81    Depression F32.9    Lipid disorder E78.9    Colonoscopy refused Z53.20    S/P laparoscopic hernia repair Z98.890, Z87.19    FTT (failure to thrive) in adult R62.7         Discharge diagnosis, hospital course/plan:  As per initial admission summary  As per initial admission summary  Gray Mi a 74 y. o. male w/ PMH of parkinson's, BPH, Fe Def anemia who is brought to hospital by family due to increased debility and inability to be cared for at home. Pt was recently diagnosed with uncomplicated Covid pneumonia and prescribed outpatient course of nebulizers and doxycycline.  Patient and family states since diagnosis of Covid pneumonia, he has had increasing debility, weakness and inability to care for himself.  Patient states he spent several days in bed after diagnosis of Covid pneumonia and subsequently had difficulty getting out of bed and caring for himself. Dylan Michael is not at bedside per chart review shows family has been concerned as patient has been unable to feed himself or take care of his personal hygiene.  Family is furthermore unable to care for him at home.  Patient is seen and examined at bedside. [facility] denies any respiratory symptoms at this time.  Denies any pain.  Continues to complain of continued debility. The patient denies any fever, chills, chest pain, cough, congestion, recent illness, palpitations, or dysuria.     Vitals on ER presentation are overall unremarkable. CMP and UA were unremarkable. CXR showed no acute process. Failure to thrive,   -Case management,   PT recommending SNF  -accepted to facility, will discharge today   D/w spouse in detail and updated regarding discharge planning. Also discussed with her regarding lasix medication she was in agreement with discontinuing lasix. Advised to follow up with pcp regarding resumption if needed      COVID-19 infection  -Chest x-ray today shows resolution of pneumonia  -s/p abx   Stable,  Patient was never on oxygen during hospital stay. He never required any oxygen      Parkinson's disease  -Continue home Sinemet  -Consult neurology to evaluate for potential worsening Parkinson's  Recommended to continue current regimen      BPH  -Home Flomax     Iron deficiency anemia  -Home iron     Chronic constipation  -Home bowel regimen with MiraLAX and Savita-Colace    On the date of discharge, diagnostic face to face encounter was performed. Patient was hemodynamically stable, offering no new complaints. Denies any shortness of breath at rest, no fevers or chills, no diarrhea or constipation. Patient is agreeable for discharge. Patient understood and verbalized the understanding of the discharge plan. Patient was advised to seek medical help/ care or return to ED, if symptoms recur, worsen or new symptoms develop. Discharge Disposition:  Home or Self Care    Discharge activity:  Activity as tolerated    Code status at discharge:  Full Code     Outpatient follow up:  Please follow up with your PCP in one week  In addition follow up with neurology as scheduled       Future appointments-  No future appointments.   Follow-up Information     Follow up With Specialties Details Why Contact Info    [address] [facility] Jose Martin Freedman   [address]  [phone]    Jovan Mckeon MD Internal Medicine In 1 week  [address] [facility]  [address] [place] [address]      Karen Gomez DO Neurology In 1 week  [facility] [address]  [phone]            Operative procedures performed:      Consults:  ED CONSULT TO SENIOR SERVICES CASE MANAGEMENT  ED CONSULT TO SENIOR SERVICES PHYSICAL THERAPY  IP CONSULT TO NEUROLOGY  IP CONSULT TO HOSPITALIST    Procedures:   * No surgery found *    Diet:  DIET ONE TIME MESSAGE  DIET NUTRITIONAL SUPPLEMENTS  DIET REGULAR    Pertinent test results:  XR CHEST PORT    Result Date: 5/27/2021  INDICATION:  Covid19, R/o pneumonia. Portable AP semiupright view of the chest. Direct comparison is made to prior CXR dated 5/22/2021. Cardiomediastinal silhouette is within normal limits. Lungs are clear bilaterally. Pleural spaces are normal. Osseous structures are intact. No acute cardiopulmonary disease. XR CHEST PORT    Result Date: 5/22/2021  INDICATION:  Chest pain COMPARISON: January 26 FINDINGS: Single AP portable view of the chest obtained at 2301 demonstrates a stable cardiomediastinal silhouette. The lungs are hypoinspiratory. There are ill-defined airspace opacities throughout both lungs. No osseous abnormalities are seen. Hypoinspiratory lungs with mild/ill-defined airspace opacities bilaterally. Findings are compatible with COVID 19 pneumonia.         Recent Results (from the past 168 hour(s))   METABOLIC PANEL, COMPREHENSIVE    Collection Time: 05/27/21  2:36 PM   Result Value Ref Range    Sodium 138 136 - 145 mmol/L    Potassium 4.1 3.5 - 5.1 mmol/L    Chloride 104 97 - 108 mmol/L    CO2 25 21 - 32 mmol/L    Anion gap 9 5 - 15 mmol/L    Glucose 92 65 - 100 mg/dL    BUN 11 6 - 20 MG/DL    Creatinine 0.80 0.70 - 1.30 MG/DL    BUN/Creatinine ratio 14 12 - 20      GFR est AA >60 >60 ml/min/1.73m2    GFR est non-AA >60 >60 ml/min/1.73m2    Calcium 8.7 8.5 - 10.1 MG/DL    Bilirubin, total 0.7 0.2 - 1.0 MG/DL    ALT (SGPT) 20 12 - 78 U/L    AST (SGOT) 47 (H) 15 - 37 U/L    Alk. phosphatase 71 45 - 117 U/L    Protein, total 7.3 6.4 - 8.2 g/dL    Albumin 3.0 (L) 3.5 - 5.0 g/dL    Globulin 4.3 (H) 2.0 - 4.0 g/dL    A-G Ratio 0.7 (L) 1.1 - 2.2     URINALYSIS W/ RFLX MICROSCOPIC    Collection Time: 05/27/21  2:36 PM   Result Value Ref Range    Color YELLOW/STRAW      Appearance CLEAR CLEAR      Specific gravity <1.005 1.003 - 1.030    pH (UA) 7.0 5.0 - 8.0      Protein Negative NEG mg/dL    Glucose Negative NEG mg/dL    Ketone Negative NEG mg/dL    Bilirubin Negative NEG      Blood Negative NEG      Urobilinogen 0.2 0.2 - 1.0 EU/dL    Nitrites Negative NEG      Leukocyte Esterase Negative NEG     MAGNESIUM    Collection Time: 05/27/21  8:57 PM   Result Value Ref Range    Magnesium 2.5 (H) 1.6 - 2.4 mg/dL   PHOSPHORUS    Collection Time: 05/27/21  8:57 PM   Result Value Ref Range    Phosphorus 1.8 (L) 2.6 - 4.7 MG/DL   TSH 3RD GENERATION    Collection Time: 05/27/21  8:57 PM   Result Value Ref Range    TSH 1.22 0.36 - 7.83 uIU/mL   METABOLIC PANEL, COMPREHENSIVE    Collection Time: 05/28/21  1:56 AM   Result Value Ref Range    Sodium 139 136 - 145 mmol/L    Potassium 3.4 (L) 3.5 - 5.1 mmol/L    Chloride 108 97 - 108 mmol/L    CO2 23 21 - 32 mmol/L    Anion gap 8 5 - 15 mmol/L    Glucose 105 (H) 65 - 100 mg/dL    BUN 7 6 - 20 MG/DL    Creatinine 0.53 (L) 0.70 - 1.30 MG/DL    BUN/Creatinine ratio 13 12 - 20      GFR est AA >60 >60 ml/min/1.73m2    GFR est non-AA >60 >60 ml/min/1.73m2    Calcium 7.8 (L) 8.5 - 10.1 MG/DL    Bilirubin, total 0.5 0.2 - 1.0 MG/DL    ALT (SGPT) 18 12 - 78 U/L    AST (SGOT) 29 15 - 37 U/L    Alk.  phosphatase 63 45 - 117 U/L    Protein, total 6.6 6.4 - 8.2 g/dL    Albumin 2.6 (L) 3.5 - 5.0 g/dL    Globulin 4.0 2.0 - 4.0 g/dL    A-G Ratio 0.7 (L) 1.1 - 2.2     VITAMIN D, 25 HYDROXY    Collection Time: 05/28/21  1:56 AM   Result Value Ref Range    Vitamin D 25-Hydroxy 9.2 (L) 30 - 100 ng/mL   CBC W/O DIFF    Collection Time: 05/28/21  1:56 AM   Result Value Ref Range    WBC 4.3 4.1 - 11.1 K/uL    RBC 4.86 4.10 - 5.70 M/uL    HGB 13.8 12.1 - 17.0 g/dL    HCT 42.2 36.6 - 50.3 %    MCV 86.8 80.0 - 99.0 FL    MCH 28.4 26.0 - 34.0 PG    MCHC 32.7 30.0 - 36.5 g/dL    RDW 13.2 11.5 - 14.5 %    PLATELET 871 (L) 644 - 400 K/uL    MPV ABNORMAL 8.9 - 12.9 FL    NRBC 0.0 0  WBC    ABSOLUTE NRBC 0.00 0.00 - 0.01 K/uL   SAMPLES BEING HELD    Collection Time: 05/28/21  1:56 AM   Result Value Ref Range    SAMPLES BEING HELD 1BLU     COMMENT        Add-on orders for these samples will be processed based on acceptable specimen integrity and analyte stability, which may vary by analyte. SARS-COV-2    Collection Time: 05/28/21  7:09 PM   Result Value Ref Range    SARS-CoV-2 Please find results under separate order     SARS-COV-2, PCR    Collection Time: 05/28/21  7:09 PM    Specimen: Nasopharyngeal   Result Value Ref Range    Specimen source Nasopharyngeal      SARS-CoV-2 Detected (AA) NOTD     CBC WITH AUTOMATED DIFF    Collection Time: 06/01/21  2:20 AM   Result Value Ref Range    WBC 4.8 4.1 - 11.1 K/uL    RBC 4.54 4.10 - 5.70 M/uL    HGB 12.9 12.1 - 17.0 g/dL    HCT 39.6 36.6 - 50.3 %    MCV 87.2 80.0 - 99.0 FL    MCH 28.4 26.0 - 34.0 PG    MCHC 32.6 30.0 - 36.5 g/dL    RDW 13.0 11.5 - 14.5 %    PLATELET  048 - 019 K/uL     UNABLE TO REPORT ACCURATE COUNT DUE TO PLATELET AGGREGATION, HOWEVER, PLATELETS APPEAR NORMAL IN NUMBER ON SMEAR. PLEASE RESUBMIT SODIUM CITRATE (BLUE) AND EDTA (LAVENDER) TUBES FOR HEMATOLOGICAL TESTING. NRBC 0.0 0  WBC    ABSOLUTE NRBC 0.00 0.00 - 0.01 K/uL    NEUTROPHILS 65 32 - 75 %    LYMPHOCYTES 22 12 - 49 %    MONOCYTES 9 5 - 13 %    EOSINOPHILS 3 0 - 7 %    BASOPHILS 0 0 - 1 %    IMMATURE GRANULOCYTES 1 (H) 0.0 - 0.5 %    ABS.  NEUTROPHILS 3.2 1.8 - 8.0 K/UL    ABS. LYMPHOCYTES 1.1 0.8 - 3.5 K/UL    ABS. MONOCYTES 0.4 0.0 - 1.0 K/UL    ABS. EOSINOPHILS 0.1 0.0 - 0.4 K/UL    ABS. BASOPHILS 0.0 0.0 - 0.1 K/UL    ABS. IMM. GRANS. 0.0 0.00 - 0.04 K/UL    DF SMEAR SCANNED      PLATELET COMMENTS CLUMPED PLATELETS      RBC COMMENTS NORMOCYTIC, NORMOCHROMIC     METABOLIC PANEL, BASIC    Collection Time: 06/01/21  2:20 AM   Result Value Ref Range    Sodium 138 136 - 145 mmol/L    Potassium 3.7 3.5 - 5.1 mmol/L    Chloride 108 97 - 108 mmol/L    CO2 24 21 - 32 mmol/L    Anion gap 6 5 - 15 mmol/L    Glucose 101 (H) 65 - 100 mg/dL    BUN 8 6 - 20 MG/DL    Creatinine 0.56 (L) 0.70 - 1.30 MG/DL    BUN/Creatinine ratio 14 12 - 20      GFR est AA >60 >60 ml/min/1.73m2    GFR est non-AA >60 >60 ml/min/1.73m2    Calcium 7.9 (L) 8.5 - 10.1 MG/DL           Physical Exam on Discharge:    Discharge condition: good    Vital signs:   Patient Vitals for the past 12 hrs:   Temp Pulse Resp BP SpO2   06/02/21 0819 98 °F (36.7 °C) 73 18 (!) 159/96 95 %   06/02/21 0518 98.2 °F (36.8 °C) 78 17 (!) 156/84 97 %       Visit Vitals  BP (!) 159/96 (BP 1 Location: Right upper arm, BP Patient Position: At rest)   Pulse 73   Temp 98 °F (36.7 °C)   Resp 18   Ht 5' 10\" (1.778 m)   Wt 76.1 kg (167 lb 12.3 oz)   SpO2 95%   BMI 24.07 kg/m²     General:  Alert, cooperative, no distress, appears stated age. Head:  Normocephalic, without obvious abnormality, atraumatic. Lungs:   Clear to auscultation bilaterally. Chest wall:  No tenderness or deformity. Heart:  Regular rate and rhythm, S1, S2 normal, no murmur, click, rub or gallop. Abdomen:   Soft, non-tender. Bowel sounds normal. No masses,  No organomegaly.                    Current Discharge Medication List      CONTINUE these medications which have NOT CHANGED    Details   albuterol (PROVENTIL HFA, VENTOLIN HFA, PROAIR HFA) 90 mcg/actuation inhaler Take 2 Puffs by inhalation every four (4) hours as needed for Wheezing, Respiratory Distress or Cough.  Qty: 1 Inhaler, Refills: 0      budesonide (Pulmicort Flexhaler) 180 mcg/actuation aepb inhaler Take 2 Puffs by inhalation two (2) times a day. Rinse mouth afterwards  Qty: 1 Inhaler, Refills: 1      ascorbic acid, vitamin C, (Vitamin C) 1,000 mg tablet Take 1 Tablet by mouth two (2) times a day. Qty: 20 Tablet, Refills: 0      zinc sulfate 50 mg zinc (220 mg) tablet Use one tablet daily until gone  Qty: 20 Tablet, Refills: 0      Walker (Ultra-Light Rollator) misc Use as directed  Qty: 1 Each, Refills: 0      tamsulosin (Flomax) 0.4 mg capsule Take 1 Cap by mouth daily. Qty: 30 Cap, Refills: 1    Associated Diagnoses: Benign prostatic hyperplasia with lower urinary tract symptoms, symptom details unspecified      ferrous sulfate 325 mg (65 mg iron) tablet Take 1 Tab by mouth two (2) times a day. Qty: 60 Tab, Refills: 3    Associated Diagnoses: Anemia, unspecified type      ! ! polyethylene glycol (MIRALAX) 17 gram/dose powder Take 17 g by mouth daily. Qty: 850 g, Refills: 2    Associated Diagnoses: Chronic idiopathic constipation      linaCLOtide (Linzess) 145 mcg cap capsule Take 1 Cap by mouth Daily (before breakfast). Qty: 90 Cap, Refills: 1    Associated Diagnoses: Chronic idiopathic constipation      olopatadine (PATANOL) 0.1 % ophthalmic solution Administer 2 Drops to both eyes two (2) times a day. Qty: 5 mL, Refills: 3    Associated Diagnoses: Allergic conjunctivitis of both eyes      potassium chloride (K-DUR, KLOR-CON) 20 mEq tablet Take 1 Tab by mouth daily. Qty: 90 Tab, Refills: 1    Associated Diagnoses: Leg swelling      carbidopa-levodopa (SINEMET)  mg per tablet TAKE 1 AND 1/2 TABLETS BY MOUTH THREE TIMES DAILY  Qty: 135 Tab, Refills: 0    Associated Diagnoses: Parkinsonism, unspecified Parkinsonism type (HCC)      diclofenac (VOLTAREN) 1 % gel Apply  to affected area four (4) times daily.   Qty: 1 Each, Refills: 1      !! polyethylene glycol (Miralax) 17 gram/dose powder Take 17 g by mouth daily. Qty: 507 g, Refills: 3      ACETAMINOPHEN (TYLENOL PO) Take 650 mg by mouth as needed. !! - Potential duplicate medications found. Please discuss with provider. STOP taking these medications       doxycycline (VIBRA-TABS) 100 mg tablet Comments:   Reason for Stopping:         furosemide (Lasix) 40 mg tablet Comments:   Reason for Stopping:             Patient was not ln lasix during hospitalization.  I am unable to find reason for lasix     Total time spent on discharge planning, counseling and co-ordination of care:   35 minutes    Sedalia Baumgarten, MD  06/02/21  1:11 PM

## 2021-06-02 NOTE — DISCHARGE INSTRUCTIONS
Discharge Instructions       PATIENT ID: Girish Crisostomo  MRN: [id number]   YOB: 1955    DATE OF ADMISSION: 5/27/2021 12:42 PM    DATE OF DISCHARGE: 6/2/2021    PRIMARY CARE PROVIDER: Andrei Bernard MD     ATTENDING PHYSICIAN: Aashish Damon MD  DISCHARGING PROVIDER: Cuong Winn MD    To contact this individual call [phone] and ask the  to page. If unavailable ask to be transferred the Adult Hospitalist Department. DISCHARGE DIAGNOSES FTT    CONSULTATIONS: ED CONSULT TO SENIOR SERVICES CASE MANAGEMENT  ED CONSULT TO SENIOR SERVICES PHYSICAL THERAPY  IP CONSULT TO NEUROLOGY  IP CONSULT TO HOSPITALIST    PROCEDURES/SURGERIES: * No surgery found *      FOLLOW UP APPOINTMENTS:   Follow-up Information     Follow up With Specialties Details Why Contact [address]   [address]  [phone]    Andrei Bernard MD Internal Medicine In 1 week  [address] [place] [address]      Keith Lacey DO Neurology In 1 week  [address] [place] [address]  [phone]             ADDITIONAL CARE RECOMMENDATIONS:   Follow up with PCP in a week  Follow up with Neurology as scheduled     DIET: Regular Diet vegetarian     ACTIVITY: Activity as tolerated        DISCHARGE MEDICATIONS:   See Medication Reconciliation Form    · It is important that you take the medication exactly as they are prescribed. · Keep your medication in the bottles provided by the pharmacist and keep a list of the medication names, dosages, and times to be taken in your wallet. · Do not take other medications without consulting your doctor. NOTIFY YOUR PHYSICIAN FOR ANY OF THE FOLLOWING:   Fever over 101 degrees for 24 hours. Chest pain, shortness of breath, fever, chills, nausea, vomiting, diarrhea, change in mentation, falling, weakness, bleeding.  Severe pain or pain not relieved by medications. Or, any other signs or symptoms that you may have questions about. DISPOSITION:    Home With:   OT  PT  HH  RN       SNF/Inpatient Rehab/LTAC    Independent/assisted living    Hospice    Other:     CDMP Checked:   Yes ***     PROBLEM LIST Updated:  Yes ***       Information obtained by :   I understand that if any problems occur once I am at home I am to contact my physician. I understand and acknowledge receipt of the instructions indicated above.                                                                                                                                              Physician's or R.N.'s Signature                                                                  Date/Time                                                                                                                                              Patient or Representative Signature                                                          Date/Time          Signed:   Irvin Dorsey MD  6/2/2021  1:14 PM

## 2021-06-02 NOTE — PROGRESS NOTES
Transition of Care Plan/Discharge    RUR- 15%   DISPOSITION: The disposition plan is transition to a SNF; [facility]; pending medical progression  o The pt has been accepted by [address] has been received by the facility. o Call Report: [phone]   F/U with PCP/Specialist     Transport: [facility]; 2:30pm      Insurance Auth was declined by MD, \"stated he is too high level and needs can be met at a lower level of care\". This cm notes that the initial plan for the pt to transition to a SNF. This cm contacted the pt's family and informed them of the update and they are open to the pt transitioning to a SNF; [facility] or [facility] a bed is available. This cm contacted Dolores the Admissions Director at [facility], to discuss LINA. This cm left a message. Will try again at a later time. 12:06pm    This cm spoke with the admissions liaisonBettie from [facility] regarding LINA. The rep stated that the pt had been accepted and insurance auth was received. This cm was informed that the facility could take the pt today. This cm messaged the attending to identify if the pt was medically stable for DC. Transition of Care Plan to SNF/Rehab    SNF/Rehab Transition:  Patient has been accepted to [facility] and meets criteria for admission. Patient will transported by [facility] and expected to leave at 2:30pm.    Communication to Patient/Family:  Met with (identified care giver) and they are agreeable to the transition plan. Communication to SNF/Rehab:  Bedside RN, Víctor , has been notified to update the transition plan to the facility and call report (phone number [phone]). Discharge information has been updated on the AVS.         Nursing Please include all hard scripts for controlled substances, med rec and dc summary, and AVS in packet.      Reviewed and confirmed with facility, Admissions liaisonHarsha, can manage the patient care needs for the following:     SNF/Rehab Transition:  Patient to follow-up with Home Health: [facility], other  ,none)  PCP/Specialist:    Reviewed and confirmed with facility, Admissions liaison they can manage the patient care needs for the following:     Chucky Lao with (X) only those applicable:    Medication:  [x]  Medications will be available at the facility  []  IV Antibiotics   []  Controlled Substance - hard copy to be sent with patient   []  Weekly Labs   Documents:  [x] Hard RX  [x] MAR  [x] Kardex  [x] AVS  []Transfer Summary  [x]Discharge   Equipment:  []  CPAP/BiPAP  []  Wound Vacuum  []  [place] or Urinary Device  []  PICC/Central Line  []  Nebulizer  []  Ventilator   Treatment:  []Isolation (for MRSA, VRE, etc.)  []Surgical Drain Management  []Tracheostomy Care  []Dressing Changes  []Dialysis with transportation and chair time. []PEG Care  []Oxygen  []Daily Weights for Heart Failure   Dietary:  []Any diet limitations  []Tube Feedings   []Total Parenteral Management (TPN)   Eligible for Medicaid Long Term Services and Supports  Yes:  [] Eligible for medical assistance or will become eligible within 180 days and UAI completed. [] Provider/Patient and/or support system has requested screening. [] UAI copy provided to patient or responsible party. [] UAI unavailable at discharge will send once processed to SNF provider. [] UAI unavailable at discharged mailed to patient  No:   [x] Private pay and is not financially eligible for Medicaid within the next 180 days. [] Reside out-of-state.   [] A residents of a state owned/operated facility that is licensed  by [facility] or [facility]  [] Enrollment in [facility] hospice services  [] [address]  [] Patient /Family declines to have screening completed or provide financial information for screening     Financial Resources:  Medicaid    [] Initiated and application pending   [x] Full coverage     Advanced Care Plan:  []Surrogate Decision Maker of Care  []POA  []Communicated Code Status (, \"Full\")    Other       Medicare pt has received, reviewed, and signed 2nd IM letter informing them of their right to appeal the discharge. Signed copy has been placed on pt bedside chart. Care Management Interventions  PCP Verified by CM: Yes  Mode of Transport at Discharge: BLS  Transition of Care Consult (CM Consult):  Other, SNF ([facility] )  MyChart Signup: No  Discharge Durable Medical Equipment: No  Physical Therapy Consult: Yes  Occupational Therapy Consult: Yes  Speech Therapy Consult: Yes  Current Support Network: Lives with Spouse, Family Lives Nearby  Confirm Follow Up Transport: Self  The Patient and/or Patient Representative was Provided with a Choice of Provider and Agrees with the Discharge Plan?: Yes  Name of the Patient Representative Who was Provided with a Choice of Provider and Agrees with the Discharge Plan: Debby Marcos of Choice List was Provided with Basic Dialogue that Supports the Patient's Individualized Plan of Care/Goals, Treatment Preferences and Shares the Quality Data Associated with the Providers?: Yes  Discharge Location  Discharge Placement: Skilled nursing facility [facility])        CM: [address] MSW,   [phone]

## 2021-06-03 ENCOUNTER — DOCUMENTATION ONLY (OUTPATIENT)
Dept: CASE MANAGEMENT | Age: 66
End: 2021-06-03

## 2021-06-03 NOTE — PROGRESS NOTES
Call placed to [facility] -[phone] to confirm if walker was received prior to hospitalization. Spoke w/ Zion Abrams provider attempted 3 messages and unsuccessful w/ delivery. Order request by CM to be cancelled secondary to rehab placement.

## 2021-07-09 ENCOUNTER — TELEPHONE (OUTPATIENT)
Dept: PRIMARY CARE CLINIC | Age: 66
End: 2021-07-09

## 2021-07-13 ENCOUNTER — TELEPHONE (OUTPATIENT)
Dept: PRIMARY CARE CLINIC | Age: 66
End: 2021-07-13

## 2021-07-13 NOTE — TELEPHONE ENCOUNTER
----- Message from Erika Tracy sent at 7/13/2021  3:18 PM EDT -----  Regarding: Dr. Mix/Telephone  Contact: [phone]  Patient return call    Caller's first and last name and relationship (if not the patient): Mrs. Prudencio Rolon contact number(s):[phone]      Whose call is being returned: Unknown, only told to call back. Details to clarify the request: Sorry he missed your call.       Erika Tracy

## 2021-07-14 NOTE — TELEPHONE ENCOUNTER
Left message to call office. Was the braden mcgraw admitted to the hospital. I see that patient  was admitted beginning of June. Is the patient doing ok?

## 2021-07-15 ENCOUNTER — OFFICE VISIT (OUTPATIENT)
Dept: PRIMARY CARE CLINIC | Age: 66
End: 2021-07-15
Payer: MEDICARE

## 2021-07-15 ENCOUNTER — HOSPITAL ENCOUNTER (OUTPATIENT)
Dept: GENERAL RADIOLOGY | Age: 66
Discharge: HOME | End: 2021-07-15
Attending: INTERNAL MEDICINE
Payer: MEDICARE

## 2021-07-15 VITALS
HEART RATE: 100 BPM | SYSTOLIC BLOOD PRESSURE: 113 MMHG | DIASTOLIC BLOOD PRESSURE: 79 MMHG | BODY MASS INDEX: 23.42 KG/M2 | OXYGEN SATURATION: 98 % | TEMPERATURE: 97.5 F | HEIGHT: 70 IN | WEIGHT: 163.6 LBS | RESPIRATION RATE: 18 BRPM

## 2021-07-15 DIAGNOSIS — R06.09 DOE (DYSPNEA ON EXERTION): ICD-10-CM

## 2021-07-15 DIAGNOSIS — R00.0 TACHYCARDIA: ICD-10-CM

## 2021-07-15 DIAGNOSIS — N39.0 URINARY TRACT INFECTION WITHOUT HEMATURIA, SITE UNSPECIFIED: ICD-10-CM

## 2021-07-15 DIAGNOSIS — G20 PARKINSON'S DISEASE (HCC): ICD-10-CM

## 2021-07-15 DIAGNOSIS — N40.1 BENIGN PROSTATIC HYPERPLASIA WITH LOWER URINARY TRACT SYMPTOMS, SYMPTOM DETAILS UNSPECIFIED: Primary | ICD-10-CM

## 2021-07-15 DIAGNOSIS — Z86.16 HISTORY OF COVID-19: ICD-10-CM

## 2021-07-15 PROCEDURE — 1101F PT FALLS ASSESS-DOCD LE1/YR: CPT | Performed by: INTERNAL MEDICINE

## 2021-07-15 PROCEDURE — G8536 NO DOC ELDER MAL SCRN: HCPCS | Performed by: INTERNAL MEDICINE

## 2021-07-15 PROCEDURE — G9717 DOC PT DX DEP/BP F/U NT REQ: HCPCS | Performed by: INTERNAL MEDICINE

## 2021-07-15 PROCEDURE — 71046 X-RAY EXAM CHEST 2 VIEWS: CPT

## 2021-07-15 PROCEDURE — G8420 CALC BMI NORM PARAMETERS: HCPCS | Performed by: INTERNAL MEDICINE

## 2021-07-15 PROCEDURE — G8427 DOCREV CUR MEDS BY ELIG CLIN: HCPCS | Performed by: INTERNAL MEDICINE

## 2021-07-15 PROCEDURE — 99214 OFFICE O/P EST MOD 30 MIN: CPT | Performed by: INTERNAL MEDICINE

## 2021-07-15 PROCEDURE — 3017F COLORECTAL CA SCREEN DOC REV: CPT | Performed by: INTERNAL MEDICINE

## 2021-07-15 RX ORDER — ESCITALOPRAM OXALATE 10 MG/1
TABLET ORAL
COMMUNITY
Start: 2021-07-09 | End: 2021-07-15

## 2021-07-15 NOTE — PROGRESS NOTES
Chief Complaint   Patient presents with   [address]     discharged from [facility] on 7/7/2021

## 2021-07-15 NOTE — PROGRESS NOTES
Raina Meehan is a 74 y.o.  male and presents with     Chief Complaint   Patient presents with   Patricia Tobar     discharged from [facility] on 7/7/2021    Positive For Covid-19    Palpitations    Leg Swelling    Tremors   Patient was admitted to the hospital end of May  With symptoms of cough and fever and shortness of breath. Patient was diagnosed with Covid pneumonia  He was treated and subsequently transferred to rehab facility as he was very weak. He has a history of Parkinson's and is taking 1-1/2 tablet of Sinemet 3 times daily. And an extra tablet at night. He has appointment with neurologist at [facility] coming up soon. Wife reports that he has more stiffness all over his body. He got physical therapy while he was in rehab. He was treated for urinary tract infection and is currently taking Keflex. He was not catheterized. There is occasional swelling in the leg. However he is not on Lasix anymore. Patient does get short of breath easily and has tachycardia. He denies bleeding from any site. Denies any fever. Denies any urinary symptoms. Does have a history of enlarged prostate. Past Medical History:   Diagnosis Date    Arthritis     knees    Chronic pain     knees, back    Constipation     Depression     Gait difficulty     H/O seasonal allergies     Parkinson disease (HCC)     Restless leg syndrome     Urinary frequency      Past Surgical History:   Procedure Laterality Date    HX HERNIA REPAIR Right 08/15/2019    Robotic-assisted laparoscopic right inguinal herniorrhaphy with mesh. Current Outpatient Medications   Medication Sig    ascorbic acid, vitamin C, (Vitamin C) 1,000 mg tablet Take 1 Tablet by mouth two (2) times a day.  tamsulosin (Flomax) 0.4 mg capsule Take 1 Cap by mouth daily.  linaCLOtide (Linzess) 145 mcg cap capsule Take 1 Cap by mouth Daily (before breakfast).     diclofenac (VOLTAREN) 1 % gel Apply  to affected area four (4) times daily.  albuterol (PROVENTIL HFA, VENTOLIN HFA, PROAIR HFA) 90 mcg/actuation inhaler Take 2 Puffs by inhalation every four (4) hours as needed for Wheezing, Respiratory Distress or Cough. (Patient not taking: Reported on 7/15/2021)    budesonide (Pulmicort Flexhaler) 180 mcg/actuation aepb inhaler Take 2 Puffs by inhalation two (2) times a day. Rinse mouth afterwards (Patient not taking: Reported on 7/15/2021)    zinc sulfate 50 mg zinc (220 mg) tablet Use one tablet daily until gone (Patient not taking: Reported on 7/15/2021)    Walker (Ultra-Light Rollator) misc Use as directed    olopatadine (PATANOL) 0.1 % ophthalmic solution Administer 2 Drops to both eyes two (2) times a day. (Patient not taking: Reported on 7/15/2021)    carbidopa-levodopa (SINEMET)  mg per tablet TAKE 1 AND 1/2 TABLETS BY MOUTH THREE TIMES DAILY (Patient not taking: Reported on 7/15/2021)    ACETAMINOPHEN (TYLENOL PO) Take 650 mg by mouth as needed. (Patient not taking: Reported on 7/15/2021)     No current facility-administered medications for this visit. Health Maintenance   Topic Date Due    COVID-19 Vaccine (1) Never done    Shingrix Vaccine Age 50> (1 of 2) Never done    Medicare Yearly Exam  08/27/2019    Flu Vaccine (1) 09/01/2021    Lipid Screen  08/27/2023    Colorectal Cancer Screening Combo  02/12/2024    DTaP/Tdap/Td series (2 - Td or Tdap) 08/26/2028    Hepatitis C Screening  Completed    Pneumococcal 65+ years  Completed     Immunization History   Administered Date(s) Administered    Pneumococcal Conjugate (PCV-13) 08/26/2018    Pneumococcal Polysaccharide (PPSV-23) 04/22/2021    Tdap 08/26/2018     No LMP for male patient. Allergies and Intolerances: Allergies   Allergen Reactions    Sulfa (Sulfonamide Antibiotics) Rash       Family History:   No family history on file. Social History:   He  reports that he has never smoked.  He has never used smokeless tobacco.  He  reports no history of alcohol use. Review of Systems:   General: negative for - chills, fatigue, fever, weight change  Psych: negative for - anxiety, depression, irritability or mood swings  ENT: negative for - headaches, hearing change, nasal congestion, oral lesions, sneezing or sore throat  Heme/ Lymph: negative for - bleeding problems, bruising, pallor or swollen lymph nodes  Endo: negative for - hot flashes, polydipsia/polyuria or temperature intolerance  Resp: negative for - cough, shortness of breath or wheezing  CV: negative for - chest pain, edema or palpitations  GI: negative for - abdominal pain, change in bowel habits, constipation, diarrhea or nausea/vomiting  : negative for - dysuria, hematuria, incontinence, pelvic pain or vulvar/vaginal symptoms  MSK: negative for - joint pain, joint swelling or muscle pain  Neuro: negative for - confusion, headaches, seizures or weakness  Derm: negative for - dry skin, hair changes, rash or skin lesion changes          Physical:   Vitals:   Vitals:    07/15/21 1437   BP: 113/79   Pulse: 100   Resp: 18   Temp: 97.5 °F (36.4 °C)   TempSrc: Temporal   SpO2: 98%   Weight: 163 lb 9.6 oz (74.2 kg)   Height: 5' 10\" (1.778 m)           Exam:   HEENT- atraumatic,normocephalic, awake, oriented, well nourished, masklike face  Neck - supple,no enlarged lymph nodes, no JVD, no thyromegaly  Chest- CTA, no rhonchi, no crackles  Heart- rrr, no murmurs / gallop/rub, mild tachycardia  Abdomen- soft,BS+,NT, no hepatosplenomegaly  Ext -trace edema edema   Neuro-rigidity in extremities  Skin - warm,dry, no obvious rashes. Review of Data:   LABS:   Lab Results   Component Value Date/Time    WBC 4.8 06/01/2021 02:20 AM    HGB 12.9 06/01/2021 02:20 AM    HCT 39.6 06/01/2021 02:20 AM    PLATELET  58/44/5324 02:20 AM     UNABLE TO REPORT ACCURATE COUNT DUE TO PLATELET AGGREGATION, HOWEVER, PLATELETS APPEAR NORMAL IN NUMBER ON SMEAR.   PLEASE RESUBMIT SODIUM CITRATE (BLUE) AND EDTA (LAVENDER) TUBES FOR HEMATOLOGICAL TESTING. Lab Results   Component Value Date/Time    Sodium 138 06/01/2021 02:20 AM    Potassium 3.7 06/01/2021 02:20 AM    Chloride 108 06/01/2021 02:20 AM    CO2 24 06/01/2021 02:20 AM    Glucose 101 (H) 06/01/2021 02:20 AM    BUN 8 06/01/2021 02:20 AM    Creatinine 0.56 (L) 06/01/2021 02:20 AM     Lab Results   Component Value Date/Time    Cholesterol, total 200 (H) 08/27/2018 09:56 AM    HDL Cholesterol 41 08/27/2018 09:56 AM    LDL, calculated 109 (H) 08/27/2018 09:56 AM    Triglyceride 252 (H) 08/27/2018 09:56 AM     No components found for: GPT        Impression / Plan:        ICD-10-CM ICD-9-CM    1. Benign prostatic hyperplasia with lower urinary tract symptoms, symptom details unspecified  N40.1 600.01    2. Urinary tract infection without hematuria, site unspecified  N39.0 599.0 URINALYSIS W/ RFLX MICROSCOPIC      CULTURE, URINE      URINALYSIS W/ RFLX MICROSCOPIC      CULTURE, URINE   3. History of COVID-19  Z86.16 V12.09    4. Parkinson's disease ([facility] [address]  G20 332.0 CBC WITH AUTOMATED DIFF      METABOLIC PANEL, COMPREHENSIVE      CBC WITH AUTOMATED DIFF      METABOLIC PANEL, COMPREHENSIVE   5. Tachycardia  R00.0 785.0 XR CHEST PA LAT      ECHO ADULT COMPLETE   6. MUSTAFA (dyspnea on exertion)  R06.00 786.09 XR CHEST PA LAT      ECHO ADULT COMPLETE     Tachycardia-etiology unclear, will rule out underlying infection, recently had Covid infection, will rule out cardiomyopathy. Parkinson's-rigid, akinetic -has appointment with neurology at [facility] over 30 minutes with patient and his wife going over detailed history with regard to hospitalization and rehab center. Explained to patient risk benefits of the medications. Advised patient to stop meds if having any side effects. Pt verbalized understanding of the instructions. I have discussed the diagnosis with the patient and the intended plan as seen in the above orders.   The patient has received an after-visit summary and questions were answered concerning future plans. I have discussed medication side effects and warnings with the patient as well. I have reviewed the plan of care with the patient, accepted their input and they are in agreement with the treatment goals. Reviewed plan of care. Patient has provided input and agrees with goals. Follow-up and Dispositions    · Return in about 2 months (around 9/15/2021).          Ana Ramirez MD

## 2021-07-17 LAB
ALBUMIN SERPL-MCNC: 4 G/DL (ref 3.8–4.8)
ALBUMIN/GLOB SERPL: 1.4 {RATIO} (ref 1.2–2.2)
ALP SERPL-CCNC: 76 IU/L (ref 48–121)
ALT SERPL-CCNC: 11 IU/L (ref 0–44)
APPEARANCE UR: CLEAR
AST SERPL-CCNC: 23 IU/L (ref 0–40)
BACTERIA #/AREA URNS HPF: ABNORMAL /[HPF]
BACTERIA UR CULT: NORMAL
BASOPHILS # BLD AUTO: 0 X10E3/UL (ref 0–0.2)
BASOPHILS NFR BLD AUTO: 0 %
BILIRUB SERPL-MCNC: 0.5 MG/DL (ref 0–1.2)
BILIRUB UR QL STRIP: NEGATIVE
BUN SERPL-MCNC: 15 MG/DL (ref 8–27)
BUN/CREAT SERPL: 21 (ref 10–24)
CALCIUM SERPL-MCNC: 9.4 MG/DL (ref 8.6–10.2)
CASTS URNS QL MICRO: ABNORMAL /LPF
CHLORIDE SERPL-SCNC: 103 MMOL/L (ref 96–106)
CO2 SERPL-SCNC: 24 MMOL/L (ref 20–29)
COLOR UR: YELLOW
CREAT SERPL-MCNC: 0.71 MG/DL (ref 0.76–1.27)
EOSINOPHIL # BLD AUTO: 0.1 X10E3/UL (ref 0–0.4)
EOSINOPHIL NFR BLD AUTO: 2 %
EPI CELLS #/AREA URNS HPF: ABNORMAL /HPF (ref 0–10)
ERYTHROCYTE [DISTWIDTH] IN BLOOD BY AUTOMATED COUNT: 13.9 % (ref 11.6–15.4)
GLOBULIN SER CALC-MCNC: 2.9 G/DL (ref 1.5–4.5)
GLUCOSE SERPL-MCNC: 79 MG/DL (ref 65–99)
GLUCOSE UR QL: NEGATIVE
HCT VFR BLD AUTO: 41.6 % (ref 37.5–51)
HGB BLD-MCNC: 13.4 G/DL (ref 13–17.7)
HGB UR QL STRIP: NEGATIVE
IMM GRANULOCYTES # BLD AUTO: 0 X10E3/UL (ref 0–0.1)
IMM GRANULOCYTES NFR BLD AUTO: 0 %
KETONES UR QL STRIP: NEGATIVE
LEUKOCYTE ESTERASE UR QL STRIP: ABNORMAL
LYMPHOCYTES # BLD AUTO: 1.3 X10E3/UL (ref 0.7–3.1)
LYMPHOCYTES NFR BLD AUTO: 18 %
MCH RBC QN AUTO: 27.9 PG (ref 26.6–33)
MCHC RBC AUTO-ENTMCNC: 32.2 G/DL (ref 31.5–35.7)
MCV RBC AUTO: 87 FL (ref 79–97)
MICRO URNS: ABNORMAL
MONOCYTES # BLD AUTO: 0.6 X10E3/UL (ref 0.1–0.9)
MONOCYTES NFR BLD AUTO: 8 %
NEUTROPHILS # BLD AUTO: 5.3 X10E3/UL (ref 1.4–7)
NEUTROPHILS NFR BLD AUTO: 72 %
NITRITE UR QL STRIP: NEGATIVE
PH UR STRIP: 5.5 [PH] (ref 5–7.5)
PLATELET # BLD AUTO: 218 X10E3/UL (ref 150–450)
POTASSIUM SERPL-SCNC: 4.4 MMOL/L (ref 3.5–5.2)
PROT SERPL-MCNC: 6.9 G/DL (ref 6–8.5)
PROT UR QL STRIP: NEGATIVE
RBC # BLD AUTO: 4.8 X10E6/UL (ref 4.14–5.8)
RBC #/AREA URNS HPF: ABNORMAL /HPF (ref 0–2)
SODIUM SERPL-SCNC: 142 MMOL/L (ref 134–144)
SP GR UR: 1.01 (ref 1–1.03)
UROBILINOGEN UR STRIP-MCNC: 0.2 MG/DL (ref 0.2–1)
WBC # BLD AUTO: 7.4 X10E3/UL (ref 3.4–10.8)
WBC #/AREA URNS HPF: ABNORMAL /HPF (ref 0–5)

## 2021-07-19 NOTE — PROGRESS NOTES
Blood count, kidney function, liver function, and urine culture are all normal,  chest x-ray is normal

## 2021-07-26 ENCOUNTER — HOSPITAL ENCOUNTER (OUTPATIENT)
Dept: NON INVASIVE DIAGNOSTICS | Age: 66
Discharge: HOME | End: 2021-07-26
Attending: INTERNAL MEDICINE
Payer: MEDICARE

## 2021-07-26 VITALS
SYSTOLIC BLOOD PRESSURE: 115 MMHG | DIASTOLIC BLOOD PRESSURE: 80 MMHG | WEIGHT: 163 LBS | HEIGHT: 70 IN | BODY MASS INDEX: 23.34 KG/M2

## 2021-07-26 DIAGNOSIS — R06.09 DOE (DYSPNEA ON EXERTION): ICD-10-CM

## 2021-07-26 DIAGNOSIS — R00.0 TACHYCARDIA: ICD-10-CM

## 2021-07-26 LAB
ECHO AO ROOT DIAM: 2.96 CM
ECHO AV AREA PEAK VELOCITY: 2.88 CM2
ECHO AV AREA/BSA PEAK VELOCITY: 1.5 CM2/M2
ECHO AV PEAK GRADIENT: 4.54 MMHG
ECHO AV PEAK VELOCITY: 106.53 CM/S
ECHO EST RA PRESSURE: 5 MMHG
ECHO LA AREA 4C: 12.5 CM2
ECHO LA MAJOR AXIS: 3.78 CM
ECHO LA MINOR AXIS: 1.98 CM
ECHO LA VOL 2C: 25.77 ML (ref 18–58)
ECHO LA VOL 4C: 30.34 ML (ref 18–58)
ECHO LA VOL BP: 30.12 ML (ref 18–58)
ECHO LA VOL/BSA BIPLANE: 15.77 ML/M2 (ref 16–28)
ECHO LA VOLUME INDEX A2C: 13.49 ML/M2 (ref 16–28)
ECHO LA VOLUME INDEX A4C: 15.88 ML/M2 (ref 16–28)
ECHO LV INTERNAL DIMENSION DIASTOLIC: 4.82 CM (ref 4.2–5.9)
ECHO LV INTERNAL DIMENSION SYSTOLIC: 3.29 CM
ECHO LV IVSD: 1.15 CM (ref 0.6–1)
ECHO LV IVSS: 1.23 CM
ECHO LV MASS 2D: 192.8 G (ref 88–224)
ECHO LV MASS INDEX 2D: 100.9 G/M2 (ref 49–115)
ECHO LV POSTERIOR WALL DIASTOLIC: 1.03 CM (ref 0.6–1)
ECHO LV POSTERIOR WALL SYSTOLIC: 1.25 CM
ECHO LVOT DIAM: 2.15 CM
ECHO LVOT PEAK GRADIENT: 2.88 MMHG
ECHO LVOT PEAK VELOCITY: 84.87 CM/S
ECHO MV A VELOCITY: 82.63 CM/S
ECHO MV E DECELERATION TIME (DT): 284.9 MS
ECHO MV E VELOCITY: 53.45 CM/S
ECHO MV E/A RATIO: 0.65
ECHO PV PEAK INSTANTANEOUS GRADIENT SYSTOLIC: 4.36 MMHG
ECHO RV INTERNAL DIMENSION: 3.84 CM
ECHO RV TAPSE: 1.59 CM (ref 1.5–2)
LA VOL DISK BP: 28.49 ML (ref 18–58)

## 2021-07-26 PROCEDURE — 93306 TTE W/DOPPLER COMPLETE: CPT | Performed by: SPECIALIST

## 2021-07-26 PROCEDURE — 93306 TTE W/DOPPLER COMPLETE: CPT

## 2021-07-28 DIAGNOSIS — N40.1 BENIGN PROSTATIC HYPERPLASIA WITH LOWER URINARY TRACT SYMPTOMS, SYMPTOM DETAILS UNSPECIFIED: ICD-10-CM

## 2021-07-28 RX ORDER — TAMSULOSIN HYDROCHLORIDE 0.4 MG/1
CAPSULE ORAL
Qty: 30 CAPSULE | Refills: 1 | Status: SHIPPED | OUTPATIENT
Start: 2021-07-28 | End: 2021-10-01

## 2021-08-03 ENCOUNTER — HOSPITAL ENCOUNTER (OUTPATIENT)
Dept: GENERAL RADIOLOGY | Age: 66
Discharge: HOME | End: 2021-08-03
Attending: INTERNAL MEDICINE
Payer: MEDICARE

## 2021-08-03 ENCOUNTER — OFFICE VISIT (OUTPATIENT)
Dept: PRIMARY CARE CLINIC | Age: 66
End: 2021-08-03
Payer: MEDICARE

## 2021-08-03 VITALS
WEIGHT: 169.8 LBS | HEART RATE: 86 BPM | TEMPERATURE: 98.1 F | RESPIRATION RATE: 18 BRPM | SYSTOLIC BLOOD PRESSURE: 127 MMHG | HEIGHT: 70 IN | DIASTOLIC BLOOD PRESSURE: 81 MMHG | BODY MASS INDEX: 24.31 KG/M2 | OXYGEN SATURATION: 99 %

## 2021-08-03 DIAGNOSIS — R05.9 COUGH: ICD-10-CM

## 2021-08-03 DIAGNOSIS — G89.29 CHRONIC MIDLINE LOW BACK PAIN WITHOUT SCIATICA: ICD-10-CM

## 2021-08-03 DIAGNOSIS — G20 PARKINSON'S DISEASE (HCC): Primary | ICD-10-CM

## 2021-08-03 DIAGNOSIS — M54.50 CHRONIC MIDLINE LOW BACK PAIN WITHOUT SCIATICA: ICD-10-CM

## 2021-08-03 DIAGNOSIS — N39.490 OVERFLOW INCONTINENCE OF URINE: ICD-10-CM

## 2021-08-03 DIAGNOSIS — M79.89 LEG SWELLING: ICD-10-CM

## 2021-08-03 PROCEDURE — G8420 CALC BMI NORM PARAMETERS: HCPCS | Performed by: INTERNAL MEDICINE

## 2021-08-03 PROCEDURE — G8427 DOCREV CUR MEDS BY ELIG CLIN: HCPCS | Performed by: INTERNAL MEDICINE

## 2021-08-03 PROCEDURE — G9717 DOC PT DX DEP/BP F/U NT REQ: HCPCS | Performed by: INTERNAL MEDICINE

## 2021-08-03 PROCEDURE — 3017F COLORECTAL CA SCREEN DOC REV: CPT | Performed by: INTERNAL MEDICINE

## 2021-08-03 PROCEDURE — G8536 NO DOC ELDER MAL SCRN: HCPCS | Performed by: INTERNAL MEDICINE

## 2021-08-03 PROCEDURE — 72100 X-RAY EXAM L-S SPINE 2/3 VWS: CPT

## 2021-08-03 PROCEDURE — 99214 OFFICE O/P EST MOD 30 MIN: CPT | Performed by: INTERNAL MEDICINE

## 2021-08-03 PROCEDURE — 1101F PT FALLS ASSESS-DOCD LE1/YR: CPT | Performed by: INTERNAL MEDICINE

## 2021-08-03 RX ORDER — FUROSEMIDE 20 MG/1
20 TABLET ORAL
Qty: 90 TABLET | Refills: 0 | Status: SHIPPED | OUTPATIENT
Start: 2021-08-03 | End: 2021-10-30

## 2021-08-03 NOTE — PROGRESS NOTES
Daniel Montes De Oca is a 74 y.o.  male and presents with     Chief Complaint   Patient presents with    Back Pain     follow up     Cough    Leg Swelling    Tremors     Patient is here for follow-up on x-ray and echocardiogram results. Patient has Parkinson's and walks slow. He has swelling in his legs. He has a mild cough and whitish phlegm. He has incontinence of urine and is requesting adult diapers. He has chronic back pain. Past Medical History:   Diagnosis Date    Arthritis     knees    Chronic pain     knees, back    Constipation     Depression     Gait difficulty     H/O seasonal allergies     Parkinson disease (HCC)     Restless leg syndrome     Urinary frequency      Past Surgical History:   Procedure Laterality Date    HX HERNIA REPAIR Right 08/15/2019    Robotic-assisted laparoscopic right inguinal herniorrhaphy with mesh. Current Outpatient Medications   Medication Sig    furosemide (LASIX) 20 mg tablet Take 1 Tablet by mouth every morning.  brief disposable (adult) misc by Does Not Apply route.  Incontinence Pad, Liner, Disp pads Use as directed    tamsulosin (FLOMAX) 0.4 mg capsule TAKE 1 CAPSULE BY MOUTH DAILY    Walker (Ultra-Light Rollator) misc Use as directed    diclofenac (VOLTAREN) 1 % gel Apply  to affected area four (4) times daily.  ACETAMINOPHEN (TYLENOL PO) Take 650 mg by mouth as needed.  carbidopa-levodopa (SINEMET)  mg per tablet TAKE 1 AND 1/2 TABLETS BY MOUTH THREE TIMES DAILY (Patient not taking: Reported on 7/15/2021)     No current facility-administered medications for this visit.      Health Maintenance   Topic Date Due    COVID-19 Vaccine (1) Never done    Shingrix Vaccine Age 50> (1 of 2) Never done    Medicare Yearly Exam  08/27/2019    Flu Vaccine (1) 09/01/2021    Lipid Screen  08/27/2023    Colorectal Cancer Screening Combo  02/12/2024    DTaP/Tdap/Td series (2 - Td or Tdap) 08/26/2028    Hepatitis C Screening  Completed  Pneumococcal 65+ years  Completed     Immunization History   Administered Date(s) Administered    Pneumococcal Conjugate (PCV-13) 08/26/2018    Pneumococcal Polysaccharide (PPSV-23) 04/22/2021    Tdap 08/26/2018     No LMP for male patient. Allergies and Intolerances: Allergies   Allergen Reactions    Sulfa (Sulfonamide Antibiotics) Rash       Family History:   No family history on file. Social History:   He  reports that he has never smoked. He has never used smokeless tobacco.  He  reports no history of alcohol use.             Review of Systems:   General: negative for - chills, fatigue, fever, weight change  Psych: negative for - anxiety, depression, irritability or mood swings  ENT: negative for - headaches, hearing change, nasal congestion, oral lesions, sneezing or sore throat  Heme/ Lymph: negative for - bleeding problems, bruising, pallor or swollen lymph nodes  Endo: negative for - hot flashes, polydipsia/polyuria or temperature intolerance  Resp: negative for - cough, shortness of breath or wheezing  CV: negative for - chest pain, edema or palpitations  GI: negative for - abdominal pain, change in bowel habits, constipation, diarrhea or nausea/vomiting  : negative for - dysuria, hematuria, incontinence, pelvic pain or vulvar/vaginal symptoms  MSK: negative for - joint pain, joint swelling or muscle pain  Neuro: negative for - confusion, headaches, seizures or weakness  Derm: negative for - dry skin, hair changes, rash or skin lesion changes          Physical:   Vitals:   Vitals:    08/03/21 0932   BP: 127/81   Pulse: 86   Resp: 18   Temp: 98.1 °F (36.7 °C)   TempSrc: Oral   SpO2: 99%   Weight: 169 lb 12.8 oz (77 kg)   Height: 5' 10\" (1.778 m)           Exam:   HEENT- atraumatic,normocephalic, awake, oriented, well nourished  Neck - supple,no enlarged lymph nodes, no JVD, no thyromegaly  Chest- CTA, no rhonchi, no crackles  Heart- rrr, no murmurs / gallop/rub  Abdomen- soft,BS+,NT, no hepatosplenomegaly  Ext -bilateral lower extremity pitting edema  Neuro-walks slow, uses a walker  Skin - warm,dry, no obvious rashes. Review of Data:   LABS:   Lab Results   Component Value Date/Time    WBC 7.4 07/15/2021 12:00 AM    HGB 13.4 07/15/2021 12:00 AM    HCT 41.6 07/15/2021 12:00 AM    PLATELET 664 97/64/0277 12:00 AM     Lab Results   Component Value Date/Time    Sodium 142 07/15/2021 12:00 AM    Potassium 4.4 07/15/2021 12:00 AM    Chloride 103 07/15/2021 12:00 AM    CO2 24 07/15/2021 12:00 AM    Glucose 79 07/15/2021 12:00 AM    BUN 15 07/15/2021 12:00 AM    Creatinine 0.71 (L) 07/15/2021 12:00 AM     Lab Results   Component Value Date/Time    Cholesterol, total 200 (H) 08/27/2018 09:56 AM    HDL Cholesterol 41 08/27/2018 09:56 AM    LDL, calculated 109 (H) 08/27/2018 09:56 AM    Triglyceride 252 (H) 08/27/2018 09:56 AM     No components found for: GPT        Impression / Plan:        ICD-10-CM ICD-9-CM    1. Parkinson's disease ([facility] [address]  G20 332.0 Incontinence Pad, Liner, Disp pads   2. Leg swelling  M79.89 729.81 furosemide (LASIX) 20 mg tablet   3. Chronic midline low back pain without sciatica  M54.5 724.2 XR SPINE LUMB 2 OR 3 V    G89.29 338.29    4. Overflow incontinence of urine  N39.490 788.38 brief disposable (adult) misc      Incontinence Pad, Liner, Disp pads   5. Cough  R05 786.2      Reviewed lab results, chest x-ray results and echocardiogram results. Chest x-ray is negative for any pleural effusion or cardiomegaly. Echocardiogram shows normal systolic function mild diastolic dysfunction,  Lab results are negative for anemia. Kidney function and liver function are normal.  TSH was checked previously and it was normal.  Reassured patient that the leg swelling was partially due to Parkinson's and slow gait and gravity and stasis. He should monitor his fluid intake. Take fluid as needed for leg swelling. Explained to patient risk benefits of the medications. Advised patient to stop meds if having any side effects. Pt verbalized understanding of the instructions. I have discussed the diagnosis with the patient and the intended plan as seen in the above orders. The patient has received an after-visit summary and questions were answered concerning future plans. I have discussed medication side effects and warnings with the patient as well. I have reviewed the plan of care with the patient, accepted their input and they are in agreement with the treatment goals. Reviewed plan of care. Patient has provided input and agrees with goals.         Yina Garcia MD

## 2021-08-03 NOTE — PROGRESS NOTES
Chief Complaint   Patient presents with    Back Pain     follow up         Visit Vitals  /81 (BP 1 Location: Right upper arm, BP Patient Position: Sitting)   Pulse 86   Temp 98.1 °F (36.7 °C) (Oral)   Resp 18   Ht 5' 10\" (1.778 m)   Wt 169 lb 12.8 oz (77 kg)   SpO2 99%   BMI 24.36 kg/m²

## 2021-09-29 DIAGNOSIS — N40.1 BENIGN PROSTATIC HYPERPLASIA WITH LOWER URINARY TRACT SYMPTOMS, SYMPTOM DETAILS UNSPECIFIED: ICD-10-CM

## 2021-10-01 RX ORDER — TAMSULOSIN HYDROCHLORIDE 0.4 MG/1
CAPSULE ORAL
Qty: 30 CAPSULE | Refills: 1 | Status: SHIPPED | OUTPATIENT
Start: 2021-10-01 | End: 2021-11-22 | Stop reason: SDUPTHER

## 2021-10-28 DIAGNOSIS — M79.89 LEG SWELLING: ICD-10-CM

## 2021-10-30 RX ORDER — FUROSEMIDE 20 MG/1
20 TABLET ORAL
Qty: 90 TABLET | Refills: 0 | Status: SHIPPED | OUTPATIENT
Start: 2021-10-30 | End: 2021-11-22

## 2021-11-22 ENCOUNTER — OFFICE VISIT (OUTPATIENT)
Dept: PRIMARY CARE CLINIC | Age: 66
End: 2021-11-22
Payer: MEDICARE

## 2021-11-22 VITALS
HEART RATE: 103 BPM | TEMPERATURE: 97.3 F | HEIGHT: 70 IN | OXYGEN SATURATION: 100 % | BODY MASS INDEX: 26.34 KG/M2 | DIASTOLIC BLOOD PRESSURE: 73 MMHG | SYSTOLIC BLOOD PRESSURE: 108 MMHG | RESPIRATION RATE: 18 BRPM | WEIGHT: 184 LBS

## 2021-11-22 DIAGNOSIS — Z91.81 HISTORY OF FALL: ICD-10-CM

## 2021-11-22 DIAGNOSIS — R29.898 WEAKNESS OF BOTH LEGS: ICD-10-CM

## 2021-11-22 DIAGNOSIS — K59.04 CHRONIC IDIOPATHIC CONSTIPATION: ICD-10-CM

## 2021-11-22 DIAGNOSIS — Z23 NEED FOR VACCINATION: ICD-10-CM

## 2021-11-22 DIAGNOSIS — G20 PARKINSON'S DISEASE (HCC): Primary | ICD-10-CM

## 2021-11-22 DIAGNOSIS — M79.89 LEG SWELLING: ICD-10-CM

## 2021-11-22 DIAGNOSIS — N40.1 BENIGN PROSTATIC HYPERPLASIA WITH LOWER URINARY TRACT SYMPTOMS, SYMPTOM DETAILS UNSPECIFIED: ICD-10-CM

## 2021-11-22 DIAGNOSIS — M20.11 HALLUX VALGUS OF RIGHT FOOT: ICD-10-CM

## 2021-11-22 PROCEDURE — G8427 DOCREV CUR MEDS BY ELIG CLIN: HCPCS | Performed by: INTERNAL MEDICINE

## 2021-11-22 PROCEDURE — G9717 DOC PT DX DEP/BP F/U NT REQ: HCPCS | Performed by: INTERNAL MEDICINE

## 2021-11-22 PROCEDURE — 3017F COLORECTAL CA SCREEN DOC REV: CPT | Performed by: INTERNAL MEDICINE

## 2021-11-22 PROCEDURE — 99214 OFFICE O/P EST MOD 30 MIN: CPT | Performed by: INTERNAL MEDICINE

## 2021-11-22 PROCEDURE — G8536 NO DOC ELDER MAL SCRN: HCPCS | Performed by: INTERNAL MEDICINE

## 2021-11-22 PROCEDURE — 1101F PT FALLS ASSESS-DOCD LE1/YR: CPT | Performed by: INTERNAL MEDICINE

## 2021-11-22 PROCEDURE — G8419 CALC BMI OUT NRM PARAM NOF/U: HCPCS | Performed by: INTERNAL MEDICINE

## 2021-11-22 RX ORDER — TAMSULOSIN HYDROCHLORIDE 0.4 MG/1
0.4 CAPSULE ORAL DAILY
Qty: 90 CAPSULE | Refills: 1 | Status: SHIPPED | OUTPATIENT
Start: 2021-11-22 | End: 2022-04-24

## 2021-11-22 RX ORDER — CARBIDOPA AND LEVODOPA 50; 200 MG/1; MG/1
1 TABLET, EXTENDED RELEASE ORAL
COMMUNITY

## 2021-11-22 RX ORDER — ZOSTER VACCINE RECOMBINANT, ADJUVANTED 50 MCG/0.5
0.5 KIT INTRAMUSCULAR ONCE
Qty: 0.5 ML | Refills: 0 | Status: SHIPPED | OUTPATIENT
Start: 2021-11-22 | End: 2021-11-22

## 2021-11-22 NOTE — PROGRESS NOTES
Maribel Rosenthal is a 74 y.o.  male and presents with     Chief Complaint   Patient presents with    Medication Evaluation    Constipation    Benign Prostatic Hypertrophy    Tremors    Weight Gain     Pt is having dry mouth and has difficulty getting up from his bed. Pt has difficulty wiih bowel movements  Pt was getting boost and has gained wt. Pt is currently not having home health. Pt has difficulty with walking. Pt has gained wt. Pt also has frequent urination and is requesting flomax. Past Medical History:   Diagnosis Date    Arthritis     knees    Chronic pain     knees, back    Constipation     Depression     Gait difficulty     H/O seasonal allergies     Parkinson disease (HCC)     Restless leg syndrome     Urinary frequency      Past Surgical History:   Procedure Laterality Date    HX HERNIA REPAIR Right 08/15/2019    Robotic-assisted laparoscopic right inguinal herniorrhaphy with mesh. Current Outpatient Medications   Medication Sig    carbidopa-levodopa ER (SINEMET CR)  mg per tablet Take 1 Tablet by mouth two (2) times a day.  tamsulosin (FLOMAX) 0.4 mg capsule Take 1 Capsule by mouth daily.  linaCLOtide (Linzess) 145 mcg cap capsule Take 1 Capsule by mouth Daily (before breakfast).  varicella-zoster recombinant, PF, (Shingrix, PF,) 50 mcg/0.5 mL susr injection 0.5 mL by IntraMUSCular route once for 1 dose.  brief disposable (adult) misc by Does Not Apply route.  Incontinence Pad, Liner, Disp pads Use as directed    Walker (Ultra-Light Rollator) misc Use as directed    carbidopa-levodopa (SINEMET)  mg per tablet TAKE 1 AND 1/2 TABLETS BY MOUTH THREE TIMES DAILY    diclofenac (VOLTAREN) 1 % gel Apply  to affected area four (4) times daily.  ACETAMINOPHEN (TYLENOL PO) Take 650 mg by mouth as needed. No current facility-administered medications for this visit.      Health Maintenance   Topic Date Due    Shingrix Vaccine Age 50> (1 of 2) Never done    Medicare Yearly Exam  08/27/2019    Flu Vaccine (1) 06/30/2022 (Originally 9/1/2021)    COVID-19 Vaccine (3 - Booster for Pfizer series) 03/18/2022    Lipid Screen  08/27/2023    Colorectal Cancer Screening Combo  02/12/2024    DTaP/Tdap/Td series (2 - Td or Tdap) 08/26/2028    Hepatitis C Screening  Completed    Pneumococcal 65+ years  Completed     Immunization History   Administered Date(s) Administered    COVID-19, PFIZER, MRNA, LNP-S, PF, 30MCG/0.3ML DOSE 08/21/2021, 09/18/2021    Pneumococcal Conjugate (PCV-13) 08/26/2018    Pneumococcal Polysaccharide (PPSV-23) 04/22/2021    Tdap 08/26/2018     No LMP for male patient. Allergies and Intolerances: Allergies   Allergen Reactions    Sulfa (Sulfonamide Antibiotics) Rash       Family History:   No family history on file. Social History:   He  reports that he has never smoked. He has never used smokeless tobacco.  He  reports no history of alcohol use.             Review of Systems:   General: negative for - chills, fatigue, fever, weight change  Psych: negative for - anxiety, depression, irritability or mood swings  ENT: negative for - headaches, hearing change, nasal congestion, oral lesions, sneezing or sore throat  Heme/ Lymph: negative for - bleeding problems, bruising, pallor or swollen lymph nodes  Endo: negative for - hot flashes, polydipsia/polyuria or temperature intolerance  Resp: negative for - cough, shortness of breath or wheezing  CV: negative for - chest pain, edema or palpitations  GI: negative for - abdominal pain, change in bowel habits, constipation, diarrhea or nausea/vomiting  : negative for - dysuria, hematuria, incontinence, pelvic pain or vulvar/vaginal symptoms  MSK: negative for - joint pain, joint swelling or muscle pain  Neuro: negative for - confusion, headaches, seizures or weakness  Derm: negative for - dry skin, hair changes, rash or skin lesion changes          Physical:   Vitals:   Vitals: 11/22/21 1348   BP: 108/73   Pulse: (!) 103   Resp: 18   Temp: 97.3 °F (36.3 °C)   TempSrc: Temporal   SpO2: 100%   Weight: 184 lb (83.5 kg)   Height: 5' 10\" (1.778 m)           Exam:   HEENT- atraumatic,normocephalic, awake, oriented, well nourished  Neck - supple,no enlarged lymph nodes, no JVD, no thyromegaly  Chest- CTA, no rhonchi, no crackles  Heart- rrr, no murmurs / gallop/rub  Abdomen- soft,BS+,NT, no hepatosplenomegaly  Ext - no c/c/edema   Neuro- no focal deficits. Power 5/5 all extremities  Skin - warm,dry, no obvious rashes. Review of Data:   LABS:   Lab Results   Component Value Date/Time    WBC 7.4 07/15/2021 12:00 AM    HGB 13.4 07/15/2021 12:00 AM    HCT 41.6 07/15/2021 12:00 AM    PLATELET 124 92/35/3356 12:00 AM     Lab Results   Component Value Date/Time    Sodium 142 07/15/2021 12:00 AM    Potassium 4.4 07/15/2021 12:00 AM    Chloride 103 07/15/2021 12:00 AM    CO2 24 07/15/2021 12:00 AM    Glucose 79 07/15/2021 12:00 AM    BUN 15 07/15/2021 12:00 AM    Creatinine 0.71 (L) 07/15/2021 12:00 AM     Lab Results   Component Value Date/Time    Cholesterol, total 200 (H) 08/27/2018 09:56 AM    HDL Cholesterol 41 08/27/2018 09:56 AM    LDL, calculated 109 (H) 08/27/2018 09:56 AM    Triglyceride 252 (H) 08/27/2018 09:56 AM     No components found for: GPT        Impression / Plan:        ICD-10-CM ICD-9-CM    1. Parkinson's disease ([facility] [address]  G20 332.0 REFERRAL TO HOME HEALTH   2. Benign prostatic hyperplasia with lower urinary tract symptoms, symptom details unspecified  N40.1 600.01 tamsulosin (FLOMAX) 0.4 mg capsule   3. Chronic idiopathic constipation  K59.04 564.00 linaCLOtide (Linzess) 145 mcg cap capsule   4. Leg swelling  M79.89 729.81 REFERRAL TO HOME HEALTH   5. Hallux valgus of right foot  M20.11 735.0    6. Need for vaccination  Z23 V05.9 varicella-zoster recombinant, PF, (Shingrix, PF,) 50 mcg/0.5 mL susr injection   7. History of fall  Z91.81 [facility]15.88 [facility]   8. Weakness of both legs  R29.898 729.89 REFERRAL TO HOME HEALTH       Explained to patient risk benefits of the medications. Advised patient to stop meds if having any side effects. Pt verbalized understanding of the instructions. I have discussed the diagnosis with the patient and the intended plan as seen in the above orders. The patient has received an after-visit summary and questions were answered concerning future plans. I have discussed medication side effects and warnings with the patient as well. I have reviewed the plan of care with the patient, accepted their input and they are in agreement with the treatment goals. Reviewed plan of care. Patient has provided input and agrees with goals. Follow-up and Dispositions    · Return in about 3 months (around 2/22/2022).          Magdaleno Hendrix MD

## 2021-11-22 NOTE — PROGRESS NOTES
Chief Complaint   Patient presents with    Medication Evaluation        Visit Vitals  /73 (BP 1 Location: Right upper arm, BP Patient Position: Sitting)   Pulse 69   Temp 97.3 °F (36.3 °C) (Temporal)   Resp 18   Ht 5' 10\" (1.778 m)   Wt 184 lb (83.5 kg)   SpO2 100%   BMI 26.40 kg/m²        1. Have you been to the ER, urgent care clinic since your last visit? Hospitalized since your last visit? No    2. Have you seen or consulted any other health care providers outside of the [address] [place] [address] since your last visit? Include any pap smears or colon screening.  No

## 2022-02-09 ENCOUNTER — OFFICE VISIT (OUTPATIENT)
Dept: PRIMARY CARE CLINIC | Age: 67
End: 2022-02-09
Payer: MEDICARE

## 2022-02-09 VITALS
BODY MASS INDEX: 25.77 KG/M2 | DIASTOLIC BLOOD PRESSURE: 90 MMHG | RESPIRATION RATE: 18 BRPM | OXYGEN SATURATION: 99 % | SYSTOLIC BLOOD PRESSURE: 140 MMHG | WEIGHT: 180 LBS | HEIGHT: 70 IN | HEART RATE: 102 BPM

## 2022-02-09 DIAGNOSIS — K59.04 CHRONIC IDIOPATHIC CONSTIPATION: Primary | ICD-10-CM

## 2022-02-09 DIAGNOSIS — R29.898 WEAKNESS OF BOTH LEGS: ICD-10-CM

## 2022-02-09 DIAGNOSIS — E53.8 B12 DEFICIENCY: ICD-10-CM

## 2022-02-09 DIAGNOSIS — N39.490 OVERFLOW INCONTINENCE OF URINE: ICD-10-CM

## 2022-02-09 DIAGNOSIS — G20 PARKINSON'S DISEASE (HCC): ICD-10-CM

## 2022-02-09 DIAGNOSIS — E55.9 VITAMIN D DEFICIENCY: ICD-10-CM

## 2022-02-09 DIAGNOSIS — R68.89 COLD INTOLERANCE: ICD-10-CM

## 2022-02-09 PROCEDURE — 99213 OFFICE O/P EST LOW 20 MIN: CPT | Performed by: INTERNAL MEDICINE

## 2022-02-09 PROCEDURE — G8536 NO DOC ELDER MAL SCRN: HCPCS | Performed by: INTERNAL MEDICINE

## 2022-02-09 PROCEDURE — G8419 CALC BMI OUT NRM PARAM NOF/U: HCPCS | Performed by: INTERNAL MEDICINE

## 2022-02-09 PROCEDURE — 3017F COLORECTAL CA SCREEN DOC REV: CPT | Performed by: INTERNAL MEDICINE

## 2022-02-09 PROCEDURE — G9717 DOC PT DX DEP/BP F/U NT REQ: HCPCS | Performed by: INTERNAL MEDICINE

## 2022-02-09 PROCEDURE — 1101F PT FALLS ASSESS-DOCD LE1/YR: CPT | Performed by: INTERNAL MEDICINE

## 2022-02-09 PROCEDURE — G8427 DOCREV CUR MEDS BY ELIG CLIN: HCPCS | Performed by: INTERNAL MEDICINE

## 2022-02-09 RX ORDER — MELATONIN
1000 DAILY
Qty: 90 TABLET | Refills: 1 | Status: SHIPPED | OUTPATIENT
Start: 2022-02-09 | End: 2022-06-13

## 2022-02-09 RX ORDER — POLYETHYLENE GLYCOL 3350 17 G/17G
17 POWDER, FOR SOLUTION ORAL DAILY PRN
COMMUNITY

## 2022-02-09 NOTE — PROGRESS NOTES
Identified pt with two pt identifiers(name and ). Chief Complaint   Patient presents with    Parkinsons Disease     Follow Up     Palpitations     At night during sleep       Vitals:    22 1300 22 1302   BP: (!) 143/96 (!) 140/90   Pulse: (!) 102    Resp: 18    SpO2: 99%    Weight: 180 lb (81.6 kg)    Height: 5' 10\" (1.778 m)    PainSc:   6    PainLoc: Back       Health Maintenance Due   Topic    Depression Monitoring     Shingrix Vaccine Age 49> (1 of 2)    Medicare Yearly Exam        Depression Screening:  :     3 most recent PHQ Screens 2022   Little interest or pleasure in doing things Not at all   Feeling down, depressed, irritable, or hopeless Not at all   Total Score PHQ 2 0        Fall Risk Assessment:  :     Fall Risk Assessment, last 12 mths 2022   Able to walk? Yes   Fall in past 12 months? 0   Do you feel unsteady? 0   Are you worried about falling 0   Is the gait abnormal? -   Number of falls in past 12 months -   Fall with injury? -       Abuse Screening:  :     Abuse Screening Questionnaire 11/3/2017   Do you ever feel afraid of your partner? N   Are you in a relationship with someone who physically or mentally threatens you? N   Is it safe for you to go home? Y        Coordination of Care Questionnaire:  :     1. Have you been to the ER, urgent care clinic since your last visit? Hospitalized since your last visit? No    2. Have you seen or consulted any other health care providers outside of the [address] [place] [address] since your last visit? Include any pap smears or colon screening.   No

## 2022-02-09 NOTE — PROGRESS NOTES
Lilia Silva is a 74 y.o.  male and presents with     Chief Complaint   Patient presents with    Parkinsons Disease     Follow Up     Palpitations     At night during sleep      Pt has nocturia times 3. Patient saw urologist and was told his prostate was normal.  Patient does not think that the Flomax is helping  Pt gets palptiations at night time. Pt says constipation is better on linzess. Feels cold all the time. Past Medical History:   Diagnosis Date    Arthritis     knees    Chronic pain     knees, back    Constipation     Depression     Gait difficulty     H/O seasonal allergies     Parkinson disease (HCC)     Restless leg syndrome     Urinary frequency      Past Surgical History:   Procedure Laterality Date    HX HERNIA REPAIR Right 08/15/2019    Robotic-assisted laparoscopic right inguinal herniorrhaphy with mesh. Current Outpatient Medications   Medication Sig    polyethylene glycol (MIRALAX) 17 gram/dose powder Take 17 g by mouth daily as needed.  cholecalciferol (VITAMIN D3) (1000 Units /25 mcg) tablet Take 1 Tablet by mouth daily.  carbidopa-levodopa ER (SINEMET CR)  mg per tablet Take 1 Tablet by mouth two (2) times a day.  tamsulosin (FLOMAX) 0.4 mg capsule Take 1 Capsule by mouth daily.  linaCLOtide (Linzess) 145 mcg cap capsule Take 1 Capsule by mouth Daily (before breakfast).  brief disposable (adult) misc by Does Not Apply route.  Incontinence Pad, Liner, Disp pads Use as directed    Walker (Ultra-Light Rollator) misc Use as directed    carbidopa-levodopa (SINEMET)  mg per tablet TAKE 1 AND 1/2 TABLETS BY MOUTH THREE TIMES DAILY    diclofenac (VOLTAREN) 1 % gel Apply  to affected area four (4) times daily.  ACETAMINOPHEN (TYLENOL PO) Take 650 mg by mouth as needed. No current facility-administered medications for this visit.      Health Maintenance   Topic Date Due    Depression Monitoring  Never done    Shingrix Vaccine Age 50> (1 of 2) Never done    Medicare Yearly Exam  08/27/2019    Flu Vaccine (1) 06/30/2022 (Originally 9/1/2021)    COVID-19 Vaccine (3 - Booster for Pfizer series) 02/18/2022    Lipid Screen  08/27/2023    Colorectal Cancer Screening Combo  02/12/2024    DTaP/Tdap/Td series (2 - Td or Tdap) 08/26/2028    Hepatitis C Screening  Completed    Pneumococcal 65+ years  Completed     Immunization History   Administered Date(s) Administered    COVID-19, Pfizer Purple top, DILUTE for use, 12+ yrs, 30mcg/0.3mL dose 08/21/2021, 09/18/2021    Pneumococcal Conjugate (PCV-13) 08/26/2018    Pneumococcal Polysaccharide (PPSV-23) 04/22/2021    Tdap 08/26/2018     No LMP for male patient. Allergies and Intolerances: Allergies   Allergen Reactions    Sulfa (Sulfonamide Antibiotics) Rash       Family History:   History reviewed. No pertinent family history. Social History:   He  reports that he has never smoked. He has never used smokeless tobacco.  He  reports no history of alcohol use.             Review of Systems:   General: negative for - chills, fatigue, fever, weight change  Psych: negative for - anxiety, depression, irritability or mood swings  ENT: negative for - headaches, hearing change, nasal congestion, oral lesions, sneezing or sore throat  Heme/ Lymph: negative for - bleeding problems, bruising, pallor or swollen lymph nodes  Endo: negative for - hot flashes, polydipsia/polyuria or temperature intolerance  Resp: negative for - cough, shortness of breath or wheezing  CV: negative for - chest pain, edema or palpitations  GI: negative for - abdominal pain, change in bowel habits, constipation, diarrhea or nausea/vomiting  : negative for - dysuria, hematuria, incontinence, pelvic pain or vulvar/vaginal symptoms  MSK: negative for - joint pain, joint swelling or muscle pain  Neuro: negative for - confusion, headaches, seizures or weakness  Derm: negative for - dry skin, hair changes, rash or skin lesion changes          Physical:   Vitals:   Vitals:    02/09/22 1300 02/09/22 1302   BP: (!) 143/96 (!) 140/90   Pulse: (!) 102    Resp: 18    SpO2: 99%    Weight: 180 lb (81.6 kg)    Height: 5' 10\" (1.778 m)            Exam:   HEENT- atraumatic,normocephalic, awake, oriented, well nourished  Neck - supple,no enlarged lymph nodes, no JVD, no thyromegaly  Chest- CTA, no rhonchi, no crackles  Heart- rrr, no murmurs / gallop/rub  Abdomen- soft,BS+,NT, no hepatosplenomegaly  Ext - no c/c/edema   Neuro- no focal deficits. Power 5/5 all extremities, slow parkinsonian gait  Skin - warm,dry, no obvious rashes. Review of Data:   LABS:   Lab Results   Component Value Date/Time    WBC 7.4 07/15/2021 12:00 AM    HGB 13.4 07/15/2021 12:00 AM    HCT 41.6 07/15/2021 12:00 AM    PLATELET 573 20/03/2293 12:00 AM     Lab Results   Component Value Date/Time    Sodium 142 07/15/2021 12:00 AM    Potassium 4.4 07/15/2021 12:00 AM    Chloride 103 07/15/2021 12:00 AM    CO2 24 07/15/2021 12:00 AM    Glucose 79 07/15/2021 12:00 AM    BUN 15 07/15/2021 12:00 AM    Creatinine 0.71 (L) 07/15/2021 12:00 AM     Lab Results   Component Value Date/Time    Cholesterol, total 200 (H) 08/27/2018 09:56 AM    HDL Cholesterol 41 08/27/2018 09:56 AM    LDL, calculated 109 (H) 08/27/2018 09:56 AM    Triglyceride 252 (H) 08/27/2018 09:56 AM     No components found for: GPT        Impression / Plan:        ICD-10-CM ICD-9-CM    1. Chronic idiopathic constipation  K59.04 564.00    2. Weakness of both legs  R29.898 729.89    3. Overflow incontinence of urine  N39.490 788.38    4. B12 deficiency  E53.8 266.2 VITAMIN B12 & FOLATE      VITAMIN B12 & FOLATE   5. Vitamin D deficiency  E55.9 268.9 VITAMIN D, 25 HYDROXY      VITAMIN D, 25 HYDROXY      cholecalciferol (VITAMIN D3) (1000 Units /25 mcg) tablet   6.  Cold intolerance  R68.89 780.99 TSH 3RD GENERATION      T4, FREE      TSH 3RD GENERATION      T4, FREE   7. [facility]'s disease ([facility]  G20 332.0 METABOLIC PANEL, COMPREHENSIVE      METABOLIC PANEL, COMPREHENSIVE         Explained to patient risk benefits of the medications. Advised patient to stop meds if having any side effects. Pt verbalized understanding of the instructions. I have discussed the diagnosis with the patient and the intended plan as seen in the above orders. The patient has received an after-visit summary and questions were answered concerning future plans. I have discussed medication side effects and warnings with the patient as well. I have reviewed the plan of care with the patient, accepted their input and they are in agreement with the treatment goals. Reviewed plan of care. Patient has provided input and agrees with goals.         Reyes Hwang MD

## 2022-03-19 PROBLEM — F32.A DEPRESSION: Status: ACTIVE | Noted: 2017-11-27

## 2022-03-19 PROBLEM — Z98.890 S/P LAPAROSCOPIC HERNIA REPAIR: Status: ACTIVE | Noted: 2019-08-30

## 2022-03-19 PROBLEM — G20 PARKINSONISM (HCC): Status: ACTIVE | Noted: 2017-11-27

## 2022-03-19 PROBLEM — Z53.20 COLONOSCOPY REFUSED: Status: ACTIVE | Noted: 2018-12-12

## 2022-03-19 PROBLEM — G25.81 RESTLESS LEG: Status: ACTIVE | Noted: 2017-11-27

## 2022-03-19 PROBLEM — Z87.19 S/P LAPAROSCOPIC HERNIA REPAIR: Status: ACTIVE | Noted: 2019-08-30

## 2022-03-19 PROBLEM — E78.9 LIPID DISORDER: Status: ACTIVE | Noted: 2018-08-29

## 2022-03-19 PROBLEM — G20.C PARKINSONISM: Status: ACTIVE | Noted: 2017-11-27

## 2022-03-19 PROBLEM — R62.7 FTT (FAILURE TO THRIVE) IN ADULT: Status: ACTIVE | Noted: 2021-05-27

## 2022-05-03 NOTE — PROGRESS NOTES
Chief Complaint   Patient presents with    Anal Bleeding     pt states blood in stool     Fatigue Unknown

## 2022-06-13 ENCOUNTER — TELEPHONE (OUTPATIENT)
Dept: PRIMARY CARE CLINIC | Age: 67
End: 2022-06-13

## 2022-06-13 ENCOUNTER — OFFICE VISIT (OUTPATIENT)
Dept: PRIMARY CARE CLINIC | Age: 67
End: 2022-06-13
Payer: MEDICARE

## 2022-06-13 VITALS
RESPIRATION RATE: 18 BRPM | HEIGHT: 70 IN | HEART RATE: 104 BPM | TEMPERATURE: 97.3 F | DIASTOLIC BLOOD PRESSURE: 72 MMHG | SYSTOLIC BLOOD PRESSURE: 106 MMHG | OXYGEN SATURATION: 97 % | BODY MASS INDEX: 27.49 KG/M2 | WEIGHT: 192 LBS

## 2022-06-13 DIAGNOSIS — E53.8 B12 DEFICIENCY: ICD-10-CM

## 2022-06-13 DIAGNOSIS — K21.9 GASTROESOPHAGEAL REFLUX DISEASE WITHOUT ESOPHAGITIS: ICD-10-CM

## 2022-06-13 DIAGNOSIS — G20 PARKINSON'S DISEASE (HCC): ICD-10-CM

## 2022-06-13 DIAGNOSIS — Z23 ENCOUNTER FOR IMMUNIZATION: ICD-10-CM

## 2022-06-13 DIAGNOSIS — R14.0 ABDOMINAL DISTENSION: ICD-10-CM

## 2022-06-13 DIAGNOSIS — E55.9 VITAMIN D DEFICIENCY: ICD-10-CM

## 2022-06-13 DIAGNOSIS — J30.1 SEASONAL ALLERGIC RHINITIS DUE TO POLLEN: ICD-10-CM

## 2022-06-13 DIAGNOSIS — Z12.5 PROSTATE CANCER SCREENING: ICD-10-CM

## 2022-06-13 DIAGNOSIS — Z00.00 MEDICARE ANNUAL WELLNESS VISIT, SUBSEQUENT: Primary | ICD-10-CM

## 2022-06-13 PROCEDURE — 1123F ACP DISCUSS/DSCN MKR DOCD: CPT | Performed by: INTERNAL MEDICINE

## 2022-06-13 PROCEDURE — 3017F COLORECTAL CA SCREEN DOC REV: CPT | Performed by: INTERNAL MEDICINE

## 2022-06-13 PROCEDURE — G8427 DOCREV CUR MEDS BY ELIG CLIN: HCPCS | Performed by: INTERNAL MEDICINE

## 2022-06-13 PROCEDURE — 99212 OFFICE O/P EST SF 10 MIN: CPT | Performed by: INTERNAL MEDICINE

## 2022-06-13 PROCEDURE — G0439 PPPS, SUBSEQ VISIT: HCPCS | Performed by: INTERNAL MEDICINE

## 2022-06-13 PROCEDURE — 1101F PT FALLS ASSESS-DOCD LE1/YR: CPT | Performed by: INTERNAL MEDICINE

## 2022-06-13 PROCEDURE — G8417 CALC BMI ABV UP PARAM F/U: HCPCS | Performed by: INTERNAL MEDICINE

## 2022-06-13 PROCEDURE — G9717 DOC PT DX DEP/BP F/U NT REQ: HCPCS | Performed by: INTERNAL MEDICINE

## 2022-06-13 PROCEDURE — G8536 NO DOC ELDER MAL SCRN: HCPCS | Performed by: INTERNAL MEDICINE

## 2022-06-13 RX ORDER — ZOSTER VACCINE RECOMBINANT, ADJUVANTED 50 MCG/0.5
0.5 KIT INTRAMUSCULAR ONCE
Qty: 0.5 ML | Refills: 0 | Status: SHIPPED | OUTPATIENT
Start: 2022-06-13 | End: 2022-06-13 | Stop reason: SDUPTHER

## 2022-06-13 RX ORDER — FLUTICASONE PROPIONATE 50 MCG
1 SPRAY, SUSPENSION (ML) NASAL DAILY
Qty: 1 EACH | Refills: 5 | Status: SHIPPED | OUTPATIENT
Start: 2022-06-13 | End: 2022-06-13 | Stop reason: SDUPTHER

## 2022-06-13 RX ORDER — PHENOL/SODIUM PHENOLATE
20 AEROSOL, SPRAY (ML) MUCOUS MEMBRANE DAILY
Qty: 90 TABLET | Refills: 1 | Status: SHIPPED | OUTPATIENT
Start: 2022-06-13

## 2022-06-13 RX ORDER — FLUTICASONE PROPIONATE 50 MCG
1 SPRAY, SUSPENSION (ML) NASAL DAILY
Qty: 1 EACH | Refills: 5 | Status: SHIPPED | OUTPATIENT
Start: 2022-06-13

## 2022-06-13 RX ORDER — PHENOL/SODIUM PHENOLATE
20 AEROSOL, SPRAY (ML) MUCOUS MEMBRANE DAILY
Qty: 90 TABLET | Refills: 1 | Status: SHIPPED | OUTPATIENT
Start: 2022-06-13 | End: 2022-06-13 | Stop reason: SDUPTHER

## 2022-06-13 RX ORDER — ZOSTER VACCINE RECOMBINANT, ADJUVANTED 50 MCG/0.5
0.5 KIT INTRAMUSCULAR ONCE
Qty: 0.5 ML | Refills: 0 | Status: SHIPPED | OUTPATIENT
Start: 2022-06-13 | End: 2022-06-13

## 2022-06-13 NOTE — TELEPHONE ENCOUNTER
Patients wife asked can the patients prescriptions be sent  [facility] on [address] [facility]   phone number [phone]

## 2022-06-13 NOTE — PROGRESS NOTES
Quinn Morris is a 69 y.o.  male and presents with     Chief Complaint   Patient presents with   [facility] Annual Wellness Visit     Patient is here for Medicare wellness visit. He sees neurology for Parkinson's. Denies feeling depressed  Burning sensation in upper abdomen and also has some cough with mucus-like secretions  Has postnasal drip        Past Medical History:   Diagnosis Date    Arthritis     knees    Chronic pain     knees, back    Constipation     Depression     Gait difficulty     H/O seasonal allergies     Parkinson disease ([address]     Restless leg syndrome     Urinary frequency      Past Surgical History:   Procedure Laterality Date    HX HERNIA REPAIR Right 08/15/2019    Robotic-assisted laparoscopic right inguinal herniorrhaphy with mesh. Current Outpatient Medications   Medication Sig    tamsulosin (FLOMAX) 0.4 mg capsule TAKE 1 CAPSULE BY MOUTH DAILY    polyethylene glycol (MIRALAX) 17 gram/dose powder Take 17 g by mouth daily as needed.  carbidopa-levodopa ER (SINEMET CR)  mg per tablet Take 1 Tablet by mouth two (2) times a day.  linaCLOtide (Linzess) 145 mcg cap capsule Take 1 Capsule by mouth Daily (before breakfast).  brief disposable (adult) misc by Does Not Apply route.  Incontinence Pad, Liner, Disp pads Use as directed    Walker (Ultra-Light Rollator) misc Use as directed    carbidopa-levodopa (SINEMET)  mg per tablet TAKE 1 AND 1/2 TABLETS BY MOUTH THREE TIMES DAILY    diclofenac (VOLTAREN) 1 % gel Apply  to affected area four (4) times daily.  ACETAMINOPHEN (TYLENOL PO) Take 650 mg by mouth as needed.  varicella-zoster recombinant, PF, (Shingrix, PF,) 50 mcg/0.5 mL susr injection 0.5 mL by IntraMUSCular route once for 1 dose.  fluticasone propionate (FLONASE) 50 mcg/actuation nasal spray 1 [place] by Both Nostrils route daily.  Omeprazole delayed release (PRILOSEC D/R) 20 mg tablet Take 1 Tablet by mouth daily.      No current facility-administered medications for this visit. Health Maintenance   Topic Date Due    Shingrix Vaccine Age 49> (1 of 2) Never done    COVID-19 Vaccine (3 - Booster for Freddy Posadas series) 02/18/2022    Flu Vaccine (Season Ended) 09/01/2022    Depression Monitoring  02/09/2023    Medicare Yearly Exam  06/14/2023    Lipid Screen  08/27/2023    Colorectal Cancer Screening Combo  02/12/2024    DTaP/Tdap/Td series (2 - Td or Tdap) 08/26/2028    Hepatitis C Screening  Completed    Pneumococcal 65+ years  Completed     Immunization History   Administered Date(s) Administered    COVID-19, Pfizer Purple top, DILUTE for use, 12+ yrs, 30mcg/0.3mL dose 08/21/2021, 09/18/2021    Pneumococcal Conjugate (PCV-13) 08/26/2018    Pneumococcal Polysaccharide (PPSV-23) 04/22/2021    Tdap 08/26/2018     No LMP for male patient. Allergies and Intolerances: Allergies   Allergen Reactions    Sulfa (Sulfonamide Antibiotics) Rash       Family History:   No family history on file. Social History:   He  reports that he has never smoked. He has never used smokeless tobacco.  He  reports no history of alcohol use.             Review of Systems:   General: negative for - chills, fatigue, fever, weight change  Psych: negative for - anxiety, depression, irritability or mood swings  ENT: negative for - headaches, hearing change, nasal congestion, oral lesions, sneezing or sore throat  Heme/ Lymph: negative for - bleeding problems, bruising, pallor or swollen lymph nodes  Endo: negative for - hot flashes, polydipsia/polyuria or temperature intolerance  Resp: negative for - cough, shortness of breath or wheezing  CV: negative for - chest pain, edema or palpitations  GI: negative for - abdominal pain, change in bowel habits, constipation, diarrhea or nausea/vomiting  : negative for - dysuria, hematuria, incontinence, pelvic pain or vulvar/vaginal symptoms  MSK: negative for - joint pain, joint swelling or muscle pain  Neuro: negative for - confusion, headaches, seizures or weakness  Derm: negative for - dry skin, hair changes, rash or skin lesion changes          Physical:   Vitals:   Vitals:    06/13/22 1044   BP: 106/72   Pulse: (!) 104   Resp: 18   Temp: 97.3 °F (36.3 °C)   TempSrc: Temporal   SpO2: 97%   Weight: 192 lb (87.1 kg)   Height: 5' 10\" (1.778 m)           Exam:   HEENT- atraumatic,normocephalic, awake, oriented, well nourished, nasal congestion  Reported. Rectal culture shows to repeat in requesting  Neck - supple,no enlarged lymph nodes, no JVD, no thyromegaly  Chest- CTA, no rhonchi, no crackles  Heart- rrr, no murmurs / gallop/rub  Abdomen- soft,BS+,NT, no hepatosplenomegaly  Ext - no c/c/edema   Neuro- no focal deficits. Power 5/5 all extremities  Skin - warm,dry, no obvious rashes. Review of Data:   LABS:   Lab Results   Component Value Date/Time    WBC 7.4 07/15/2021 12:00 AM    HGB 13.4 07/15/2021 12:00 AM    HCT 41.6 07/15/2021 12:00 AM    PLATELET 005 01/43/2544 12:00 AM     Lab Results   Component Value Date/Time    Sodium 142 07/15/2021 12:00 AM    Potassium 4.4 07/15/2021 12:00 AM    Chloride 103 07/15/2021 12:00 AM    CO2 24 07/15/2021 12:00 AM    Glucose 79 07/15/2021 12:00 AM    BUN 15 07/15/2021 12:00 AM    Creatinine 0.71 (L) 07/15/2021 12:00 AM     Lab Results   Component Value Date/Time    Cholesterol, total 200 (H) 08/27/2018 09:56 AM    HDL Cholesterol 41 08/27/2018 09:56 AM    LDL, calculated 109 (H) 08/27/2018 09:56 AM    Triglyceride 252 (H) 08/27/2018 09:56 AM     No components found for: GPT        Impression / Plan:        ICD-10-CM ICD-9-CM    1. Medicare annual wellness visit, subsequent  H26.90 J33.4 METABOLIC PANEL, COMPREHENSIVE      TSH 3RD GENERATION      T4, FREE      HEMOGLOBIN A1C WITH EAG      METABOLIC PANEL, COMPREHENSIVE      TSH 3RD GENERATION      T4, FREE      HEMOGLOBIN A1C WITH EAG   2.  Encounter for immunization  Z23 V03.89 DISCONTINUED: varicella-zoster recombinant, PF, (Shingrix, PF,) 50 mcg/0.5 mL susr injection   3. B12 deficiency  E53.8 266.2 VITAMIN B12 & FOLATE      VITAMIN B12 & FOLATE   4. Vitamin D deficiency  E55.9 268.9 VITAMIN D, 25 HYDROXY      VITAMIN D, 25 HYDROXY   5. Parkinson's disease ([facility]  G20 332.0    6. Prostate cancer screening  Z12.5 V76.44 PSA SCREENING (SCREENING)      PSA SCREENING (SCREENING)   7. Abdominal distension  R14.0 787.3 US ABD COMP   8. Gastroesophageal reflux disease without esophagitis  K21.9 530.81 DISCONTINUED: Omeprazole delayed release (PRILOSEC D/R) 20 mg tablet   9. Seasonal allergic rhinitis due to pollen  J30.1 477.0 DISCONTINUED: fluticasone propionate (FLONASE) 50 mcg/actuation nasal spray       Explained to patient risk benefits of the medications. Advised patient to stop meds if having any side effects. Pt verbalized understanding of the instructions. I have discussed the diagnosis with the patient and the intended plan as seen in the above orders. The patient has received an after-visit summary and questions were answered concerning future plans. I have discussed medication side effects and warnings with the patient as well. I have reviewed the plan of care with the patient, accepted their input and they are in agreement with the treatment goals. Reviewed plan of care. Patient has provided input and agrees with goals. Follow-up and Dispositions    · Return in about 6 months (around 12/13/2022). Chanelle Faye MD        -------------------------------    HE SUBSEQUENT MEDICARE [facility] VISIT PROGRESS NOTES    This is a Subsequent Medicare Annual Wellness Visit providing Personalized Prevention Plan Services (PPPS) (Performed 12 months after initial AWV and PPPS )    I have reviewed the patient's medical history in detail and updated the computerized patient record. Jose L Bowman is a 69 y.o.   male and presents for an subsequent annual wellness exam         ROS   Negative except burning sensation in chest and cough with mucoid phlegm    All other systems reviewed and are negative. History     Past Medical History:   Diagnosis Date    Arthritis     knees    Chronic pain     knees, back    Constipation     Depression     Gait difficulty     H/O seasonal allergies     Parkinson disease (HCC)     Restless leg syndrome     Urinary frequency       Past Surgical History:   Procedure Laterality Date    HX HERNIA REPAIR Right 08/15/2019    Robotic-assisted laparoscopic right inguinal herniorrhaphy with mesh. Current Outpatient Medications   Medication Sig Dispense Refill    tamsulosin (FLOMAX) 0.4 mg capsule TAKE 1 CAPSULE BY MOUTH DAILY 90 Capsule 0    polyethylene glycol (MIRALAX) 17 gram/dose powder Take 17 g by mouth daily as needed.  carbidopa-levodopa ER (SINEMET CR)  mg per tablet Take 1 Tablet by mouth two (2) times a day.  linaCLOtide (Linzess) 145 mcg cap capsule Take 1 Capsule by mouth Daily (before breakfast). 90 Capsule 1    brief disposable (adult) misc by Does Not Apply route. 1 Package 11    Incontinence Pad, Liner, Disp pads Use as directed 100 Pad 1    Walker (Ultra-Light Rollator) misc Use as directed 1 Each 0    carbidopa-levodopa (SINEMET)  mg per tablet TAKE 1 AND 1/2 TABLETS BY MOUTH THREE TIMES DAILY 135 Tab 0    diclofenac (VOLTAREN) 1 % gel Apply  to affected area four (4) times daily. 1 Each 1    ACETAMINOPHEN (TYLENOL PO) Take 650 mg by mouth as needed.  varicella-zoster recombinant, PF, (Shingrix, PF,) 50 mcg/0.5 mL susr injection 0.5 mL by IntraMUSCular route once for 1 dose. 0.5 mL 0    fluticasone propionate (FLONASE) 50 mcg/actuation nasal spray 1 [place] by Both Nostrils route daily. 1 Each 5    Omeprazole delayed release (PRILOSEC D/R) 20 mg tablet Take 1 Tablet by mouth daily. 90 Tablet 1     Allergies   Allergen Reactions    Sulfa (Sulfonamide Antibiotics) Rash     No family history on file.   Social History     Tobacco Use    Smoking status: Never Smoker    Smokeless tobacco: Never Used   Substance Use Topics    Alcohol use: No     Patient Active Problem List   Diagnosis Code    Parkinsonism ([facility] G20    Restless leg G25.81    Depression F32. A    Lipid disorder E78.9    Colonoscopy refused Z53.20    S/P laparoscopic hernia repair Z98.890, Z87.19    FTT (failure to thrive) in adult R62.7       Health Maintenance History  Immunizations reviewed, dtap up-to-date, pneumovax, , zoster- due  colonoscopy:uptodate      Health Care Directive or Living Will: no    Depression Risk Factor Screening:      Patient Health Questionnaire (PHQ-2)   Over the last 2 weeks, how often have you been bothered by any of the following problems? · Little interest or pleasure in doing things? · Not at all. [0]  · Feeling down, depressed, or hopeless? · Not at all. [0]    Total Score: 0/6  PHQ-2 Assessment Scoring:   A score of 2 or more requires further screening with the PHQ-9    Alcohol Risk Factor Screening:     Women: On any occasion during the past 3 months, have you had more than 3 drinks containing alcohol? Do you average more than 7 drinks per week? Men: On any occasion during the past 3 months, have you had more than 4 drinks containing alcohol? Do you average more than 14 drinks per week? Functional Ability and Level of Safety:     Hearing Loss    Hearing is good. Activities of Daily Living   Partial assistance. Requires assistance with: bathing and hygiene, grooming, toileting and no ADLs    Fall Risk   No fall risk factors    Abuse Screen   Patient is not abused  None      Examination   Physical Examination  Vitals:    06/13/22 1044   BP: 106/72   Pulse: (!) 104   Resp: 18   Temp: 97.3 °F (36.3 °C)   TempSrc: Temporal   SpO2: 97%   Weight: 192 lb (87.1 kg)   Height: 5' 10\" (1.778 m)   PainSc:   0 - No pain     Body mass index is 27.55 kg/m².     Evaluation of Cognitive Function:  Mood/affect:normal  Appearance:parkinsonian features, mask like face  Family member/caregiver input:wife    alert, well appearing, and in no distress    Patient Care Team:  Burton Dukes MD as PCP - General (Internal Medicine Physician)  Burton Dukes MD as PCP - [facility] Empaneled Provider  Cesar Valadez MD as Surgeon (Surgery Physician)    Advice/Referrals/Counseling/Plan:   Education and counseling provided:  Are appropriate based on today's review and evaluation  Include in education list (weight loss, physical activity, smoking cessation, fall prevention, and nutrition)  current treatment plan is effective, no change in therapy. I have discussed the diagnosis with the patient and the intended plan as seen in the above orders. The patient has received an after-visit summary and questions were answered concerning future plans. I have discussed medication side effects and warnings with the patient as well. I have reviewed the plan of care with the patient, accepted their input and they are in agreement with the treatment goals. Follow-up and Dispositions    · Return in about 6 months (around 12/13/2022).          _____________________________________________________________

## 2022-06-13 NOTE — PROGRESS NOTES
Chief Complaint   Patient presents with   [facility] Annual Wellness Visit        Visit Vitals  /72 (BP 1 Location: Left upper arm, BP Patient Position: Sitting)   Pulse (!) 104   Temp 97.3 °F (36.3 °C) (Temporal)   Resp 18   Ht 5' 10\" (1.778 m)   Wt 192 lb (87.1 kg)   SpO2 97%   BMI 27.55 kg/m²        Health Maintenance Due   Topic    Shingrix Vaccine Age 50> (1 of 2)    Medicare Yearly Exam     COVID-19 Vaccine (3 - Booster for [place] Peter series)        1. Have you been to the ER, urgent care clinic since your last visit? Hospitalized since your last visit? No    2. Have you seen or consulted any other health care providers outside of the [address] [place] [address] since your last visit? Include any pap smears or colon screening.  No

## 2022-06-14 DIAGNOSIS — E55.9 VITAMIN D DEFICIENCY: Primary | ICD-10-CM

## 2022-06-14 LAB
25(OH)D3+25(OH)D2 SERPL-MCNC: 11.5 NG/ML (ref 30–100)
ALBUMIN SERPL-MCNC: 4.1 G/DL (ref 3.8–4.8)
ALBUMIN/GLOB SERPL: 1.5 {RATIO} (ref 1.2–2.2)
ALP SERPL-CCNC: 69 IU/L (ref 44–121)
ALT SERPL-CCNC: 34 IU/L (ref 0–44)
AST SERPL-CCNC: 34 IU/L (ref 0–40)
BILIRUB SERPL-MCNC: 0.6 MG/DL (ref 0–1.2)
BUN SERPL-MCNC: 13 MG/DL (ref 8–27)
BUN/CREAT SERPL: 15 (ref 10–24)
CALCIUM SERPL-MCNC: 9.9 MG/DL (ref 8.6–10.2)
CHLORIDE SERPL-SCNC: 99 MMOL/L (ref 96–106)
CO2 SERPL-SCNC: 22 MMOL/L (ref 20–29)
CREAT SERPL-MCNC: 0.85 MG/DL (ref 0.76–1.27)
EGFR: 94 ML/MIN/1.73
EST. AVERAGE GLUCOSE BLD GHB EST-MCNC: 128 MG/DL
FOLATE SERPL-MCNC: 11.4 NG/ML
GLOBULIN SER CALC-MCNC: 2.8 G/DL (ref 1.5–4.5)
GLUCOSE SERPL-MCNC: 81 MG/DL (ref 65–99)
HBA1C MFR BLD: 6.1 % (ref 4.8–5.6)
POTASSIUM SERPL-SCNC: 4.5 MMOL/L (ref 3.5–5.2)
PROT SERPL-MCNC: 6.9 G/DL (ref 6–8.5)
PSA SERPL-MCNC: 1 NG/ML (ref 0–4)
SODIUM SERPL-SCNC: 139 MMOL/L (ref 134–144)
T4 FREE SERPL-MCNC: 1.11 NG/DL (ref 0.82–1.77)
TSH SERPL DL<=0.005 MIU/L-ACNC: 2.41 UIU/ML (ref 0.45–4.5)
VIT B12 SERPL-MCNC: 323 PG/ML (ref 232–1245)

## 2022-06-14 RX ORDER — ERGOCALCIFEROL 1.25 MG/1
50000 CAPSULE ORAL
Qty: 12 CAPSULE | Refills: 1 | Status: SHIPPED | OUTPATIENT
Start: 2022-06-14

## 2022-06-15 NOTE — PROGRESS NOTES
Vitamin D level is low.   I have sent vitamin D to pharmacy  Thyroid function, B12, kidney and liver function, and test for prostate cancer are all normal.

## 2022-06-28 ENCOUNTER — HOSPITAL ENCOUNTER (OUTPATIENT)
Dept: ULTRASOUND IMAGING | Age: 67
Discharge: HOME | End: 2022-06-28
Attending: INTERNAL MEDICINE
Payer: MEDICAID

## 2022-06-28 DIAGNOSIS — R14.0 ABDOMINAL DISTENSION: ICD-10-CM

## 2022-06-28 PROCEDURE — 76700 US EXAM ABDOM COMPLETE: CPT

## 2022-06-29 DIAGNOSIS — K59.04 CHRONIC IDIOPATHIC CONSTIPATION: ICD-10-CM

## 2022-06-29 DIAGNOSIS — N40.1 BENIGN PROSTATIC HYPERPLASIA WITH LOWER URINARY TRACT SYMPTOMS, SYMPTOM DETAILS UNSPECIFIED: ICD-10-CM

## 2022-06-29 NOTE — PROGRESS NOTES
Ultrasound abdomen shows fatty liver and gallstones. If you do not have abdominal pain we do not need to do any intervention.

## 2022-07-01 RX ORDER — LINACLOTIDE 145 UG/1
CAPSULE, GELATIN COATED ORAL
Qty: 90 CAPSULE | Refills: 0 | Status: SHIPPED | OUTPATIENT
Start: 2022-07-01

## 2022-07-01 RX ORDER — TAMSULOSIN HYDROCHLORIDE 0.4 MG/1
CAPSULE ORAL
Qty: 30 CAPSULE | Refills: 0 | Status: SHIPPED | OUTPATIENT
Start: 2022-07-01

## 2022-07-13 NOTE — PROGRESS NOTES
[facility] Medicare Wellness Visit      Alejandrina Sorto is a 69 y.o. male and presents for Annual Medicare Wellness Visit. Vitals:    06/13/22 1044   BP: 106/72   Pulse: (!) 104   Resp: 18   Temp: 97.3 °F (36.3 °C)   TempSrc: Temporal   SpO2: 97%   Weight: 192 lb (87.1 kg)   Height: 5' 10\" (1.778 m)   PainSc:   0 - No pain        Depression Screen:   3 most recent PHQ Screens 6/13/2022   Little interest or pleasure in doing things Not at all   Feeling down, depressed, irritable, or hopeless Not at all   Total Score PHQ 2 0       Fall Risk Assessment:    Fall Risk Assessment, last 12 mths 6/13/2022   Able to walk? Yes   Fall in past 12 months? 0   Do you feel unsteady? 0   Are you worried about falling 1   Is the gait abnormal? -   Number of falls in past 12 months -   Fall with injury? -       Abuse Screen:   Abuse Screening Questionnaire 6/13/2022   Do you ever feel afraid of your partner? N   Are you in a relationship with someone who physically or mentally threatens you? N   Is it safe for you to go home? Y       Health Maintenance Due   Topic    Shingrix Vaccine Age 49> (1 of 2)    COVID-19 Vaccine (3 - Booster for [place] series)       General Health Questions   -During the past 4 weeks:   -how would you rate your health in general? Fair   -how often have you been bothered by feeling dizzy when standing up? Often   -how much have you been bothered by bodily pain? Moderately    -Have you noticed any hearing difficulties? no   -has your physical and emotional health limited your social activities with family or friends? yes    Emotional Health Questions   -Do you have a history of depression, anxiety, or emotional problems? no  -Over the past 2 weeks, have you felt down, depressed or hopeless? no  -Over the past 2 weeks, have you felt little interest or pleasure in doing things? yes    Health Habits   Please describe your diet habits:   Do you get 5 servings of fruits or vegetables daily? yes  Do you exercise regularly? Activities of Daily Living and Functional Status   -Do you need help with eating, walking, dressing, bathing, toileting, the phone, transportation, shopping, preparing meals, housework, laundry, medications or managing money? Yes Parkinson   -In the past four weeks, was someone available to help you if you needed and wanted help with anything? yes  -Are you confident are you that you can control and manage most of your health problems?   -Have you been given information to help you keep track of your medications? yes  -How often do you have trouble taking your medications as prescribed? never    Fall Risk and Home Safety   Have you fallen 2 or more times in the past year? yes  Does your home have rugs in the hallways? no, Do you have grab bars in the bathrooms? yes, Does your home have handrails on the stairs? no, Do you have adequate lighting in your home? yes  Do you have smoke detectors and check them regularly? yes  Do you have difficulties driving a car/vehicle?  yes  Do you always fasten your seat belt when you are in a car? yes

## 2022-07-20 ENCOUNTER — APPOINTMENT (OUTPATIENT)
Dept: GENERAL RADIOLOGY | Age: 67
End: 2022-07-20
Payer: MEDICARE

## 2022-07-20 ENCOUNTER — HOSPITAL ENCOUNTER (EMERGENCY)
Age: 67
Discharge: HOME | End: 2022-07-20
Attending: EMERGENCY MEDICINE
Payer: MEDICARE

## 2022-07-20 ENCOUNTER — APPOINTMENT (OUTPATIENT)
Dept: ULTRASOUND IMAGING | Age: 67
End: 2022-07-20
Payer: MEDICARE

## 2022-07-20 ENCOUNTER — APPOINTMENT (OUTPATIENT)
Dept: CT IMAGING | Age: 67
End: 2022-07-20
Payer: MEDICARE

## 2022-07-20 VITALS
OXYGEN SATURATION: 96 % | HEART RATE: 102 BPM | BODY MASS INDEX: 28.22 KG/M2 | WEIGHT: 197.09 LBS | SYSTOLIC BLOOD PRESSURE: 143 MMHG | HEIGHT: 70 IN | RESPIRATION RATE: 21 BRPM | DIASTOLIC BLOOD PRESSURE: 96 MMHG | TEMPERATURE: 99.9 F

## 2022-07-20 DIAGNOSIS — D72.829 LEUKOCYTOSIS, UNSPECIFIED TYPE: ICD-10-CM
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
## 2022-07-20 DIAGNOSIS — R31.9 URINARY TRACT INFECTION WITH HEMATURIA, SITE UNSPECIFIED: Primary | ICD-10-CM

## 2022-07-20 DIAGNOSIS — N39.0 URINARY TRACT INFECTION WITH HEMATURIA, SITE UNSPECIFIED: Primary | ICD-10-CM

## 2022-07-20 LAB
ALBUMIN SERPL-MCNC: 3.7 G/DL (ref 3.5–5)
ALBUMIN/GLOB SERPL: 0.9 {RATIO} (ref 1.1–2.2)
ALP SERPL-CCNC: 77 U/L (ref 45–117)
ALT SERPL-CCNC: 39 U/L (ref 12–78)
ANION GAP SERPL CALC-SCNC: 10 MMOL/L (ref 5–15)
APPEARANCE UR: CLEAR
AST SERPL-CCNC: 40 U/L (ref 15–37)
BACTERIA URNS QL MICRO: ABNORMAL /HPF
BASOPHILS # BLD: 0 K/UL (ref 0–0.1)
BASOPHILS NFR BLD: 0 % (ref 0–1)
BILIRUB SERPL-MCNC: 1.1 MG/DL (ref 0.2–1)
BILIRUB UR QL: NEGATIVE
BUN SERPL-MCNC: 16 MG/DL (ref 6–20)
BUN/CREAT SERPL: 13 (ref 12–20)
CALCIUM SERPL-MCNC: 9.3 MG/DL (ref 8.5–10.1)
CHLORIDE SERPL-SCNC: 101 MMOL/L (ref 97–108)
CO2 SERPL-SCNC: 25 MMOL/L (ref 21–32)
COLOR UR: ABNORMAL
COVID-19 RAPID TEST, COVR: NOT DETECTED
CREAT SERPL-MCNC: 1.21 MG/DL (ref 0.7–1.3)
D DIMER PPP FEU-MCNC: 1.08 MG/L FEU (ref 0–0.65)
DIFFERENTIAL METHOD BLD: ABNORMAL
EOSINOPHIL # BLD: 0 K/UL (ref 0–0.4)
EOSINOPHIL NFR BLD: 0 % (ref 0–7)
EPITH CASTS URNS QL MICRO: ABNORMAL /LPF
ERYTHROCYTE [DISTWIDTH] IN BLOOD BY AUTOMATED COUNT: 13.1 % (ref 11.5–14.5)
GLOBULIN SER CALC-MCNC: 4.3 G/DL (ref 2–4)
GLUCOSE SERPL-MCNC: 104 MG/DL (ref 65–100)
GLUCOSE UR STRIP.AUTO-MCNC: NEGATIVE MG/DL
HCT VFR BLD AUTO: 47.6 % (ref 36.6–50.3)
HGB BLD-MCNC: 15.4 G/DL (ref 12.1–17)
HGB UR QL STRIP: ABNORMAL
IMM GRANULOCYTES # BLD AUTO: 0.1 K/UL (ref 0–0.04)
IMM GRANULOCYTES NFR BLD AUTO: 0 % (ref 0–0.5)
KETONES UR QL STRIP.AUTO: NEGATIVE MG/DL
LEUKOCYTE ESTERASE UR QL STRIP.AUTO: ABNORMAL
LYMPHOCYTES # BLD: 1.3 K/UL (ref 0.8–3.5)
LYMPHOCYTES NFR BLD: 10 % (ref 12–49)
MAGNESIUM SERPL-MCNC: 2.2 MG/DL (ref 1.6–2.4)
MCH RBC QN AUTO: 29.2 PG (ref 26–34)
MCHC RBC AUTO-ENTMCNC: 32.4 G/DL (ref 30–36.5)
MCV RBC AUTO: 90.2 FL (ref 80–99)
MONOCYTES # BLD: 1.1 K/UL (ref 0–1)
MONOCYTES NFR BLD: 8 % (ref 5–13)
NEUTS SEG # BLD: 11.2 K/UL (ref 1.8–8)
NEUTS SEG NFR BLD: 82 % (ref 32–75)
NITRITE UR QL STRIP.AUTO: NEGATIVE
NRBC # BLD: 0 K/UL (ref 0–0.01)
NRBC BLD-RTO: 0 PER 100 WBC
PH UR STRIP: 7.5 [PH] (ref 5–8)
PLATELET # BLD AUTO: 191 K/UL (ref 150–400)
PMV BLD AUTO: 9.7 FL (ref 8.9–12.9)
POTASSIUM SERPL-SCNC: 4.2 MMOL/L (ref 3.5–5.1)
PROT SERPL-MCNC: 8 G/DL (ref 6.4–8.2)
PROT UR STRIP-MCNC: NEGATIVE MG/DL
RBC # BLD AUTO: 5.28 M/UL (ref 4.1–5.7)
RBC #/AREA URNS HPF: ABNORMAL /HPF (ref 0–5)
SODIUM SERPL-SCNC: 136 MMOL/L (ref 136–145)
SOURCE, COVRS: NORMAL
SP GR UR REFRACTOMETRY: 1.01 (ref 1–1.03)
TROPONIN-HIGH SENSITIVITY: 20 NG/L (ref 0–76)
TROPONIN-HIGH SENSITIVITY: 20 NG/L (ref 0–76)
UR CULT HOLD, URHOLD: NORMAL
UROBILINOGEN UR QL STRIP.AUTO: 0.2 EU/DL (ref 0.2–1)
WBC # BLD AUTO: 13.7 K/UL (ref 4.1–11.1)
WBC URNS QL MICRO: ABNORMAL /HPF (ref 0–4)

## 2022-07-20 PROCEDURE — 72125 CT NECK SPINE W/O DYE: CPT

## 2022-07-20 PROCEDURE — 87077 CULTURE AEROBIC IDENTIFY: CPT

## 2022-07-20 PROCEDURE — 80053 COMPREHEN METABOLIC PANEL: CPT

## 2022-07-20 PROCEDURE — 81001 URINALYSIS AUTO W/SCOPE: CPT

## 2022-07-20 PROCEDURE — 71045 X-RAY EXAM CHEST 1 VIEW: CPT

## 2022-07-20 PROCEDURE — 74011000636 HC RX REV CODE- 636: Performed by: EMERGENCY MEDICINE

## 2022-07-20 PROCEDURE — 85025 COMPLETE CBC W/AUTO DIFF WBC: CPT

## 2022-07-20 PROCEDURE — 85379 FIBRIN DEGRADATION QUANT: CPT

## 2022-07-20 PROCEDURE — 84484 ASSAY OF TROPONIN QUANT: CPT

## 2022-07-20 PROCEDURE — 74011250636 HC RX REV CODE- 250/636: Performed by: PHYSICIAN ASSISTANT

## 2022-07-20 PROCEDURE — 99285 EMERGENCY DEPT VISIT HI MDM: CPT

## 2022-07-20 PROCEDURE — 93970 EXTREMITY STUDY: CPT

## 2022-07-20 PROCEDURE — 36415 COLL VENOUS BLD VENIPUNCTURE: CPT

## 2022-07-20 PROCEDURE — 83735 ASSAY OF MAGNESIUM: CPT

## 2022-07-20 PROCEDURE — 93005 ELECTROCARDIOGRAM TRACING: CPT

## 2022-07-20 PROCEDURE — 87086 URINE CULTURE/COLONY COUNT: CPT

## 2022-07-20 PROCEDURE — 87635 SARS-COV-2 COVID-19 AMP PRB: CPT

## 2022-07-20 PROCEDURE — 87186 SC STD MICRODIL/AGAR DIL: CPT

## 2022-07-20 PROCEDURE — 70450 CT HEAD/BRAIN W/O DYE: CPT

## 2022-07-20 PROCEDURE — 71275 CT ANGIOGRAPHY CHEST: CPT

## 2022-07-20 PROCEDURE — 74011000250 HC RX REV CODE- 250: Performed by: PHYSICIAN ASSISTANT

## 2022-07-20 PROCEDURE — 74011250637 HC RX REV CODE- 250/637: Performed by: PHYSICIAN ASSISTANT

## 2022-07-20 PROCEDURE — 96374 THER/PROPH/DIAG INJ IV PUSH: CPT

## 2022-07-20 RX ORDER — ACETAMINOPHEN 325 MG/1
650 TABLET ORAL ONCE
Status: COMPLETED | OUTPATIENT
Start: 2022-07-20 | End: 2022-07-20

## 2022-07-20 RX ORDER — CEPHALEXIN 500 MG/1
500 CAPSULE ORAL 2 TIMES DAILY
Qty: 14 CAPSULE | Refills: 0 | Status: SHIPPED | OUTPATIENT
Start: 2022-07-20 | End: 2022-07-27

## 2022-07-20 RX ORDER — FUROSEMIDE 20 MG/1
20 TABLET ORAL DAILY
COMMUNITY

## 2022-07-20 RX ORDER — MELATONIN
1000 DAILY
COMMUNITY

## 2022-07-20 RX ORDER — FEXOFENADINE HCL 60 MG
60 TABLET ORAL DAILY
COMMUNITY
End: 2022-07-27

## 2022-07-20 RX ADMIN — CEFTRIAXONE SODIUM 1 G: 1 INJECTION, POWDER, FOR SOLUTION INTRAMUSCULAR; INTRAVENOUS at 17:57

## 2022-07-20 RX ADMIN — SODIUM CHLORIDE 500 ML: 9 INJECTION, SOLUTION INTRAVENOUS at 15:05

## 2022-07-20 RX ADMIN — ACETAMINOPHEN 650 MG: 325 TABLET ORAL at 15:07

## 2022-07-20 RX ADMIN — IOPAMIDOL 80 ML: 755 INJECTION, SOLUTION INTRAVENOUS at 15:29

## 2022-07-20 NOTE — ED PROVIDER NOTES
Date of Service:  7/20/2022    Patient:  Su Ramirez    Chief Complaint:  Fatigue and Fall       HPI:  Su Ramirez is a 69 y.o.  male who presents for evaluation of a fall that occurred this morning. States he fell off the bed landing on a carpeted floor. Patient states denies hitting his head or loss of consciousness. Fall was unwitnessed. Patient also complains of bilateral lower extremity pain and swelling. Patient states he is currently taking Lasix for bilateral lower extremity swelling. Patient denies neck pain, back pain, or visual disturbances. Patient denies numbness, tingling, in bilateral upper or lower extremities. She denies chest pain or abdominal pain. Patient denies fever, chills, body aches. Patient states that he needed help getting up from the floor. Patient states that he uses a walker daily. Patient states history of Parkinson's disease. Past Medical History:   Diagnosis Date    Arthritis     knees    Chronic pain     knees, back    Constipation     Depression     Gait difficulty     H/O seasonal allergies     Parkinson disease (HCC)     Restless leg syndrome     Urinary frequency        Past Surgical History:   Procedure Laterality Date    HX HERNIA REPAIR Right 08/15/2019    Robotic-assisted laparoscopic right inguinal herniorrhaphy with mesh. History reviewed. No pertinent family history.     Social History     Socioeconomic History    Marital status:      Spouse name: Not on file    Number of children: Not on file    Years of education: Not on file    Highest education level: Not on file   Occupational History    Not on file   Tobacco Use    Smoking status: Never    Smokeless tobacco: Never   Substance and Sexual Activity    Alcohol use: No    Drug use: No    Sexual activity: Yes     Partners: Female   Other Topics Concern    Not on file   Social History Narrative    Not on file     Social Determinants of Health     Financial Resource Strain: Not on file   Food Insecurity: Not on file   Transportation Needs: Not on file   Physical Activity: Not on file   Stress: Not on file   Social Connections: Not on file   Intimate Partner Violence: Not on file   Housing Stability: Not on file         ALLERGIES: Sulfa (sulfonamide antibiotics)    Review of Systems   Constitutional:  Negative for chills and fever. Respiratory:  Positive for shortness of breath. Negative for cough. Cardiovascular:  Positive for leg swelling. Negative for chest pain and palpitations. Gastrointestinal:  Negative for abdominal pain, blood in stool, diarrhea, nausea and vomiting. Genitourinary:  Negative for dysuria. Musculoskeletal:  Negative for back pain and neck pain. Skin:  Negative for rash. Allergic/Immunologic: Negative for immunocompromised state. Neurological:  Negative for headaches. Psychiatric/Behavioral:  Negative for agitation. All other systems reviewed and are negative. Vitals:    07/20/22 1619 07/20/22 1700 07/20/22 1734 07/20/22 1804   BP: (!) 161/98 121/78 123/84 (!) 143/96   Pulse: (!) 116 (!) 115 (!) 106 (!) 102   Resp: 22 21 18 21   Temp: 99.9 °F (37.7 °C)      SpO2: 97% 97% 96% 96%   Weight:       Height:                Physical Exam  Vitals and nursing note reviewed. Constitutional:       General: He is not in acute distress. HENT:      Head: Atraumatic. Cardiovascular:      Rate and Rhythm: Normal rate and regular rhythm. Heart sounds: No murmur heard. Pulmonary:      Effort: Pulmonary effort is normal.      Breath sounds: Normal breath sounds. Abdominal:      Palpations: Abdomen is soft. Tenderness: There is no abdominal tenderness. Musculoskeletal:         General: Normal range of motion. Cervical back: Normal range of motion. No tenderness. Skin:     General: Skin is warm. Neurological:      Mental Status: He is alert and oriented to person, place, and time. Mental status is at baseline. MDM     Amount and/or Complexity of Data Reviewed  Clinical lab tests: reviewed  Tests in the radiology section of CPT®: reviewed  Tests in the medicine section of CPT®: reviewed           Procedures    VITAL SIGNS:  Patient Vitals for the past 4 hrs:   Temp Pulse Resp BP SpO2   07/20/22 1804 -- (!) 102 21 (!) 143/96 96 %   07/20/22 1734 -- (!) 106 18 123/84 96 %   07/20/22 1700 -- (!) 115 21 121/78 97 %   07/20/22 1619 99.9 °F (37.7 °C) (!) 116 22 (!) 161/98 97 %   07/20/22 1607 -- (!) 119 25 -- 98 %   07/20/22 1514 -- (!) 117 27 (!) 178/101 97 %   07/20/22 1444 (!) 100.8 °F (38.2 °C) (!) 121 28 -- 97 %         LABS:  Recent Results (from the past 6 hour(s))   EKG, 12 LEAD, INITIAL    Collection Time: 07/20/22 12:50 PM   Result Value Ref Range    Ventricular Rate 117 BPM    Atrial Rate 117 BPM    P-R Interval 148 ms    QRS Duration 114 ms    Q-T Interval 344 ms    QTC Calculation (Bezet) 479 ms    Calculated P Axis 51 degrees    Calculated R Axis 13 degrees    Calculated T Axis 11 degrees    Diagnosis       Sinus tachycardia  Right bundle branch block  Abnormal ECG  When compared with ECG of 22-MAY-2021 23:55,  Right bundle branch block is now present     CBC WITH AUTOMATED DIFF    Collection Time: 07/20/22  1:24 PM   Result Value Ref Range    WBC 13.7 (H) 4.1 - 11.1 K/uL    RBC 5.28 4.10 - 5.70 M/uL    HGB 15.4 12.1 - 17.0 g/dL    HCT 47.6 36.6 - 50.3 %    MCV 90.2 80.0 - 99.0 FL    MCH 29.2 26.0 - 34.0 PG    MCHC 32.4 30.0 - 36.5 g/dL    RDW 13.1 11.5 - 14.5 %    PLATELET 862 366 - 980 K/uL    MPV 9.7 8.9 - 12.9 FL    NRBC 0.0 0  WBC    ABSOLUTE NRBC 0.00 0.00 - 0.01 K/uL    NEUTROPHILS 82 (H) 32 - 75 %    LYMPHOCYTES 10 (L) 12 - 49 %    MONOCYTES 8 5 - 13 %    EOSINOPHILS 0 0 - 7 %    BASOPHILS 0 0 - 1 %    IMMATURE GRANULOCYTES 0 0.0 - 0.5 %    ABS. NEUTROPHILS 11.2 (H) 1.8 - 8.0 K/UL    ABS. LYMPHOCYTES 1.3 0.8 - 3.5 K/UL    ABS. MONOCYTES 1.1 (H) 0.0 - 1.0 K/UL    ABS.  EOSINOPHILS 0.0 0.0 - 0.4 K/UL    ABS. BASOPHILS 0.0 0.0 - 0.1 K/UL    ABS. IMM. GRANS. 0.1 (H) 0.00 - 0.04 K/UL    DF AUTOMATED     METABOLIC PANEL, COMPREHENSIVE    Collection Time: 07/20/22  1:24 PM   Result Value Ref Range    Sodium 136 136 - 145 mmol/L    Potassium 4.2 3.5 - 5.1 mmol/L    Chloride 101 97 - 108 mmol/L    CO2 25 21 - 32 mmol/L    Anion gap 10 5 - 15 mmol/L    Glucose 104 (H) 65 - 100 mg/dL    BUN 16 6 - 20 MG/DL    Creatinine 1.21 0.70 - 1.30 MG/DL    BUN/Creatinine ratio 13 12 - 20      GFR est AA >60 >60 ml/min/1.73m2    GFR est non-AA 59 (L) >60 ml/min/1.73m2    Calcium 9.3 8.5 - 10.1 MG/DL    Bilirubin, total 1.1 (H) 0.2 - 1.0 MG/DL    ALT (SGPT) 39 12 - 78 U/L    AST (SGOT) 40 (H) 15 - 37 U/L    Alk.  phosphatase 77 45 - 117 U/L    Protein, total 8.0 6.4 - 8.2 g/dL    Albumin 3.7 3.5 - 5.0 g/dL    Globulin 4.3 (H) 2.0 - 4.0 g/dL    A-G Ratio 0.9 (L) 1.1 - 2.2     D DIMER    Collection Time: 07/20/22  1:24 PM   Result Value Ref Range    D-dimer 1.08 (H) 0.00 - 0.65 mg/L FEU   MAGNESIUM    Collection Time: 07/20/22  1:24 PM   Result Value Ref Range    Magnesium 2.2 1.6 - 2.4 mg/dL   TROPONIN-HIGH SENSITIVITY    Collection Time: 07/20/22  1:24 PM   Result Value Ref Range    Troponin-High Sensitivity 20 0 - 76 ng/L   COVID-19 RAPID TEST    Collection Time: 07/20/22  3:09 PM   Result Value Ref Range    Specimen source Nasopharyngeal      COVID-19 rapid test Not detected NOTD     TROPONIN-HIGH SENSITIVITY    Collection Time: 07/20/22  3:15 PM   Result Value Ref Range    Troponin-High Sensitivity 20 0 - 76 ng/L   URINALYSIS W/MICROSCOPIC    Collection Time: 07/20/22  4:28 PM   Result Value Ref Range    Color YELLOW/STRAW      Appearance CLEAR CLEAR      Specific gravity 1.012 1.003 - 1.030      pH (UA) 7.5 5.0 - 8.0      Protein Negative NEG mg/dL    Glucose Negative NEG mg/dL    Ketone Negative NEG mg/dL    Bilirubin Negative NEG      Blood TRACE (A) NEG      Urobilinogen 0.2 0.2 - 1.0 EU/dL    Nitrites Negative NEG Leukocyte Esterase SMALL (A) NEG      WBC 5-10 0 - 4 /hpf    RBC 0-5 0 - 5 /hpf    Epithelial cells FEW FEW /lpf    Bacteria 2+ (A) NEG /hpf   URINE CULTURE HOLD SAMPLE    Collection Time: 07/20/22  4:28 PM    Specimen: Serum; Urine   Result Value Ref Range    Urine culture hold        Urine on hold in Microbiology dept for 2 days. If unpreserved urine is submitted, it cannot be used for addtional testing after 24 hours, recollection will be required. IMAGING:  CTA CHEST W OR W WO CONT   Final Result   No significant abnormalities. CT HEAD WO CONT   Final Result   No acute changes. CT SPINE CERV WO CONT   Final Result    impression: No acute changes. DUPLEX LOWER EXT VENOUS BILAT   Final Result      XR CHEST PORT   Final Result   No evidence of acute cardiopulmonary process. Medications During Visit:  Medications   iopamidoL (ISOVUE-370) 76 % injection 100 mL (80 mL IntraVENous Given 7/20/22 1529)   acetaminophen (TYLENOL) tablet 650 mg (650 mg Oral Given 7/20/22 1507)   sodium chloride 0.9 % bolus infusion 500 mL (0 mL IntraVENous IV Completed 7/20/22 1705)   cefTRIAXone (ROCEPHIN) 1 g in 0.9% sodium chloride 10 mL IV syringe (1 g IntraVENous Given 7/20/22 1757)         DECISION MAKING:  Carri Mcdonald is a 69 y.o. male who comes in as above. All imaging showed no acute abnormality. No evidence of pulmonary embolism. Lab work-up was unremarkable. UA showed evidence of urinary tract infection. Patient given 1 g of Rocephin while ER. Patient prescribed a 7-day course of cephalexin. Results discussed with patient and patient's family member. Patient stable upon discharge. Return precautions given to patient. IMPRESSION:  1. Urinary tract infection with hematuria, site unspecified    2. Fall, initial encounter    3.  Leukocytosis, unspecified type        DISPOSITION:  Discharged      Current Discharge Medication List        START taking these medications    Details cephALEXin (Keflex) 500 mg capsule Take 1 Capsule by mouth two (2) times a day for 7 days. Qty: 14 Capsule, Refills: 0  Start date: 7/20/2022, End date: 7/27/2022              Follow-up Information       Follow up With Specialties Details Why Alayna Benoit MD Internal Medicine Physician Schedule an appointment as soon as possible for a visit   [place] [id number]       [address] [facility] Emergency Medicine  If symptoms worsen Fannie K[phone]              The patient is asked to follow-up with their primary care provider in the next several days. They are to call tomorrow for an appointment. The patient is asked to return promptly for any increased concerns or worsening of symptoms. They can return to this emergency department or any other emergency department.

## 2022-07-20 NOTE — PROGRESS NOTES
Occupational Therapy Screening:  Services maybe indicated at this time. [facility] based on results obtained during the nursing admission assessment. The patients chart was reviewed . Please order a consult for occupational therapy if patient has had a decline in function from baseline and you would like an evaluation to be completed. Thank you.

## 2022-07-20 NOTE — ED TRIAGE NOTES
Patient reports unable to get up from the bed. Today he fell of the bed and couldn't get up. Reports feeling stiff and dieretic. Patient denies pain. Reports Hx of Parkinson's disease.

## 2022-07-20 NOTE — ED NOTES
Discharge instructions reviewed with pt and spouse and copy given along with RX by this RN. Patient and spouse educated on follow up with PCP and new prescription sent to pharmacy. Spouse and patient verbalized understanding of all discharge follow up. Patient taken via wheelchair to ED restroom prior to discharge and patient had large BM. Pt had new incontinence brief placed prior to discharge. Patient's son providing transport home for patient and spouse. Pt in no sign of distress with discharge.

## 2022-07-21 LAB
ATRIAL RATE: 117 BPM
CALCULATED P AXIS, ECG09: 51 DEGREES
CALCULATED R AXIS, ECG10: 13 DEGREES
CALCULATED T AXIS, ECG11: 11 DEGREES
DIAGNOSIS, 93000: NORMAL
P-R INTERVAL, ECG05: 148 MS
Q-T INTERVAL, ECG07: 344 MS
QRS DURATION, ECG06: 114 MS
QTC CALCULATION (BEZET), ECG08: 479 MS
VENTRICULAR RATE, ECG03: 117 BPM

## 2022-07-22 LAB
BACTERIA SPEC CULT: ABNORMAL
CC UR VC: ABNORMAL
SERVICE CMNT-IMP: ABNORMAL

## 2022-07-27 ENCOUNTER — OFFICE VISIT (OUTPATIENT)
Dept: PRIMARY CARE CLINIC | Age: 67
End: 2022-07-27
Payer: MEDICARE

## 2022-07-27 VITALS
OXYGEN SATURATION: 97 % | RESPIRATION RATE: 18 BRPM | HEIGHT: 70 IN | TEMPERATURE: 98 F | WEIGHT: 187 LBS | DIASTOLIC BLOOD PRESSURE: 83 MMHG | BODY MASS INDEX: 26.77 KG/M2 | SYSTOLIC BLOOD PRESSURE: 130 MMHG | HEART RATE: 94 BPM

## 2022-07-27 DIAGNOSIS — N39.0 URINARY TRACT INFECTION WITHOUT HEMATURIA, SITE UNSPECIFIED: ICD-10-CM

## 2022-07-27 DIAGNOSIS — R14.0 ABDOMINAL DISTENSION: Primary | ICD-10-CM

## 2022-07-27 DIAGNOSIS — K80.20 GALLSTONES: ICD-10-CM

## 2022-07-27 DIAGNOSIS — R14.0 BLOATING: ICD-10-CM

## 2022-07-27 PROCEDURE — 1123F ACP DISCUSS/DSCN MKR DOCD: CPT | Performed by: INTERNAL MEDICINE

## 2022-07-27 PROCEDURE — 3017F COLORECTAL CA SCREEN DOC REV: CPT | Performed by: INTERNAL MEDICINE

## 2022-07-27 PROCEDURE — 1101F PT FALLS ASSESS-DOCD LE1/YR: CPT | Performed by: INTERNAL MEDICINE

## 2022-07-27 PROCEDURE — G9717 DOC PT DX DEP/BP F/U NT REQ: HCPCS | Performed by: INTERNAL MEDICINE

## 2022-07-27 PROCEDURE — G8417 CALC BMI ABV UP PARAM F/U: HCPCS | Performed by: INTERNAL MEDICINE

## 2022-07-27 PROCEDURE — G8536 NO DOC ELDER MAL SCRN: HCPCS | Performed by: INTERNAL MEDICINE

## 2022-07-27 PROCEDURE — 99214 OFFICE O/P EST MOD 30 MIN: CPT | Performed by: INTERNAL MEDICINE

## 2022-07-27 PROCEDURE — G8427 DOCREV CUR MEDS BY ELIG CLIN: HCPCS | Performed by: INTERNAL MEDICINE

## 2022-07-27 NOTE — PROGRESS NOTES
Chief Complaint   Patient presents with    Abdominal Pain     For one month,he is having swelling in abdomen     Visit Vitals  /83 (BP 1 Location: Left upper arm, BP Patient Position: Sitting, BP Cuff Size: Small adult)   Pulse 94   Temp 98 °F (36.7 °C) (Oral)   Resp 18   Ht 5' 10\" (1.778 m)   Wt 187 lb (84.8 kg)   SpO2 97%   BMI 26.83 kg/m²

## 2022-07-27 NOTE — PROGRESS NOTES
Roselia Perry is a 69 y.o.  male and presents with     Chief Complaint   Patient presents with    Abdominal Pain     For one month,he is having swelling in abdomen    Leg Swelling     bilateral     Pt feels bloated  Taking meds for GERD  Has burning sesnation in chest  Has gallstones   Feels likely    fodd is not going down  Was seen in ER  for a fall and was found to have UTI. CT head, chest , and venous dopplers were neg  Gets swelling in legs. Past Medical History:   Diagnosis Date    Arthritis     knees    Chronic pain     knees, back    Constipation     Depression     Gait difficulty     H/O seasonal allergies     Parkinson disease (HCC)     Restless leg syndrome     Urinary frequency      Past Surgical History:   Procedure Laterality Date    HX HERNIA REPAIR Right 08/15/2019    Robotic-assisted laparoscopic right inguinal herniorrhaphy with mesh. Current Outpatient Medications   Medication Sig    cholecalciferol (VITAMIN D3) (1000 Units /25 mcg) tablet Take 1,000 Units by mouth in the morning. furosemide (LASIX) 20 mg tablet Take 20 mg by mouth in the morning. cephALEXin (Keflex) 500 mg capsule Take 1 Capsule by mouth two (2) times a day for 7 days. tamsulosin (FLOMAX) 0.4 mg capsule TAKE 1 CAPSULE BY MOUTH EVERY DAY    Linzess 145 mcg cap capsule TAKE 1 CAPSULE BY MOUTH EVERY DAY BEFORE BREAKFAST    ergocalciferol (ERGOCALCIFEROL) 1,250 mcg (50,000 unit) capsule Take 1 Capsule by mouth every seven (7) days. fluticasone propionate (FLONASE) 50 mcg/actuation nasal spray 1 [place] by Both Nostrils route daily. Omeprazole delayed release (PRILOSEC D/R) 20 mg tablet Take 1 Tablet by mouth daily. polyethylene glycol (MIRALAX) 17 gram/dose powder Take 17 g by mouth daily as needed. carbidopa-levodopa ER (SINEMET CR)  mg per tablet Take 1 Tablet by mouth nightly. brief disposable (adult) misc by Does Not Apply route.     Incontinence Pad, Liner, Disp pads Use as directed    Jaimie Johnson (Ultra-Light Rollator) misc Use as directed    carbidopa-levodopa (SINEMET)  mg per tablet TAKE 1 AND 1/2 TABLETS BY MOUTH THREE TIMES DAILY    diclofenac (VOLTAREN) 1 % gel Apply  to affected area four (4) times daily. ACETAMINOPHEN (TYLENOL PO) Take 650 mg by mouth as needed. fexofenadine (ALLEGRA) 60 mg tablet Take 60 mg by mouth in the morning. (Patient not taking: Reported on 7/27/2022)     No current facility-administered medications for this visit. Health Maintenance   Topic Date Due    Shingrix Vaccine Age 49> (1 of 2) Never done    COVID-19 Vaccine (3 - Booster for Pfizer series) 02/18/2022    Flu Vaccine (1) 09/01/2022    A1C test (Diabetic or Prediabetic)  06/13/2023    Medicare Yearly Exam  06/14/2023    Depression Monitoring  07/20/2023    Lipid Screen  08/27/2023    Colorectal Cancer Screening Combo  02/12/2024    DTaP/Tdap/Td series (2 - Td or Tdap) 08/26/2028    Hepatitis C Screening  Completed    Pneumococcal 65+ years  Completed     Immunization History   Administered Date(s) Administered    COVID-19, PFIZER PURPLE top, DILUTE for use, (age 15 y+), IM, 30mcg/0.3mL 08/21/2021, 09/18/2021    Pneumococcal Conjugate (PCV-13) 08/26/2018    Pneumococcal Polysaccharide (PPSV-23) 04/22/2021    Tdap 08/26/2018     No LMP for male patient. Allergies and Intolerances: Allergies   Allergen Reactions    Sulfa (Sulfonamide Antibiotics) Rash       Family History:   History reviewed. No pertinent family history. Social History:   He  reports that he has never smoked. He has never used smokeless tobacco.  He  reports no history of alcohol use.             Review of Systems:   General: negative for - chills, fatigue, fever, weight change  Psych: negative for - anxiety, depression, irritability or mood swings  ENT: negative for - headaches, hearing change, nasal congestion, oral lesions, sneezing or sore throat  Heme/ Lymph: negative for - bleeding problems, bruising, pallor or swollen lymph nodes  Endo: negative for - hot flashes, polydipsia/polyuria or temperature intolerance  Resp: negative for - cough, shortness of breath or wheezing  CV: negative for - chest pain, edema or palpitations  GI: negative for - abdominal pain, change in bowel habits, constipation, diarrhea or nausea/vomiting  : negative for - dysuria, hematuria, incontinence, pelvic pain or vulvar/vaginal symptoms  MSK: negative for - joint pain, joint swelling or muscle pain  Neuro: negative for - confusion, headaches, seizures or weakness  Derm: negative for - dry skin, hair changes, rash or skin lesion changes          Physical:   Vitals:   Vitals:    07/27/22 1356   BP: 130/83   Pulse: 94   Resp: 18   Temp: 98 °F (36.7 °C)   TempSrc: Oral   SpO2: 97%   Weight: 187 lb (84.8 kg)   Height: 5' 10\" (1.778 m)           Exam:   HEENT- atraumatic,normocephalic, awake, oriented, well nourished  Neck - supple,no enlarged lymph nodes, no JVD, no thyromegaly  Chest- CTA, no rhonchi, no crackles  Heart- rrr, no murmurs / gallop/rub  Abdomen- soft,BS+,NT, no hepatosplenomegaly, abdomen distended  Ext -bilateral pitting edema  Neuro- no focal deficits. Power 5/5 all extremities  Skin - warm,dry, no obvious rashes.           Review of Data:   LABS:   Lab Results   Component Value Date/Time    WBC 13.7 (H) 07/20/2022 01:24 PM    HGB 15.4 07/20/2022 01:24 PM    HCT 47.6 07/20/2022 01:24 PM    PLATELET 396 77/90/8208 01:24 PM     Lab Results   Component Value Date/Time    Sodium 136 07/20/2022 01:24 PM    Potassium 4.2 07/20/2022 01:24 PM    Chloride 101 07/20/2022 01:24 PM    CO2 25 07/20/2022 01:24 PM    Glucose 104 (H) 07/20/2022 01:24 PM    BUN 16 07/20/2022 01:24 PM    Creatinine 1.21 07/20/2022 01:24 PM     Lab Results   Component Value Date/Time    Cholesterol, total 200 (H) 08/27/2018 09:56 AM    HDL Cholesterol 41 08/27/2018 09:56 AM    LDL, calculated 109 (H) 08/27/2018 09:56 AM    Triglyceride 252 (H) 08/27/2018 09:56 AM     No components found for: GPT        Impression / Plan:        ICD-10-CM ICD-9-CM    1. Abdominal distension  R14.0 787.3 NM GASTRIC EMPTY STDY      NM HEPATOBILIARY DUCT SCAN      2. Urinary tract infection without hematuria, site unspecified  N39.0 599.0 URINALYSIS W/ RFLX MICROSCOPIC      URINALYSIS W/ REFLEX CULTURE      URINALYSIS W/ RFLX MICROSCOPIC      URINALYSIS W/ REFLEX CULTURE      3. Bloating  R14.0 787.3 NM GASTRIC EMPTY STDY      4. Gallstones  K80.20 574.20 NM HEPATOBILIARY DUCT SCAN        Bilateral pedal edema-reduce fluid intake, continue Lasix as needed      Explained to patient risk benefits of the medications. Advised patient to stop meds if having any side effects. Pt verbalized understanding of the instructions. I have discussed the diagnosis with the patient and the intended plan as seen in the above orders. The patient has received an after-visit summary and questions were answered concerning future plans. I have discussed medication side effects and warnings with the patient as well. I have reviewed the plan of care with the patient, accepted their input and they are in agreement with the treatment goals. Reviewed plan of care. Patient has provided input and agrees with goals. Follow-up and Dispositions    Return in about 2 months (around 9/27/2022).          Tyler Ernandez MD

## 2022-07-28 LAB
APPEARANCE UR: CLEAR
BACTERIA #/AREA URNS HPF: NORMAL /[HPF]
BILIRUB UR QL STRIP: NEGATIVE
CASTS URNS QL MICRO: NORMAL /LPF
COLOR UR: YELLOW
EPI CELLS #/AREA URNS HPF: NORMAL /HPF (ref 0–10)
GLUCOSE UR QL STRIP: NEGATIVE
HGB UR QL STRIP: NEGATIVE
KETONES UR QL STRIP: ABNORMAL
LEUKOCYTE ESTERASE UR QL STRIP: NEGATIVE
MICRO URNS: ABNORMAL
MICRO URNS: ABNORMAL
NITRITE UR QL STRIP: NEGATIVE
PH UR STRIP: 5.5 [PH] (ref 5–7.5)
PROT UR QL STRIP: NEGATIVE
RBC #/AREA URNS HPF: NORMAL /HPF (ref 0–2)
SP GR UR STRIP: 1.02 (ref 1–1.03)
URINALYSIS REFLEX, 377202: ABNORMAL
UROBILINOGEN UR STRIP-MCNC: 1 MG/DL (ref 0.2–1)
WBC #/AREA URNS HPF: NORMAL /HPF (ref 0–5)

## 2022-10-18 ENCOUNTER — HOSPITAL ENCOUNTER (OUTPATIENT)
Dept: GENERAL RADIOLOGY | Age: 67
Discharge: HOME | End: 2022-10-18
Attending: INTERNAL MEDICINE
Payer: MEDICARE

## 2022-10-18 ENCOUNTER — OFFICE VISIT (OUTPATIENT)
Dept: PRIMARY CARE CLINIC | Age: 67
End: 2022-10-18
Payer: MEDICARE

## 2022-10-18 VITALS
BODY MASS INDEX: 29.55 KG/M2 | HEART RATE: 83 BPM | HEIGHT: 68 IN | WEIGHT: 195 LBS | RESPIRATION RATE: 16 BRPM | OXYGEN SATURATION: 97 % | SYSTOLIC BLOOD PRESSURE: 101 MMHG | TEMPERATURE: 97.1 F | DIASTOLIC BLOOD PRESSURE: 68 MMHG

## 2022-10-18 DIAGNOSIS — E55.9 VITAMIN D DEFICIENCY: ICD-10-CM

## 2022-10-18 DIAGNOSIS — R14.0 BLOATING: ICD-10-CM

## 2022-10-18 DIAGNOSIS — N40.1 BENIGN PROSTATIC HYPERPLASIA WITH LOWER URINARY TRACT SYMPTOMS, SYMPTOM DETAILS UNSPECIFIED: ICD-10-CM

## 2022-10-18 DIAGNOSIS — R14.0 ABDOMINAL DISTENSION: ICD-10-CM

## 2022-10-18 DIAGNOSIS — G20 PARKINSON'S DISEASE (HCC): ICD-10-CM

## 2022-10-18 DIAGNOSIS — K80.20 GALLSTONES: Primary | ICD-10-CM

## 2022-10-18 DIAGNOSIS — K59.04 CHRONIC IDIOPATHIC CONSTIPATION: ICD-10-CM

## 2022-10-18 PROCEDURE — G8417 CALC BMI ABV UP PARAM F/U: HCPCS | Performed by: INTERNAL MEDICINE

## 2022-10-18 PROCEDURE — G8427 DOCREV CUR MEDS BY ELIG CLIN: HCPCS | Performed by: INTERNAL MEDICINE

## 2022-10-18 PROCEDURE — 3017F COLORECTAL CA SCREEN DOC REV: CPT | Performed by: INTERNAL MEDICINE

## 2022-10-18 PROCEDURE — G9717 DOC PT DX DEP/BP F/U NT REQ: HCPCS | Performed by: INTERNAL MEDICINE

## 2022-10-18 PROCEDURE — G8536 NO DOC ELDER MAL SCRN: HCPCS | Performed by: INTERNAL MEDICINE

## 2022-10-18 PROCEDURE — 1101F PT FALLS ASSESS-DOCD LE1/YR: CPT | Performed by: INTERNAL MEDICINE

## 2022-10-18 PROCEDURE — 74018 RADEX ABDOMEN 1 VIEW: CPT

## 2022-10-18 PROCEDURE — 99214 OFFICE O/P EST MOD 30 MIN: CPT | Performed by: INTERNAL MEDICINE

## 2022-10-18 PROCEDURE — 1123F ACP DISCUSS/DSCN MKR DOCD: CPT | Performed by: INTERNAL MEDICINE

## 2022-10-18 RX ORDER — ENTACAPONE 200 MG/1
200 TABLET ORAL 3 TIMES DAILY
COMMUNITY

## 2022-10-18 RX ORDER — SIMETHICONE 80 MG
80 TABLET,CHEWABLE ORAL
Qty: 30 TABLET | Refills: 2 | Status: SHIPPED | OUTPATIENT
Start: 2022-10-18

## 2022-10-18 NOTE — PROGRESS NOTES
Chief Complaint   Patient presents with    Weight Gain        Visit Vitals  /68 (BP 1 Location: Left upper arm, BP Patient Position: Sitting)   Pulse 83   Temp 97.1 °F (36.2 °C) (Temporal)   Resp 16   Ht 5' 8\" (1.727 m)   Wt 195 lb (88.5 kg)   SpO2 97%   BMI 29.65 kg/m²        Health Maintenance Due   Topic    Shingrix Vaccine Age 50> (1 of 2)    COVID-19 Vaccine (3 - Booster for [employer] series)        1. \"Have you been to the ER, urgent care clinic since your last visit? Hospitalized since your last visit? \" No    2. \"Have you seen or consulted any other health care providers outside of the [address] [place] [address] since your last visit? \" No     3. For patients aged 39-70: Has the patient had a colonoscopy / FIT/ Cologuard? NA - based on age      If the patient is female:    4. For patients aged 41-77: Has the patient had a mammogram within the past 2 years? Yes - no Care Gap present      5. For patients aged 21-65: Has the patient had a pap smear?  NA - based on age or sex

## 2022-10-18 NOTE — PROGRESS NOTES
Emelina Rangel is a 69 y.o.  male and presents with     Chief Complaint   Patient presents with    Weight Gain     Pt is taking omperazole foe GERD and symptoms seems to have improved. Also taking linzess fr constipation - seems to be doing better. He is taking flomax  2 capsules and urination seems to be better  HOwever abdomen still feels blaoted. Has h/o gallstones  Has not done HIDA scan or Gastric emptying study. Pt has cramping sesnation in the RUQ. Pt has leg swelling  Has been more ambulatory   Walks for about 10 minutes. Past Medical History:   Diagnosis Date    Arthritis     knees    Chronic pain     knees, back    Constipation     Depression     Gait difficulty     H/O seasonal allergies     Parkinson disease (HCC)     Restless leg syndrome     Urinary frequency      Past Surgical History:   Procedure Laterality Date    HX HERNIA REPAIR Right 08/15/2019    Robotic-assisted laparoscopic right inguinal herniorrhaphy with mesh. Current Outpatient Medications   Medication Sig    simethicone (MYLICON) 80 mg chewable tablet Take 1 Tablet by mouth every six (6) hours as needed for Flatulence. entacapone (COMTAN) 200 mg tablet Take 200 mg by mouth three (3) times daily. cholecalciferol (VITAMIN D3) (1000 Units /25 mcg) tablet Take 1,000 Units by mouth in the morning. furosemide (LASIX) 20 mg tablet Take 20 mg by mouth in the morning. tamsulosin (FLOMAX) 0.4 mg capsule TAKE 1 CAPSULE BY MOUTH EVERY DAY    Linzess 145 mcg cap capsule TAKE 1 CAPSULE BY MOUTH EVERY DAY BEFORE BREAKFAST    ergocalciferol (ERGOCALCIFEROL) 1,250 mcg (50,000 unit) capsule Take 1 Capsule by mouth every seven (7) days. fluticasone propionate (FLONASE) 50 mcg/actuation nasal spray 1 [place] by Both Nostrils route daily. Omeprazole delayed release (PRILOSEC D/R) 20 mg tablet Take 1 Tablet by mouth daily. polyethylene glycol (MIRALAX) 17 gram/dose powder Take 17 g by mouth daily as needed.     carbidopa-levodopa ER (SINEMET CR)  mg per tablet Take 1 Tablet by mouth nightly. brief disposable (adult) misc by Does Not Apply route. Incontinence Pad, Liner, Disp pads Use as directed    Walker (Ultra-Light Rollator) misc Use as directed    carbidopa-levodopa (SINEMET)  mg per tablet TAKE 1 AND 1/2 TABLETS BY MOUTH THREE TIMES DAILY    diclofenac (VOLTAREN) 1 % gel Apply  to affected area four (4) times daily. ACETAMINOPHEN (TYLENOL PO) Take 650 mg by mouth as needed. No current facility-administered medications for this visit. Health Maintenance   Topic Date Due    Shingrix Vaccine Age 49> (1 of 2) Never done    COVID-19 Vaccine (3 - Booster for Pfizer series) 02/18/2022    Flu Vaccine (1) 06/30/2023 (Originally 8/1/2022)    A1C test (Diabetic or Prediabetic)  06/13/2023    Medicare Yearly Exam  06/14/2023    Depression Monitoring  07/20/2023    Lipid Screen  08/27/2023    Colorectal Cancer Screening Combo  02/12/2024    DTaP/Tdap/Td series (2 - Td or Tdap) 08/26/2028    Hepatitis C Screening  Completed    Pneumococcal 65+ years  Completed     Immunization History   Administered Date(s) Administered    COVID-19, PFIZER PURPLE top, DILUTE for use, (age 15 y+), IM, 30mcg/0.3mL 08/21/2021, 09/18/2021    Pneumococcal Conjugate (PCV-13) 08/26/2018    Pneumococcal Polysaccharide (PPSV-23) 04/22/2021    Tdap 08/26/2018     No LMP for male patient. Allergies and Intolerances: Allergies   Allergen Reactions    Sulfa (Sulfonamide Antibiotics) Rash       Family History:   No family history on file. Social History:   He  reports that he has never smoked. He has never used smokeless tobacco.  He  reports no history of alcohol use.             Review of Systems:   General: negative for - chills, fatigue, fever, weight change  Psych: negative for - anxiety, depression, irritability or mood swings  ENT: negative for - headaches, hearing change, nasal congestion, oral lesions, sneezing or sore throat  Heme/ Lymph: negative for - bleeding problems, bruising, pallor or swollen lymph nodes  Endo: negative for - hot flashes, polydipsia/polyuria or temperature intolerance  Resp: negative for - cough, shortness of breath or wheezing  CV: negative for - chest pain, edema or palpitations  GI: negative for - abdominal pain, change in bowel habits, constipation, diarrhea or nausea/vomiting  : negative for - dysuria, hematuria, incontinence, pelvic pain or vulvar/vaginal symptoms  MSK: negative for - joint pain, joint swelling or muscle pain  Neuro: negative for - confusion, headaches, seizures or weakness  Derm: negative for - dry skin, hair changes, rash or skin lesion changes          Physical:   Vitals:   Vitals:    10/18/22 1518   BP: 101/68   Pulse: 83   Resp: 16   Temp: 97.1 °F (36.2 °C)   TempSrc: Temporal   SpO2: 97%   Weight: 195 lb (88.5 kg)   Height: 5' 8\" (1.727 m)           Exam:   HEENT- atraumatic,normocephalic, awake, oriented, well nourished  Neck - supple,no enlarged lymph nodes, no JVD, no thyromegaly  Chest- CTA, no rhonchi, no crackles  Heart- rrr, no murmurs / gallop/rub  Abdomen- soft,BS+,NT, no hepatosplenomegaly, abdomen distended  Ext - no c/c/edema   Neuro- no focal deficits. Power 5/5 all extremities, improved gait, Ambulating well  Skin - warm,dry, no obvious rashes.           Review of Data:   LABS:   Lab Results   Component Value Date/Time    WBC 13.7 (H) 07/20/2022 01:24 PM    HGB 15.4 07/20/2022 01:24 PM    HCT 47.6 07/20/2022 01:24 PM    PLATELET 826 25/37/8851 01:24 PM     Lab Results   Component Value Date/Time    Sodium 136 07/20/2022 01:24 PM    Potassium 4.2 07/20/2022 01:24 PM    Chloride 101 07/20/2022 01:24 PM    CO2 25 07/20/2022 01:24 PM    Glucose 104 (H) 07/20/2022 01:24 PM    BUN 16 07/20/2022 01:24 PM    Creatinine 1.21 07/20/2022 01:24 PM     Lab Results   Component Value Date/Time    Cholesterol, total 200 (H) 08/27/2018 09:56 AM    HDL Cholesterol 41 08/27/2018 09:56 AM    LDL, calculated 109 (H) 08/27/2018 09:56 AM    Triglyceride 252 (H) 08/27/2018 09:56 AM     No components found for: GPT        Impression / Plan:        ICD-10-CM ICD-9-CM    1. Gallstones  K80.20 574.20 NM HEPATOBILIARY DUCT SCAN      2. Abdominal distension  R14.0 787.3       3. Benign prostatic hyperplasia with lower urinary tract symptoms, symptom details unspecified  N40.1 600.01       4. Parkinson's disease ([facility] [address]  G20 332.0       5. Chronic idiopathic constipation  K59.04 564.00       6. Bloating  R14.0 580.5 simethicone (MYLICON) 80 mg chewable tablet      XR ABD (KUB)      7. Vitamin D deficiency  E55.9 268.9 VITAMIN D, 25 HYDROXY      VITAMIN D, 25 HYDROXY      Parkinsons - gait improved    GERD - symptoms resolved    Constipation - in crease fiber intake , ambulate as tolerated. Explained to patient risk benefits of the medications. Advised patient to stop meds if having any side effects. Pt verbalized understanding of the instructions. I have discussed the diagnosis with the patient and the intended plan as seen in the above orders. The patient has received an after-visit summary and questions were answered concerning future plans. I have discussed medication side effects and warnings with the patient as well. I have reviewed the plan of care with the patient, accepted their input and they are in agreement with the treatment goals. Reviewed plan of care. Patient has provided input and agrees with goals. Follow-up and Dispositions    Return in about 4 months (around 2/18/2023).          Hiral Perdomo MD

## 2022-10-19 NOTE — PROGRESS NOTES
X-ray abdomen shows fecal stasis or constipation  Take Linzess and MiraLAX as directed.   Increase fiber intake

## 2022-10-26 ENCOUNTER — TELEPHONE (OUTPATIENT)
Dept: PRIMARY CARE CLINIC | Age: 67
End: 2022-10-26

## 2022-10-26 ENCOUNTER — HOSPITAL ENCOUNTER (OUTPATIENT)
Dept: NUCLEAR MEDICINE | Age: 67
Discharge: HOME | End: 2022-10-26
Attending: INTERNAL MEDICINE
Payer: MEDICARE

## 2022-10-26 DIAGNOSIS — K80.20 GALLSTONES: ICD-10-CM

## 2022-10-26 DIAGNOSIS — R94.8 ABNORMAL BILIARY HIDA SCAN: Primary | ICD-10-CM

## 2022-10-26 PROCEDURE — 78226 HEPATOBILIARY SYSTEM IMAGING: CPT

## 2022-10-26 RX ORDER — KIT FOR THE PREPARATION OF TECHNETIUM TC 99M MEBROFENIN 45 MG/10ML
5.2 INJECTION, POWDER, LYOPHILIZED, FOR SOLUTION INTRAVENOUS
Status: COMPLETED | OUTPATIENT
Start: 2022-10-26 | End: 2022-10-26

## 2022-10-26 RX ADMIN — KIT FOR THE PREPARATION OF TECHNETIUM TC 99M MEBROFENIN 5.2 MILLICURIE: 45 INJECTION, POWDER, LYOPHILIZED, FOR SOLUTION INTRAVENOUS at 14:00

## 2022-10-26 NOTE — TELEPHONE ENCOUNTER
I couldn't hear excatly what the patient was saying but it sounded like she said the patient  caouldnt

## 2022-10-27 ENCOUNTER — TELEPHONE (OUTPATIENT)
Dept: PRIMARY CARE CLINIC | Age: 67
End: 2022-10-27

## 2022-10-27 NOTE — TELEPHONE ENCOUNTER
----- Message from Lobo Bourgeois MD sent at 10/26/2022  8:59 PM EDT -----  HIDA scan is abnormal.  I am referring patient to general surgery

## 2022-11-08 ENCOUNTER — OFFICE VISIT (OUTPATIENT)
Dept: SURGERY | Age: 67
End: 2022-11-08
Payer: MEDICARE

## 2022-11-08 VITALS
SYSTOLIC BLOOD PRESSURE: 124 MMHG | HEIGHT: 69 IN | WEIGHT: 191 LBS | OXYGEN SATURATION: 97 % | HEART RATE: 100 BPM | TEMPERATURE: 97.8 F | RESPIRATION RATE: 20 BRPM | BODY MASS INDEX: 28.29 KG/M2 | DIASTOLIC BLOOD PRESSURE: 84 MMHG

## 2022-11-08 DIAGNOSIS — K81.1 CHRONIC CHOLECYSTITIS: Primary | ICD-10-CM

## 2022-11-08 PROCEDURE — G8427 DOCREV CUR MEDS BY ELIG CLIN: HCPCS | Performed by: SURGERY

## 2022-11-08 PROCEDURE — 1101F PT FALLS ASSESS-DOCD LE1/YR: CPT | Performed by: SURGERY

## 2022-11-08 PROCEDURE — 1123F ACP DISCUSS/DSCN MKR DOCD: CPT | Performed by: SURGERY

## 2022-11-08 PROCEDURE — 99213 OFFICE O/P EST LOW 20 MIN: CPT | Performed by: SURGERY

## 2022-11-08 PROCEDURE — G8417 CALC BMI ABV UP PARAM F/U: HCPCS | Performed by: SURGERY

## 2022-11-08 PROCEDURE — G8536 NO DOC ELDER MAL SCRN: HCPCS | Performed by: SURGERY

## 2022-11-08 PROCEDURE — 3017F COLORECTAL CA SCREEN DOC REV: CPT | Performed by: SURGERY

## 2022-11-08 PROCEDURE — G9717 DOC PT DX DEP/BP F/U NT REQ: HCPCS | Performed by: SURGERY

## 2022-11-08 NOTE — PROGRESS NOTES
Identified pt with two pt identifiers (name and ). Reviewed chart in preparation for visit and have obtained necessary documentation. Charity Ghotra is a 69 y.o. male  Chief Complaint   Patient presents with    New Patient     gallbladder     Visit Vitals  /84 (BP 1 Location: Right upper arm, BP Patient Position: Sitting, BP Cuff Size: Large adult)   Pulse 100   Temp 97.8 °F (36.6 °C)   Resp 20   Ht 5' 8.5\" (1.74 m)   Wt 191 lb (86.6 kg)   SpO2 97%   BMI 28.62 kg/m²       1. Have you been to the ER, urgent care clinic since your last visit? Hospitalized since your last visit? new patient    2. Have you seen or consulted any other health care providers outside of the [address] [place] [address] since your last visit? Include any pap smears or colon screening.  New patient

## 2022-11-09 PROBLEM — K81.1 CHRONIC CHOLECYSTITIS: Status: ACTIVE | Noted: 2022-11-09

## 2022-11-09 NOTE — PROGRESS NOTES
Surgery Consult    Subjective:      Sharmin Laboy is a 69 y.o. male who is being seen for evaluation of abdominal pain. The pain is located in the RUQ without radiation. Pain is described as pressure and cramping and measures 6/10 in intensity. Onset of pain was several months ago. Aggravating factors include eating. Alleviating factors include lying down. Associated symptoms include belching and bloating. He denies nausea, vomiting, change in bowel habits, dysuria, fever, chills, or jaundice. He has Parkinson's disease. Patient Active Problem List    Diagnosis Date Noted    Chronic cholecystitis 11/09/2022    FTT (failure to thrive) in adult 05/27/2021    S/P laparoscopic hernia repair 08/30/2019    Colonoscopy refused 12/12/2018    Lipid disorder 08/29/2018    Parkinsonism ([facility] [address] 11/27/2017    Restless leg 11/27/2017    Depression 11/27/2017     Past Medical History:   Diagnosis Date    Arthritis     knees    Chronic pain     knees, back    Constipation     Depression     Gait difficulty     H/O seasonal allergies     Parkinson disease ([address]     Restless leg syndrome     Urinary frequency       Past Surgical History:   Procedure Laterality Date    HX HERNIA REPAIR Right 08/15/2019    Robotic-assisted laparoscopic right inguinal herniorrhaphy with mesh. Social History     Tobacco Use    Smoking status: Never    Smokeless tobacco: Never   Substance Use Topics    Alcohol use: No      History reviewed. No pertinent family history. Current Outpatient Medications   Medication Sig    simethicone (MYLICON) 80 mg chewable tablet Take 1 Tablet by mouth every six (6) hours as needed for Flatulence. entacapone (COMTAN) 200 mg tablet Take 200 mg by mouth three (3) times daily. cholecalciferol (VITAMIN D3) (1000 Units /25 mcg) tablet Take 1,000 Units by mouth in the morning. furosemide (LASIX) 20 mg tablet Take 20 mg by mouth in the morning.     tamsulosin (FLOMAX) 0.4 mg capsule TAKE 1 CAPSULE BY MOUTH EVERY DAY    Linzess 145 mcg cap capsule TAKE 1 CAPSULE BY MOUTH EVERY DAY BEFORE BREAKFAST    fluticasone propionate (FLONASE) 50 mcg/actuation nasal spray 1 [place] by Both Nostrils route daily. Omeprazole delayed release (PRILOSEC D/R) 20 mg tablet Take 1 Tablet by mouth daily. polyethylene glycol (MIRALAX) 17 gram/dose powder Take 17 g by mouth daily as needed. carbidopa-levodopa ER (SINEMET CR)  mg per tablet Take 1 Tablet by mouth nightly. brief disposable (adult) misc by Does Not Apply route. Incontinence Pad, Liner, Disp pads Use as directed    Walker (Ultra-Light Rollator) misc Use as directed    carbidopa-levodopa (SINEMET)  mg per tablet TAKE 1 AND 1/2 TABLETS BY MOUTH THREE TIMES DAILY    diclofenac (VOLTAREN) 1 % gel Apply  to affected area four (4) times daily. ACETAMINOPHEN (TYLENOL PO) Take 650 mg by mouth as needed. No current facility-administered medications for this visit. Allergies   Allergen Reactions    Sulfa (Sulfonamide Antibiotics) Rash       Review of Systems:    A complete review of systems was negative except as noted in the HPI. Objective:      Visit Vitals  /84 (BP 1 Location: Right upper arm, BP Patient Position: Sitting, BP Cuff Size: Large adult)   Pulse 100   Temp 97.8 °F (36.6 °C)   Resp 20   Ht 5' 8.5\" (1.74 m)   Wt 191 lb (86.6 kg)   SpO2 97%   BMI 28.62 kg/m²       Physical Exam:  GENERAL: alert, cooperative, no distress, appears stated age, mildly obese, EYE: negative, LYMPH NODES: No cervical or supraclavicular adenopathy. THROAT & NECK: normal, LUNG: clear to auscultation bilaterally, HEART: regular rate and rhythm, S1, S2 normal, no murmur. ABDOMEN: NABS, obese, nondistended, soft. Mild right upper quadrant pain with palpation. No mass, guarding, or hernia.  EXTREMITIES:  extremities normal, atraumatic, no cyanosis or edema, SKIN: Normal., NEUROLOGIC: negative    Imaging:  reviewed  Ultrasound and HIDA scan      Assessment: Abdominal pain, suspect chronic cholecystitis. Reviewed options with patient, spouse (cholecystectomy versus dietary modification, lifestyle modification) and they desire to attempt nonoperative management for now. Plan:     1. I recommend proceeding with nonoperative management-recommended low-fat diet to avoid gallbladder stimulation, ongoing symptoms. 2. Discussed aspects of surgical intervention, methods, risks (including by not limited to infection, bleeding, hematoma, open procedure, bile duct injury, and perforation of the intestines or solid organs), and the risks of general anesthetic. The patient understands the risks; all questions were answered to the patient's satisfaction. 3. Patient will contact our office if nonoperative therapy is not providing satisfactory symptom control.

## 2022-12-01 DIAGNOSIS — E55.9 VITAMIN D DEFICIENCY: ICD-10-CM

## 2022-12-01 RX ORDER — ERGOCALCIFEROL 1.25 MG/1
CAPSULE ORAL
Qty: 12 CAPSULE | Refills: 1 | Status: SHIPPED | OUTPATIENT
Start: 2022-12-01

## 2022-12-25 DIAGNOSIS — K21.9 GASTROESOPHAGEAL REFLUX DISEASE WITHOUT ESOPHAGITIS: ICD-10-CM

## 2022-12-28 RX ORDER — PHENOL/SODIUM PHENOLATE
AEROSOL, SPRAY (ML) MUCOUS MEMBRANE
Qty: 90 TABLET | Refills: 1 | Status: SHIPPED | OUTPATIENT
Start: 2022-12-28

## 2023-01-10 DIAGNOSIS — K59.04 CHRONIC IDIOPATHIC CONSTIPATION: ICD-10-CM

## 2023-01-11 RX ORDER — MELATONIN
Qty: 90 TABLET | Refills: 0 | Status: SHIPPED | OUTPATIENT
Start: 2023-01-11

## 2023-01-11 RX ORDER — LINACLOTIDE 145 UG/1
CAPSULE, GELATIN COATED ORAL
Qty: 90 CAPSULE | Refills: 0 | Status: SHIPPED | OUTPATIENT
Start: 2023-01-11

## 2023-03-01 ENCOUNTER — OFFICE VISIT (OUTPATIENT)
Dept: PRIMARY CARE CLINIC | Age: 68
End: 2023-03-01
Payer: MEDICARE

## 2023-03-01 VITALS
HEART RATE: 84 BPM | RESPIRATION RATE: 16 BRPM | DIASTOLIC BLOOD PRESSURE: 86 MMHG | BODY MASS INDEX: 28.04 KG/M2 | OXYGEN SATURATION: 98 % | TEMPERATURE: 97.2 F | HEIGHT: 68 IN | WEIGHT: 185 LBS | SYSTOLIC BLOOD PRESSURE: 130 MMHG

## 2023-03-01 DIAGNOSIS — K59.04 CHRONIC IDIOPATHIC CONSTIPATION: ICD-10-CM

## 2023-03-01 DIAGNOSIS — R30.0 DYSURIA: ICD-10-CM

## 2023-03-01 DIAGNOSIS — R73.03 PREDIABETES: ICD-10-CM

## 2023-03-01 DIAGNOSIS — E55.9 VITAMIN D DEFICIENCY: ICD-10-CM

## 2023-03-01 DIAGNOSIS — G20 PARKINSON'S DISEASE (HCC): Primary | ICD-10-CM

## 2023-03-01 DIAGNOSIS — N39.490 OVERFLOW INCONTINENCE OF URINE: ICD-10-CM

## 2023-03-01 PROCEDURE — 1101F PT FALLS ASSESS-DOCD LE1/YR: CPT | Performed by: INTERNAL MEDICINE

## 2023-03-01 PROCEDURE — 1123F ACP DISCUSS/DSCN MKR DOCD: CPT | Performed by: INTERNAL MEDICINE

## 2023-03-01 PROCEDURE — G9717 DOC PT DX DEP/BP F/U NT REQ: HCPCS | Performed by: INTERNAL MEDICINE

## 2023-03-01 PROCEDURE — 99214 OFFICE O/P EST MOD 30 MIN: CPT | Performed by: INTERNAL MEDICINE

## 2023-03-01 PROCEDURE — G8417 CALC BMI ABV UP PARAM F/U: HCPCS | Performed by: INTERNAL MEDICINE

## 2023-03-01 PROCEDURE — 3017F COLORECTAL CA SCREEN DOC REV: CPT | Performed by: INTERNAL MEDICINE

## 2023-03-01 PROCEDURE — G8536 NO DOC ELDER MAL SCRN: HCPCS | Performed by: INTERNAL MEDICINE

## 2023-03-01 PROCEDURE — G8427 DOCREV CUR MEDS BY ELIG CLIN: HCPCS | Performed by: INTERNAL MEDICINE

## 2023-03-01 RX ORDER — FUROSEMIDE 20 MG/1
20 TABLET ORAL DAILY
Qty: 90 TABLET | Refills: 1 | Status: SHIPPED | OUTPATIENT
Start: 2023-03-01

## 2023-03-01 NOTE — PROGRESS NOTES
Mohamud East is a 69 y.o.  male and presents with     Chief Complaint   Patient presents with    Follow-up     Getting tired more frequently  Pain in ankles     Pt has lost some wt. However he feels tired. Saw general surgery for abnormal HIDA scan but was felt due to parkinsons to hold off on surgery as pt is asymptoamtic. At night  he sometimes has urinary incont  Wears adult diapers. Pt is requesting skilled nurse   has a history of UTI in the past         Past Medical History:   Diagnosis Date    Arthritis     knees    Chronic pain     knees, back    Constipation     Depression     Gait difficulty     H/O seasonal allergies     Parkinson disease ([address]     Restless leg syndrome     Urinary frequency      Past Surgical History:   Procedure Laterality Date    HX HERNIA REPAIR Right 08/15/2019    Robotic-assisted laparoscopic right inguinal herniorrhaphy with mesh. Current Outpatient Medications   Medication Sig    linaCLOtide (Linzess) 145 mcg cap capsule Take 1 Capsule by mouth Daily (before breakfast). furosemide (LASIX) 20 mg tablet Take 1 Tablet by mouth daily. cholecalciferol (VITAMIN D3) (1000 Units /25 mcg) tablet TAKE 1 TABLET BY MOUTH EVERY DAY    Omeprazole delayed release (PRILOSEC D/R) 20 mg tablet TAKE 1 TABLET BY MOUTH EVERY DAY    Vitamin D2 1,250 mcg (50,000 unit) capsule TAKE 1 CAPSULE BY MOUTH EVERY 7 DAYS    simethicone (MYLICON) 80 mg chewable tablet Take 1 Tablet by mouth every six (6) hours as needed for Flatulence. entacapone (COMTAN) 200 mg tablet Take 200 mg by mouth three (3) times daily. tamsulosin (FLOMAX) 0.4 mg capsule TAKE 1 CAPSULE BY MOUTH EVERY DAY    fluticasone propionate (FLONASE) 50 mcg/actuation nasal spray 1 [place] by Both Nostrils route daily. polyethylene glycol (MIRALAX) 17 gram/dose powder Take 17 g by mouth daily as needed. carbidopa-levodopa ER (SINEMET CR)  mg per tablet Take 1 Tablet by mouth nightly.     brief disposable (adult) misc by Does Not Apply route. Incontinence Pad, Liner, Disp pads Use as directed    Walker (Ultra-Light Rollator) misc Use as directed    carbidopa-levodopa (SINEMET)  mg per tablet TAKE 1 AND 1/2 TABLETS BY MOUTH THREE TIMES DAILY    diclofenac (VOLTAREN) 1 % gel Apply  to affected area four (4) times daily. ACETAMINOPHEN (TYLENOL PO) Take 650 mg by mouth as needed. No current facility-administered medications for this visit. Health Maintenance   Topic Date Due    Shingles Vaccine (1 of 2) Never done    COVID-19 Vaccine (3 - Booster for Pfizer series) 11/13/2021    Flu Vaccine (1) 06/30/2023 (Originally 8/1/2022)    A1C test (Diabetic or Prediabetic)  06/13/2023    Medicare Yearly Exam  06/14/2023    Lipid Screen  08/27/2023    Depression Monitoring  11/08/2023    Colorectal Cancer Screening Combo  02/12/2024    DTaP/Tdap/Td series (2 - Td or Tdap) 08/26/2028    Hepatitis C Screening  Completed    Pneumococcal 65+ years  Completed     Immunization History   Administered Date(s) Administered    COVID-19, PFIZER PURPLE top, DILUTE for use, (age 15 y+), IM, 30mcg/0.3mL 08/21/2021, 09/18/2021    Pneumococcal Conjugate (PCV-13) 08/26/2018    Pneumococcal Polysaccharide (PPSV-23) 04/22/2021    Tdap 08/26/2018     No LMP for male patient. Allergies and Intolerances: Allergies   Allergen Reactions    Sulfa (Sulfonamide Antibiotics) Rash       Family History:   No family history on file. Social History:   He  reports that he has never smoked. He has never used smokeless tobacco.  He  reports no history of alcohol use.             Review of Systems:   General: negative for - chills, fatigue, fever, weight change  Psych: negative for - anxiety, depression, irritability or mood swings  ENT: negative for - headaches, hearing change, nasal congestion, oral lesions, sneezing or sore throat  Heme/ Lymph: negative for - bleeding problems, bruising, pallor or swollen lymph nodes  Endo: negative for - hot flashes, polydipsia/polyuria or temperature intolerance  Resp: negative for - cough, shortness of breath or wheezing  CV: negative for - chest pain, edema or palpitations  GI: negative for - abdominal pain, change in bowel habits, constipation, diarrhea or nausea/vomiting  : negative for - dysuria, hematuria, incontinence, pelvic pain or vulvar/vaginal symptoms  MSK: negative for - joint pain, joint swelling or muscle pain  Neuro: negative for - confusion, headaches, seizures or weakness  Derm: negative for - dry skin, hair changes, rash or skin lesion changes          Physical:   Vitals:   Vitals:    03/01/23 1116   BP: 130/86   Pulse: 84   Resp: 16   Temp: 97.2 °F (36.2 °C)   TempSrc: Temporal   SpO2: 98%   Weight: 185 lb (83.9 kg)   Height: 5' 8\" (1.727 m)           Exam:   HEENT- atraumatic,normocephalic, awake, oriented, well nourished  Neck - supple,no enlarged lymph nodes, no JVD, no thyromegaly  Chest- CTA, no rhonchi, no crackles  Heart- rrr, no murmurs / gallop/rub  Abdomen- soft,BS+,NT, no hepatosplenomegaly  Ext - no c/c/edema   Neuro-uses a walker. Skin - warm,dry, no obvious rashes. Review of Data:   LABS:   Lab Results   Component Value Date/Time    WBC 13.7 (H) 07/20/2022 01:24 PM    HGB 15.4 07/20/2022 01:24 PM    HCT 47.6 07/20/2022 01:24 PM    PLATELET 327 39/69/9462 01:24 PM     Lab Results   Component Value Date/Time    Sodium 136 07/20/2022 01:24 PM    Potassium 4.2 07/20/2022 01:24 PM    Chloride 101 07/20/2022 01:24 PM    CO2 25 07/20/2022 01:24 PM    Glucose 104 (H) 07/20/2022 01:24 PM    BUN 16 07/20/2022 01:24 PM    Creatinine 1.21 07/20/2022 01:24 PM     Lab Results   Component Value Date/Time    Cholesterol, total 200 (H) 08/27/2018 09:56 AM    HDL Cholesterol 41 08/27/2018 09:56 AM    LDL, calculated 109 (H) 08/27/2018 09:56 AM    Triglyceride 252 (H) 08/27/2018 09:56 AM     No components found for: GPT        Impression / Plan:        ICD-10-CM ICD-9-CM    1.  Parkinson's disease ([facility]  G20 332.0 [address] TO PHYSICAL THERAPY      2. Overflow incontinence of urine  N39.490 788.38 REFERRAL TO HOME HEALTH      REFERRAL TO PHYSICAL THERAPY      3. Chronic idiopathic constipation  K59.04 564.00 linaCLOtide (Linzess) 145 mcg cap capsule      4. Prediabetes  R73.03 790.29 HEMOGLOBIN A1C WITH EAG      HEMOGLOBIN A1C WITH EAG      5. Vitamin D deficiency  E55.9 268.9 VITAMIN D, 25 HYDROXY      VITAMIN D, 25 HYDROXY      6. Dysuria  R30.0 788. 1 CULTURE, URINE      URINALYSIS W/ RFLX MICROSCOPIC      CULTURE, URINE      URINALYSIS W/ RFLX MICROSCOPIC        Explained to patient risk benefits of the medications. Advised patient to stop meds if having any side effects. Pt verbalized understanding of the instructions. I have discussed the diagnosis with the patient and the intended plan as seen in the above orders. The patient has received an after-visit summary and questions were answered concerning future plans. I have discussed medication side effects and warnings with the patient as well. I have reviewed the plan of care with the patient, accepted their input and they are in agreement with the treatment goals. Reviewed plan of care. Patient has provided input and agrees with goals.         Param Harvey MD

## 2023-03-01 NOTE — PROGRESS NOTES
Chief Complaint   Patient presents with    Follow-up     Getting tired more frequently  Pain in ankles       There were no vitals taken for this visit. 1. Have you been to the ER, urgent care clinic since your last visit? Hospitalized since your last visit? No    2. Have you seen or consulted any other health care providers outside of the [address] [place] [address] since your last visit? Include any pap smears or colon screening. No      3. For patients aged 39-70: Has the patient had a colonoscopy / FIT/ Cologuard? Yes - Care Gap present.  Most recent result on file

## 2023-03-02 LAB
25(OH)D3+25(OH)D2 SERPL-MCNC: 86 NG/ML (ref 30–100)
EST. AVERAGE GLUCOSE BLD GHB EST-MCNC: 123 MG/DL
HBA1C MFR BLD: 5.9 % (ref 4.8–5.6)
SPECIMEN STATUS REPORT, ROLRST: NORMAL

## 2023-03-03 NOTE — PROGRESS NOTES
Hemoglobin A1c is 5.9. Stable.   Vitamin D level has improved and is high normal.  Stop supplemental vitamin D

## 2023-05-15 ENCOUNTER — OFFICE VISIT (OUTPATIENT)
Dept: PRIMARY CARE CLINIC | Facility: CLINIC | Age: 68
End: 2023-05-15
Payer: MEDICARE

## 2023-05-15 VITALS
OXYGEN SATURATION: 98 % | RESPIRATION RATE: 18 BRPM | HEIGHT: 68 IN | DIASTOLIC BLOOD PRESSURE: 76 MMHG | HEART RATE: 89 BPM | SYSTOLIC BLOOD PRESSURE: 122 MMHG | BODY MASS INDEX: 27.13 KG/M2 | WEIGHT: 179 LBS | TEMPERATURE: 98.2 F

## 2023-05-15 DIAGNOSIS — Z91.81 AT HIGH RISK FOR FALLS: ICD-10-CM

## 2023-05-15 DIAGNOSIS — K92.1 BLACK STOOLS: ICD-10-CM

## 2023-05-15 DIAGNOSIS — R10.13 EPIGASTRIC PAIN: ICD-10-CM

## 2023-05-15 DIAGNOSIS — R19.7 DIARRHEA, UNSPECIFIED TYPE: Primary | ICD-10-CM

## 2023-05-15 PROCEDURE — 1123F ACP DISCUSS/DSCN MKR DOCD: CPT | Performed by: INTERNAL MEDICINE

## 2023-05-15 PROCEDURE — 99213 OFFICE O/P EST LOW 20 MIN: CPT | Performed by: INTERNAL MEDICINE

## 2023-05-15 PROCEDURE — G8419 CALC BMI OUT NRM PARAM NOF/U: HCPCS | Performed by: INTERNAL MEDICINE

## 2023-05-15 PROCEDURE — 4004F PT TOBACCO SCREEN RCVD TLK: CPT | Performed by: INTERNAL MEDICINE

## 2023-05-15 PROCEDURE — G8427 DOCREV CUR MEDS BY ELIG CLIN: HCPCS | Performed by: INTERNAL MEDICINE

## 2023-05-15 PROCEDURE — 3017F COLORECTAL CA SCREEN DOC REV: CPT | Performed by: INTERNAL MEDICINE

## 2023-05-15 RX ORDER — CLOBETASOL PROPIONATE 0.46 MG/ML
SOLUTION TOPICAL
COMMUNITY
Start: 2018-08-26

## 2023-05-15 RX ORDER — OMEPRAZOLE 40 MG/1
40 CAPSULE, DELAYED RELEASE ORAL
Qty: 90 CAPSULE | Refills: 1 | Status: SHIPPED | OUTPATIENT
Start: 2023-05-15

## 2023-05-15 RX ORDER — TAMSULOSIN HYDROCHLORIDE 0.4 MG/1
0.4 CAPSULE ORAL 2 TIMES DAILY
COMMUNITY
Start: 2023-04-04

## 2023-05-15 RX ORDER — ACETAMINOPHEN 325 MG/1
650 TABLET ORAL EVERY 6 HOURS PRN
COMMUNITY

## 2023-05-15 SDOH — ECONOMIC STABILITY: INCOME INSECURITY: HOW HARD IS IT FOR YOU TO PAY FOR THE VERY BASICS LIKE FOOD, HOUSING, MEDICAL CARE, AND HEATING?: PATIENT DECLINED

## 2023-05-15 SDOH — ECONOMIC STABILITY: HOUSING INSECURITY
IN THE LAST 12 MONTHS, WAS THERE A TIME WHEN YOU DID NOT HAVE A STEADY PLACE TO SLEEP OR SLEPT IN A SHELTER (INCLUDING NOW)?: PATIENT REFUSED

## 2023-05-15 SDOH — ECONOMIC STABILITY: FOOD INSECURITY: WITHIN THE PAST 12 MONTHS, THE FOOD YOU BOUGHT JUST DIDN'T LAST AND YOU DIDN'T HAVE MONEY TO GET MORE.: PATIENT DECLINED

## 2023-05-15 SDOH — ECONOMIC STABILITY: FOOD INSECURITY: WITHIN THE PAST 12 MONTHS, YOU WORRIED THAT YOUR FOOD WOULD RUN OUT BEFORE YOU GOT MONEY TO BUY MORE.: PATIENT DECLINED

## 2023-05-15 NOTE — PROGRESS NOTES
Chief Complaint   Patient presents with    Diarrhea     Diarrhea x 2 months        /76 (Site: Left Upper Arm, Position: Sitting)   Pulse 89   Temp 98.2 °F (36.8 °C) (Oral)   Resp 18   Ht 5' 8\" (1.727 m)   Wt 179 lb (81.2 kg)   SpO2 98%   BMI 27.22 kg/m²     1. Have you been to the ER, urgent care clinic since your last visit? Hospitalized since your last visit? No    2. Have you seen or consulted any other health care providers outside of the [address] [place] [address] since your last visit? Include any pap smears or colon screening. No      3. For patients aged 39-70: Has the patient had a colonoscopy / FIT/ Cologuard? yes      If the patient is female:    4. For patients aged 41-77: Has the patient had a mammogram within the past 2 years? NA      5. For patients aged 21-65: Has the patient had a pap smear?  NA

## 2023-05-15 NOTE — PROGRESS NOTES
Luci Proctor is a 77 y.o.  male and presents with     Chief Complaint   Patient presents with    Diarrhea     Diarrhea x 2 months      Pt has had diarrhea for 2 months. Has black stools. has abdominal pain  Pt does not have great appetite. Not related to food. Infact gets diarrhea if he does not eat food. Pt is not taking NSAIDS. Currently not taking PPI          Past Medical History:   Diagnosis Date    Arthritis     knees    Chronic pain     knees, back    Constipation     Depression     Gait difficulty     H/O seasonal allergies     Parkinson disease (HCC)     Restless leg syndrome     Urinary frequency      Past Surgical History:   Procedure Laterality Date    HERNIA REPAIR Right 08/15/2019    Robotic-assisted laparoscopic right inguinal herniorrhaphy with mesh. Current Outpatient Medications   Medication Sig    acetaminophen (TYLENOL) 325 MG tablet Take 2 tablets by mouth every 6 hours as needed for Pain    Misc. Devices (WALKER) MISC 1 each by Does not apply route daily    UNABLE TO FIND     UNABLE TO FIND     carbidopa-levodopa (SINEMET)  MG per tablet TAKE 1 AND 1/2 TABLETS BY MOUTH THREE TIMES DAILY    diclofenac sodium (VOLTAREN) 1 % GEL APPLY 2 GRAMS EXTERNALLY TO THE AFFECTED AREA FOUR TIMES DAILY. NOT TO EXCEED 8 GM PER DAY    clobetasol (TEMOVATE) 0.05 % external solution Apply to scaling skin at bedtime    tamsulosin (FLOMAX) 0.4 MG capsule Take 1 capsule by mouth 2 times daily    omeprazole (PRILOSEC) 40 MG delayed release capsule Take 1 capsule by mouth every morning (before breakfast)     No current facility-administered medications for this visit.      Health Maintenance   Topic Date Due    Lipids  Never done    Shingles vaccine (1 of 2) Never done    Annual Wellness Visit (AWV)  06/14/2023    COVID-19 Vaccine (3 - Booster for Pfizer series) 05/31/2024 (Originally 11/13/2021)    Flu vaccine (Season Ended) 08/01/2023    Colorectal Cancer Screen  02/12/2024    A1C test (Diabetic or

## 2023-05-29 RX ORDER — SIMETHICONE 80 MG
80 TABLET,CHEWABLE ORAL EVERY 6 HOURS PRN
COMMUNITY
Start: 2022-10-18

## 2023-05-29 RX ORDER — TAMSULOSIN HYDROCHLORIDE 0.4 MG/1
1 CAPSULE ORAL DAILY
COMMUNITY
Start: 2022-07-01

## 2023-05-29 RX ORDER — FUROSEMIDE 20 MG/1
20 TABLET ORAL DAILY
COMMUNITY
Start: 2023-03-01

## 2023-05-29 RX ORDER — FLUTICASONE PROPIONATE 50 MCG
1 SPRAY, SUSPENSION (ML) NASAL DAILY
COMMUNITY
Start: 2022-06-13

## 2023-05-29 RX ORDER — CARBIDOPA AND LEVODOPA 50; 200 MG/1; MG/1
1 TABLET, EXTENDED RELEASE ORAL NIGHTLY
COMMUNITY

## 2023-05-29 RX ORDER — OMEPRAZOLE 20 MG/1
1 TABLET, DELAYED RELEASE ORAL DAILY
COMMUNITY
Start: 2022-12-28

## 2023-05-29 RX ORDER — POLYETHYLENE GLYCOL 3350 17 G/17G
17 POWDER, FOR SOLUTION ORAL DAILY PRN
COMMUNITY

## 2023-05-29 RX ORDER — ENTACAPONE 200 MG/1
200 TABLET ORAL 3 TIMES DAILY
COMMUNITY

## 2023-07-11 RX ORDER — MELATONIN
Qty: 90 TABLET | Refills: 1 | Status: SHIPPED | OUTPATIENT
Start: 2023-07-11

## 2023-07-11 NOTE — TELEPHONE ENCOUNTER
Requested Prescriptions     Pending Prescriptions Disp Refills    vitamin D3 (CHOLECALCIFEROL) 25 MCG (1000 UT) TABS tablet [Pharmacy Med Name: VITAMIN D3 25 MCG TABLET] 90 tablet 0     Sig: TAKE 1 TABLET BY MOUTH EVERY DAY        Last Visit 5/15/23  Last Refill 4/12/23

## 2023-10-03 ENCOUNTER — OFFICE VISIT (OUTPATIENT)
Dept: PRIMARY CARE CLINIC | Facility: CLINIC | Age: 68
End: 2023-10-03
Payer: MEDICARE

## 2023-10-03 VITALS
RESPIRATION RATE: 18 BRPM | DIASTOLIC BLOOD PRESSURE: 82 MMHG | OXYGEN SATURATION: 96 % | HEIGHT: 68 IN | WEIGHT: 194 LBS | SYSTOLIC BLOOD PRESSURE: 132 MMHG | TEMPERATURE: 97.8 F | BODY MASS INDEX: 29.4 KG/M2 | HEART RATE: 90 BPM

## 2023-10-03 DIAGNOSIS — R73.03 PREDIABETES: ICD-10-CM

## 2023-10-03 DIAGNOSIS — R25.2 LEG CRAMPS: ICD-10-CM

## 2023-10-03 DIAGNOSIS — G20.A1 PARKINSON'S DISEASE WITHOUT FLUCTUATING MANIFESTATIONS, UNSPECIFIED WHETHER DYSKINESIA PRESENT: ICD-10-CM

## 2023-10-03 DIAGNOSIS — Z23 NEED FOR VACCINATION: ICD-10-CM

## 2023-10-03 DIAGNOSIS — Z23 NEED FOR SHINGLES VACCINE: ICD-10-CM

## 2023-10-03 DIAGNOSIS — N40.1 BENIGN PROSTATIC HYPERPLASIA WITH LOWER URINARY TRACT SYMPTOMS, SYMPTOM DETAILS UNSPECIFIED: ICD-10-CM

## 2023-10-03 DIAGNOSIS — E78.00 HYPERCHOLESTEREMIA: ICD-10-CM

## 2023-10-03 DIAGNOSIS — Z00.00 MEDICARE ANNUAL WELLNESS VISIT, SUBSEQUENT: Primary | ICD-10-CM

## 2023-10-03 DIAGNOSIS — E55.9 VITAMIN D DEFICIENCY: ICD-10-CM

## 2023-10-03 DIAGNOSIS — K59.04 CHRONIC IDIOPATHIC CONSTIPATION: ICD-10-CM

## 2023-10-03 LAB
25(OH)D3 SERPL-MCNC: 22.1 NG/ML (ref 30–100)
ALBUMIN SERPL-MCNC: 3.6 G/DL (ref 3.5–5)
ALBUMIN/GLOB SERPL: 1 (ref 1.1–2.2)
ALP SERPL-CCNC: 69 U/L (ref 45–117)
ALT SERPL-CCNC: 26 U/L (ref 12–78)
ANION GAP SERPL CALC-SCNC: 6 MMOL/L (ref 5–15)
AST SERPL-CCNC: 21 U/L (ref 15–37)
BILIRUB SERPL-MCNC: 0.8 MG/DL (ref 0.2–1)
BUN SERPL-MCNC: 11 MG/DL (ref 6–20)
BUN/CREAT SERPL: 10 (ref 12–20)
CALCIUM SERPL-MCNC: 9.1 MG/DL (ref 8.5–10.1)
CHLORIDE SERPL-SCNC: 105 MMOL/L (ref 97–108)
CHOLEST SERPL-MCNC: 269 MG/DL
CO2 SERPL-SCNC: 28 MMOL/L (ref 21–32)
CREAT SERPL-MCNC: 1.06 MG/DL (ref 0.7–1.3)
ERYTHROCYTE [DISTWIDTH] IN BLOOD BY AUTOMATED COUNT: 13.8 % (ref 11.5–14.5)
EST. AVERAGE GLUCOSE BLD GHB EST-MCNC: 123 MG/DL
GLOBULIN SER CALC-MCNC: 3.7 G/DL (ref 2–4)
GLUCOSE SERPL-MCNC: 107 MG/DL (ref 65–100)
HBA1C MFR BLD: 5.9 % (ref 4–5.6)
HCT VFR BLD AUTO: 48.5 % (ref 36.6–50.3)
HDLC SERPL-MCNC: 49 MG/DL
HDLC SERPL: 5.5 (ref 0–5)
HGB BLD-MCNC: 15 G/DL (ref 12.1–17)
LDLC SERPL CALC-MCNC: 150 MG/DL (ref 0–100)
MCH RBC QN AUTO: 27.5 PG (ref 26–34)
MCHC RBC AUTO-ENTMCNC: 30.9 G/DL (ref 30–36.5)
MCV RBC AUTO: 89 FL (ref 80–99)
NRBC # BLD: 0 K/UL (ref 0–0.01)
NRBC BLD-RTO: 0 PER 100 WBC
PLATELET # BLD AUTO: 227 K/UL (ref 150–400)
PMV BLD AUTO: 10.3 FL (ref 8.9–12.9)
POTASSIUM SERPL-SCNC: 4.4 MMOL/L (ref 3.5–5.1)
PROT SERPL-MCNC: 7.3 G/DL (ref 6.4–8.2)
RBC # BLD AUTO: 5.45 M/UL (ref 4.1–5.7)
SODIUM SERPL-SCNC: 139 MMOL/L (ref 136–145)
TRIGL SERPL-MCNC: 350 MG/DL
VLDLC SERPL CALC-MCNC: 70 MG/DL
WBC # BLD AUTO: 5.9 K/UL (ref 4.1–11.1)

## 2023-10-03 PROCEDURE — G0439 PPPS, SUBSEQ VISIT: HCPCS | Performed by: INTERNAL MEDICINE

## 2023-10-03 PROCEDURE — 1123F ACP DISCUSS/DSCN MKR DOCD: CPT | Performed by: INTERNAL MEDICINE

## 2023-10-03 RX ORDER — ZOSTER VACCINE RECOMBINANT, ADJUVANTED 50 MCG/0.5
0.5 KIT INTRAMUSCULAR ONCE
Qty: 1 EACH | Refills: 0 | Status: SHIPPED | OUTPATIENT
Start: 2023-10-03 | End: 2023-10-03

## 2023-10-03 ASSESSMENT — PATIENT HEALTH QUESTIONNAIRE - PHQ9
SUM OF ALL RESPONSES TO PHQ QUESTIONS 1-9: 0
SUM OF ALL RESPONSES TO PHQ QUESTIONS 1-9: 0
1. LITTLE INTEREST OR PLEASURE IN DOING THINGS: 0
SUM OF ALL RESPONSES TO PHQ QUESTIONS 1-9: 0
SUM OF ALL RESPONSES TO PHQ QUESTIONS 1-9: 0
6. FEELING BAD ABOUT YOURSELF - OR THAT YOU ARE A FAILURE OR HAVE LET YOURSELF OR YOUR FAMILY DOWN: 0
SUM OF ALL RESPONSES TO PHQ9 QUESTIONS 1 & 2: 0
4. FEELING TIRED OR HAVING LITTLE ENERGY: 0
10. IF YOU CHECKED OFF ANY PROBLEMS, HOW DIFFICULT HAVE THESE PROBLEMS MADE IT FOR YOU TO DO YOUR WORK, TAKE CARE OF THINGS AT HOME, OR GET ALONG WITH OTHER PEOPLE: 0
7. TROUBLE CONCENTRATING ON THINGS, SUCH AS READING THE NEWSPAPER OR WATCHING TELEVISION: 0
9. THOUGHTS THAT YOU WOULD BE BETTER OFF DEAD, OR OF HURTING YOURSELF: 0
5. POOR APPETITE OR OVEREATING: 0
8. MOVING OR SPEAKING SO SLOWLY THAT OTHER PEOPLE COULD HAVE NOTICED. OR THE OPPOSITE, BEING SO FIGETY OR RESTLESS THAT YOU HAVE BEEN MOVING AROUND A LOT MORE THAN USUAL: 0
3. TROUBLE FALLING OR STAYING ASLEEP: 0
2. FEELING DOWN, DEPRESSED OR HOPELESS: 0

## 2023-10-03 ASSESSMENT — LIFESTYLE VARIABLES
HOW MANY STANDARD DRINKS CONTAINING ALCOHOL DO YOU HAVE ON A TYPICAL DAY: PATIENT DOES NOT DRINK
HOW OFTEN DO YOU HAVE A DRINK CONTAINING ALCOHOL: NEVER

## 2023-10-03 NOTE — PROGRESS NOTES
Kareem Augustin is a 77 y.o.  male and presents with     Chief Complaint   Patient presents with    Medicare AWV     Pain in left calf, swelling too     Patient complains of pain in his legs  Patient does not make any efforts to walk  He is slightly depressed  Also feels bloated and has constipation  He is taking medications for depression and it seems to help some  He walks slowly  Has some difficulty with speech that has improved after increasing the dose of the Parkinson's medication        Past Medical History:   Diagnosis Date    Arthritis     knees    Chronic pain     knees, back    Constipation     Depression     Gait difficulty     H/O seasonal allergies     Parkinson disease     Restless leg syndrome     Urinary frequency      Past Surgical History:   Procedure Laterality Date    HERNIA REPAIR Right 08/15/2019    Robotic-assisted laparoscopic right inguinal herniorrhaphy with mesh.      Current Outpatient Medications   Medication Sig    zoster recombinant adjuvanted vaccine (SHINGRIX) 50 MCG/0.5ML SUSR injection Inject 0.5 mLs into the muscle once for 1 dose    Magnesium Oxide (MAGNESIUM-OXIDE) 250 MG TABS tablet Take 1 tablet by mouth daily    vitamin D3 (CHOLECALCIFEROL) 25 MCG (1000 UT) TABS tablet TAKE 1 TABLET BY MOUTH EVERY DAY    carbidopa-levodopa (SINEMET CR)  MG per extended release tablet Take 1 tablet by mouth nightly    entacapone (COMTAN) 200 MG tablet Take 1 tablet by mouth 3 times daily    fluticasone (FLONASE) 50 MCG/ACT nasal spray 1 spray by Nasal route daily    furosemide (LASIX) 20 MG tablet Take 1 tablet by mouth daily    linaclotide (LINZESS) 145 MCG capsule Take 1 capsule by mouth every morning (before breakfast)    omeprazole (PRILOSEC OTC) 20 MG tablet Take 1 tablet by mouth daily    polyethylene glycol (GLYCOLAX) 17 GM/SCOOP powder Take 17 g by mouth daily as needed    simethicone (MYLICON) 80 MG chewable tablet Take 1 tablet by mouth every 6 hours as needed    acetaminophen

## 2023-10-08 DIAGNOSIS — E55.9 VITAMIN D DEFICIENCY: Primary | ICD-10-CM

## 2023-10-08 RX ORDER — ERGOCALCIFEROL 1.25 MG/1
50000 CAPSULE ORAL WEEKLY
Qty: 12 CAPSULE | Refills: 1 | Status: SHIPPED | OUTPATIENT
Start: 2023-10-08

## 2023-12-31 DIAGNOSIS — R10.13 EPIGASTRIC PAIN: ICD-10-CM

## 2024-01-02 RX ORDER — LINACLOTIDE 145 UG/1
CAPSULE, GELATIN COATED ORAL
Qty: 90 CAPSULE | Refills: 1 | Status: SHIPPED | OUTPATIENT
Start: 2024-01-02

## 2024-01-02 RX ORDER — OMEPRAZOLE 40 MG/1
40 CAPSULE, DELAYED RELEASE ORAL
Qty: 90 CAPSULE | Refills: 1 | Status: SHIPPED | OUTPATIENT
Start: 2024-01-02

## 2024-01-02 RX ORDER — OMEPRAZOLE 20 MG/1
20 TABLET, DELAYED RELEASE ORAL DAILY
Qty: 90 TABLET | Refills: 1 | Status: SHIPPED | OUTPATIENT
Start: 2024-01-02

## 2024-01-08 RX ORDER — MELATONIN
Qty: 90 TABLET | Refills: 1 | Status: SHIPPED | OUTPATIENT
Start: 2024-01-08

## 2024-01-08 NOTE — TELEPHONE ENCOUNTER
PCP: Martin Hutchinson MD    Last Visit 10/3/2023   Future Appointments   Date Time Provider Department Center   4/2/2024  2:00 PM Martin Hutchinson MD [facility]       Requested Prescriptions     Pending Prescriptions Disp Refills    vitamin D3 (CHOLECALCIFEROL) 25 MCG (1000 UT) TABS tablet [Pharmacy Med Name: VITAMIN D3 25 MCG TABLET] 90 tablet 1     Sig: TAKE 1 TABLET BY MOUTH EVERY DAY         Other Comments: Last Refill   10/08/23

## 2024-01-09 ENCOUNTER — TELEPHONE (OUTPATIENT)
Dept: PRIMARY CARE CLINIC | Facility: CLINIC | Age: 69
End: 2024-01-09

## 2024-01-09 NOTE — TELEPHONE ENCOUNTER
Wegmans calling to get clarification on the omeprazole medication does the patient take 20 or 40mg Please call Loni to clarify.

## 2024-01-10 NOTE — TELEPHONE ENCOUNTER
Called patient - patients granddaughter answered. I asked her if the patient is taking Omeprazole 20 or 40 mg and she said she will check and call us back

## 2024-01-11 ENCOUNTER — TELEPHONE (OUTPATIENT)
Dept: PRIMARY CARE CLINIC | Facility: CLINIC | Age: 69
End: 2024-01-11

## 2024-01-11 NOTE — TELEPHONE ENCOUNTER
Spoke with patients spouse - she stated the patient is taking 40 mg of omeprazole.    Pharmacy has been notified.

## 2024-01-11 NOTE — TELEPHONE ENCOUNTER
Pharmacy ([facility] - [phone]) is requesting clarification on the prescription for omeprazole. They received two prescriptions one for 20MG (OTC) and 40MG (pt picked up the 40 MG, as it was covered by the insurance). Please advise if the patient should be taking both the 20MG and the 40MG?    Please assist with this matter.     JOE - PSEVA

## 2024-02-15 ENCOUNTER — TELEPHONE (OUTPATIENT)
Dept: PRIMARY CARE CLINIC | Facility: CLINIC | Age: 69
End: 2024-02-15

## 2024-02-15 DIAGNOSIS — G20.A1 PARKINSON'S DISEASE WITHOUT FLUCTUATING MANIFESTATIONS, UNSPECIFIED WHETHER DYSKINESIA PRESENT: Primary | ICD-10-CM

## 2024-02-15 NOTE — TELEPHONE ENCOUNTER
Maritza advised that he is calling in regards to the Pt doing very well in home health PT. pt is being discharged from home health PT and he is requesting an order for Outpatient PT. Client doesn't have a preferred Outpatient PT.    Please assist with this matter.     EMPERATRIZT -  PSR

## 2024-02-27 ENCOUNTER — OFFICE VISIT (OUTPATIENT)
Dept: PRIMARY CARE CLINIC | Facility: CLINIC | Age: 69
End: 2024-02-27
Payer: MEDICARE

## 2024-02-27 VITALS
WEIGHT: 194 LBS | HEIGHT: 68 IN | BODY MASS INDEX: 29.4 KG/M2 | TEMPERATURE: 98 F | OXYGEN SATURATION: 99 % | DIASTOLIC BLOOD PRESSURE: 80 MMHG | RESPIRATION RATE: 16 BRPM | HEART RATE: 97 BPM | SYSTOLIC BLOOD PRESSURE: 132 MMHG

## 2024-02-27 DIAGNOSIS — M25.561 CHRONIC PAIN OF BOTH KNEES: ICD-10-CM

## 2024-02-27 DIAGNOSIS — S83.511A COMPLETE TEAR OF ANTERIOR CRUCIATE LIGAMENT OF RIGHT KNEE, INITIAL ENCOUNTER: ICD-10-CM

## 2024-02-27 DIAGNOSIS — R73.03 PREDIABETES: ICD-10-CM

## 2024-02-27 DIAGNOSIS — E78.00 HYPERCHOLESTEREMIA: ICD-10-CM

## 2024-02-27 DIAGNOSIS — N40.1 BENIGN PROSTATIC HYPERPLASIA WITH LOWER URINARY TRACT SYMPTOMS, SYMPTOM DETAILS UNSPECIFIED: ICD-10-CM

## 2024-02-27 DIAGNOSIS — M25.562 CHRONIC PAIN OF BOTH KNEES: ICD-10-CM

## 2024-02-27 DIAGNOSIS — G20.A1 PARKINSON'S DISEASE WITHOUT FLUCTUATING MANIFESTATIONS, UNSPECIFIED WHETHER DYSKINESIA PRESENT: Primary | ICD-10-CM

## 2024-02-27 DIAGNOSIS — G89.29 CHRONIC PAIN OF BOTH KNEES: ICD-10-CM

## 2024-02-27 DIAGNOSIS — Z23 NEED FOR SHINGLES VACCINE: ICD-10-CM

## 2024-02-27 PROCEDURE — 99214 OFFICE O/P EST MOD 30 MIN: CPT | Performed by: INTERNAL MEDICINE

## 2024-02-27 PROCEDURE — 1123F ACP DISCUSS/DSCN MKR DOCD: CPT | Performed by: INTERNAL MEDICINE

## 2024-02-27 RX ORDER — ZOSTER VACCINE RECOMBINANT, ADJUVANTED 50 MCG/0.5
0.5 KIT INTRAMUSCULAR ONCE
Qty: 1 EACH | Refills: 0 | Status: SHIPPED | OUTPATIENT
Start: 2024-02-27 | End: 2024-02-27

## 2024-02-27 RX ORDER — LIDOCAINE 50 MG/G
1 PATCH TOPICAL DAILY
Qty: 90 PATCH | Refills: 1 | Status: SHIPPED | OUTPATIENT
Start: 2024-02-27 | End: 2024-08-25

## 2024-02-27 ASSESSMENT — PATIENT HEALTH QUESTIONNAIRE - PHQ9
3. TROUBLE FALLING OR STAYING ASLEEP: 0
7. TROUBLE CONCENTRATING ON THINGS, SUCH AS READING THE NEWSPAPER OR WATCHING TELEVISION: 0
5. POOR APPETITE OR OVEREATING: 0
SUM OF ALL RESPONSES TO PHQ QUESTIONS 1-9: 0
SUM OF ALL RESPONSES TO PHQ9 QUESTIONS 1 & 2: 0
4. FEELING TIRED OR HAVING LITTLE ENERGY: 0
6. FEELING BAD ABOUT YOURSELF - OR THAT YOU ARE A FAILURE OR HAVE LET YOURSELF OR YOUR FAMILY DOWN: 0
8. MOVING OR SPEAKING SO SLOWLY THAT OTHER PEOPLE COULD HAVE NOTICED. OR THE OPPOSITE, BEING SO FIGETY OR RESTLESS THAT YOU HAVE BEEN MOVING AROUND A LOT MORE THAN USUAL: 0
SUM OF ALL RESPONSES TO PHQ QUESTIONS 1-9: 0
9. THOUGHTS THAT YOU WOULD BE BETTER OFF DEAD, OR OF HURTING YOURSELF: 0
2. FEELING DOWN, DEPRESSED OR HOPELESS: 0
1. LITTLE INTEREST OR PLEASURE IN DOING THINGS: 0
SUM OF ALL RESPONSES TO PHQ QUESTIONS 1-9: 0
SUM OF ALL RESPONSES TO PHQ QUESTIONS 1-9: 0
10. IF YOU CHECKED OFF ANY PROBLEMS, HOW DIFFICULT HAVE THESE PROBLEMS MADE IT FOR YOU TO DO YOUR WORK, TAKE CARE OF THINGS AT HOME, OR GET ALONG WITH OTHER PEOPLE: 0

## 2024-02-27 NOTE — PROGRESS NOTES
Marie Jackson is a 68 y.o.  male and presents with     Chief Complaint   Patient presents with    Follow-up     Pain in both knees and calves     Pt sees Neurology for Parkinsons. Getting Pt and also has nursing come to house  Had bilateral  knee pain. Has anam ACL and meniscus , but not a candidate  for surgery . Getting PT for knees.  Taking medications as directed        Past Medical History:   Diagnosis Date    Arthritis     knees    Chronic pain     knees, back    Constipation     Depression     Gait difficulty     H/O seasonal allergies     Parkinson disease     Restless leg syndrome     Urinary frequency      Past Surgical History:   Procedure Laterality Date    HERNIA REPAIR Right 08/15/2019    Robotic-assisted laparoscopic right inguinal herniorrhaphy with mesh.     Current Outpatient Medications   Medication Sig    zoster recombinant adjuvanted vaccine (SHINGRIX) 50 MCG/0.5ML SUSR injection Inject 0.5 mLs into the muscle once for 1 dose    lidocaine (LIDODERM) 5 % Place 1 patch onto the skin daily 12 hours on, 12 hours off.    vitamin D3 (CHOLECALCIFEROL) 25 MCG (1000 UT) TABS tablet TAKE 1 TABLET BY MOUTH EVERY DAY    linaclotide (LINZESS) 145 MCG capsule TAKE 1 CAPSULE BY MOUTH EVERY DAY BEFORE BREAKFAST    omeprazole (PRILOSEC) 40 MG delayed release capsule TAKE 1 CAPSULE BY MOUTH EVERY MORNING BEFORE BREAKFAST    omeprazole (PRILOSEC OTC) 20 MG tablet Take 1 tablet by mouth daily    vitamin D (ERGOCALCIFEROL) 1.25 MG (18403 UT) CAPS capsule Take 1 capsule by mouth once a week    Magnesium Oxide (MAGNESIUM-OXIDE) 250 MG TABS tablet Take 1 tablet by mouth daily    carbidopa-levodopa (SINEMET CR)  MG per extended release tablet Take 1 tablet by mouth nightly    entacapone (COMTAN) 200 MG tablet Take 1 tablet by mouth 3 times daily    fluticasone (FLONASE) 50 MCG/ACT nasal spray 1 spray by Nasal route daily    furosemide (LASIX) 20 MG tablet Take 1 tablet by mouth daily    polyethylene glycol

## 2024-02-27 NOTE — PROGRESS NOTES
\"Have you been to the ER, urgent care clinic since your last visit?  Hospitalized since your last visit?\"    NO    “Have you seen or consulted any other health care providers outside of [facility] since your last visit?”    NO    “Have you had a colorectal cancer screening such as a colonoscopy/FIT/Cologuard?    YES - Type: Colonoscopy - Where: patient is unsure Nurse/CMA to request most recent records if not in the chart

## 2024-03-28 ENCOUNTER — APPOINTMENT (OUTPATIENT)
Facility: HOSPITAL | Age: 69
End: 2024-03-28
Payer: MEDICARE

## 2024-03-28 ENCOUNTER — HOSPITAL ENCOUNTER (EMERGENCY)
Facility: HOSPITAL | Age: 69
Discharge: HOME | End: 2024-03-28
Attending: STUDENT IN AN ORGANIZED HEALTH CARE EDUCATION/TRAINING PROGRAM
Payer: MEDICARE

## 2024-03-28 VITALS
OXYGEN SATURATION: 98 % | WEIGHT: 200 LBS | TEMPERATURE: 98.3 F | RESPIRATION RATE: 16 BRPM | HEART RATE: 78 BPM | DIASTOLIC BLOOD PRESSURE: 78 MMHG | BODY MASS INDEX: 30.41 KG/M2 | SYSTOLIC BLOOD PRESSURE: 129 MMHG

## 2024-03-28 DIAGNOSIS — R53.1 GENERAL WEAKNESS: Primary | ICD-10-CM

## 2024-03-28 LAB
ALBUMIN SERPL-MCNC: 3.4 G/DL (ref 3.5–5)
ALBUMIN/GLOB SERPL: 0.9 (ref 1.1–2.2)
ALP SERPL-CCNC: 73 U/L (ref 45–117)
ALT SERPL-CCNC: 24 U/L (ref 12–78)
ANION GAP SERPL CALC-SCNC: 4 MMOL/L (ref 5–15)
AST SERPL-CCNC: 15 U/L (ref 15–37)
BASOPHILS # BLD: 0 K/UL (ref 0–0.1)
BASOPHILS NFR BLD: 1 % (ref 0–1)
BILIRUB SERPL-MCNC: 0.6 MG/DL (ref 0.2–1)
BUN SERPL-MCNC: 10 MG/DL (ref 6–20)
BUN/CREAT SERPL: 9 (ref 12–20)
CALCIUM SERPL-MCNC: 8.9 MG/DL (ref 8.5–10.1)
CHLORIDE SERPL-SCNC: 108 MMOL/L (ref 97–108)
CO2 SERPL-SCNC: 28 MMOL/L (ref 21–32)
COMMENT:: NORMAL
CREAT SERPL-MCNC: 1.17 MG/DL (ref 0.7–1.3)
DIFFERENTIAL METHOD BLD: NORMAL
EOSINOPHIL # BLD: 0.2 K/UL (ref 0–0.4)
EOSINOPHIL NFR BLD: 5 % (ref 0–7)
ERYTHROCYTE [DISTWIDTH] IN BLOOD BY AUTOMATED COUNT: 14.4 % (ref 11.5–14.5)
GLOBULIN SER CALC-MCNC: 4 G/DL (ref 2–4)
GLUCOSE SERPL-MCNC: 143 MG/DL (ref 65–100)
HCT VFR BLD AUTO: 45.8 % (ref 36.6–50.3)
HGB BLD-MCNC: 15.4 G/DL (ref 12.1–17)
IMM GRANULOCYTES # BLD AUTO: 0 K/UL (ref 0–0.04)
IMM GRANULOCYTES NFR BLD AUTO: 0 % (ref 0–0.5)
LYMPHOCYTES # BLD: 1.3 K/UL (ref 0.8–3.5)
LYMPHOCYTES NFR BLD: 28 % (ref 12–49)
MCH RBC QN AUTO: 28.9 PG (ref 26–34)
MCHC RBC AUTO-ENTMCNC: 33.6 G/DL (ref 30–36.5)
MCV RBC AUTO: 86.1 FL (ref 80–99)
MONOCYTES # BLD: 0.5 K/UL (ref 0–1)
MONOCYTES NFR BLD: 11 % (ref 5–13)
NEUTS SEG # BLD: 2.6 K/UL (ref 1.8–8)
NEUTS SEG NFR BLD: 55 % (ref 32–75)
NRBC # BLD: 0 K/UL (ref 0–0.01)
NRBC BLD-RTO: 0 PER 100 WBC
PLATELET # BLD AUTO: 208 K/UL (ref 150–400)
PMV BLD AUTO: 9.7 FL (ref 8.9–12.9)
POTASSIUM SERPL-SCNC: 3.7 MMOL/L (ref 3.5–5.1)
PROT SERPL-MCNC: 7.4 G/DL (ref 6.4–8.2)
RBC # BLD AUTO: 5.32 M/UL (ref 4.1–5.7)
SODIUM SERPL-SCNC: 140 MMOL/L (ref 136–145)
SPECIMEN HOLD: NORMAL
WBC # BLD AUTO: 4.7 K/UL (ref 4.1–11.1)

## 2024-03-28 PROCEDURE — 80053 COMPREHEN METABOLIC PANEL: CPT

## 2024-03-28 PROCEDURE — 70450 CT HEAD/BRAIN W/O DYE: CPT

## 2024-03-28 PROCEDURE — 36415 COLL VENOUS BLD VENIPUNCTURE: CPT

## 2024-03-28 PROCEDURE — 93005 ELECTROCARDIOGRAM TRACING: CPT | Performed by: STUDENT IN AN ORGANIZED HEALTH CARE EDUCATION/TRAINING PROGRAM

## 2024-03-28 PROCEDURE — 99284 EMERGENCY DEPT VISIT MOD MDM: CPT

## 2024-03-28 PROCEDURE — 85025 COMPLETE CBC W/AUTO DIFF WBC: CPT

## 2024-03-28 NOTE — ED PROVIDER NOTES
present.      Mental Status: He is alert and oriented to person, place, and time. Mental status is at baseline.   Psychiatric:         Mood and Affect: Mood normal.         Behavior: Behavior normal.         DIAGNOSTIC RESULTS     EKG: All EKG's are interpreted by the Emergency Department Physician who either signs or Co-signs this chart in the absence of a cardiologist.        RADIOLOGY:   Non-plain film images such as CT, Ultrasound and MRI are read by the radiologist. Plain radiographic images are visualized and preliminarily interpreted by the emergency physician with the below findings:        Interpretation per the Radiologist below, if available at the time of this note:    CT HEAD WO CONTRAST   Final Result   No acute findings. Chronic microvascular ischemic white matter   change. Intracranial atherosclerosis.           LABS:  Labs Reviewed   COMPREHENSIVE METABOLIC PANEL - Abnormal; Notable for the following components:       Result Value    Anion Gap 4 (*)     Glucose 143 (*)     Bun/Cre Ratio 9 (*)     Albumin 3.4 (*)     Albumin/Globulin Ratio 0.9 (*)     All other components within normal limits   CBC WITH AUTO DIFFERENTIAL   EXTRA TUBES HOLD   EXTRA TUBES HOLD       All other labs were within normal range or not returned as of this dictation.    EMERGENCY DEPARTMENT COURSE and DIFFERENTIAL DIAGNOSIS/MDM:   Vitals:    Vitals:    03/28/24 0148 03/28/24 0149 03/28/24 0400   BP: (!) 142/94  129/78   Pulse: 80  78   Resp: 16  16   Temp: 98.3 °F (36.8 °C)     TempSrc: Oral     SpO2: 98%  98%   Weight:  90.7 kg (200 lb)            Medical Decision Making  Parkinson's disease, medication noncompliance, generalized weakness.  68-year-old male present emergency department after forgetting to take his Sinemet for Parkinson's patient's physical exam reassuring no unilateral weakness low suspicion for CVA labs reassuring CT head without contrast unremarkable patient will be discharged with neurology 
5

## 2024-03-29 LAB
EKG ATRIAL RATE: 93 BPM
EKG DIAGNOSIS: NORMAL
EKG P AXIS: 50 DEGREES
EKG P-R INTERVAL: 146 MS
EKG Q-T INTERVAL: 334 MS
EKG QRS DURATION: 68 MS
EKG QTC CALCULATION (BAZETT): 415 MS
EKG R AXIS: 16 DEGREES
EKG T AXIS: 47 DEGREES
EKG VENTRICULAR RATE: 93 BPM

## 2024-03-29 PROCEDURE — 93010 ELECTROCARDIOGRAM REPORT: CPT | Performed by: SPECIALIST

## 2024-04-09 NOTE — TELEPHONE ENCOUNTER
A user error has taken place: encounter opened in error, closed for administrative reasons. Pt requesting call from PCP about concerns for previous stroke. In hospital for TIA in Feb 2024.     Pt walked out of last neurology appt. Please advise.

## 2024-05-20 ENCOUNTER — OFFICE VISIT (OUTPATIENT)
Dept: PRIMARY CARE CLINIC | Facility: CLINIC | Age: 69
End: 2024-05-20
Payer: MEDICARE

## 2024-05-20 VITALS
BODY MASS INDEX: 29.8 KG/M2 | TEMPERATURE: 97.8 F | SYSTOLIC BLOOD PRESSURE: 150 MMHG | OXYGEN SATURATION: 98 % | HEART RATE: 105 BPM | HEIGHT: 68 IN | WEIGHT: 196.6 LBS | RESPIRATION RATE: 16 BRPM | DIASTOLIC BLOOD PRESSURE: 90 MMHG

## 2024-05-20 DIAGNOSIS — G20.A1 PARKINSON'S DISEASE WITHOUT FLUCTUATING MANIFESTATIONS, UNSPECIFIED WHETHER DYSKINESIA PRESENT (HCC): ICD-10-CM

## 2024-05-20 DIAGNOSIS — M79.89 LEG SWELLING: ICD-10-CM

## 2024-05-20 DIAGNOSIS — Z00.00 MEDICARE ANNUAL WELLNESS VISIT, SUBSEQUENT: Primary | ICD-10-CM

## 2024-05-20 DIAGNOSIS — F32.A DEPRESSION, UNSPECIFIED DEPRESSION TYPE: ICD-10-CM

## 2024-05-20 DIAGNOSIS — M21.619 HALLUX VALGUS WITH BUNIONS, UNSPECIFIED LATERALITY: ICD-10-CM

## 2024-05-20 DIAGNOSIS — K59.04 CHRONIC IDIOPATHIC CONSTIPATION: ICD-10-CM

## 2024-05-20 DIAGNOSIS — Z12.11 COLON CANCER SCREENING: ICD-10-CM

## 2024-05-20 DIAGNOSIS — M20.10 HALLUX VALGUS WITH BUNIONS, UNSPECIFIED LATERALITY: ICD-10-CM

## 2024-05-20 DIAGNOSIS — Z23 NEED FOR VACCINATION: ICD-10-CM

## 2024-05-20 PROCEDURE — G0439 PPPS, SUBSEQ VISIT: HCPCS | Performed by: INTERNAL MEDICINE

## 2024-05-20 PROCEDURE — 1123F ACP DISCUSS/DSCN MKR DOCD: CPT | Performed by: INTERNAL MEDICINE

## 2024-05-20 RX ORDER — SERTRALINE HYDROCHLORIDE 25 MG/1
25 TABLET, FILM COATED ORAL
Qty: 90 TABLET | Refills: 1 | Status: SHIPPED | OUTPATIENT
Start: 2024-05-20

## 2024-05-20 RX ORDER — FUROSEMIDE 20 MG/1
20 TABLET ORAL DAILY
Qty: 90 TABLET | Refills: 1 | Status: SHIPPED | OUTPATIENT
Start: 2024-05-20

## 2024-05-20 RX ORDER — ZOSTER VACCINE RECOMBINANT, ADJUVANTED 50 MCG/0.5
0.5 KIT INTRAMUSCULAR ONCE
Qty: 1 EACH | Refills: 0 | Status: SHIPPED | OUTPATIENT
Start: 2024-05-20 | End: 2024-05-20

## 2024-05-20 ASSESSMENT — PATIENT HEALTH QUESTIONNAIRE - PHQ9
SUM OF ALL RESPONSES TO PHQ QUESTIONS 1-9: 1
SUM OF ALL RESPONSES TO PHQ9 QUESTIONS 1 & 2: 1
1. LITTLE INTEREST OR PLEASURE IN DOING THINGS: NOT AT ALL
4. FEELING TIRED OR HAVING LITTLE ENERGY: NOT AT ALL
7. TROUBLE CONCENTRATING ON THINGS, SUCH AS READING THE NEWSPAPER OR WATCHING TELEVISION: NOT AT ALL
9. THOUGHTS THAT YOU WOULD BE BETTER OFF DEAD, OR OF HURTING YOURSELF: NOT AT ALL
8. MOVING OR SPEAKING SO SLOWLY THAT OTHER PEOPLE COULD HAVE NOTICED. OR THE OPPOSITE, BEING SO FIGETY OR RESTLESS THAT YOU HAVE BEEN MOVING AROUND A LOT MORE THAN USUAL: NOT AT ALL
SUM OF ALL RESPONSES TO PHQ QUESTIONS 1-9: 1
5. POOR APPETITE OR OVEREATING: NOT AT ALL
2. FEELING DOWN, DEPRESSED OR HOPELESS: SEVERAL DAYS
10. IF YOU CHECKED OFF ANY PROBLEMS, HOW DIFFICULT HAVE THESE PROBLEMS MADE IT FOR YOU TO DO YOUR WORK, TAKE CARE OF THINGS AT HOME, OR GET ALONG WITH OTHER PEOPLE: NOT DIFFICULT AT ALL
6. FEELING BAD ABOUT YOURSELF - OR THAT YOU ARE A FAILURE OR HAVE LET YOURSELF OR YOUR FAMILY DOWN: NOT AT ALL
SUM OF ALL RESPONSES TO PHQ QUESTIONS 1-9: 1
SUM OF ALL RESPONSES TO PHQ QUESTIONS 1-9: 1
3. TROUBLE FALLING OR STAYING ASLEEP: NOT AT ALL

## 2024-05-20 NOTE — PROGRESS NOTES
\"Have you been to the ER, urgent care clinic since your last visit?  Hospitalized since your last visit?\"    YES - When: approximately 2 months ago.  Where and Why: [facility] ER.    “Have you seen or consulted any other health care providers outside of [facility] since your last visit?”    NO        “Have you had a colorectal cancer screening such as a colonoscopy/FIT/Cologuard?    NO    No colonoscopy on file  Date of last Cologuard: 2/12/2021  No FIT/FOBT on file   No flexible sigmoidoscopy on file         Click Here for Release of Records Request

## 2024-05-20 NOTE — PATIENT INSTRUCTIONS
problems.  Where can you learn more?  Go to [email or web address].net/[email or web address] and enter F075 to learn more about \"A Healthy Heart: Care Instructions.\"  Current as of: June 24, 2023               Content Version: 14.0  © 1547-6428 [employer].   Care instructions adapted under license by [employer]. If you have questions about a medical condition or this instruction, always ask your healthcare professional. [employer] disclaims any warranty or liability for your use of this information.      Personalized Preventive Plan for Marie Jackson - 5/20/2024  Medicare offers a range of preventive health benefits. Some of the tests and screenings are paid in full while other may be subject to a deductible, co-insurance, and/or copay.    Some of these benefits include a comprehensive review of your medical history including lifestyle, illnesses that may run in your family, and various assessments and screenings as appropriate.    After reviewing your medical record and screening and assessments performed today your provider may have ordered immunizations, labs, imaging, and/or referrals for you.  A list of these orders (if applicable) as well as your Preventive Care list are included within your After Visit Summary for your review.    Other Preventive Recommendations:    A preventive eye exam performed by an eye specialist is recommended every 1-2 years to screen for glaucoma; cataracts, macular degeneration, and other eye disorders.  A preventive dental visit is recommended every 6 months.  Try to get at least 150 minutes of exercise per week or 10,000 steps per day on a pedometer .  Order or download the FREE \"Exercise & Physical Activity: Your Everyday Guide\" from [employer] [place] [employer]. Call 1-330.454.7826 or search [employer] [place] [employer] online.  You need 7106-3246 mg of calcium and 2518-3279 IU of vitamin D per day. It is possible to meet your calcium requirement with diet

## 2024-05-20 NOTE — PROGRESS NOTES
Marie Jackson is a 69 y.o. male and presents with     Chief Complaint   Patient presents with    Medicare AWV     Pain and swelling in both feet and legs       History of Present Illness  The patient presents for a wellness visit. He is accompanied by a .    The patient reports experiencing bilateral lower extremity edema.    Supplemental Information  He had an emergency room visit in 03/2024 or 05/2024 for diffuse weakness. He had a hematoma. He is on Linzess for constipation. He sees a neurologist for his Parkinson's.           Past Medical History:   Diagnosis Date    Arthritis     knees    Chronic pain     knees, back    Constipation     Depression     Gait difficulty     H/O seasonal allergies     Parkinson disease (HCC)     Restless leg syndrome     Urinary frequency      Past Surgical History:   Procedure Laterality Date    HERNIA REPAIR Right 08/15/2019    Robotic-assisted laparoscopic right inguinal herniorrhaphy with mesh.     Current Outpatient Medications   Medication Sig    furosemide (LASIX) 20 MG tablet Take 1 tablet by mouth daily    zoster recombinant adjuvanted vaccine (SHINGRIX) 50 MCG/0.5ML SUSR injection Inject 0.5 mLs into the muscle once for 1 dose    sertraline (ZOLOFT) 25 MG tablet Take 1 tablet by mouth nightly    lidocaine (LIDODERM) 5 % Place 1 patch onto the skin daily 12 hours on, 12 hours off.    vitamin D3 (CHOLECALCIFEROL) 25 MCG (1000 UT) TABS tablet TAKE 1 TABLET BY MOUTH EVERY DAY    linaclotide (LINZESS) 145 MCG capsule TAKE 1 CAPSULE BY MOUTH EVERY DAY BEFORE BREAKFAST    omeprazole (PRILOSEC) 40 MG delayed release capsule TAKE 1 CAPSULE BY MOUTH EVERY MORNING BEFORE BREAKFAST    omeprazole (PRILOSEC OTC) 20 MG tablet Take 1 tablet by mouth daily    vitamin D (ERGOCALCIFEROL) 1.25 MG (97093 UT) CAPS capsule Take 1 capsule by mouth once a week    Magnesium Oxide (MAGNESIUM-OXIDE) 250 MG TABS tablet Take 1 tablet by mouth daily    carbidopa-levodopa (SINEMET CR)

## 2024-06-20 NOTE — TELEPHONE ENCOUNTER
Marie Jackson (Key: [id number]) PA has been renewed on Cover My Meds for the Lidocaine patches.  The first one done .

## 2024-06-25 DIAGNOSIS — R10.13 EPIGASTRIC PAIN: ICD-10-CM

## 2024-06-25 RX ORDER — LINACLOTIDE 145 UG/1
CAPSULE, GELATIN COATED ORAL
Qty: 90 CAPSULE | Refills: 0 | Status: SHIPPED | OUTPATIENT
Start: 2024-06-25

## 2024-06-25 RX ORDER — OMEPRAZOLE 40 MG/1
40 CAPSULE, DELAYED RELEASE ORAL
Qty: 90 CAPSULE | Refills: 0 | Status: SHIPPED | OUTPATIENT
Start: 2024-06-25

## 2024-07-10 RX ORDER — MELATONIN
Qty: 90 TABLET | Refills: 1 | Status: SHIPPED | OUTPATIENT
Start: 2024-07-10

## 2024-08-15 ENCOUNTER — OFFICE VISIT (OUTPATIENT)
Dept: PRIMARY CARE | Facility: CLINIC | Age: 69
End: 2024-08-15
Payer: MEDICARE

## 2024-08-15 VITALS
SYSTOLIC BLOOD PRESSURE: 128 MMHG | HEART RATE: 110 BPM | HEIGHT: 69 IN | DIASTOLIC BLOOD PRESSURE: 78 MMHG | OXYGEN SATURATION: 99 % | WEIGHT: 193.6 LBS | TEMPERATURE: 98 F | BODY MASS INDEX: 28.68 KG/M2

## 2024-08-15 DIAGNOSIS — G20.A2 PARKINSON'S DISEASE WITH FLUCTUATING MANIFESTATIONS, UNSPECIFIED WHETHER DYSKINESIA PRESENT (MULTI): ICD-10-CM

## 2024-08-15 DIAGNOSIS — E55.9 VITAMIN D DEFICIENCY: ICD-10-CM

## 2024-08-15 DIAGNOSIS — F32.A DEPRESSION, UNSPECIFIED DEPRESSION TYPE: ICD-10-CM

## 2024-08-15 DIAGNOSIS — K21.9 GASTROESOPHAGEAL REFLUX DISEASE, UNSPECIFIED WHETHER ESOPHAGITIS PRESENT: ICD-10-CM

## 2024-08-15 DIAGNOSIS — M79.89 SWELLING OF LOWER EXTREMITY: ICD-10-CM

## 2024-08-15 DIAGNOSIS — K59.00 CONSTIPATION, UNSPECIFIED CONSTIPATION TYPE: Primary | ICD-10-CM

## 2024-08-15 PROCEDURE — 1036F TOBACCO NON-USER: CPT

## 2024-08-15 PROCEDURE — 3008F BODY MASS INDEX DOCD: CPT

## 2024-08-15 PROCEDURE — 99213 OFFICE O/P EST LOW 20 MIN: CPT

## 2024-08-15 PROCEDURE — 1124F ACP DISCUSS-NO DSCNMKR DOCD: CPT

## 2024-08-15 PROCEDURE — 1159F MED LIST DOCD IN RCRD: CPT

## 2024-08-15 RX ORDER — CHOLECALCIFEROL (VITAMIN D3) 25 MCG
1 TABLET ORAL DAILY
COMMUNITY
Start: 2023-04-12 | End: 2024-08-15 | Stop reason: SDUPTHER

## 2024-08-15 RX ORDER — AMANTADINE HYDROCHLORIDE 100 MG/1
100 TABLET ORAL DAILY
COMMUNITY
End: 2024-08-15 | Stop reason: SDUPTHER

## 2024-08-15 RX ORDER — LINACLOTIDE 145 UG/1
145 CAPSULE, GELATIN COATED ORAL
COMMUNITY
End: 2024-08-15 | Stop reason: SDUPTHER

## 2024-08-15 RX ORDER — ENTACAPONE 200 MG/1
200 TABLET ORAL 4 TIMES DAILY
COMMUNITY
End: 2024-08-15 | Stop reason: SDUPTHER

## 2024-08-15 RX ORDER — AMANTADINE HYDROCHLORIDE 100 MG/1
100 TABLET ORAL DAILY
Qty: 30 TABLET | Refills: 1 | Status: SHIPPED | OUTPATIENT
Start: 2024-08-15 | End: 2024-10-14

## 2024-08-15 RX ORDER — CARBIDOPA AND LEVODOPA 50; 200 MG/1; MG/1
1 TABLET, EXTENDED RELEASE ORAL NIGHTLY
COMMUNITY
Start: 2024-01-17 | End: 2024-08-15 | Stop reason: SDUPTHER

## 2024-08-15 RX ORDER — LINACLOTIDE 145 UG/1
145 CAPSULE, GELATIN COATED ORAL
Qty: 30 CAPSULE | Refills: 1 | Status: SHIPPED | OUTPATIENT
Start: 2024-08-15 | End: 2024-10-14

## 2024-08-15 RX ORDER — FUROSEMIDE 20 MG/1
20 TABLET ORAL DAILY
Qty: 30 TABLET | Refills: 1 | Status: SHIPPED | OUTPATIENT
Start: 2024-08-15 | End: 2024-10-14

## 2024-08-15 RX ORDER — CARBIDOPA AND LEVODOPA 25; 250 MG/1; MG/1
1 TABLET ORAL EVERY 6 HOURS
Qty: 120 TABLET | Refills: 1 | Status: SHIPPED | OUTPATIENT
Start: 2024-08-15 | End: 2024-10-14

## 2024-08-15 RX ORDER — FUROSEMIDE 20 MG/1
1 TABLET ORAL DAILY
COMMUNITY
Start: 2023-03-01 | End: 2024-08-15 | Stop reason: SDUPTHER

## 2024-08-15 RX ORDER — SERTRALINE HYDROCHLORIDE 25 MG/1
25 TABLET, FILM COATED ORAL NIGHTLY
Qty: 60 TABLET | Refills: 0 | Status: SHIPPED | OUTPATIENT
Start: 2024-08-15 | End: 2024-10-14

## 2024-08-15 RX ORDER — ENTACAPONE 200 MG/1
200 TABLET ORAL 4 TIMES DAILY
Qty: 120 TABLET | Refills: 1 | Status: SHIPPED | OUTPATIENT
Start: 2024-08-15 | End: 2024-10-14

## 2024-08-15 RX ORDER — CHOLECALCIFEROL (VITAMIN D3) 25 MCG
2000 TABLET ORAL DAILY
Qty: 60 TABLET | Refills: 0 | Status: SHIPPED | OUTPATIENT
Start: 2024-08-15 | End: 2024-09-14

## 2024-08-15 RX ORDER — CARBIDOPA AND LEVODOPA 50; 200 MG/1; MG/1
1 TABLET, EXTENDED RELEASE ORAL NIGHTLY
Qty: 30 TABLET | Refills: 1 | Status: SHIPPED | OUTPATIENT
Start: 2024-08-15 | End: 2024-10-14

## 2024-08-15 RX ORDER — OMEPRAZOLE 40 MG/1
40 CAPSULE, DELAYED RELEASE ORAL
COMMUNITY
End: 2024-08-15 | Stop reason: SDUPTHER

## 2024-08-15 RX ORDER — OMEPRAZOLE 40 MG/1
40 CAPSULE, DELAYED RELEASE ORAL
Qty: 30 CAPSULE | Refills: 1 | Status: SHIPPED | OUTPATIENT
Start: 2024-08-15 | End: 2024-10-14

## 2024-08-15 RX ORDER — CARBIDOPA AND LEVODOPA 25; 250 MG/1; MG/1
1 TABLET ORAL EVERY 6 HOURS
COMMUNITY
Start: 2024-02-10 | End: 2024-08-15 | Stop reason: SDUPTHER

## 2024-08-15 RX ORDER — SERTRALINE HYDROCHLORIDE 25 MG/1
1 TABLET, FILM COATED ORAL NIGHTLY
COMMUNITY
Start: 2024-05-20 | End: 2024-08-15 | Stop reason: SDUPTHER

## 2024-08-15 ASSESSMENT — PATIENT HEALTH QUESTIONNAIRE - PHQ9
SUM OF ALL RESPONSES TO PHQ9 QUESTIONS 1 AND 2: 0
1. LITTLE INTEREST OR PLEASURE IN DOING THINGS: NOT AT ALL
2. FEELING DOWN, DEPRESSED OR HOPELESS: NOT AT ALL

## 2024-08-15 NOTE — PROGRESS NOTES
"Subjective   Patient ID: Kalani Keith is a 69 y.o. male who presents for Establish Care (Patient is in office today to establish care).    HPI     69-year-old with past medical history of Parkinson's disease, constipation, depression,  GERD presented today to establish care.  Patient endorses occasional abdominal pain, and lower limb swelling otherwise he denies chest pain, shortness of breath, cough, fever, nausea, vomiting  Patient endorses episodes of weakness especially if he misses his carbidopa-levodopa pills in the morning.  It is noted that patient has right-sided facial weakness, he denies history of stroke or facial palsy.  Med list showed sertraline, wife stated that patient has depression since his diagnosis with  Parkinson but no suicidal or homicidal ideations.  Patient ambulates with a cane  He is non-smoker no alcohol use, no illicit drug use    Review of Systems  As above  Objective   /78 (BP Location: Left arm, Patient Position: Sitting, BP Cuff Size: Large adult)   Pulse 110   Temp 36.7 °C (98 °F) (Temporal)   Ht 1.753 m (5' 9\")   Wt 87.8 kg (193 lb 9.6 oz)   SpO2 99% Comment: RA  BMI 28.59 kg/m²     Physical Exam  Constitutional:       General: He is not in acute distress.     Appearance: Normal appearance. He is not ill-appearing, toxic-appearing or diaphoretic.   HENT:      Head: Normocephalic.   Eyes:      Extraocular Movements: Extraocular movements intact.   Cardiovascular:      Rate and Rhythm: Normal rate and regular rhythm.   Pulmonary:      Effort: Pulmonary effort is normal. No respiratory distress.      Breath sounds: Normal breath sounds. No stridor. No wheezing.      Comments: Bilateral basal crackles  Abdominal:      General: Bowel sounds are normal. There is distension.      Palpations: There is no mass.      Tenderness: There is no abdominal tenderness. There is no guarding.   Musculoskeletal:      Cervical back: Neck supple.      Comments: Right big toe bunion, trace " lower limb edema   Neurological:      Mental Status: He is alert and oriented to person, place, and time.      Comments: Masked facial expressions, cogwheel rigidity, shuffling gait   Right sided mouth facial weakness involving the lower part   Psychiatric:         Mood and Affect: Mood normal.         Thought Content: Thought content normal.         Judgment: Judgment normal.         Assessment/Plan     1.  Parkinson's disease: Refilled his medications, referred to neurology.    2. Lower limb swelling: Physical exam is notable for bilateral basal crackles, trace lower limb edema.  Refilled Lasix    3.Depression: Refilled sertraline  4. GERD: refilled Omeprazole  Ordered labs  Follow-up in 6 to 8 weeks

## 2024-08-21 ENCOUNTER — LAB (OUTPATIENT)
Dept: LAB | Facility: LAB | Age: 69
End: 2024-08-21
Payer: MEDICARE

## 2024-08-21 DIAGNOSIS — E55.9 VITAMIN D DEFICIENCY: ICD-10-CM

## 2024-08-21 DIAGNOSIS — K59.00 CONSTIPATION, UNSPECIFIED CONSTIPATION TYPE: ICD-10-CM

## 2024-08-21 LAB
25(OH)D3 SERPL-MCNC: 33 NG/ML (ref 30–100)
ALBUMIN SERPL BCP-MCNC: 3.8 G/DL (ref 3.4–5)
ALP SERPL-CCNC: 66 U/L (ref 33–136)
ALT SERPL W P-5'-P-CCNC: 10 U/L (ref 10–52)
ANION GAP SERPL CALC-SCNC: 13 MMOL/L (ref 10–20)
AST SERPL W P-5'-P-CCNC: 18 U/L (ref 9–39)
BASOPHILS # BLD AUTO: 0.04 X10*3/UL (ref 0–0.1)
BASOPHILS NFR BLD AUTO: 0.7 %
BILIRUB SERPL-MCNC: 0.6 MG/DL (ref 0–1.2)
BUN SERPL-MCNC: 13 MG/DL (ref 6–23)
CALCIUM SERPL-MCNC: 9 MG/DL (ref 8.6–10.3)
CHLORIDE SERPL-SCNC: 96 MMOL/L (ref 98–107)
CO2 SERPL-SCNC: 28 MMOL/L (ref 21–32)
CREAT SERPL-MCNC: 1.01 MG/DL (ref 0.5–1.3)
EGFRCR SERPLBLD CKD-EPI 2021: 80 ML/MIN/1.73M*2
EOSINOPHIL # BLD AUTO: 0.13 X10*3/UL (ref 0–0.4)
EOSINOPHIL NFR BLD AUTO: 2.3 %
ERYTHROCYTE [DISTWIDTH] IN BLOOD BY AUTOMATED COUNT: 15.5 % (ref 11.5–14.5)
GLUCOSE SERPL-MCNC: 102 MG/DL (ref 74–99)
HCT VFR BLD AUTO: 44.2 % (ref 41–52)
HGB BLD-MCNC: 14.1 G/DL (ref 13.5–17.5)
IMM GRANULOCYTES # BLD AUTO: 0.02 X10*3/UL (ref 0–0.5)
IMM GRANULOCYTES NFR BLD AUTO: 0.4 % (ref 0–0.9)
LYMPHOCYTES # BLD AUTO: 1.21 X10*3/UL (ref 0.8–3)
LYMPHOCYTES NFR BLD AUTO: 21.4 %
MCH RBC QN AUTO: 27.8 PG (ref 26–34)
MCHC RBC AUTO-ENTMCNC: 31.9 G/DL (ref 32–36)
MCV RBC AUTO: 87 FL (ref 80–100)
MONOCYTES # BLD AUTO: 0.55 X10*3/UL (ref 0.05–0.8)
MONOCYTES NFR BLD AUTO: 9.7 %
NEUTROPHILS # BLD AUTO: 3.71 X10*3/UL (ref 1.6–5.5)
NEUTROPHILS NFR BLD AUTO: 65.5 %
NRBC BLD-RTO: 0 /100 WBCS (ref 0–0)
PLATELET # BLD AUTO: 223 X10*3/UL (ref 150–450)
POTASSIUM SERPL-SCNC: 4.3 MMOL/L (ref 3.5–5.3)
PROT SERPL-MCNC: 7 G/DL (ref 6.4–8.2)
RBC # BLD AUTO: 5.07 X10*6/UL (ref 4.5–5.9)
SODIUM SERPL-SCNC: 133 MMOL/L (ref 136–145)
WBC # BLD AUTO: 5.7 X10*3/UL (ref 4.4–11.3)

## 2024-08-21 PROCEDURE — 82306 VITAMIN D 25 HYDROXY: CPT

## 2024-08-21 PROCEDURE — 36415 COLL VENOUS BLD VENIPUNCTURE: CPT

## 2024-08-21 PROCEDURE — 85025 COMPLETE CBC W/AUTO DIFF WBC: CPT

## 2024-08-21 PROCEDURE — 80053 COMPREHEN METABOLIC PANEL: CPT

## 2024-09-10 DIAGNOSIS — R10.13 EPIGASTRIC PAIN: ICD-10-CM

## 2024-09-10 RX ORDER — LINACLOTIDE 145 UG/1
CAPSULE, GELATIN COATED ORAL
Qty: 90 CAPSULE | Refills: 1 | Status: SHIPPED | OUTPATIENT
Start: 2024-09-10

## 2024-09-10 RX ORDER — OMEPRAZOLE 40 MG/1
40 CAPSULE, DELAYED RELEASE ORAL
Qty: 90 CAPSULE | Refills: 1 | Status: SHIPPED | OUTPATIENT
Start: 2024-09-10

## 2024-09-27 ENCOUNTER — APPOINTMENT (OUTPATIENT)
Dept: NEUROLOGY | Facility: CLINIC | Age: 69
End: 2024-09-27
Payer: MEDICARE

## 2024-09-27 VITALS
SYSTOLIC BLOOD PRESSURE: 126 MMHG | DIASTOLIC BLOOD PRESSURE: 78 MMHG | HEART RATE: 95 BPM | BODY MASS INDEX: 27.37 KG/M2 | HEIGHT: 70 IN | WEIGHT: 191.2 LBS | TEMPERATURE: 97.8 F | RESPIRATION RATE: 18 BRPM

## 2024-09-27 DIAGNOSIS — G20.A2 PARKINSON'S DISEASE WITH FLUCTUATING MANIFESTATIONS, UNSPECIFIED WHETHER DYSKINESIA PRESENT: ICD-10-CM

## 2024-09-27 PROCEDURE — 3008F BODY MASS INDEX DOCD: CPT | Performed by: PSYCHIATRY & NEUROLOGY

## 2024-09-27 PROCEDURE — 99204 OFFICE O/P NEW MOD 45 MIN: CPT | Performed by: PSYCHIATRY & NEUROLOGY

## 2024-09-27 PROCEDURE — 1126F AMNT PAIN NOTED NONE PRSNT: CPT | Performed by: PSYCHIATRY & NEUROLOGY

## 2024-09-27 PROCEDURE — 1160F RVW MEDS BY RX/DR IN RCRD: CPT | Performed by: PSYCHIATRY & NEUROLOGY

## 2024-09-27 PROCEDURE — 1036F TOBACCO NON-USER: CPT | Performed by: PSYCHIATRY & NEUROLOGY

## 2024-09-27 PROCEDURE — G2211 COMPLEX E/M VISIT ADD ON: HCPCS | Performed by: PSYCHIATRY & NEUROLOGY

## 2024-09-27 PROCEDURE — 1159F MED LIST DOCD IN RCRD: CPT | Performed by: PSYCHIATRY & NEUROLOGY

## 2024-09-27 ASSESSMENT — PAIN SCALES - GENERAL: PAINLEVEL: 0-NO PAIN

## 2024-09-27 NOTE — PROGRESS NOTES
"Consultation:   Subjective      Kalani Keith is a 69 y.o. year old RH male is here for consult for PD.    Referred by Leonid Collier MD  Accompanied by wife and son.      HPI  He was being treated at [facility]  ([place] ) prior to moving here to [place], [place] recently.   He was being treated by that neurologist ( Gloria Garcia) for 4-5 years last seen in April.      He has dizziness.  He has a head dyskinesia and tongue rolls around. Tremor is minimal.   Trouble with getting up from chair.    No FH of PD.    More irritable.   PD symptoms began about 2014.  Trouble driving and difficulty coordination.  Memory is \"not bad\".  He has hallucinations and delusions.   He has not fallen.   He feels stiff and slow when medication wears off.  He has slowness getting up at night to use bathroom.  He uses a walker.   He had hypersexuality as well as morning akinesia.     Current PD Medications:  Sinemet  1 tab every 6 hours QID  Sinemet ER 50-200mg 1 tab qhs  Entacapone 200mg QID  Amantadine 100mg daily  - did help dyskinesia.     Review of Systems    There is no problem list on file for this patient.    No past medical history on file.  Past Surgical History:   Procedure Laterality Date    HERNIA REPAIR       Social History     Tobacco Use    Smoking status: Never    Smokeless tobacco: Never   Substance Use Topics    Alcohol use: Never     family history includes Diabetes in his mother; No Known Problems in his father.    Current Outpatient Medications:     amantadine (Symmetrel) 100 mg tablet, Take 1 tablet (100 mg) by mouth once daily., Disp: 30 tablet, Rfl: 1    carbidopa-levodopa (Sinemet CR)  mg ER tablet, Take 1 tablet by mouth once daily at bedtime., Disp: 30 tablet, Rfl: 1    carbidopa-levodopa (Sinemet)  mg tablet, Take 1 tablet by mouth every 6 hours., Disp: 120 tablet, Rfl: 1    entacapone (Comtan) 200 mg tablet, Take 1 tablet (200 mg) by mouth 4 times a day., Disp: 120 tablet, Rfl: 1    " "furosemide (Lasix) 20 mg tablet, Take 1 tablet (20 mg) by mouth once daily., Disp: 30 tablet, Rfl: 1    Linzess 145 mcg capsule, Take 1 capsule (145 mcg) by mouth once daily in the morning. Take before meals., Disp: 30 capsule, Rfl: 1    omeprazole (PriLOSEC) 40 mg DR capsule, Take 1 capsule (40 mg) by mouth once daily in the morning. Take before meals., Disp: 30 capsule, Rfl: 1    sertraline (Zoloft) 25 mg tablet, Take 1 tablet (25 mg) by mouth once daily at bedtime., Disp: 60 tablet, Rfl: 0    Vitamin D3 25 mcg (1,000 unit) capsule, TAKE 2 CAPSULES (2,000 UNITS) BY MOUTH ONCE DAILY., Disp: 60 capsule, Rfl: 0  No Known Allergies  Resp 18   Ht 1.778 m (5' 10\")   Wt 86.7 kg (191 lb 3.2 oz)   BMI 27.43 kg/m²   Neurological Exam/Physical Exam:    Constitutional: General appearance:  Pleasant.  Hypomimia.   Auscultation of Heart: Regular rate and rhythm, no murmurs, normal S1 and S2.   Carotid Arteries: no bruits  Peripheral Vascular Exam: No swelling, edema or tenderness to palpation in extremities  Mental status: no distress, alert, interactive and cooperative. Affect is appropriate.   Orientation: oriented to person, oriented to place  Eyes: The ophthalmoscopic examination was normal.   Cranial nerve II: Visual fields full to confrontation.   Cranial nerves III, IV, and VI: Pupils round, equally reactive to light; EOMs intact. No nystagmus.   Cranial Nerve V: Facial sensation intact to LT bilaterally.   Cranial nerve VII: no facial droop  Cranial nerve VIII: Hearing is intact  Cranial nerves IX and X: Palate elevates symmetrically.   Cranial nerve XI: Shoulder shrug intact.   Cranial nerve XII: Tongue is midline.  Motor:  Muscle bulk was normal in both upper and lower extremities.   Head and neck dyskinesia is mild. Mild rigidity.  No tremor today.  He has dyskinesia when walking.  He has disfigured toes, more so in the right foot. ( Congenital?) there is bradykinesia in L hand mild and b/l foot tapping.  Deep " Tendon Reflexes: left biceps 2+ , right biceps 2+, left triceps 2+, right triceps 2+, left brachioradialis 2+, right brachioradialis 2+, left patella 2+, right patella 2+, left ankle jerk 2+, right ankle jerk 2+   Plantar Reflex: Toes downgoing to plantar stimulation on the left. Toes downgoing to plantar stimulation on the right.   Sensory Exam: Normal to vibratory sensation  Coordination:  no limb dystaxia  Gait:  shuffling gait, difficulty getting up from chair.        Labs:  CBC:   Lab Results   Component Value Date    WBC 5.7 08/21/2024    HGB 14.1 08/21/2024    HCT 44.2 08/21/2024     08/21/2024     BMP:   Lab Results   Component Value Date     (L) 08/21/2024    K 4.3 08/21/2024    CL 96 (L) 08/21/2024    CO2 28 08/21/2024    BUN 13 08/21/2024    CREATININE 1.01 08/21/2024    CALCIUM 9.0 08/21/2024     LFT:   Lab Results   Component Value Date    ALKPHOS 66 08/21/2024    BILITOT 0.6 08/21/2024    PROT 7.0 08/21/2024    ALBUMIN 3.8 08/21/2024    ALT 10 08/21/2024    AST 18 08/21/2024         Assessment/Plan   Problem List Items Addressed This Visit    None  Visit Diagnoses         Codes    Parkinson's disease with fluctuating manifestations, unspecified whether dyskinesia present (Multi)     G20.A2        This is a chronic neurologic condition that requires ongoing care and monitoring. This is a complex, serious condition that needs long term care going forward. Between myself and the patient we will be changing direction of care depending on responses to treatment.   Today we discussed medication options, non medication options for management and various other symptoms that are in relation to this disease.  I will continue to be involved in the care of this patient.      Levodopa complications including motor fluctuations, hallucinations, impulse control and dyskinesia.    STOP Amantadine due to hallucinations. , wait 1 week then stop entacapone due to dyskinesia.  Next steps to lower levodopa.    May benefit from psychiatry evaluation.  Consider increase Zoloft to 50mg daily.  Nocturnal Urination- please see PCP for evaluation/ management.    Rytary or Crexont could be future options.

## 2024-09-27 NOTE — PATIENT INSTRUCTIONS
"It was a pleasure seeing you today.     Will get medical records of your past neurologist in [place].    Week 1: STOP Amantadine   Week 2: then STOP Entacapone.    Please make virtual/phone call in 3-4 weeks and regular clinic follow up in 3-4 months.      For any urgent issues or needing to speak to a medical assistant please call [phone], option 6 during our office hours Monday-Friday 8am-4pm, and leave a voicemail with your concern.  My office will try to reach back you as soon as possible within 24 (business) hours.  If you have an emergency please call 911 or visit a local urgent care or nearest emergency room.      Please understand that [employer] is a useful communication tool for simple \"normal\" results or a refill request but I would not recommend using this tool for emergent or urgent issues or for conversations with me.  I am happy to ask my staff to rearrange a follow up visit or a virtual visit sooner than requested if appropriate for your care.    "

## 2024-10-09 DIAGNOSIS — F32.A DEPRESSION, UNSPECIFIED DEPRESSION TYPE: ICD-10-CM

## 2024-10-09 RX ORDER — SERTRALINE HYDROCHLORIDE 25 MG/1
25 TABLET, FILM COATED ORAL NIGHTLY
Qty: 60 TABLET | Refills: 0 | Status: SHIPPED | OUTPATIENT
Start: 2024-10-09 | End: 2024-12-08

## 2024-10-18 ENCOUNTER — APPOINTMENT (OUTPATIENT)
Dept: NEUROLOGY | Facility: CLINIC | Age: 69
End: 2024-10-18
Payer: MEDICARE

## 2024-10-18 VITALS — WEIGHT: 190 LBS | HEIGHT: 70 IN | BODY MASS INDEX: 27.2 KG/M2

## 2024-10-18 DIAGNOSIS — R60.0 BILATERAL LEG EDEMA: ICD-10-CM

## 2024-10-18 DIAGNOSIS — M79.662 BILATERAL CALF PAIN: ICD-10-CM

## 2024-10-18 DIAGNOSIS — G20.A2 PARKINSON'S DISEASE WITH FLUCTUATING MANIFESTATIONS, UNSPECIFIED WHETHER DYSKINESIA PRESENT: Primary | ICD-10-CM

## 2024-10-18 DIAGNOSIS — M79.661 BILATERAL CALF PAIN: ICD-10-CM

## 2024-10-18 PROCEDURE — 3008F BODY MASS INDEX DOCD: CPT | Performed by: PSYCHIATRY & NEUROLOGY

## 2024-10-18 PROCEDURE — 99213 OFFICE O/P EST LOW 20 MIN: CPT | Performed by: PSYCHIATRY & NEUROLOGY

## 2024-10-18 PROCEDURE — 1159F MED LIST DOCD IN RCRD: CPT | Performed by: PSYCHIATRY & NEUROLOGY

## 2024-10-18 PROCEDURE — 1036F TOBACCO NON-USER: CPT | Performed by: PSYCHIATRY & NEUROLOGY

## 2024-10-18 PROCEDURE — 1125F AMNT PAIN NOTED PAIN PRSNT: CPT | Performed by: PSYCHIATRY & NEUROLOGY

## 2024-10-18 PROCEDURE — 1160F RVW MEDS BY RX/DR IN RCRD: CPT | Performed by: PSYCHIATRY & NEUROLOGY

## 2024-10-18 PROCEDURE — G2211 COMPLEX E/M VISIT ADD ON: HCPCS | Performed by: PSYCHIATRY & NEUROLOGY

## 2024-10-18 RX ORDER — CARBIDOPA AND LEVODOPA 50; 200 MG/1; MG/1
1 TABLET, EXTENDED RELEASE ORAL NIGHTLY
Qty: 90 TABLET | Refills: 3 | Status: SHIPPED | OUTPATIENT
Start: 2024-10-18

## 2024-10-18 ASSESSMENT — PAIN SCALES - GENERAL: PAINLEVEL_OUTOF10: 8

## 2024-10-18 NOTE — PATIENT INSTRUCTIONS
"It was a pleasure seeing you today.     Save the Date for The 15th [facility] Parkinson's Boot Camp  Saturday, November 16th at the [facility] [place], [place]  [email or web address]     Please make a follow up appointment in 5-6 months.  You may also schedule a phone or virtual visit sooner on a Friday morning with me as needed before the next clinic appointment.     For any urgent issues or needing to speak to a medical assistant please call [phone], option 6 during our office hours Monday-Friday 8am-4pm, and leave a voicemail with your concern.  My office will try to reach back you as soon as possible within 24 (business) hours.  If you have an emergency please call 911 or visit a local urgent care or nearest emergency room.      Please understand that [employer] is a useful communication tool for simple \"normal\" results or a refill request but I would not recommend using this tool for emergent or urgent issues or for conversations with me.  I am happy to ask my staff to rearrange a follow up visit or a virtual visit sooner than requested if appropriate for your care.    " Report given to All recovery nurse  CMS intact to lower extremities  C/D/I dressing on lower back

## 2024-10-18 NOTE — PROGRESS NOTES
"  Subjective      Kalani Keith is a 69 y.o. year old RH male is here for virtual follow up for PD.  Wife is here, and daughter.     HPI  He met me once in clinic.  I advised him to stop Amantadine due to hallucinations then to  wait 1 week then stop entacapone (due to dyskinesia).  Hallucinations are much better.   No dyskinesias at this point after stopping Entacapone.    Mornings are harder and more stiff in the morning.   He was being treated at [facility] [place] [facility]  ([place] ) prior to moving here to [place], [place] recently.   He was being treated by that neurologist ( Gloria Garcia).    Sertraline 25mg at bedtime.  No dizziness.     PD symptoms began about 2014.  Trouble driving and difficulty coordination.  Memory is \"not bad\".    He feels stiff and slow when medication wears off.  He has slowness getting up at night to use bathroom.  He uses a walker.   He had hypersexuality as well as morning akinesia.     Current PD Medications:  Sinemet  1 tab every 6 hours QID (6am wakes up)  Sinemet ER 50-200mg 1 tab at bedtime (9pm)      Review of Systems    There is no problem list on file for this patient.    No past medical history on file.  Past Surgical History:   Procedure Laterality Date    HERNIA REPAIR       Social History     Tobacco Use    Smoking status: Never    Smokeless tobacco: Never   Substance Use Topics    Alcohol use: Never     family history includes Diabetes in his mother; No Known Problems in his father.    Current Outpatient Medications:     carbidopa-levodopa (Sinemet CR)  mg ER tablet, TAKE 1 TABLET BY MOUTH EVERYDAY AT BEDTIME, Disp: 30 tablet, Rfl: 0    carbidopa-levodopa (Sinemet)  mg tablet, TAKE 1 TABLET BY MOUTH EVERY 6 HOURS, Disp: 120 tablet, Rfl: 0    furosemide (Lasix) 20 mg tablet, TAKE 1 TABLET BY MOUTH EVERY DAY, Disp: 30 tablet, Rfl: 0    Linzess 145 mcg capsule, Take 1 capsule (145 mcg) by mouth once daily in the morning. Take before meals., Disp: 30 capsule, Rfl: 1    " omeprazole (PriLOSEC) 40 mg DR capsule, Take 1 capsule (40 mg) by mouth once daily in the morning. Take before meals., Disp: 30 capsule, Rfl: 1    sertraline (Zoloft) 25 mg tablet, TAKE 1 TABLET (25 MG) BY MOUTH ONCE DAILY AT BEDTIME., Disp: 60 tablet, Rfl: 0    Vitamin D3 25 mcg (1,000 unit) capsule, TAKE 2 CAPSULES (2,000 UNITS) BY MOUTH ONCE DAILY., Disp: 60 capsule, Rfl: 0  No Known Allergies  There were no vitals taken for this visit.  Neurological Exam/Physical Exam:    alert, oriented. face symmetric. speech is clear, fluent.  appropriate, conversational.   Mild facial dyskinesia.  moves all extremities above gravity.  No tremors.        Labs:  CBC:   Lab Results   Component Value Date    WBC 5.7 08/21/2024    HGB 14.1 08/21/2024    HCT 44.2 08/21/2024     08/21/2024     BMP:   Lab Results   Component Value Date     (L) 08/21/2024    K 4.3 08/21/2024    CL 96 (L) 08/21/2024    CO2 28 08/21/2024    BUN 13 08/21/2024    CREATININE 1.01 08/21/2024    CALCIUM 9.0 08/21/2024     LFT:   Lab Results   Component Value Date    ALKPHOS 66 08/21/2024    BILITOT 0.6 08/21/2024    PROT 7.0 08/21/2024    ALBUMIN 3.8 08/21/2024    ALT 10 08/21/2024    AST 18 08/21/2024         Assessment/Plan   Problem List Items Addressed This Visit    None    This is a chronic neurologic condition that requires ongoing care and monitoring. This is a complex, serious condition that needs long term care going forward. Between myself and the patient we will be changing direction of care depending on responses to treatment.   Today we discussed medication options, non medication options for management and various other symptoms that are in relation to this disease.  I will continue to be involved in the care of this patient.      Levodopa complications including motor fluctuations, hallucinations, impulse control and dyskinesia.  Possible next steps to lower levodopa vs. Inbrija inhaler as needed.   May benefit from psychiatry  evaluation.  Consider increase Zoloft to 50mg daily.  Nocturnal Urination- please see PCP for evaluation/ management.    Rytary or Crexont could be future options.   Will obtain leg ultrasound of leg for calf pain/ leg swelling.     Total time with patient encounter:  22 minutes

## 2024-11-03 DIAGNOSIS — M79.89 SWELLING OF LOWER EXTREMITY: ICD-10-CM

## 2024-11-04 ENCOUNTER — APPOINTMENT (OUTPATIENT)
Dept: PRIMARY CARE | Facility: CLINIC | Age: 69
End: 2024-11-04
Payer: MEDICARE

## 2024-11-04 RX ORDER — FUROSEMIDE 20 MG/1
20 TABLET ORAL DAILY
Qty: 30 TABLET | Refills: 0 | Status: SHIPPED | OUTPATIENT
Start: 2024-11-04

## 2024-11-16 DIAGNOSIS — G20.A2 PARKINSON'S DISEASE WITH FLUCTUATING MANIFESTATIONS, UNSPECIFIED WHETHER DYSKINESIA PRESENT: ICD-10-CM

## 2024-11-19 DIAGNOSIS — K21.9 GASTROESOPHAGEAL REFLUX DISEASE, UNSPECIFIED WHETHER ESOPHAGITIS PRESENT: ICD-10-CM

## 2024-11-19 RX ORDER — OMEPRAZOLE 40 MG/1
40 CAPSULE, DELAYED RELEASE ORAL
Qty: 30 CAPSULE | Refills: 1 | Status: SHIPPED | OUTPATIENT
Start: 2024-11-19 | End: 2025-01-18

## 2024-11-19 RX ORDER — CARBIDOPA AND LEVODOPA 25; 250 MG/1; MG/1
1 TABLET ORAL EVERY 6 HOURS
Qty: 120 TABLET | Refills: 0 | Status: SHIPPED | OUTPATIENT
Start: 2024-11-19 | End: 2024-12-19

## 2024-11-20 DIAGNOSIS — E55.9 VITAMIN D DEFICIENCY: ICD-10-CM

## 2024-11-21 ENCOUNTER — HOSPITAL ENCOUNTER (OUTPATIENT)
Dept: CARDIOLOGY | Facility: HOSPITAL | Age: 69
Discharge: HOME | End: 2024-11-21
Payer: MEDICAID

## 2024-11-21 ENCOUNTER — APPOINTMENT (OUTPATIENT)
Dept: CARDIOLOGY | Facility: HOSPITAL | Age: 69
End: 2024-11-21
Payer: MEDICAID

## 2024-11-21 ENCOUNTER — APPOINTMENT (OUTPATIENT)
Dept: RADIOLOGY | Facility: HOSPITAL | Age: 69
End: 2024-11-21
Payer: MEDICAID

## 2024-11-21 ENCOUNTER — HOSPITAL ENCOUNTER (EMERGENCY)
Facility: HOSPITAL | Age: 69
Discharge: HOME | End: 2024-11-21
Attending: STUDENT IN AN ORGANIZED HEALTH CARE EDUCATION/TRAINING PROGRAM
Payer: MEDICAID

## 2024-11-21 VITALS
HEIGHT: 70 IN | TEMPERATURE: 97.6 F | DIASTOLIC BLOOD PRESSURE: 84 MMHG | BODY MASS INDEX: 27.2 KG/M2 | HEART RATE: 79 BPM | WEIGHT: 190 LBS | OXYGEN SATURATION: 97 % | RESPIRATION RATE: 17 BRPM | SYSTOLIC BLOOD PRESSURE: 153 MMHG

## 2024-11-21 DIAGNOSIS — I10 HYPERTENSION, UNSPECIFIED TYPE: Primary | ICD-10-CM

## 2024-11-21 LAB
ALBUMIN SERPL BCP-MCNC: 4 G/DL (ref 3.4–5)
ALP SERPL-CCNC: 60 U/L (ref 33–136)
ALT SERPL W P-5'-P-CCNC: 7 U/L (ref 10–52)
ANION GAP SERPL CALC-SCNC: 11 MMOL/L (ref 10–20)
APTT PPP: 18 SECONDS (ref 27–38)
AST SERPL W P-5'-P-CCNC: 25 U/L (ref 9–39)
BASOPHILS # BLD AUTO: 0.04 X10*3/UL (ref 0–0.1)
BASOPHILS NFR BLD AUTO: 0.7 %
BILIRUB SERPL-MCNC: 0.6 MG/DL (ref 0–1.2)
BUN SERPL-MCNC: 12 MG/DL (ref 6–23)
CALCIUM SERPL-MCNC: 9.3 MG/DL (ref 8.6–10.3)
CARDIAC TROPONIN I PNL SERPL HS: 4 NG/L (ref 0–20)
CHLORIDE SERPL-SCNC: 98 MMOL/L (ref 98–107)
CO2 SERPL-SCNC: 30 MMOL/L (ref 21–32)
CREAT SERPL-MCNC: 1.04 MG/DL (ref 0.5–1.3)
EGFRCR SERPLBLD CKD-EPI 2021: 77 ML/MIN/1.73M*2
EOSINOPHIL # BLD AUTO: 0.26 X10*3/UL (ref 0–0.4)
EOSINOPHIL NFR BLD AUTO: 4.5 %
ERYTHROCYTE [DISTWIDTH] IN BLOOD BY AUTOMATED COUNT: 13.4 % (ref 11.5–14.5)
GLUCOSE BLD MANUAL STRIP-MCNC: 79 MG/DL (ref 74–99)
GLUCOSE SERPL-MCNC: 98 MG/DL (ref 74–99)
HCT VFR BLD AUTO: 45.9 % (ref 41–52)
HGB BLD-MCNC: 15 G/DL (ref 13.5–17.5)
HOLD SPECIMEN: NORMAL
IMM GRANULOCYTES # BLD AUTO: 0.01 X10*3/UL (ref 0–0.5)
IMM GRANULOCYTES NFR BLD AUTO: 0.2 % (ref 0–0.9)
INR PPP: 1 (ref 0.9–1.1)
LYMPHOCYTES # BLD AUTO: 1.61 X10*3/UL (ref 0.8–3)
LYMPHOCYTES NFR BLD AUTO: 27.9 %
MCH RBC QN AUTO: 28.6 PG (ref 26–34)
MCHC RBC AUTO-ENTMCNC: 32.7 G/DL (ref 32–36)
MCV RBC AUTO: 88 FL (ref 80–100)
MONOCYTES # BLD AUTO: 0.55 X10*3/UL (ref 0.05–0.8)
MONOCYTES NFR BLD AUTO: 9.5 %
NEUTROPHILS # BLD AUTO: 3.3 X10*3/UL (ref 1.6–5.5)
NEUTROPHILS NFR BLD AUTO: 57.2 %
NRBC BLD-RTO: 0 /100 WBCS (ref 0–0)
PLATELET # BLD AUTO: 211 X10*3/UL (ref 150–450)
POTASSIUM SERPL-SCNC: 4.4 MMOL/L (ref 3.5–5.3)
PROT SERPL-MCNC: 7.5 G/DL (ref 6.4–8.2)
PROTHROMBIN TIME: 11.2 SECONDS (ref 9.8–12.8)
RBC # BLD AUTO: 5.24 X10*6/UL (ref 4.5–5.9)
SODIUM SERPL-SCNC: 135 MMOL/L (ref 136–145)
WBC # BLD AUTO: 5.8 X10*3/UL (ref 4.4–11.3)

## 2024-11-21 PROCEDURE — 93005 ELECTROCARDIOGRAM TRACING: CPT

## 2024-11-21 PROCEDURE — 85610 PROTHROMBIN TIME: CPT

## 2024-11-21 PROCEDURE — 85730 THROMBOPLASTIN TIME PARTIAL: CPT

## 2024-11-21 PROCEDURE — 82947 ASSAY GLUCOSE BLOOD QUANT: CPT

## 2024-11-21 PROCEDURE — 36415 COLL VENOUS BLD VENIPUNCTURE: CPT

## 2024-11-21 PROCEDURE — 99285 EMERGENCY DEPT VISIT HI MDM: CPT | Performed by: STUDENT IN AN ORGANIZED HEALTH CARE EDUCATION/TRAINING PROGRAM

## 2024-11-21 PROCEDURE — 84484 ASSAY OF TROPONIN QUANT: CPT

## 2024-11-21 PROCEDURE — 70450 CT HEAD/BRAIN W/O DYE: CPT

## 2024-11-21 PROCEDURE — 70450 CT HEAD/BRAIN W/O DYE: CPT | Performed by: RADIOLOGY

## 2024-11-21 PROCEDURE — 85025 COMPLETE CBC W/AUTO DIFF WBC: CPT

## 2024-11-21 PROCEDURE — 99285 EMERGENCY DEPT VISIT HI MDM: CPT | Mod: 25

## 2024-11-21 PROCEDURE — 80053 COMPREHEN METABOLIC PANEL: CPT

## 2024-11-21 RX ORDER — LABETALOL HYDROCHLORIDE 5 MG/ML
10 INJECTION, SOLUTION INTRAVENOUS ONCE
Status: DISCONTINUED | OUTPATIENT
Start: 2024-11-21 | End: 2024-11-21

## 2024-11-21 RX ORDER — AMLODIPINE BESYLATE 5 MG/1
5 TABLET ORAL DAILY
Qty: 30 TABLET | Refills: 0 | Status: SHIPPED | OUTPATIENT
Start: 2024-11-21 | End: 2024-12-21

## 2024-11-21 RX ORDER — AMPICILLIN TRIHYDRATE 500 MG
CAPSULE ORAL
Qty: 60 CAPSULE | Refills: 0 | Status: SHIPPED | OUTPATIENT
Start: 2024-11-21

## 2024-11-21 ASSESSMENT — PAIN SCALES - GENERAL
PAINLEVEL_OUTOF10: 0 - NO PAIN

## 2024-11-21 ASSESSMENT — PAIN - FUNCTIONAL ASSESSMENT: PAIN_FUNCTIONAL_ASSESSMENT: 0-10

## 2024-11-21 ASSESSMENT — COLUMBIA-SUICIDE SEVERITY RATING SCALE - C-SSRS
6. HAVE YOU EVER DONE ANYTHING, STARTED TO DO ANYTHING, OR PREPARED TO DO ANYTHING TO END YOUR LIFE?: NO
2. HAVE YOU ACTUALLY HAD ANY THOUGHTS OF KILLING YOURSELF?: NO
1. IN THE PAST MONTH, HAVE YOU WISHED YOU WERE DEAD OR WISHED YOU COULD GO TO SLEEP AND NOT WAKE UP?: NO

## 2024-11-22 ENCOUNTER — APPOINTMENT (OUTPATIENT)
Dept: CARDIOLOGY | Facility: HOSPITAL | Age: 69
End: 2024-11-22
Payer: MEDICAID

## 2024-11-22 LAB
ATRIAL RATE: 79 BPM
P AXIS: 28 DEGREES
P OFFSET: 204 MS
P ONSET: 150 MS
PR INTERVAL: 154 MS
Q ONSET: 227 MS
QRS COUNT: 13 BEATS
QRS DURATION: 74 MS
QT INTERVAL: 364 MS
QTC CALCULATION(BAZETT): 417 MS
QTC FREDERICIA: 399 MS
R AXIS: 2 DEGREES
T AXIS: 31 DEGREES
T OFFSET: 409 MS
VENTRICULAR RATE: 79 BPM

## 2024-11-22 NOTE — ED PROVIDER NOTES
History/Exam limitations: communication barrier accent, parkinson's disease .   Additional history was obtained from EMS personnel.    HPI:       Kalani Keith is a 69 y.o. with a history of Parkinson's disease presenting with concern for slurred speech and a right-sided facial droop.  Last known well of 9 PM on 11/20/2024.  Patient otherwise not complaining of any recent falls, headache, lightheadedness, dizziness, visual changes, fevers, chest pain, shortness of breath, [place] pain, nausea, vomiting.  Patient's grandson, who lives with the patient, provided collateral information.  He states that his aunt thought the patient might be drooling and slurring his speech more than usual today.  After taking the grandson to bedside.  Patient states that he is not much of a smile or and had a bump in his mouth which demonstrated no asymmetry.  His grandson states that patient is not slurring more than usual and is acting at his baseline.  His grandson states that the patient has not had much oral intake today but had no focal complaints or falls.     Last Known Well Time: 2100 11/21/2024        ------------------------------------------------------------------------------------------------------------------------------------------     Physical Exam:    ED Triage Vitals   Temp Pulse Resp BP   -- -- -- --      SpO2 Temp src Heart Rate Source Patient Position   -- -- -- --      BP Location FiO2 (%)     -- --          Gen: Not in acute distress  Head/Neck: NCAT  Eyes: Anicteric sclerae, noninjected conjunctivae  Nose: No rhinorrhea  Mouth:  MMM  Heart: Regular rate and rhythm w/ no murmurs, rubs, gallops  Lungs: CTAB w/o wheezes, rales, rhonchi, no increased work of breathing  Abdomen: Soft, NT/ND  Musculoskeletal: No deformities  Extremities: No edema.  Neurologic: Please see below for NIHSS  Skin: No rashes noted  Psychological: Calm        Interval: Baseline  Time: 9:31 PM  Person Administering Scale: Orlando Ya MD      1a  Level of consciousness: 0=alert; keenly responsive   1b. LOC questions:  0=Performs both tasks correctly   1c. LOC commands: 0=Performs both tasks correctly   2.  Best Gaze: 0=normal   3. Visual: 0=No visual loss   4. Facial Palsy: 1=Minor paralysis (flattened nasolabial fold, asymmetric on smiling)   5a. Motor left arm: 0=No drift, limb holds 90 (or 45) degrees for full 10 seconds   5b.  Motor right arm: 0=No drift, limb holds 90 (or 45) degrees for full 10 seconds   6a. motor left le=No drift, limb holds 90 (or 45) degrees for full 10 seconds   6b  Motor right le=No drift, limb holds 90 (or 45) degrees for full 10 seconds   7. Limb Ataxia: 0=Absent   8.  Sensory: 0=Normal; no sensory loss   9. Best Language:  0=No aphasia, normal   10. Dysarthria: 1=Mild to moderate, patient slurs at least some words and at worst, can be understood with some difficulty   11. Extinction and Inattention: 0=No abnormality     Total:   2         VAN: VAN: Negative     ------------------------------------------------------------------------------------------------------------------------------------------     Medical Decision Making:     History obtained from the patient, grandson, EMS.  Records including labs, imaging, notes reviewed.  Initial presentation concerning for possible stroke.  Patient VAN negative with a initial NIH of 2.  Brain attack was called, but CT angio was deferred.  Point-of-care glucose normal at 79.  Labs were sent in the meantime.  CT scan did not demonstrate any acute findings.  Upon reevaluation with the family at bedside, patient at his reported baseline, no acute dysarthria.  When patient opened his mouth wide, he had no facial droop.  NIH of 0.  Initially, patient was hypertensive with a systolic of 210/118.  We ordered 10 mg of intravenous labetalol but was informed by nursing that patient's blood pressure had spontaneously dropped to 170/90.  Antihypertensives were deferred.  INR within  normal limits, APTT mildly decreased at 18 thought to be clinically noncontributory.  CBC within normal limits.  CMP with a mild hyponatremia of 135 only thought to be clinically noncontributory.  Troponin within normal limits.  We believe any symptoms he may have had were temporary and likely in the setting of uncontrolled hypertension.  There is no evidence that patient is having a stroke or other acute neurologic crisis at this time.  Patient to be discharged home in satisfactory condition with a prescription for amlodipine and follow-up with his primary care provider.  Family amenable to this plan.     EKG interpreted by myself: EKG demonstrated normal sinus rhythm at a rate of 79, normal intervals.  No acute injury pattern.     Independent Interpretation of Studies: I independently interpreted the CT head and see No obvious evidence of intracranial hemorrhage and No obvious evidence of skull fracture    Chronic Medical Conditions Significantly Affecting Care: Parkinson's disease    Social Determinants of Health Significantly Affecting Care:  N/A     External Records Reviewed: I reviewed recent and relevant outside records including: notes, labs, imaging     Discussion of Management with Other Providers:   I discussed the patient/results with: no others, patient at neurologic baseline, presentation like 2/2 to undiagnosed hypertension.      IV Thrombolysis IV Thrombolysis Checklist        IV Thrombolysis Given: No; Thrombolysis contraindication reason: Working diagnosis is NOT a suspected ischemic stroke and Time from Last Known Well (or stroke onset) is >4.5 hours         Procedure  Procedures          Orlando Ya MD  Resident  11/21/24 [id number]

## 2024-11-22 NOTE — DISCHARGE INSTRUCTIONS
Please take your home medications, now including 5 mg of amlodipine daily, as prescribed.  Please follow-up with your primary care provider in 1 week to discuss your medications.  If you develop confusion, intractable headaches, or other worrisome symptoms, return to the ER.

## 2024-11-29 ENCOUNTER — HOSPITAL ENCOUNTER (OUTPATIENT)
Dept: RADIOLOGY | Facility: HOSPITAL | Age: 69
Discharge: HOME | End: 2024-11-29
Payer: COMMERCIAL

## 2024-11-29 DIAGNOSIS — R60.0 BILATERAL LEG EDEMA: ICD-10-CM

## 2024-11-29 DIAGNOSIS — M79.661 BILATERAL CALF PAIN: ICD-10-CM

## 2024-11-29 DIAGNOSIS — M79.662 BILATERAL CALF PAIN: ICD-10-CM

## 2024-11-29 LAB
ATRIAL RATE: 79 BPM
ATRIAL RATE: 79 BPM
P AXIS: 27 DEGREES
P AXIS: 28 DEGREES
P OFFSET: 198 MS
P OFFSET: 204 MS
P ONSET: 146 MS
P ONSET: 150 MS
PR INTERVAL: 152 MS
PR INTERVAL: 154 MS
Q ONSET: 222 MS
Q ONSET: 227 MS
QRS COUNT: 13 BEATS
QRS COUNT: 13 BEATS
QRS DURATION: 72 MS
QRS DURATION: 74 MS
QT INTERVAL: 362 MS
QT INTERVAL: 364 MS
QTC CALCULATION(BAZETT): 415 MS
QTC CALCULATION(BAZETT): 417 MS
QTC FREDERICIA: 396 MS
QTC FREDERICIA: 399 MS
R AXIS: 1 DEGREES
R AXIS: 2 DEGREES
T AXIS: 22 DEGREES
T AXIS: 31 DEGREES
T OFFSET: 403 MS
T OFFSET: 409 MS
VENTRICULAR RATE: 79 BPM
VENTRICULAR RATE: 79 BPM

## 2024-11-29 PROCEDURE — 93970 EXTREMITY STUDY: CPT

## 2024-11-30 DIAGNOSIS — M79.89 SWELLING OF LOWER EXTREMITY: ICD-10-CM

## 2024-12-02 DIAGNOSIS — F32.A DEPRESSION, UNSPECIFIED DEPRESSION TYPE: ICD-10-CM

## 2024-12-02 RX ORDER — FUROSEMIDE 20 MG/1
20 TABLET ORAL DAILY
Qty: 30 TABLET | Refills: 0 | Status: SHIPPED | OUTPATIENT
Start: 2024-12-02

## 2024-12-02 RX ORDER — SERTRALINE HYDROCHLORIDE 25 MG/1
25 TABLET, FILM COATED ORAL NIGHTLY
Qty: 30 TABLET | Refills: 1 | Status: SHIPPED | OUTPATIENT
Start: 2024-12-02 | End: 2025-01-31

## 2024-12-02 NOTE — TELEPHONE ENCOUNTER
Rx request  Last OV 8/2024  Pending OV 1/2025     Patient is transferring care to another physician.

## 2024-12-12 ENCOUNTER — TELEPHONE (OUTPATIENT)
Dept: NEUROLOGY | Facility: CLINIC | Age: 69
End: 2024-12-12
Payer: COMMERCIAL

## 2024-12-12 NOTE — TELEPHONE ENCOUNTER
Patients wife called stating that she knows that he has a virtual visit tomorrow and wanted to make you aware that Kalani can lift himself out of his bed, he is now wetting the bed, he does not let her know destinee to change his diaper and he is not following his diet She is having her own issues and is becoming difficult to take care of him alone. She was wondering if home care would be appropriate or if physical therapy will help? Just wanted you to know prior to appointment.

## 2024-12-13 ENCOUNTER — HOME HEALTH ADMISSION (OUTPATIENT)
Dept: HOME HEALTH SERVICES | Facility: HOME HEALTH | Age: 69
End: 2024-12-13
Payer: MEDICARE

## 2024-12-13 ENCOUNTER — TELEMEDICINE (OUTPATIENT)
Dept: NEUROLOGY | Facility: CLINIC | Age: 69
End: 2024-12-13
Payer: COMMERCIAL

## 2024-12-13 ENCOUNTER — HOSPITAL ENCOUNTER (EMERGENCY)
Facility: HOSPITAL | Age: 69
Discharge: LEFT BEFORE DISCHARGE | End: 2024-12-13
Attending: EMERGENCY MEDICINE
Payer: COMMERCIAL

## 2024-12-13 ENCOUNTER — APPOINTMENT (OUTPATIENT)
Dept: RADIOLOGY | Facility: HOSPITAL | Age: 69
End: 2024-12-13
Payer: COMMERCIAL

## 2024-12-13 ENCOUNTER — APPOINTMENT (OUTPATIENT)
Dept: CARDIOLOGY | Facility: HOSPITAL | Age: 69
End: 2024-12-13
Payer: COMMERCIAL

## 2024-12-13 ENCOUNTER — DOCUMENTATION (OUTPATIENT)
Dept: HOME HEALTH SERVICES | Facility: HOME HEALTH | Age: 69
End: 2024-12-13

## 2024-12-13 VITALS — BODY MASS INDEX: 27.2 KG/M2 | WEIGHT: 190 LBS | HEIGHT: 70 IN

## 2024-12-13 VITALS
HEART RATE: 92 BPM | RESPIRATION RATE: 19 BRPM | HEIGHT: 70 IN | TEMPERATURE: 97.9 F | WEIGHT: 190 LBS | OXYGEN SATURATION: 98 % | SYSTOLIC BLOOD PRESSURE: 159 MMHG | BODY MASS INDEX: 27.2 KG/M2 | DIASTOLIC BLOOD PRESSURE: 88 MMHG

## 2024-12-13 DIAGNOSIS — R26.89 IMBALANCE: ICD-10-CM

## 2024-12-13 DIAGNOSIS — G20.B1 PARKINSON'S DISEASE WITH DYSKINESIA WITHOUT FLUCTUATING MANIFESTATIONS: Primary | ICD-10-CM

## 2024-12-13 DIAGNOSIS — R29.6 REPEATED FALLS: ICD-10-CM

## 2024-12-13 DIAGNOSIS — R29.810 WEAKNESS ON RIGHT SIDE OF FACE: ICD-10-CM

## 2024-12-13 DIAGNOSIS — R29.90 STROKE-LIKE SYMPTOMS: Primary | ICD-10-CM

## 2024-12-13 PROBLEM — R26.2 AMBULATORY DYSFUNCTION: Status: ACTIVE | Noted: 2024-12-13

## 2024-12-13 PROBLEM — G20.B2 PARKINSON'S DISEASE WITH DYSKINESIA AND FLUCTUATING MANIFESTATIONS: Status: ACTIVE | Noted: 2024-12-13

## 2024-12-13 PROBLEM — G45.9 TIA (TRANSIENT ISCHEMIC ATTACK): Status: ACTIVE | Noted: 2024-12-13

## 2024-12-13 LAB
ALBUMIN SERPL BCP-MCNC: 4.1 G/DL (ref 3.4–5)
ALP SERPL-CCNC: 60 U/L (ref 33–136)
ALT SERPL W P-5'-P-CCNC: 18 U/L (ref 10–52)
ANION GAP SERPL CALC-SCNC: 18 MMOL/L (ref 10–20)
APTT PPP: 28 SECONDS (ref 27–38)
AST SERPL W P-5'-P-CCNC: 30 U/L (ref 9–39)
ATRIAL RATE: 105 BPM
BASOPHILS # BLD AUTO: 0.02 X10*3/UL (ref 0–0.1)
BASOPHILS NFR BLD AUTO: 0.2 %
BILIRUB SERPL-MCNC: 0.9 MG/DL (ref 0–1.2)
BUN SERPL-MCNC: 18 MG/DL (ref 6–23)
CALCIUM SERPL-MCNC: 9.3 MG/DL (ref 8.6–10.3)
CARDIAC TROPONIN I PNL SERPL HS: 4 NG/L (ref 0–20)
CHLORIDE SERPL-SCNC: 102 MMOL/L (ref 98–107)
CO2 SERPL-SCNC: 21 MMOL/L (ref 21–32)
CREAT SERPL-MCNC: 1.06 MG/DL (ref 0.5–1.3)
EGFRCR SERPLBLD CKD-EPI 2021: 75 ML/MIN/1.73M*2
EOSINOPHIL # BLD AUTO: 0.07 X10*3/UL (ref 0–0.4)
EOSINOPHIL NFR BLD AUTO: 0.8 %
ERYTHROCYTE [DISTWIDTH] IN BLOOD BY AUTOMATED COUNT: 13.4 % (ref 11.5–14.5)
GLUCOSE SERPL-MCNC: 104 MG/DL (ref 74–99)
HCT VFR BLD AUTO: 47.9 % (ref 41–52)
HGB BLD-MCNC: 14.8 G/DL (ref 13.5–17.5)
IMM GRANULOCYTES # BLD AUTO: 0.02 X10*3/UL (ref 0–0.5)
IMM GRANULOCYTES NFR BLD AUTO: 0.2 % (ref 0–0.9)
INR PPP: 1.1 (ref 0.9–1.1)
LYMPHOCYTES # BLD AUTO: 1.2 X10*3/UL (ref 0.8–3)
LYMPHOCYTES NFR BLD AUTO: 14.1 %
MCH RBC QN AUTO: 27.6 PG (ref 26–34)
MCHC RBC AUTO-ENTMCNC: 30.9 G/DL (ref 32–36)
MCV RBC AUTO: 89 FL (ref 80–100)
MONOCYTES # BLD AUTO: 0.85 X10*3/UL (ref 0.05–0.8)
MONOCYTES NFR BLD AUTO: 10 %
NEUTROPHILS # BLD AUTO: 6.38 X10*3/UL (ref 1.6–5.5)
NEUTROPHILS NFR BLD AUTO: 74.7 %
NRBC BLD-RTO: 0 /100 WBCS (ref 0–0)
P AXIS: 39 DEGREES
P OFFSET: 191 MS
P ONSET: 141 MS
PLATELET # BLD AUTO: 207 X10*3/UL (ref 150–450)
POTASSIUM SERPL-SCNC: 4.7 MMOL/L (ref 3.5–5.3)
PR INTERVAL: 148 MS
PROT SERPL-MCNC: 7.6 G/DL (ref 6.4–8.2)
PROTHROMBIN TIME: 12.3 SECONDS (ref 9.8–12.8)
Q ONSET: 215 MS
QRS COUNT: 17 BEATS
QRS DURATION: 70 MS
QT INTERVAL: 342 MS
QTC CALCULATION(BAZETT): 452 MS
QTC FREDERICIA: 412 MS
R AXIS: -6 DEGREES
RBC # BLD AUTO: 5.37 X10*6/UL (ref 4.5–5.9)
SODIUM SERPL-SCNC: 136 MMOL/L (ref 136–145)
T AXIS: 23 DEGREES
T OFFSET: 386 MS
VENTRICULAR RATE: 105 BPM
WBC # BLD AUTO: 8.5 X10*3/UL (ref 4.4–11.3)

## 2024-12-13 PROCEDURE — 85730 THROMBOPLASTIN TIME PARTIAL: CPT

## 2024-12-13 PROCEDURE — 1159F MED LIST DOCD IN RCRD: CPT | Performed by: PSYCHIATRY & NEUROLOGY

## 2024-12-13 PROCEDURE — 85025 COMPLETE CBC W/AUTO DIFF WBC: CPT

## 2024-12-13 PROCEDURE — 1036F TOBACCO NON-USER: CPT | Performed by: PSYCHIATRY & NEUROLOGY

## 2024-12-13 PROCEDURE — 99285 EMERGENCY DEPT VISIT HI MDM: CPT | Mod: 25 | Performed by: EMERGENCY MEDICINE

## 2024-12-13 PROCEDURE — 70450 CT HEAD/BRAIN W/O DYE: CPT | Performed by: RADIOLOGY

## 2024-12-13 PROCEDURE — 3008F BODY MASS INDEX DOCD: CPT | Performed by: PSYCHIATRY & NEUROLOGY

## 2024-12-13 PROCEDURE — 1160F RVW MEDS BY RX/DR IN RCRD: CPT | Performed by: PSYCHIATRY & NEUROLOGY

## 2024-12-13 PROCEDURE — 2500000004 HC RX 250 GENERAL PHARMACY W/ HCPCS (ALT 636 FOR OP/ED)

## 2024-12-13 PROCEDURE — 70450 CT HEAD/BRAIN W/O DYE: CPT

## 2024-12-13 PROCEDURE — 84484 ASSAY OF TROPONIN QUANT: CPT

## 2024-12-13 PROCEDURE — 99443 PR PHYS/QHP TELEPHONE EVALUATION 21-30 MIN: CPT | Performed by: PSYCHIATRY & NEUROLOGY

## 2024-12-13 PROCEDURE — 93010 ELECTROCARDIOGRAM REPORT: CPT | Performed by: EMERGENCY MEDICINE

## 2024-12-13 PROCEDURE — 99222 1ST HOSP IP/OBS MODERATE 55: CPT | Performed by: NURSE PRACTITIONER

## 2024-12-13 PROCEDURE — 72125 CT NECK SPINE W/O DYE: CPT | Performed by: RADIOLOGY

## 2024-12-13 PROCEDURE — 96361 HYDRATE IV INFUSION ADD-ON: CPT

## 2024-12-13 PROCEDURE — 96360 HYDRATION IV INFUSION INIT: CPT

## 2024-12-13 PROCEDURE — 1125F AMNT PAIN NOTED PAIN PRSNT: CPT | Performed by: PSYCHIATRY & NEUROLOGY

## 2024-12-13 PROCEDURE — 99291 CRITICAL CARE FIRST HOUR: CPT

## 2024-12-13 PROCEDURE — 99222 1ST HOSP IP/OBS MODERATE 55: CPT | Performed by: STUDENT IN AN ORGANIZED HEALTH CARE EDUCATION/TRAINING PROGRAM

## 2024-12-13 PROCEDURE — 99285 EMERGENCY DEPT VISIT HI MDM: CPT | Performed by: EMERGENCY MEDICINE

## 2024-12-13 PROCEDURE — 93005 ELECTROCARDIOGRAM TRACING: CPT

## 2024-12-13 PROCEDURE — 85610 PROTHROMBIN TIME: CPT

## 2024-12-13 PROCEDURE — 2500000001 HC RX 250 WO HCPCS SELF ADMINISTERED DRUGS (ALT 637 FOR MEDICARE OP)

## 2024-12-13 PROCEDURE — 36415 COLL VENOUS BLD VENIPUNCTURE: CPT

## 2024-12-13 PROCEDURE — 72125 CT NECK SPINE W/O DYE: CPT

## 2024-12-13 PROCEDURE — 80053 COMPREHEN METABOLIC PANEL: CPT

## 2024-12-13 RX ORDER — ASPIRIN 81 MG/1
81 TABLET ORAL DAILY
Status: CANCELLED | OUTPATIENT
Start: 2024-12-13

## 2024-12-13 RX ORDER — ENOXAPARIN SODIUM 100 MG/ML
40 INJECTION SUBCUTANEOUS EVERY 24 HOURS
Status: CANCELLED | OUTPATIENT
Start: 2024-12-13

## 2024-12-13 RX ORDER — AMLODIPINE BESYLATE 5 MG/1
5 TABLET ORAL ONCE
Status: COMPLETED | OUTPATIENT
Start: 2024-12-13 | End: 2024-12-13

## 2024-12-13 RX ORDER — FUROSEMIDE 20 MG/1
20 TABLET ORAL DAILY
Status: CANCELLED | OUTPATIENT
Start: 2024-12-13

## 2024-12-13 RX ORDER — SERTRALINE HYDROCHLORIDE 50 MG/1
25 TABLET, FILM COATED ORAL NIGHTLY
Status: CANCELLED | OUTPATIENT
Start: 2024-12-13

## 2024-12-13 RX ORDER — CARBIDOPA AND LEVODOPA 50; 200 MG/1; MG/1
1 TABLET, EXTENDED RELEASE ORAL NIGHTLY
Status: CANCELLED | OUTPATIENT
Start: 2024-12-13

## 2024-12-13 RX ORDER — AMLODIPINE BESYLATE 5 MG/1
5 TABLET ORAL DAILY
Status: CANCELLED | OUTPATIENT
Start: 2024-12-14

## 2024-12-13 RX ORDER — ATORVASTATIN CALCIUM 40 MG/1
40 TABLET, FILM COATED ORAL NIGHTLY
Status: CANCELLED | OUTPATIENT
Start: 2024-12-13

## 2024-12-13 RX ORDER — CARBIDOPA AND LEVODOPA 25; 250 MG/1; MG/1
1 TABLET ORAL EVERY 6 HOURS
Status: CANCELLED | OUTPATIENT
Start: 2024-12-13

## 2024-12-13 RX ORDER — PANTOPRAZOLE SODIUM 20 MG/1
20 TABLET, DELAYED RELEASE ORAL
Status: CANCELLED | OUTPATIENT
Start: 2024-12-14

## 2024-12-13 RX ADMIN — AMLODIPINE BESYLATE 5 MG: 5 TABLET ORAL at 16:51

## 2024-12-13 RX ADMIN — SODIUM CHLORIDE 1000 ML: 9 INJECTION, SOLUTION INTRAVENOUS at 16:46

## 2024-12-13 ASSESSMENT — ENCOUNTER SYMPTOMS
DEPRESSION: 0
OCCASIONAL FEELINGS OF UNSTEADINESS: 0
LOSS OF SENSATION IN FEET: 0

## 2024-12-13 ASSESSMENT — LIFESTYLE VARIABLES
HAVE PEOPLE ANNOYED YOU BY CRITICIZING YOUR DRINKING: NO
TOTAL SCORE: 0
HAVE YOU EVER FELT YOU SHOULD CUT DOWN ON YOUR DRINKING: NO
EVER HAD A DRINK FIRST THING IN THE MORNING TO STEADY YOUR NERVES TO GET RID OF A HANGOVER: NO
EVER FELT BAD OR GUILTY ABOUT YOUR DRINKING: NO

## 2024-12-13 ASSESSMENT — PAIN SCALES - GENERAL
PAINLEVEL_OUTOF10: 5
PAINLEVEL_OUTOF10: 8

## 2024-12-13 ASSESSMENT — PAIN DESCRIPTION - ORIENTATION: ORIENTATION: RIGHT;LEFT

## 2024-12-13 ASSESSMENT — COLUMBIA-SUICIDE SEVERITY RATING SCALE - C-SSRS
1. IN THE PAST MONTH, HAVE YOU WISHED YOU WERE DEAD OR WISHED YOU COULD GO TO SLEEP AND NOT WAKE UP?: NO
6. HAVE YOU EVER DONE ANYTHING, STARTED TO DO ANYTHING, OR PREPARED TO DO ANYTHING TO END YOUR LIFE?: NO
2. HAVE YOU ACTUALLY HAD ANY THOUGHTS OF KILLING YOURSELF?: NO

## 2024-12-13 ASSESSMENT — PAIN DESCRIPTION - LOCATION: LOCATION: LEG

## 2024-12-13 ASSESSMENT — PAIN - FUNCTIONAL ASSESSMENT: PAIN_FUNCTIONAL_ASSESSMENT: 0-10

## 2024-12-13 NOTE — HH CARE COORDINATION
Home Care received a Referral for Physical Therapy, Occupational Therapy, Home Health Aide, and Medical Social Work. We have processed the referral for a Start of Care on 24-48HRS.     If you have any questions or concerns, please feel free to contact us at [phone]. Follow the prompts, enter your five digit zip code, and you will be directed to your care team on WEST 2.

## 2024-12-13 NOTE — PROGRESS NOTES
PHARMACY STROKE RESPONSE      Patient Name: Kalani Keith  MRN: [id number]  Location: [place] [address]    Patient Weight (kg):   Wt Readings from Last 1 Encounters:   12/13/24 86.2 kg (190 lb)        An acute Brain Attack has been activated, pharmacy participated in multidisciplinary team bedside response for Kalani Keith.  Contraindications for fibrinolytic therapy have been reviewed by pharmacy and any issues relating to medication therapy have been discussed directly with the provider(s) caring for this patient.     Pharmacy aided in the bedside response for fibrinolytic therapy. Patient did not receive fibrinolysis.         Thank you for allowing me to take part in the care of this patient.     Isaiah Ribeiro, Connor  12/13/2024  12:44 PM         References:    Neurological [place] Stroke Tools   Neurological [place] IV Thrombolysis Checklist

## 2024-12-13 NOTE — ED PROVIDER NOTES
Emergency Department Provider Note        History of Present Illness     History provided by: Patient and EMS  Limitations to History: None  External Records Reviewed with Brief Summary:  ED note from 11/21/2024 which showed presentation with the same symptoms    HPI:  Kalani Keith is a 69 y.o. male with a history of Parkinson's disease presents to the ED with a chief complaint of slurred speech, right-sided facial droop, and leaning to his right side. The patient was last known well at 10 a.m. and was found in the restroom by family after hearing him fall. He was seen in the ED on November 21st this year with similar symptoms, which resolved during that visit. At that time, he was determined to have uncontrolled hypertension and was provided with his own blood pressure medications. No thrombolytic therapy was given, and the patient is not on any anticoagulation.    The patient was scheduled for an MRI today but did not undergo the procedure. Upon assessment in the ED, the patient was found to be van-negative, able to identify objects, with no aphasia, no loss of vision, and no appreciable slurred speech or facial droop. The patient reports feeling okay and does not recall the events leading to his fall. He denies any recent illness, cough, runny nose, congestion, or sore throat.    The patient's family reports that during his previous ED visit, he was diagnosed with hypertensive emergency, but his symptoms resolved during that visit.    Last Known Well Time: 10:00    Physical Exam   Triage vitals:  T 36.6 °C (97.9 °F)    BP (!) 179/114  RR 18  O2 99 % None (Room air)    General: Awake, alert, in no acute distress  Eyes: Gaze conjugate.  No scleral icterus or injection  HENT: Normo-cephalic, atraumatic. No stridor.  CV: Regular rate, regular rhythm. Radial pulses 2+ bilaterally  Resp: Breathing non-labored, speaking in full sentences.  Clear to auscultation bilaterally  GI: Soft, non-distended, non-tender.  No rebound or guarding.  MSK/Extremities: No gross bony deformities. Moving all extremities.  Skin: Warm. Appropriate color  Neuro: see below for NIHSS  Psych: Appropriate mood and affect    NIH Stroke Scale  Time: 1218  Person Administering Scale: Jaswant Pham MD    1a  Level of consciousness: 0=alert; keenly responsive   1b. LOC questions:  0=Performs both tasks correctly   1c. LOC commands: 0=Performs both tasks correctly   2.  Best Gaze: 0=normal   3. Visual: 0=No visual loss   4. Facial Palsy: 0=Normal symmetric movement   5a. Motor left arm: 0=No drift, limb holds 90 (or 45) degrees for full 10 seconds   5b.  Motor right arm: 0=No drift, limb holds 90 (or 45) degrees for full 10 seconds   6a. Motor left le=No drift, limb holds 90 (or 45) degrees for full 10 seconds   6b  Motor right le=No drift, limb holds 90 (or 45) degrees for full 10 seconds   7. Limb Ataxia: 0=Absent   8.  Sensory: 0=Normal; no sensory loss   9. Best Language:  0=No aphasia, normal   10. Dysarthria: 0=Normal   11. Extinction and Inattention: 0=No abnormality     Total:   0     VAN: Negative    Medical Decision Making & ED Course   Medical Decision Makin y.o. male with history of Parkinson's disease and uncontrolled hypertension presenting with suspected Transient Ischemic Attack (TIA). Symptoms included slurred speech, right facial droop, and leaning to the right side, with a similar episode reported last month. Patient also experienced a fall in the restroom. Symptoms resolved during the ED visit.    Based on patient's history, symptoms, and presentation, the condition is most concerning for a TIA, potentially complicated by Parkinson's disease and uncontrolled hypertension. The fall may be related to the suspected TIA or Parkinson's disease.    Plan includes loading with aspirin if patient can swallow, performing a stroke workup with basic labs and imaging, and scheduling an inpatient MRI. We will monitor blood pressure  closely, ensure patient is taking prescribed Parkinson's and antihypertensive medications, and implement fall precautions. Consultation with a neurologist will be considered for further management of both TIA and Parkinson's disease. Patient education on lifestyle modifications for hypertension management will be provided.  ----  Differential diagnoses considered include but are not limited to: CVA, ICH, TBI, Sepsis, Hypoglycemia, Hyperglycemia (DKA, HHS), Hypertensive encephalopathy, Drug overdose (opioids, sedatives, stimulants), Alcohol intoxication, Electrolyte abnormalities (hyponatremia, hypernatremia, hypercalcemia), Liver failure (hepatic encephalopathy), Renal failure (uremia), Delirium, Dementia, Dehydration    Social Determinants of Health which Significantly Impact Care:  Difficulty paying for observation admission and out-of-coverage medical expenses  The following actions were taken to address these social determinants: Patient offered evaluation by Social Work    EKG Independent Interpretation: The EKG obtained at 1243 was independently interpreted by myself. It demonstrates sinus rhythm with a ventricular rate of 105.  Left axis. Intervals QRS 70 ms, QTc 432 ms. ST segments showed no acute ischemic changes.     Independent Result Review and Interpretation: On my interpretation of labs, results are unimpressive for systemic inflammation or infection, acute anemia or blood loss, ANDREA, hepatitis, acute coronary syndrome, or electrolyte abnormalities.  CT head and C-spine unimpressive for ICH, mass, ventriculomegaly, or unstable C-spine fracture.    Chronic conditions affecting the patient's care: As documented above in MDM    The patient was discussed with the following consultants/services: Neurology regarding stoke-like symptoms in which patient does not meet TNK administration criteria due to resolution of symptoms but agrees with admission for TIA workup, Hospitalist/Admitting Provider who accepted  the patient for admission, and Social Work regarding difficulty paying for out of neck work expenses and reports that we are unable to provide further assistance regarding patient's  observation admission status that is not covered by insurance.    Care Considerations: As documented above in MDM    IV Thrombolysis IV Thrombolysis Checklist        IV Thrombolysis Given: No; Thrombolysis contraindication reason: Neurologic signs have spontaneously resolved       ED Course:  ED Course as of 12/14/24 1842   Fri Dec 13, 2024   1221 history of Parkinson's disease presenting with concern for slurred speech, right-sided facial droop, and righ-sided weakness [ES]   1807 I was called to the bedside and informed that the patient's spouse, POA would like to leave AGAINST MEDICAL ADVICE at this time.  The surrogate is oriented to person, place, time, and demonstrating all key elements of capacity to make decisions regarding the medical care offered.  The surrogate speaks coherently and exhibits no evidence of having an altered level of consciousness or alcohol or drug intoxication to a point that would impair ability to delineate a choice.  The surrogate surrogate demonstrates understanding and appreciation of the relevant information of the nature of their medical condition, as well as the risks, benefits, and treatment alternatives (including non-treatment), consequences of refusing care, and can appropriately communicate a rational reasoning about their choice of care options.  Surrogate is aware the suspected diagnosis suggested by history and exam. The patient demonstrates understanding and appreciation of the relevant information of the nature of their medical condition, as well as the risks, benefits, and treatment alternatives (including non-treatment), consequences of refusing care, and can appropriately communicate a rational reasoning about their choice of care options. The risks of refusing recommended care that were  disclosed and acknowledged by the patient and surrogate include death, neurologic dysfunction, permanent mental impairment, loss of limb, loss of current lifestyle, worsening of chronic condition, long-term disability. The patient and surrogate understands they are welcome to return to the hospital at any time to receive the recommended care or any other care at any time, regardless of their ability to pay for such care. Discharge instructions were provided to the patient. [ES]      ED Course User Index  [ES] Jaswant Pham MD         Diagnoses as of 12/14/24 1842   Stroke-like symptoms   Repeated falls       Disposition   The patient elected to leave the Emergency Department against medical advice. In my opinion, the patient has capacity to leave AMA. he is clinically sober, free from distracting injury, appears to have intact insight, judgment, and reason, and in my opinion has capacity to make decisions. I explained to him that these symptoms may represent a serious underlying medical condition and he verbalized understanding of my concerns and understands the consequences of leaving without complete evaluation.    I had a discussion with the patient about his workup and results, and informed him what the next step in diagnosis and treatment would be, and he verbalized understanding. I explained the risks of leaving without further workup or treatment, which included reasonably foreseeable complications such as death, serious injury, and permanent disability. I also offered alternatives to departing AMA.    I have asked him to return as soon as possible to complete his evaluation, and also explained that he is welcome to return to the ED for further evaluation whenever he chooses. I have asked him to follow up with his primary doctor as soon as possible. All of his questions were answered and he was given oral discharge instructions.    Procedures   Critical Care    Performed by: Jaswant Pham MD  Authorized by:  Wayne Mcmahon DO    Critical care provider statement:     Critical care time (minutes):  35    Critical care start time:  12/13/2024 12:18 PM    Critical care end time:  12/13/2024 2:00 PM    Critical care time was exclusive of:  Separately billable procedures and treating other patients    Critical care was necessary to treat or prevent imminent or life-threatening deterioration of the following conditions:  CNS failure or compromise    Critical care was time spent personally by me on the following activities:  Discussions with consultants, examination of patient, review of old charts, obtaining history from patient or surrogate, development of treatment plan with patient or surrogate, ordering and performing treatments and interventions, ordering and review of radiographic studies, ordering and review of laboratory studies and re-evaluation of patient's condition      This was a shared visit with an ED attending.  The patient was seen and discussed with the ED attending    Jaswant Pham MD  Emergency Medicine, PGY 3     Jaswant Pham MD  Resident  12/14/24 4408          The patient was seen by the resident/fellow.  I have personally performed a substantive portion of the encounter.  I have seen and examined the patient; agree with the workup, evaluation, MDM, management and diagnosis.  The care plan has been discussed with the resident; I have reviewed the resident’s note and agree with the documented findings.                                                    Wayne Mcmahon DO  12/16/24 1122

## 2024-12-13 NOTE — H&P
History Of Present Illness  Kalani Keith is a 69 y.o. male presenting with frequent falls at home.  Patient is alert and awake, however speech is somewhat difficult, family at bedside help provide history.  Per report, he was in the emergency room on 11/21 due to concern for slurred speech and right-sided facial droop.  During the ER visit, they found his blood pressure was significantly elevated, after treating the blood pressure, he had a NIHSS score of 0, patient was discharged recommendation to follow-up as outpatient.  CT of the head did not show any acute findings at the time.  Per family report, since discharge, he has had more frequent episodes of falling and has became increasingly weak.  This morning, he had a fall at 10 AM, grandson was able to help him up.  Then at 11, he fell in the bathroom while trying to grab onto the glass door handle, the frame became loose and he fell while holding onto the glass door.  No reported head injury or loss of consciousness.  Due to concern for this recurrent falls, family decided bring him to the emergency room for further evaluation.     While in the emergency room, patient was initially hypertensive with blood pressure of 179/114.  This did show some improvement without treatment.  His NIHSS was 0 in the emergency room.  CBC BMP did not show any acute findings.  CT brain no acute intracranial pathology.  CT cervical spine showed degenerative changes.  Case was discussed with neurology team due to frequent episode of falls, uncertain if this is due to progression of his Parkinson disease versus TIA/stroke.  Recommend to admit for MRI for further evaluation as well as PT OT.  Evaluation.    Discussed with the patient and wife at bedside, patient is DNR CCA DNI     Past Medical History  He has no past medical history on file.    Surgical History  He has a past surgical history that includes Hernia repair.     Social History  He reports that he has never smoked. He has  "never used smokeless tobacco. He reports that he does not drink alcohol and does not use drugs.    Family History  Family History   Problem Relation Name Age of Onset    Diabetes Mother      No Known Problems Father          Allergies  Sulfa (sulfonamide antibiotics)    Review of Systems   ROS: 12 systems reviewed and negative except per HPI above     Physical Exam by System:     Constitutional: Well developed, awake/alert/oriented x3, no distress, alert and cooperative   ENMT: mucous membranes moist   Head/Neck: Neck supple   Respiratory/Thorax: Patent airways, CTAB, normal breath sounds with good chest expansion, thorax symmetric   Cardiovascular: Regular, rate and rhythm, no murmurs   Gastrointestinal: Nondistended, soft, non-tender      Extremities: normal extremities, no cyanosis edema, contusions or wounds, no clubbing   Neurological: alert and oriented x3, intact senses, motor, able to raise bilateral lower extremities off the bed very slowly, he does have masked facies likely due to chronic Parkinson disease, speech is also very slow       Last Recorded Vitals  Blood pressure 159/88, pulse 99, temperature 36.6 °C (97.9 °F), temperature source Tympanic, resp. rate 19, height 1.778 m (5' 10\"), weight 86.2 kg (190 lb), SpO2 97%.    Relevant Results  Results for orders placed or performed during the hospital encounter of 12/13/24 (from the past 24 hours)   ECG 12 lead   Result Value Ref Range    Ventricular Rate 105 BPM    Atrial Rate 105 BPM    KS Interval 148 ms    QRS Duration 70 ms    QT Interval 342 ms    QTC Calculation(Bazett) 452 ms    P Axis 39 degrees    R Axis -6 degrees    T Axis 23 degrees    QRS Count 17 beats    Q Onset 215 ms    P Onset 141 ms    P Offset 191 ms    T Offset 386 ms    QTC Fredericia 412 ms   Comprehensive metabolic panel   Result Value Ref Range    Glucose 104 (H) 74 - 99 mg/dL    Sodium 136 136 - 145 mmol/L    Potassium 4.7 3.5 - 5.3 mmol/L    Chloride 102 98 - 107 mmol/L    " Bicarbonate 21 21 - 32 mmol/L    Anion Gap 18 10 - 20 mmol/L    Urea Nitrogen 18 6 - 23 mg/dL    Creatinine 1.06 0.50 - 1.30 mg/dL    eGFR 75 >60 mL/min/1.73m*2    Calcium 9.3 8.6 - 10.3 mg/dL    Albumin 4.1 3.4 - 5.0 g/dL    Alkaline Phosphatase 60 33 - 136 U/L    Total Protein 7.6 6.4 - 8.2 g/dL    AST 30 9 - 39 U/L    Bilirubin, Total 0.9 0.0 - 1.2 mg/dL    ALT 18 10 - 52 U/L   Troponin I, High Sensitivity   Result Value Ref Range    Troponin I, High Sensitivity 4 0 - 20 ng/L   CBC and Auto Differential   Result Value Ref Range    WBC 8.5 4.4 - 11.3 x10*3/uL    nRBC 0.0 0.0 - 0.0 /100 WBCs    RBC 5.37 4.50 - 5.90 x10*6/uL    Hemoglobin 14.8 13.5 - 17.5 g/dL    Hematocrit 47.9 41.0 - 52.0 %    MCV 89 80 - 100 fL    MCH 27.6 26.0 - 34.0 pg    MCHC 30.9 (L) 32.0 - 36.0 g/dL    RDW 13.4 11.5 - 14.5 %    Platelets 207 150 - 450 x10*3/uL    Neutrophils % 74.7 40.0 - 80.0 %    Immature Granulocytes %, Automated 0.2 0.0 - 0.9 %    Lymphocytes % 14.1 13.0 - 44.0 %    Monocytes % 10.0 2.0 - 10.0 %    Eosinophils % 0.8 0.0 - 6.0 %    Basophils % 0.2 0.0 - 2.0 %    Neutrophils Absolute 6.38 (H) 1.60 - 5.50 x10*3/uL    Immature Granulocytes Absolute, Automated 0.02 0.00 - 0.50 x10*3/uL    Lymphocytes Absolute 1.20 0.80 - 3.00 x10*3/uL    Monocytes Absolute 0.85 (H) 0.05 - 0.80 x10*3/uL    Eosinophils Absolute 0.07 0.00 - 0.40 x10*3/uL    Basophils Absolute 0.02 0.00 - 0.10 x10*3/uL   Protime-INR   Result Value Ref Range    Protime 12.3 9.8 - 12.8 seconds    INR 1.1 0.9 - 1.1   APTT   Result Value Ref Range    aPTT 28 27 - 38 seconds      Scheduled medications  sodium chloride, 1,000 mL, intravenous, Once      Continuous medications     PRN medications              Assessment/Plan   Assessment & Plan  Stroke-like symptoms    TIA (transient ischemic attack)    Parkinson's disease with dyskinesia and fluctuating manifestations    Ambulatory dysfunction      Plan  - Patient was already seen by neurology team, recommend MRI for  evaluation  - Will start patient on aspirin and statin  - Continue home amlodipine  - Every 4 hours neurochecks, telemetry monitor  - Will check lipid profile and A1c  - Patient will be admitted under observation for evaluation  - Home medication resumed  - PT OT consulted for evaluation  - DVT prophylaxis with subcu Lovenox  - Patient DNR CCA DNI confirmed with wife at bedside         I spent 55 minutes in the professional and overall care of this patient.    This dictation was created using voice recognition software.  If there are any questions about inaccuracies please do not hesitate to contact me.      SIGNATURE: Ruddy Hill MD PATIENT NAME: Kalani Keith   DATE: December 13, 2024 MRN: [id number]   TIME: 5:03 PM

## 2024-12-13 NOTE — PROGRESS NOTES
Subjective      Kalani Keith is a 69 y.o. year old RH male is here for virtual follow up for PD.  Wife and daughter is on the phone today.     Parkinson's Disease    He met me once in clinic.  He was in the ER 11/21 for right facial droop.   He was having frequent UTI.   Is being treated now for UTI.  He is slipping out of the chair.   His walking is much worse and having more urination issues.   He stopped Amantadine due to hallucinations and stopped entacapone due to dyskinesia.  He has diarrhea.    Mornings are harder and more stiff in the morning.   He was being treated at [facility]  ([place] ) prior to moving here to [place], [place] recently.     Sertraline 25mg at bedtime.  No dizziness.     PD symptoms began about 2014.  Trouble driving and difficulty coordination.    He feels stiff and slow when medication wears off.  He has slowness getting up and needs more help to get up.   He uses a walker.     Current PD Medications:  Sinemet 25-250mg 1 tab every 6 hours QID (6am wakes up)  Sinemet ER 50-200mg 1 tab at bedtime (9pm)      Review of Systems    There is no problem list on file for this patient.    No past medical history on file.  Past Surgical History:   Procedure Laterality Date    HERNIA REPAIR       Social History     Tobacco Use    Smoking status: Never    Smokeless tobacco: Never   Substance Use Topics    Alcohol use: Never     family history includes Diabetes in his mother; No Known Problems in his father.    Current Outpatient Medications:     amLODIPine (Norvasc) 5 mg tablet, Take 1 tablet (5 mg) by mouth once daily., Disp: 30 tablet, Rfl: 0    carbidopa-levodopa (Sinemet CR)  mg ER tablet, Take 1 tablet by mouth once daily at bedtime., Disp: 90 tablet, Rfl: 3    carbidopa-levodopa (Sinemet)  mg tablet, TAKE 1 TABLET BY MOUTH EVERY 6 HOURS, Disp: 120 tablet, Rfl: 0    furosemide (Lasix) 20 mg tablet, TAKE 1 TABLET BY MOUTH EVERY DAY, Disp: 30 tablet, Rfl: 0    Linzess 145 mcg capsule, Take  "1 capsule (145 mcg) by mouth once daily in the morning. Take before meals., Disp: 30 capsule, Rfl: 1    omeprazole (PriLOSEC) 40 mg DR capsule, TAKE 1 CAPSULE (40 MG) BY MOUTH ONCE DAILY IN THE MORNING. TAKE BEFORE MEALS., Disp: 30 capsule, Rfl: 1    sertraline (Zoloft) 25 mg tablet, TAKE 1 TABLET (25 MG) BY MOUTH ONCE DAILY AT BEDTIME., Disp: 30 tablet, Rfl: 1    Vitamin D3 25 mcg (1,000 unit) capsule, TAKE 2 CAPSULES (2,000 UNITS) BY MOUTH ONCE DAILY., Disp: 60 capsule, Rfl: 0  No Known Allergies  Ht 1.778 m (5' 10\")   Wt 86.2 kg (190 lb)   BMI 27.26 kg/m²   Neurological Exam/Physical Exam:    alert, oriented. speech is clear, fluent.  Speech is soft, slurred.        Labs:  CBC:   Lab Results   Component Value Date    WBC 5.8 11/21/2024    HGB 15.0 11/21/2024    HCT 45.9 11/21/2024     11/21/2024     BMP:   Lab Results   Component Value Date     (L) 11/21/2024    K 4.4 11/21/2024    CL 98 11/21/2024    CO2 30 11/21/2024    BUN 12 11/21/2024    CREATININE 1.04 11/21/2024    CALCIUM 9.3 11/21/2024     LFT:   Lab Results   Component Value Date    ALKPHOS 60 11/21/2024    BILITOT 0.6 11/21/2024    PROT 7.5 11/21/2024    ALBUMIN 4.0 11/21/2024    ALT 7 (L) 11/21/2024    AST 25 11/21/2024     Labs personally reviewed LFTs WNL.  Creatinine 1.04.     Assessment/Plan   Problem List Items Addressed This Visit    None    This is a chronic neurologic condition that requires ongoing care and monitoring. This is a complex, serious condition that needs long term care going forward. Between myself and the patient we will be changing direction of care depending on responses to treatment.   Today we discussed medication options, non medication options for management and various other symptoms that are in relation to this disease.  I will continue to be involved in the care of this patient.      Levodopa complications including motor fluctuations, hallucinations, impulse control and dyskinesia.    Obtain MRI brain to " rule out stroke due to right facial droop.    Urinary issues and UTI are contributing to his worsening symptoms- advised to see PCP.    High fall risk, needs more supervision and care.       Total time with patient encounter:  22 minutes

## 2024-12-13 NOTE — CONSULTS
CONTROLLED  - BP in goal range today, has had some reported values above goal range at home but possibly with active infection  - for now, will cont current dose of ARB-thiazide, will adjust if no infection or if elevated home values persist  - Cont checking BP at home daily, call if values trend outside of goal range     Inpatient consult to Neurology  Consult performed by: HEMANTH Carrillo-CNP  Consult ordered by: Wayne Mcmahon DO          History Of Present Illness  Kalani Keith is a 69 y.o. male presenting with slurred speech, right facial droop, leaning to right side. Last known well 10 AM this morning. Was found on bathroom floor after family heard him fall. He was brought to the ED via EMS. In the ED, his BP was 179/114, NIH 0,  and CT head unremarkable. Pt was seen in ED with Dr. Steiner and family at bedside. They state that pt's symptoms have resolved and that he has had a right mouth droop for a long time. They feel his facial expression is back to his normal. He was evaluated in ED on 11/21 with similar presentation, and was found to have hypertension.     He was scheduled to have an MRI today after having a tele visit with Dr. Rojas, his neurologist for Parkinson's disease. However, he ended up here at the hospital. His symptoms resolved while he was in the ED. Family reports that pt has been less active and has been incontinent with multiple falls in the last month.     Past Medical History  History reviewed. No pertinent past medical history.  Surgical History  Past Surgical History:   Procedure Laterality Date    HERNIA REPAIR       Social History  Social History     Tobacco Use    Smoking status: Never    Smokeless tobacco: Never   Vaping Use    Vaping status: Never Used   Substance Use Topics    Alcohol use: Never    Drug use: Never     Allergies  Sulfa (sulfonamide antibiotics)  (Not in a hospital admission)      Review of Systems  Neurological Exam  Mental Status  Awake, alert and oriented to person, place and time. Recent and remote memory are intact. Speech is normal. Language is fluent with no aphasia. Attention and concentration are normal.    Cranial Nerves  CN II: Visual fields full to confrontation.  CN III, IV, VI: Extraocular movements intact bilaterally. Pupils equal round and reactive to  "light bilaterally.  CN V: Facial sensation is normal.  CN VII:  Right: There is central facial weakness.  CN VIII: Hearing is normal.  CN IX, X: Palate elevates symmetrically  CN XII: Tongue midline without atrophy or fasciculations.  Family reports chronic right mouth droop.    Motor   Strength is 5/5 throughout all four extremities.    Sensory  Light touch is normal in upper and lower extremities.     Coordination  Right: Finger-to-nose normal.Left: Finger-to-nose normal.    Physical Exam  Eyes:      Extraocular Movements: Extraocular movements intact.      Pupils: Pupils are equal, round, and reactive to light.   Neurological:      Motor: Motor strength is normal.  Psychiatric:         Speech: Speech normal.       Last Recorded Vitals  Blood pressure 159/88, pulse 98, temperature 36.6 °C (97.9 °F), temperature source Tympanic, resp. rate 19, height 1.778 m (5' 10\"), weight 86.2 kg (190 lb), SpO2 97%.    Relevant Results      NIH Stroke Scale  1A. Level of Consciousness: Alert, Keenly Responsive  1B. Ask Month and Age: Both Questions Right  1C. Blink Eyes & Squeeze Hands: Performs Both Tasks  2. Best Gaze: Normal  3. Visual: No Visual Loss  4. Facial Palsy: Normal Symmetrical Movements  5A. Motor - Left Arm: No Drift  5B. Motor - Right Arm: No Drift  6A. Motor - Left Leg: No Drift  6B. Motor - Right Leg: No Drift  7. Limb Ataxia: Present in One Limb  8. Sensory Loss: Normal  9. Best Language: No Aphasia  10. Dysarthria: Normal  11. Extinction and Inattention: No Abnormality  NIH Stroke Scale: 1  NIHSS 0 at this time.       Scheduled medications  sodium chloride, 1,000 mL, intravenous, Once      Continuous medications     PRN medications    Results for orders placed or performed during the hospital encounter of 12/13/24 (from the past 24 hours)   ECG 12 lead   Result Value Ref Range    Ventricular Rate 105 BPM    Atrial Rate 105 BPM    TN Interval 148 ms    QRS Duration 70 ms    QT Interval 342 ms    QTC " Calculation(Bazett) 452 ms    P Axis 39 degrees    R Axis -6 degrees    T Axis 23 degrees    QRS Count 17 beats    Q Onset 215 ms    P Onset 141 ms    P Offset 191 ms    T Offset 386 ms    QTC Fredericia 412 ms   Comprehensive metabolic panel   Result Value Ref Range    Glucose 104 (H) 74 - 99 mg/dL    Sodium 136 136 - 145 mmol/L    Potassium 4.7 3.5 - 5.3 mmol/L    Chloride 102 98 - 107 mmol/L    Bicarbonate 21 21 - 32 mmol/L    Anion Gap 18 10 - 20 mmol/L    Urea Nitrogen 18 6 - 23 mg/dL    Creatinine 1.06 0.50 - 1.30 mg/dL    eGFR 75 >60 mL/min/1.73m*2    Calcium 9.3 8.6 - 10.3 mg/dL    Albumin 4.1 3.4 - 5.0 g/dL    Alkaline Phosphatase 60 33 - 136 U/L    Total Protein 7.6 6.4 - 8.2 g/dL    AST 30 9 - 39 U/L    Bilirubin, Total 0.9 0.0 - 1.2 mg/dL    ALT 18 10 - 52 U/L   Troponin I, High Sensitivity   Result Value Ref Range    Troponin I, High Sensitivity 4 0 - 20 ng/L   CBC and Auto Differential   Result Value Ref Range    WBC 8.5 4.4 - 11.3 x10*3/uL    nRBC 0.0 0.0 - 0.0 /100 WBCs    RBC 5.37 4.50 - 5.90 x10*6/uL    Hemoglobin 14.8 13.5 - 17.5 g/dL    Hematocrit 47.9 41.0 - 52.0 %    MCV 89 80 - 100 fL    MCH 27.6 26.0 - 34.0 pg    MCHC 30.9 (L) 32.0 - 36.0 g/dL    RDW 13.4 11.5 - 14.5 %    Platelets 207 150 - 450 x10*3/uL    Neutrophils % 74.7 40.0 - 80.0 %    Immature Granulocytes %, Automated 0.2 0.0 - 0.9 %    Lymphocytes % 14.1 13.0 - 44.0 %    Monocytes % 10.0 2.0 - 10.0 %    Eosinophils % 0.8 0.0 - 6.0 %    Basophils % 0.2 0.0 - 2.0 %    Neutrophils Absolute 6.38 (H) 1.60 - 5.50 x10*3/uL    Immature Granulocytes Absolute, Automated 0.02 0.00 - 0.50 x10*3/uL    Lymphocytes Absolute 1.20 0.80 - 3.00 x10*3/uL    Monocytes Absolute 0.85 (H) 0.05 - 0.80 x10*3/uL    Eosinophils Absolute 0.07 0.00 - 0.40 x10*3/uL    Basophils Absolute 0.02 0.00 - 0.10 x10*3/uL   Protime-INR   Result Value Ref Range    Protime 12.3 9.8 - 12.8 seconds    INR 1.1 0.9 - 1.1   APTT   Result Value Ref Range    aPTT 28 27 - 38 seconds         I have personally reviewed the following imaging results ECG 12 lead    Result Date: 12/13/2024  Sinus tachycardia Otherwise normal ECG When compared with ECG of 21-NOV-2024 21:08, No significant change was found    CT cervical spine wo IV contrast    Result Date: 12/13/2024  Interpreted By:  Ayo Ríos, STUDY: CT CERVICAL SPINE WO IV CONTRAST; 12/13/2024 12:37 pm   INDICATION: Signs/Symptoms:fall;   COMPARISON: None   ACCESSION NUMBER(S): [id number]   ORDERING CLINICIAN: OSCAR MATOS   TECHNIQUE: Contiguous axial images were acquired from the skull base to the lung apices. Coronal and sagittal reformatted images were obtained. All CT examinations are performed with 1 or more of the following dose reduction techniques: Automated exposure control, adjustment of mA and/or kv according to patient's size, or use of iterative reconstruction techniques.   FINDINGS: There is straightening of the normal cervical lordosis.  No acute fracture or spondylolisthesis is identified.     The occipital condyles, arch of C1, and the odontoid processes are intact. The atlantoaxial relationship is well maintained.   There is moderate disc space narrowing at C4-5, C5-6. Mild-moderate disc space narrowing at C6-7. Bony spinal canal is patent.   Mild right neural foramina stenosis at C4-5 mild-moderate left neural foramina stenosis at C5-6.   The visualized lung apices are unremarkable.       1. No acute fracture or spondylolisthesis. 2. Degenerative disc disease and spondylosis as described in the body of the report.     Signed by: Ayo Ríos 12/13/2024 1:02 PM Dictation workstation:   [id number]    CT brain attack head wo IV contrast    Result Date: 12/13/2024  Interpreted By:  Ayo Ríos, STUDY: OSCAR MATOS; 12/13/2024 12:37 pm   INDICATION: Signs/Symptoms:Stroke Evaluation;   COMPARISON: 11/21/2024   ACCESSION NUMBER(S): [id number]   ORDERING CLINICIAN: OSCAR MATOS   TECHNIQUE: Contiguous axial images  were acquired from the vertex through the posterior fossa without IV contrast. All CT examinations are performed with 1 or more of the following dose reduction techniques: Automated exposure control, adjustment of mA and/or kv according to patient's size, or use of iterative reconstruction techniques.   FINDINGS: No focal mass effect or midline shift is identified. The ventricles and sulci are symmetric and appropriate for the patient's age.   There is a mild degree of nonspecific white matter hypodensity, most consistent with chronic small-vessel ischemic disease. The gray white matter differentiation is preserved.   No acute intracranial hemorrhage is seen. No intra-axial or extra-axial fluid collection is seen.   The visualized paranasal sinuses and mastoid air cells are clear.       No CT evidence for acute intracranial pathology.     Findings discussed with OSCAR MATOS via telephone at 12:50 p.m. on 12/13/2024   Signed by: Ayo Ríos 12/13/2024 12:58 PM Dictation workstation:   [id number]    Vascular US lower extremity venous duplex bilateral    Result Date: 11/30/2024  Interpreted By:  Marcos Zamora, STUDY: [facility] US LOWER EXTREMITY VENOUS DUPLEX BILATERAL;  11/29/2024 4:31 pm   INDICATION: Signs/Symptoms:leg swelling and calf pain b/l.   COMPARISON: None.   ACCESSION NUMBER(S): [id number]   ORDERING CLINICIAN: LINN HOLDEN   TECHNIQUE: Vascular ultrasound of the bilateral lower extremities was performed. Real-time compression views as well as Gray scale, color Doppler and spectral Doppler waveform analysis was performed.   FINDINGS: Evaluation of the visualized portions of the bilateral common femoral, proximal, mid, and distal femoral, and popliteal veins was performed.  Evaluation of the visualized portions of the posterior tibial and peroneal veins was also performed.   Limitations: None   The evaluated veins demonstrate normal compressibility. There is intact venous flow demonstrating normal  respiratory variability and normal augmentation of flow with calf compression.         No sonographic evidence for deep vein thrombosis within the evaluated veins of the bilateral lower extremities.   MACRO: None   Signed by: Marcos Zamora 11/30/2024 6:23 AM Dictation workstation:   [id number]    ECG 12 lead    Result Date: 11/29/2024  Normal sinus rhythm Normal ECG When compared with ECG of 21-NOV-2024 21:08, (unconfirmed) No significant change was found Confirmed by JASPAL Charles ([id number]) on 11/29/2024 9:38:23 AM    ECG 12 lead    Result Date: 11/29/2024  Poor data quality, interpretation may be adversely affected Normal sinus rhythm Normal ECG No previous ECGs available Confirmed by JASPAL Charles ([id number]) on 11/29/2024 9:38:19 AM    CT brain attack head wo IV contrast    Result Date: 11/21/2024  Interpreted By:  Katerin Lilly, STUDY: CT BRAIN ATTACK HEAD WO IV CONTRAST;  11/21/2024 8:45 pm   INDICATION: Signs/Symptoms:Stroke Evaluation, right facial droop, dysarthria.   COMPARISON: None.   ACCESSION NUMBER(S): [id number]   ORDERING CLINICIAN: ALEISHA MCCABE   TECHNIQUE: Noncontrast CT axial images were obtained through the brain.  Coronal and sagittal reformats were performed.   FINDINGS: The ventricles, sulci and basal cisterns are prominent suggestive of diffuse parenchymal volume loss. The grey-white matter differentiation is intact. No acute territorial infarct.  There is no mass effect or midline shift. There is no extraaxial fluid collection.  There is no intracranial hemorrhage.  No depressed calvarial fracture. No air-fluid levels at the visualized paranasal sinuses. The mastoid air cells are clear.       1.  No acute intracranial hemorrhage or acute territorial infarct.         MACRO: Katerin Lilly discussed the significance and urgency of this critical finding by telephone with  ALEISHA MCCABE on 11/21/2024 at 8:56 pm.  (**-RCF-**) Findings:  See findings.     Signed by: Katerin Lilly 11/21/2024 8:57 PM  Dictation workstation:   [id number]  .     Assessment/Plan   Assessment & Plan  Stroke-like symptoms      Fall, facial droop (chronic per family)- suspect progression of pt's Parkinson's disease by deconditioning, but will do MRI brain to rule out stroke, although this is low on differential.     Recommend:    MRI brain without contrast- if positive for acute stroke (not likely), complete full stroke work up.  Continue current dose of Sinemet.  PT/OT    Case/plan discussed and pt seen with Dr. Steiner.   HEMANTH Carrillo-CNP

## 2024-12-13 NOTE — PATIENT INSTRUCTIONS
I want you to get a MRI Brain- Please call radiology to schedule at [phone].   If the results are abnormal, I will call you to discuss.

## 2024-12-14 ENCOUNTER — HOME CARE VISIT (OUTPATIENT)
Dept: HOME HEALTH SERVICES | Facility: HOME HEALTH | Age: 69
End: 2024-12-14

## 2024-12-14 NOTE — ED NOTES
1850--Spouse did not want to wait for transportation back home.  Spouse stats she is able to drive patient home and can get him safely out of the car.  PT ambulated with one assist to wheelchair.  Pt was safely assisted into car.       Cynthia Belle RN  12/13/24 1914

## 2024-12-16 LAB
ATRIAL RATE: 105 BPM
P AXIS: 39 DEGREES
P OFFSET: 191 MS
P ONSET: 141 MS
PR INTERVAL: 148 MS
Q ONSET: 215 MS
QRS COUNT: 17 BEATS
QRS DURATION: 70 MS
QT INTERVAL: 342 MS
QTC CALCULATION(BAZETT): 452 MS
QTC FREDERICIA: 412 MS
R AXIS: -6 DEGREES
T AXIS: 23 DEGREES
T OFFSET: 386 MS
VENTRICULAR RATE: 105 BPM

## 2024-12-17 ENCOUNTER — HOME CARE VISIT (OUTPATIENT)
Dept: HOME HEALTH SERVICES | Facility: HOME HEALTH | Age: 69
End: 2024-12-17
Payer: COMMERCIAL

## 2024-12-17 VITALS
TEMPERATURE: 97.8 F | HEART RATE: 81 BPM | SYSTOLIC BLOOD PRESSURE: 100 MMHG | DIASTOLIC BLOOD PRESSURE: 70 MMHG | OXYGEN SATURATION: 98 %

## 2024-12-17 DIAGNOSIS — G20.A2 PARKINSON'S DISEASE WITH FLUCTUATING MANIFESTATIONS, UNSPECIFIED WHETHER DYSKINESIA PRESENT: ICD-10-CM

## 2024-12-17 PROCEDURE — 169592 NO-PAY CLAIM PROCEDURE

## 2024-12-17 PROCEDURE — G0151 HHCP-SERV OF PT,EA 15 MIN: HCPCS | Mod: HHH

## 2024-12-17 PROCEDURE — G0155 HHCP-SVS OF CSW,EA 15 MIN: HCPCS | Mod: HHH

## 2024-12-17 SDOH — HEALTH STABILITY: PHYSICAL HEALTH: EXERCISE COMMENTS: INSTR IN SEATED HIP ROTATION AND BL KNEE EXTENSION , WALKING PROGRAM

## 2024-12-17 ASSESSMENT — ENCOUNTER SYMPTOMS
LOSS OF SENSATION IN FEET: 1
PAIN LOCATION - PAIN SEVERITY: 8/10
DEPRESSION: 0
PERSON REPORTING PAIN: PATIENT
HIGHEST PAIN SEVERITY IN PAST 24 HOURS: 8/10
PAIN: 1
LOWEST PAIN SEVERITY IN PAST 24 HOURS: 4/10
PAIN LOCATION: LEFT LEG
PAIN LOCATION - PAIN SEVERITY: 8/10
OCCASIONAL FEELINGS OF UNSTEADINESS: 1
PAIN LOCATION: RIGHT LEG

## 2024-12-17 ASSESSMENT — ACTIVITIES OF DAILY LIVING (ADL)
SHOPPING_REQUIRES_ASSISTANCE: 1
AMBULATION ASSISTANCE ON FLAT SURFACES: 1
LAUNDRY_REQUIRES_ASSISTANCE: 1
AMBULATION_DISTANCE/DURATION_TOLERATED: 150 FT
OASIS_M1830: 03
ENTERING_EXITING_HOME: NEEDS ASSISTANCE

## 2024-12-17 NOTE — TELEPHONE ENCOUNTER
Rx request  Last OV 8/2024  Pending OV 1/2025    Patient is transferring care. 45 day pended until next appointment with new PCP

## 2024-12-18 ENCOUNTER — HOME CARE VISIT (OUTPATIENT)
Dept: HOME HEALTH SERVICES | Facility: HOME HEALTH | Age: 69
End: 2024-12-18
Payer: COMMERCIAL

## 2024-12-18 PROCEDURE — G0152 HHCP-SERV OF OT,EA 15 MIN: HCPCS | Mod: HHH

## 2024-12-18 RX ORDER — CARBIDOPA AND LEVODOPA 25; 250 MG/1; MG/1
1 TABLET ORAL EVERY 6 HOURS
Qty: 120 TABLET | Refills: 0 | Status: SHIPPED | OUTPATIENT
Start: 2024-12-18 | End: 2025-01-17

## 2024-12-19 SDOH — ECONOMIC STABILITY: HOUSING INSECURITY: HOME SAFETY: LIVES WITH FAMILY, MAIN FLOOR BED/BATHROOM WITH WALK IN SHOWER

## 2024-12-19 ASSESSMENT — ACTIVITIES OF DAILY LIVING (ADL)
PHYSICAL TRANSFERS ASSESSED: 1
CURRENT_FUNCTION: CONTACT GUARD ASSIST

## 2024-12-19 ASSESSMENT — ENCOUNTER SYMPTOMS
DENIES PAIN: 1
PERSON REPORTING PAIN: PATIENT

## 2024-12-20 ENCOUNTER — HOME CARE VISIT (OUTPATIENT)
Dept: HOME HEALTH SERVICES | Facility: HOME HEALTH | Age: 69
End: 2024-12-20
Payer: COMMERCIAL

## 2024-12-20 PROCEDURE — G0157 HHC PT ASSISTANT EA 15: HCPCS | Mod: HHH

## 2024-12-20 ASSESSMENT — ENCOUNTER SYMPTOMS
PERSON REPORTING PAIN: PATIENT
DENIES PAIN: 1

## 2024-12-23 ENCOUNTER — HOME CARE VISIT (OUTPATIENT)
Dept: HOME HEALTH SERVICES | Facility: HOME HEALTH | Age: 69
End: 2024-12-23
Payer: COMMERCIAL

## 2024-12-23 PROCEDURE — G0157 HHC PT ASSISTANT EA 15: HCPCS | Mod: HHH

## 2024-12-23 SDOH — HEALTH STABILITY: PHYSICAL HEALTH
EXERCISE COMMENTS: SEATED:      UNSUPPORTED SIT CERVICAL ROTATION      UNSUPPORTED SIT TRUNK ROTATION, WT SHIFTING WITH UE AROM, CROSS MIDLINE AND PNF PATTERNS      ALTERNATING MARCH CUES FOR PACE AND BOS      ALTERNATING LAQ, CUES FOR PACE AND FULL TKE      SIT TO ST

## 2024-12-23 SDOH — HEALTH STABILITY: PHYSICAL HEALTH
EXERCISE COMMENTS: IFFICULTY ADEQUATELY SHIFTING FOR EFFECTIVE SIDE STEP.        SLOW ALTERNATING MARCH WITH CUES FOR BOS AND PACE       SLOW HIP ABD WITH CUES FOR TRUNK EXT AND PACE    SUPINE:        INSTRUCTED IN SLOW RYTHMIC HEELSIDES PRIOR TO GETTING OUT OF BED EAC

## 2024-12-23 SDOH — HEALTH STABILITY: PHYSICAL HEALTH
EXERCISE COMMENTS: SEATED:     UNSUPPORTED SIT CERVICAL ROTATION    UNSUPPORTED SIT TRUNK ROTATION, WT SHIFTING WITH UE AROM, CROSS MIDLINE AND PNF PATTERNS    ALTERNATING MARCH CUES FOR PACE AND BOS    ALTERNATING LAQ, CUES FOR PACE AND FULL TKE       STAND:       POS

## 2024-12-23 SDOH — HEALTH STABILITY: PHYSICAL HEALTH
EXERCISE COMMENTS: H MORNING      INSTRUCTED IN HOOKLYING TRUNK ROTATION TO PERFORM EARLING MORNING AND OCC DURING THE DAY    RECOMMEND PTS FAMILY OBTAIN A PEDDLER OR STATIONARY BIKE FOR THE BENEFITS OF 20 MINS ALTERNATING MOTION DAILY

## 2024-12-23 SDOH — HEALTH STABILITY: PHYSICAL HEALTH
EXERCISE COMMENTS: AND AT EDGE OF SEAT X12 REPS, NO SELF ASSIST WITH UE REQ, INSTRUCTED IN SAFETY     STAND:         POSTURAL TX WITH CUES FOR BOS AND BILAT KNEE EXT       LAT WT SHIFTING AT WALKER AND AT KITCHEN COUNTER      INSTRUCTED IN SIDE STEP GT DRILL.  PT HAS D

## 2024-12-23 SDOH — HEALTH STABILITY: PHYSICAL HEALTH
EXERCISE COMMENTS: TURAL TX WITH CUES FOR BOS AND BILAT KNEE EXT     LAT WT SHIFTING AT WALKER      SLOW ALTERNATING MARCH WITH CUES FOR BOS AND PACE     SLOW HIP ABD WITH CUES FOR TRUNK EXT AND PACE

## 2024-12-23 ASSESSMENT — ENCOUNTER SYMPTOMS
DENIES PAIN: 1
PERSON REPORTING PAIN: PATIENT

## 2024-12-23 ASSESSMENT — ACTIVITIES OF DAILY LIVING (ADL)
AMBULATION_DISTANCE/DURATION_TOLERATED: 200FT
AMBULATION ASSISTANCE ON FLAT SURFACES: 1

## 2024-12-24 DIAGNOSIS — M79.89 SWELLING OF LOWER EXTREMITY: ICD-10-CM

## 2024-12-24 RX ORDER — FUROSEMIDE 20 MG/1
20 TABLET ORAL DAILY
Qty: 30 TABLET | Refills: 0 | Status: SHIPPED | OUTPATIENT
Start: 2024-12-24 | End: 2024-12-24

## 2024-12-24 RX ORDER — FUROSEMIDE 20 MG/1
20 TABLET ORAL DAILY
Qty: 90 TABLET | Refills: 0 | Status: ON HOLD | OUTPATIENT
Start: 2024-12-24

## 2024-12-26 ENCOUNTER — APPOINTMENT (OUTPATIENT)
Dept: PRIMARY CARE | Facility: CLINIC | Age: 69
End: 2024-12-26
Payer: MEDICARE

## 2024-12-27 ENCOUNTER — APPOINTMENT (OUTPATIENT)
Dept: RADIOLOGY | Facility: HOSPITAL | Age: 69
End: 2024-12-27
Payer: MEDICARE

## 2024-12-27 ENCOUNTER — HOME CARE VISIT (OUTPATIENT)
Dept: HOME HEALTH SERVICES | Facility: HOME HEALTH | Age: 69
End: 2024-12-27
Payer: MEDICARE

## 2024-12-27 ENCOUNTER — APPOINTMENT (OUTPATIENT)
Dept: CARDIOLOGY | Facility: HOSPITAL | Age: 69
End: 2024-12-27
Payer: MEDICARE

## 2024-12-27 ENCOUNTER — HOME CARE VISIT (OUTPATIENT)
Dept: HOME HEALTH SERVICES | Facility: HOME HEALTH | Age: 69
End: 2024-12-27
Payer: COMMERCIAL

## 2024-12-27 ENCOUNTER — HOSPITAL ENCOUNTER (OUTPATIENT)
Facility: HOSPITAL | Age: 69
Setting detail: OBSERVATION
End: 2024-12-27
Attending: EMERGENCY MEDICINE
Payer: MEDICARE

## 2024-12-27 DIAGNOSIS — G20.B2 PARKINSON'S DISEASE WITH DYSKINESIA AND FLUCTUATING MANIFESTATIONS: ICD-10-CM

## 2024-12-27 DIAGNOSIS — R53.1 GENERALIZED WEAKNESS: Primary | ICD-10-CM

## 2024-12-27 LAB
ALBUMIN SERPL BCP-MCNC: 4.1 G/DL (ref 3.4–5)
ALP SERPL-CCNC: 70 U/L (ref 33–136)
ALT SERPL W P-5'-P-CCNC: 15 U/L (ref 10–52)
ANION GAP SERPL CALC-SCNC: 17 MMOL/L (ref 10–20)
AST SERPL W P-5'-P-CCNC: 25 U/L (ref 9–39)
BASOPHILS # BLD AUTO: 0.04 X10*3/UL (ref 0–0.1)
BASOPHILS NFR BLD AUTO: 0.5 %
BILIRUB SERPL-MCNC: 0.8 MG/DL (ref 0–1.2)
BUN SERPL-MCNC: 13 MG/DL (ref 6–23)
CALCIUM SERPL-MCNC: 8.9 MG/DL (ref 8.6–10.3)
CHLORIDE SERPL-SCNC: 97 MMOL/L (ref 98–107)
CO2 SERPL-SCNC: 23 MMOL/L (ref 21–32)
CREAT SERPL-MCNC: 1.27 MG/DL (ref 0.5–1.3)
EGFRCR SERPLBLD CKD-EPI 2021: 60 ML/MIN/1.73M*2
EOSINOPHIL # BLD AUTO: 0 X10*3/UL (ref 0–0.4)
EOSINOPHIL NFR BLD AUTO: 0 %
ERYTHROCYTE [DISTWIDTH] IN BLOOD BY AUTOMATED COUNT: 13.6 % (ref 11.5–14.5)
GLUCOSE SERPL-MCNC: 128 MG/DL (ref 74–99)
HCT VFR BLD AUTO: 47.2 % (ref 41–52)
HGB BLD-MCNC: 15.2 G/DL (ref 13.5–17.5)
IMM GRANULOCYTES # BLD AUTO: 0.02 X10*3/UL (ref 0–0.5)
IMM GRANULOCYTES NFR BLD AUTO: 0.2 % (ref 0–0.9)
LYMPHOCYTES # BLD AUTO: 0.53 X10*3/UL (ref 0.8–3)
LYMPHOCYTES NFR BLD AUTO: 6.4 %
MAGNESIUM SERPL-MCNC: 1.9 MG/DL (ref 1.6–2.4)
MCH RBC QN AUTO: 27.4 PG (ref 26–34)
MCHC RBC AUTO-ENTMCNC: 32.2 G/DL (ref 32–36)
MCV RBC AUTO: 85 FL (ref 80–100)
MONOCYTES # BLD AUTO: 0.54 X10*3/UL (ref 0.05–0.8)
MONOCYTES NFR BLD AUTO: 6.5 %
NEUTROPHILS # BLD AUTO: 7.19 X10*3/UL (ref 1.6–5.5)
NEUTROPHILS NFR BLD AUTO: 86.4 %
NRBC BLD-RTO: 0 /100 WBCS (ref 0–0)
PLATELET # BLD AUTO: 207 X10*3/UL (ref 150–450)
POTASSIUM SERPL-SCNC: 3.8 MMOL/L (ref 3.5–5.3)
PROT SERPL-MCNC: 7.6 G/DL (ref 6.4–8.2)
RBC # BLD AUTO: 5.54 X10*6/UL (ref 4.5–5.9)
SODIUM SERPL-SCNC: 133 MMOL/L (ref 136–145)
TSH SERPL-ACNC: 0.64 MIU/L (ref 0.44–3.98)
WBC # BLD AUTO: 8.3 X10*3/UL (ref 4.4–11.3)

## 2024-12-27 PROCEDURE — 99223 1ST HOSP IP/OBS HIGH 75: CPT | Performed by: NURSE PRACTITIONER

## 2024-12-27 PROCEDURE — G0378 HOSPITAL OBSERVATION PER HR: HCPCS

## 2024-12-27 PROCEDURE — 80053 COMPREHEN METABOLIC PANEL: CPT

## 2024-12-27 PROCEDURE — 96374 THER/PROPH/DIAG INJ IV PUSH: CPT

## 2024-12-27 PROCEDURE — 93005 ELECTROCARDIOGRAM TRACING: CPT

## 2024-12-27 PROCEDURE — 71045 X-RAY EXAM CHEST 1 VIEW: CPT

## 2024-12-27 PROCEDURE — 2500000004 HC RX 250 GENERAL PHARMACY W/ HCPCS (ALT 636 FOR OP/ED): Performed by: NURSE PRACTITIONER

## 2024-12-27 PROCEDURE — 72125 CT NECK SPINE W/O DYE: CPT

## 2024-12-27 PROCEDURE — 85025 COMPLETE CBC W/AUTO DIFF WBC: CPT

## 2024-12-27 PROCEDURE — 99285 EMERGENCY DEPT VISIT HI MDM: CPT | Performed by: EMERGENCY MEDICINE

## 2024-12-27 PROCEDURE — 72125 CT NECK SPINE W/O DYE: CPT | Performed by: RADIOLOGY

## 2024-12-27 PROCEDURE — 84443 ASSAY THYROID STIM HORMONE: CPT

## 2024-12-27 PROCEDURE — 96372 THER/PROPH/DIAG INJ SC/IM: CPT | Mod: 59

## 2024-12-27 PROCEDURE — 70450 CT HEAD/BRAIN W/O DYE: CPT | Performed by: RADIOLOGY

## 2024-12-27 PROCEDURE — 2500000002 HC RX 250 W HCPCS SELF ADMINISTERED DRUGS (ALT 637 FOR MEDICARE OP, ALT 636 FOR OP/ED)

## 2024-12-27 PROCEDURE — 2500000004 HC RX 250 GENERAL PHARMACY W/ HCPCS (ALT 636 FOR OP/ED)

## 2024-12-27 PROCEDURE — 83735 ASSAY OF MAGNESIUM: CPT

## 2024-12-27 PROCEDURE — 71045 X-RAY EXAM CHEST 1 VIEW: CPT | Performed by: RADIOLOGY

## 2024-12-27 PROCEDURE — 36415 COLL VENOUS BLD VENIPUNCTURE: CPT

## 2024-12-27 PROCEDURE — G0157 HHC PT ASSISTANT EA 15: HCPCS | Mod: HHH

## 2024-12-27 PROCEDURE — G0152 HHCP-SERV OF OT,EA 15 MIN: HCPCS | Mod: HHH

## 2024-12-27 PROCEDURE — 70450 CT HEAD/BRAIN W/O DYE: CPT

## 2024-12-27 PROCEDURE — 99285 EMERGENCY DEPT VISIT HI MDM: CPT | Mod: 25 | Performed by: EMERGENCY MEDICINE

## 2024-12-27 RX ORDER — ENOXAPARIN SODIUM 100 MG/ML
40 INJECTION SUBCUTANEOUS EVERY 24 HOURS
Status: DISCONTINUED | OUTPATIENT
Start: 2024-12-27 | End: 2025-01-07 | Stop reason: HOSPADM

## 2024-12-27 RX ORDER — SERTRALINE HYDROCHLORIDE 25 MG/1
25 TABLET, FILM COATED ORAL NIGHTLY
Status: DISCONTINUED | OUTPATIENT
Start: 2024-12-27 | End: 2025-01-07 | Stop reason: HOSPADM

## 2024-12-27 RX ORDER — FUROSEMIDE 20 MG/1
20 TABLET ORAL DAILY
Status: DISCONTINUED | OUTPATIENT
Start: 2024-12-28 | End: 2025-01-07 | Stop reason: HOSPADM

## 2024-12-27 RX ORDER — AMLODIPINE BESYLATE 5 MG/1
5 TABLET ORAL DAILY
Status: DISCONTINUED | OUTPATIENT
Start: 2024-12-28 | End: 2025-01-07 | Stop reason: HOSPADM

## 2024-12-27 RX ORDER — CARBIDOPA AND LEVODOPA 50; 200 MG/1; MG/1
1 TABLET, EXTENDED RELEASE ORAL NIGHTLY
Status: DISCONTINUED | OUTPATIENT
Start: 2024-12-27 | End: 2025-01-07 | Stop reason: HOSPADM

## 2024-12-27 RX ORDER — HYDRALAZINE HYDROCHLORIDE 20 MG/ML
10 INJECTION INTRAMUSCULAR; INTRAVENOUS EVERY 8 HOURS PRN
Status: DISCONTINUED | OUTPATIENT
Start: 2024-12-27 | End: 2024-12-29

## 2024-12-27 RX ORDER — CARBIDOPA AND LEVODOPA 25; 250 MG/1; MG/1
1 TABLET ORAL ONCE
Status: COMPLETED | OUTPATIENT
Start: 2024-12-27 | End: 2024-12-28

## 2024-12-27 RX ORDER — CARBIDOPA AND LEVODOPA 25; 250 MG/1; MG/1
1 TABLET ORAL EVERY 6 HOURS
Status: DISCONTINUED | OUTPATIENT
Start: 2024-12-28 | End: 2024-12-28

## 2024-12-27 RX ADMIN — SERTRALINE 25 MG: 25 TABLET, FILM COATED ORAL at 23:24

## 2024-12-27 RX ADMIN — ENOXAPARIN SODIUM 40 MG: 40 INJECTION SUBCUTANEOUS at 23:23

## 2024-12-27 RX ADMIN — HYDRALAZINE HYDROCHLORIDE 10 MG: 20 INJECTION INTRAMUSCULAR; INTRAVENOUS at 23:24

## 2024-12-27 SDOH — SOCIAL STABILITY: SOCIAL INSECURITY
WITHIN THE LAST YEAR, HAVE YOU BEEN KICKED, HIT, SLAPPED, OR OTHERWISE PHYSICALLY HURT BY YOUR PARTNER OR EX-PARTNER?: NO

## 2024-12-27 SDOH — ECONOMIC STABILITY: HOUSING INSECURITY: AT ANY TIME IN THE PAST 12 MONTHS, WERE YOU HOMELESS OR LIVING IN A SHELTER (INCLUDING NOW)?: NO

## 2024-12-27 SDOH — ECONOMIC STABILITY: HOUSING INSECURITY: IN THE PAST 12 MONTHS, HOW MANY TIMES HAVE YOU MOVED WHERE YOU WERE LIVING?: 0

## 2024-12-27 SDOH — SOCIAL STABILITY: SOCIAL INSECURITY: WITHIN THE LAST YEAR, HAVE YOU BEEN AFRAID OF YOUR PARTNER OR EX-PARTNER?: NO

## 2024-12-27 SDOH — SOCIAL STABILITY: SOCIAL INSECURITY: WITHIN THE LAST YEAR, HAVE YOU BEEN HUMILIATED OR EMOTIONALLY ABUSED IN OTHER WAYS BY YOUR PARTNER OR EX-PARTNER?: NO

## 2024-12-27 SDOH — SOCIAL STABILITY: SOCIAL INSECURITY: DOES ANYONE TRY TO KEEP YOU FROM HAVING/CONTACTING OTHER FRIENDS OR DOING THINGS OUTSIDE YOUR HOME?: NO

## 2024-12-27 SDOH — ECONOMIC STABILITY: HOUSING INSECURITY: IN THE LAST 12 MONTHS, WAS THERE A TIME WHEN YOU WERE NOT ABLE TO PAY THE MORTGAGE OR RENT ON TIME?: NO

## 2024-12-27 SDOH — SOCIAL STABILITY: SOCIAL INSECURITY: DO YOU FEEL UNSAFE GOING BACK TO THE PLACE WHERE YOU ARE LIVING?: NO

## 2024-12-27 SDOH — ECONOMIC STABILITY: FOOD INSECURITY: WITHIN THE PAST 12 MONTHS, THE FOOD YOU BOUGHT JUST DIDN'T LAST AND YOU DIDN'T HAVE MONEY TO GET MORE.: NEVER TRUE

## 2024-12-27 SDOH — SOCIAL STABILITY: SOCIAL INSECURITY: DO YOU FEEL ANYONE HAS EXPLOITED OR TAKEN ADVANTAGE OF YOU FINANCIALLY OR OF YOUR PERSONAL PROPERTY?: NO

## 2024-12-27 SDOH — SOCIAL STABILITY: SOCIAL INSECURITY: WERE YOU ABLE TO COMPLETE ALL THE BEHAVIORAL HEALTH SCREENINGS?: YES

## 2024-12-27 SDOH — ECONOMIC STABILITY: INCOME INSECURITY: IN THE PAST 12 MONTHS HAS THE ELECTRIC, GAS, OIL, OR WATER COMPANY THREATENED TO SHUT OFF SERVICES IN YOUR HOME?: NO

## 2024-12-27 SDOH — ECONOMIC STABILITY: TRANSPORTATION INSECURITY: IN THE PAST 12 MONTHS, HAS LACK OF TRANSPORTATION KEPT YOU FROM MEDICAL APPOINTMENTS OR FROM GETTING MEDICATIONS?: NO

## 2024-12-27 SDOH — SOCIAL STABILITY: SOCIAL INSECURITY: ABUSE: ADULT

## 2024-12-27 SDOH — ECONOMIC STABILITY: FOOD INSECURITY: WITHIN THE PAST 12 MONTHS, YOU WORRIED THAT YOUR FOOD WOULD RUN OUT BEFORE YOU GOT THE MONEY TO BUY MORE.: NEVER TRUE

## 2024-12-27 SDOH — SOCIAL STABILITY: SOCIAL INSECURITY: HAVE YOU HAD THOUGHTS OF HARMING ANYONE ELSE?: NO

## 2024-12-27 SDOH — SOCIAL STABILITY: SOCIAL INSECURITY: HAVE YOU HAD ANY THOUGHTS OF HARMING ANYONE ELSE?: NO

## 2024-12-27 SDOH — SOCIAL STABILITY: SOCIAL INSECURITY
WITHIN THE LAST YEAR, HAVE YOU BEEN RAPED OR FORCED TO HAVE ANY KIND OF SEXUAL ACTIVITY BY YOUR PARTNER OR EX-PARTNER?: NO

## 2024-12-27 SDOH — SOCIAL STABILITY: SOCIAL INSECURITY: HAS ANYONE EVER THREATENED TO HURT YOUR FAMILY OR YOUR PETS?: NO

## 2024-12-27 SDOH — ECONOMIC STABILITY: FOOD INSECURITY: HOW HARD IS IT FOR YOU TO PAY FOR THE VERY BASICS LIKE FOOD, HOUSING, MEDICAL CARE, AND HEATING?: NOT HARD AT ALL

## 2024-12-27 SDOH — SOCIAL STABILITY: SOCIAL INSECURITY: ARE YOU OR HAVE YOU BEEN THREATENED OR ABUSED PHYSICALLY, EMOTIONALLY, OR SEXUALLY BY ANYONE?: NO

## 2024-12-27 SDOH — SOCIAL STABILITY: SOCIAL INSECURITY: ARE THERE ANY APPARENT SIGNS OF INJURIES/BEHAVIORS THAT COULD BE RELATED TO ABUSE/NEGLECT?: NO

## 2024-12-27 ASSESSMENT — ACTIVITIES OF DAILY LIVING (ADL)
BATHING: NEEDS ASSISTANCE
LACK_OF_TRANSPORTATION: NO
DRESSING_UB_CURRENT_FUNCTION: STAND BY ASSIST
GROOMING: NEEDS ASSISTANCE
ADEQUATE_TO_COMPLETE_ADL: YES
TOILETING: NEEDS ASSISTANCE
JUDGMENT_ADEQUATE_SAFELY_COMPLETE_DAILY_ACTIVITIES: YES
FEEDING YOURSELF: NEEDS ASSISTANCE
TOILETING: 1
HEARING - LEFT EAR: FUNCTIONAL
PATIENT'S MEMORY ADEQUATE TO SAFELY COMPLETE DAILY ACTIVITIES?: YES
TOILETING: MINIMUM ASSIST
DRESSING_LB_CURRENT_FUNCTION: MAXIMUM ASSIST
WALKS IN HOME: NEEDS ASSISTANCE
HEARING - RIGHT EAR: FUNCTIONAL
LACK_OF_TRANSPORTATION: NO
DRESSING YOURSELF: NEEDS ASSISTANCE

## 2024-12-27 ASSESSMENT — PAIN SCALES - GENERAL
PAINLEVEL_OUTOF10: 0 - NO PAIN

## 2024-12-27 ASSESSMENT — COGNITIVE AND FUNCTIONAL STATUS - GENERAL
MOVING FROM LYING ON BACK TO SITTING ON SIDE OF FLAT BED WITH BEDRAILS: A LOT
CLIMB 3 TO 5 STEPS WITH RAILING: A LOT
WALKING IN HOSPITAL ROOM: A LOT
MOVING TO AND FROM BED TO CHAIR: A LOT
TOILETING: A LOT
PATIENT BASELINE BEDBOUND: NO
PERSONAL GROOMING: A LITTLE
DRESSING REGULAR LOWER BODY CLOTHING: A LOT
DRESSING REGULAR UPPER BODY CLOTHING: A LOT
STANDING UP FROM CHAIR USING ARMS: A LOT
HELP NEEDED FOR BATHING: A LOT
MOBILITY SCORE: 12
DAILY ACTIVITIY SCORE: 14
TURNING FROM BACK TO SIDE WHILE IN FLAT BAD: A LOT
EATING MEALS: A LITTLE

## 2024-12-27 ASSESSMENT — LIFESTYLE VARIABLES
HOW OFTEN DO YOU HAVE 6 OR MORE DRINKS ON ONE OCCASION: NEVER
EVER HAD A DRINK FIRST THING IN THE MORNING TO STEADY YOUR NERVES TO GET RID OF A HANGOVER: NO
AUDIT-C TOTAL SCORE: 0
SKIP TO QUESTIONS 9-10: 1
HAVE PEOPLE ANNOYED YOU BY CRITICIZING YOUR DRINKING: NO
HOW MANY STANDARD DRINKS CONTAINING ALCOHOL DO YOU HAVE ON A TYPICAL DAY: PATIENT DOES NOT DRINK
AUDIT-C TOTAL SCORE: 0
TOTAL SCORE: 0
EVER FELT BAD OR GUILTY ABOUT YOUR DRINKING: NO
HOW OFTEN DO YOU HAVE A DRINK CONTAINING ALCOHOL: NEVER
HAVE YOU EVER FELT YOU SHOULD CUT DOWN ON YOUR DRINKING: NO

## 2024-12-27 ASSESSMENT — ENCOUNTER SYMPTOMS
DENIES PAIN: 1
PERSON REPORTING PAIN: PATIENT

## 2024-12-27 ASSESSMENT — COLUMBIA-SUICIDE SEVERITY RATING SCALE - C-SSRS
1. IN THE PAST MONTH, HAVE YOU WISHED YOU WERE DEAD OR WISHED YOU COULD GO TO SLEEP AND NOT WAKE UP?: NO
2. HAVE YOU ACTUALLY HAD ANY THOUGHTS OF KILLING YOURSELF?: NO
6. HAVE YOU EVER DONE ANYTHING, STARTED TO DO ANYTHING, OR PREPARED TO DO ANYTHING TO END YOUR LIFE?: NO

## 2024-12-27 ASSESSMENT — PAIN - FUNCTIONAL ASSESSMENT: PAIN_FUNCTIONAL_ASSESSMENT: 0-10

## 2024-12-27 ASSESSMENT — PATIENT HEALTH QUESTIONNAIRE - PHQ9
1. LITTLE INTEREST OR PLEASURE IN DOING THINGS: NOT AT ALL
SUM OF ALL RESPONSES TO PHQ9 QUESTIONS 1 & 2: 0
2. FEELING DOWN, DEPRESSED OR HOPELESS: NOT AT ALL

## 2024-12-27 NOTE — ED PROVIDER NOTES
EMERGENCY DEPARTMENT ENCOUNTER      Pt Name: Kalani Keith  MRN: [id number]  Birthdate 1955  Date of evaluation: 12/27/2024    HISTORY OF PRESENT ILLNESS    Kalani Keith is an 69 y.o. male with history including Parkinson's disease presenting to the emergency department for generalized weakness and falls.  His falls were nontraumatic and or supervised, he was seeing home PT and has no injuries.  He has had multiple falls in the past and his weakness has been worsening and worsening.  He was seen here previously and left AMA as they did not want to get observed his insurance would not cover it while waiting to get a placed for PT/OT.  Family and patient would like placement for PT OT today however there may be difficulty with insurance.  Denies any headache, chest pain, shortness of breath, abdominal pain, dysuria, constipation/diarrhea, nausea/vomiting.  Does note that he has some decreased sensation in his feet.      PAST MEDICAL HISTORY   No past medical history on file.    SURGICAL HISTORY       Past Surgical History:   Procedure Laterality Date    HERNIA REPAIR         CURRENT MEDICATIONS       Previous Medications    AMLODIPINE (NORVASC) 5 MG TABLET    Take 1 tablet (5 mg) by mouth once daily.    CARBIDOPA-LEVODOPA (SINEMET CR)  MG ER TABLET    Take 1 tablet by mouth once daily at bedtime.    CARBIDOPA-LEVODOPA (SINEMET)  MG TABLET    TAKE 1 TABLET BY MOUTH EVERY 6 HOURS    FUROSEMIDE (LASIX) 20 MG TABLET    TAKE 1 TABLET BY MOUTH EVERY DAY    LINZESS 145 MCG CAPSULE    Take 1 capsule (145 mcg) by mouth once daily in the morning. Take before meals.    OMEPRAZOLE (PRILOSEC) 40 MG DR CAPSULE    TAKE 1 CAPSULE (40 MG) BY MOUTH ONCE DAILY IN THE MORNING. TAKE BEFORE MEALS.    SERTRALINE (ZOLOFT) 25 MG TABLET    TAKE 1 TABLET (25 MG) BY MOUTH ONCE DAILY AT BEDTIME.    VITAMIN D3 25 MCG (1,000 UNIT) CAPSULE    TAKE 2 CAPSULES (2,000 UNITS) BY MOUTH ONCE DAILY.       ALLERGIES     Sulfa (sulfonamide  antibiotics)    FAMILY HISTORY       Family History   Problem Relation Name Age of Onset    Diabetes Mother      No Known Problems Father          SOCIAL HISTORY       Social History     Socioeconomic History    Marital status:    Tobacco Use    Smoking status: Never    Smokeless tobacco: Never   Vaping Use    Vaping status: Never Used   Substance and Sexual Activity    Alcohol use: Never    Drug use: Never    Sexual activity: Defer     Social Drivers of Health     Financial Resource Strain: Patient Declined (5/15/2023)    Received from [facility]    Overall Financial Resource Strain (CARDIA)     Difficulty of Paying Living Expenses: Patient declined   Food Insecurity: Patient Declined (5/15/2023)    Received from [facility]    Hunger Vital Sign     Worried About Running Out of Food in the Last Year: Patient declined     Ran Out of Food in the Last Year: Patient declined   Transportation Needs: No Transportation Needs (12/17/2024)    OASIS : Transportation     Lack of Transportation (Medical): No     Lack of Transportation (Non-Medical): No     Patient Unable or Declines to Respond: No   Physical Activity: Inactive (5/20/2024)    Received from [facility]    Exercise Vital Sign     Days of Exercise per Week: 0 days     Minutes of Exercise per Session: 0 min   Social Connections: Feeling Socially Integrated (12/17/2024)    OASIS : Social Isolation     Frequency of experiencing loneliness or isolation: Never       PHYSICAL EXAM       ED Triage Vitals [12/27/24 1408]   Temperature Heart Rate Respirations BP   36.9 °C (98.4 °F) (!) 110 18 121/79      Pulse Ox Temp src Heart Rate Source Patient Position   95 % -- -- --      BP Location FiO2 (%)     -- --       Physical Exam  Constitutional:       General: He is not in acute distress.  HENT:      Head: Atraumatic.      Nose: Nose normal.      Mouth/Throat:      Mouth: Mucous membranes are  moist.   Eyes:      Pupils: Pupils are equal, round, and reactive to light.   Cardiovascular:      Rate and Rhythm: Regular rhythm. Tachycardia present.      Pulses: Normal pulses.      Heart sounds: Normal heart sounds.   Pulmonary:      Effort: Pulmonary effort is normal.      Breath sounds: Normal breath sounds.   Abdominal:      General: Abdomen is flat and protuberant. Bowel sounds are normal.      Palpations: Abdomen is soft.   Musculoskeletal:         General: Deformity present.      Comments: Patient has acutely deformed feet with great toe going medially both feet worse on the right.  Patient has some decreased sensation on the feet.   Skin:     Capillary Refill: Capillary refill takes less than 2 seconds.   Neurological:      Mental Status: Mental status is at baseline.      GCS: GCS eye subscore is 4. GCS verbal subscore is 5. GCS motor subscore is 6.      Motor: Weakness present.      Gait: Gait abnormal.      Comments: Patient has known facial droop, dysarthria.          DIAGNOSTIC RESULTS     LABS:  Labs Reviewed   CBC WITH AUTO DIFFERENTIAL - Abnormal       Result Value    WBC 8.3      nRBC 0.0      RBC 5.54      Hemoglobin 15.2      Hematocrit 47.2      MCV 85      MCH 27.4      MCHC 32.2      RDW 13.6      Platelets 207      Neutrophils % 86.4      Immature Granulocytes %, Automated 0.2      Lymphocytes % 6.4      Monocytes % 6.5      Eosinophils % 0.0      Basophils % 0.5      Neutrophils Absolute 7.19 (*)     Immature Granulocytes Absolute, Automated 0.02      Lymphocytes Absolute 0.53 (*)     Monocytes Absolute 0.54      Eosinophils Absolute 0.00      Basophils Absolute 0.04     COMPREHENSIVE METABOLIC PANEL - Abnormal    Glucose 128 (*)     Sodium 133 (*)     Potassium 3.8      Chloride 97 (*)     Bicarbonate 23      Anion Gap 17      Urea Nitrogen 13      Creatinine 1.27      eGFR 60 (*)     Calcium 8.9      Albumin 4.1      Alkaline Phosphatase 70      Total Protein 7.6      AST 25       Bilirubin, Total 0.8      ALT 15     TSH WITH REFLEX TO FREE T4 IF ABNORMAL - Normal    Thyroid Stimulating Hormone 0.64      Narrative:     TSH testing is performed using different testing methodology at [facility] than at other [facility]. Direct result comparisons should only be made within the same method.     MAGNESIUM - Normal    Magnesium 1.90     URINALYSIS WITH REFLEX CULTURE AND MICROSCOPIC    Narrative:     The following orders were created for panel order Urinalysis with Reflex Culture and Microscopic.  Procedure                               Abnormality         Status                     ---------                               -----------         ------                     Urinalysis with Reflex C...[[id number]]                                                 Extra Urine Gray Tube[[id number]]                                                         Please view results for these tests on the individual orders.   URINALYSIS WITH REFLEX CULTURE AND MICROSCOPIC   EXTRA URINE GRAY TUBE       All other labs were within normal range or not returned as of this dictation.    Imaging  CT head wo IV contrast   Final Result   CT HEAD:   1. No acute intracranial abnormality or calvarial fracture.             CT CERVICAL SPINE:   1. No acute fracture or traumatic malalignment of the cervical spine.        MACRO:   None        Signed by: Carlo Cruz 12/27/2024 6:47 PM   Dictation workstation:   [id number]      CT cervical spine wo IV contrast   Final Result   CT HEAD:   1. No acute intracranial abnormality or calvarial fracture.             CT CERVICAL SPINE:   1. No acute fracture or traumatic malalignment of the cervical spine.        MACRO:   None        Signed by: Carlo Cruz 12/27/2024 6:47 PM   Dictation workstation:   [id number]      XR chest 1 view   Final Result   1.  Left basilar mild linear atelectasis. No focal consolidation.                  MACRO:   None.        Signed by:  Anson Sfiligoj 12/27/2024 4:05 PM   Dictation workstation:   [id number]           Procedures  Procedures     EMERGENCY DEPARTMENT COURSE/MDM:   Medical Decision Making  Patient is a 69-year-old male with history of Parkinson's presenting to the emergency department for worsening recurrent weakness and nontraumatic falls.  He has been seeing physical therapy at home and has not adequate to remain ambulatory.  Will do a weakness workup including CBC, CMP, mag, TSH, UA and chest x-ray.  Patient intermittently complaining of some numbness in the arms and hands.  Will get a CT head and CT C-spine.     Workup showing no significant electrolyte abnormalities.  TSH within normal limits.  Patient is not anemic.X-ray showing left basilar mild linear atelectasis with no focal consolidation.CT head showing no acute intracranial abnormality CT C-spine shows no acute fracture or malalignment.    Family and patient both would like placement the patient is not able to ambulate well even with walker and assistance..  Will recommend admission/observation for PT/OT or skilled nursing placement.    ED Course as of 12/27/24 1934   Fri Dec 27, 2024   1849 CT head wo IV contrast  CT HEAD:  1. No acute intracranial abnormality or calvarial fracture.          CT CERVICAL SPINE:  1. No acute fracture or traumatic malalignment of the cervical spine.   [IS]      ED Course User Index  [IS] Greg Beckford MD         Diagnoses as of 12/27/24 1934   Generalized weakness   Parkinson's disease with dyskinesia and fluctuating manifestations        External records reviewed: recent inpatient, clinic, and prior ED notes  Labs and Diagnostic imaging independently reviewed/interpreted by me.    Patient plan, care, lab results and imaging were all discussed with attending.    ED Medications administered this visit:  Medications - No data to display  New Prescriptions from this visit:    New Prescriptions    No medications on file       (Please note  that portions of this note were completed with a voice recognition program.  Efforts were made to edit the dictations but occasionally words are mis-transcribed.)     Greg Beckford MD  Resident  12/27/24 1933       Greg Beckford MD  Resident  12/27/24 1934

## 2024-12-28 LAB
ALBUMIN SERPL BCP-MCNC: 3.6 G/DL (ref 3.4–5)
ANION GAP SERPL CALC-SCNC: 14 MMOL/L (ref 10–20)
APPEARANCE UR: CLEAR
BILIRUB UR STRIP.AUTO-MCNC: NEGATIVE MG/DL
BUN SERPL-MCNC: 13 MG/DL (ref 6–23)
CALCIUM SERPL-MCNC: 8.4 MG/DL (ref 8.6–10.3)
CHLORIDE SERPL-SCNC: 100 MMOL/L (ref 98–107)
CO2 SERPL-SCNC: 24 MMOL/L (ref 21–32)
COLOR UR: ABNORMAL
CREAT SERPL-MCNC: 0.99 MG/DL (ref 0.5–1.3)
EGFRCR SERPLBLD CKD-EPI 2021: 81 ML/MIN/1.73M*2
ERYTHROCYTE [DISTWIDTH] IN BLOOD BY AUTOMATED COUNT: 13.8 % (ref 11.5–14.5)
GLUCOSE SERPL-MCNC: 111 MG/DL (ref 74–99)
GLUCOSE UR STRIP.AUTO-MCNC: NORMAL MG/DL
HCT VFR BLD AUTO: 43.1 % (ref 41–52)
HGB BLD-MCNC: 13.6 G/DL (ref 13.5–17.5)
HOLD SPECIMEN: NORMAL
KETONES UR STRIP.AUTO-MCNC: ABNORMAL MG/DL
LEUKOCYTE ESTERASE UR QL STRIP.AUTO: NEGATIVE
MAGNESIUM SERPL-MCNC: 2.24 MG/DL (ref 1.6–2.4)
MCH RBC QN AUTO: 27 PG (ref 26–34)
MCHC RBC AUTO-ENTMCNC: 31.6 G/DL (ref 32–36)
MCV RBC AUTO: 86 FL (ref 80–100)
NITRITE UR QL STRIP.AUTO: NEGATIVE
NRBC BLD-RTO: 0 /100 WBCS (ref 0–0)
PH UR STRIP.AUTO: 6.5 [PH]
PHOSPHATE SERPL-MCNC: 2.6 MG/DL (ref 2.5–4.9)
PLATELET # BLD AUTO: 183 X10*3/UL (ref 150–450)
POTASSIUM SERPL-SCNC: 3.7 MMOL/L (ref 3.5–5.3)
PROT UR STRIP.AUTO-MCNC: NEGATIVE MG/DL
RBC # BLD AUTO: 5.03 X10*6/UL (ref 4.5–5.9)
RBC # UR STRIP.AUTO: NEGATIVE /UL
SODIUM SERPL-SCNC: 134 MMOL/L (ref 136–145)
SP GR UR STRIP.AUTO: 1.01
UROBILINOGEN UR STRIP.AUTO-MCNC: NORMAL MG/DL
WBC # BLD AUTO: 6 X10*3/UL (ref 4.4–11.3)

## 2024-12-28 PROCEDURE — 2500000001 HC RX 250 WO HCPCS SELF ADMINISTERED DRUGS (ALT 637 FOR MEDICARE OP): Performed by: STUDENT IN AN ORGANIZED HEALTH CARE EDUCATION/TRAINING PROGRAM

## 2024-12-28 PROCEDURE — 2500000001 HC RX 250 WO HCPCS SELF ADMINISTERED DRUGS (ALT 637 FOR MEDICARE OP): Performed by: NURSE PRACTITIONER

## 2024-12-28 PROCEDURE — 36415 COLL VENOUS BLD VENIPUNCTURE: CPT

## 2024-12-28 PROCEDURE — 2500000004 HC RX 250 GENERAL PHARMACY W/ HCPCS (ALT 636 FOR OP/ED)

## 2024-12-28 PROCEDURE — 83735 ASSAY OF MAGNESIUM: CPT

## 2024-12-28 PROCEDURE — 97161 PT EVAL LOW COMPLEX 20 MIN: CPT | Mod: GP

## 2024-12-28 PROCEDURE — 96372 THER/PROPH/DIAG INJ SC/IM: CPT

## 2024-12-28 PROCEDURE — 2500000002 HC RX 250 W HCPCS SELF ADMINISTERED DRUGS (ALT 637 FOR MEDICARE OP, ALT 636 FOR OP/ED)

## 2024-12-28 PROCEDURE — 2500000001 HC RX 250 WO HCPCS SELF ADMINISTERED DRUGS (ALT 637 FOR MEDICARE OP)

## 2024-12-28 PROCEDURE — G0378 HOSPITAL OBSERVATION PER HR: HCPCS

## 2024-12-28 PROCEDURE — 99254 IP/OBS CNSLTJ NEW/EST MOD 60: CPT | Performed by: STUDENT IN AN ORGANIZED HEALTH CARE EDUCATION/TRAINING PROGRAM

## 2024-12-28 PROCEDURE — 99232 SBSQ HOSP IP/OBS MODERATE 35: CPT | Performed by: STUDENT IN AN ORGANIZED HEALTH CARE EDUCATION/TRAINING PROGRAM

## 2024-12-28 PROCEDURE — 97165 OT EVAL LOW COMPLEX 30 MIN: CPT | Mod: GO | Performed by: OCCUPATIONAL THERAPIST

## 2024-12-28 PROCEDURE — 85027 COMPLETE CBC AUTOMATED: CPT

## 2024-12-28 PROCEDURE — 81003 URINALYSIS AUTO W/O SCOPE: CPT

## 2024-12-28 PROCEDURE — 80069 RENAL FUNCTION PANEL: CPT

## 2024-12-28 RX ORDER — CARBIDOPA AND LEVODOPA 25; 250 MG/1; MG/1
1.5 TABLET ORAL EVERY 6 HOURS
Status: DISCONTINUED | OUTPATIENT
Start: 2024-12-28 | End: 2025-01-07 | Stop reason: HOSPADM

## 2024-12-28 RX ORDER — ACETAMINOPHEN 325 MG/1
975 TABLET ORAL EVERY 8 HOURS PRN
Status: DISCONTINUED | OUTPATIENT
Start: 2024-12-28 | End: 2025-01-07 | Stop reason: HOSPADM

## 2024-12-28 RX ADMIN — ACETAMINOPHEN 975 MG: 325 TABLET ORAL at 22:58

## 2024-12-28 RX ADMIN — CARBIDOPA AND LEVODOPA 1.5 TABLET: 25; 250 TABLET ORAL at 16:18

## 2024-12-28 RX ADMIN — SERTRALINE 25 MG: 25 TABLET, FILM COATED ORAL at 21:27

## 2024-12-28 RX ADMIN — LINACLOTIDE 145 MCG: 145 CAPSULE, GELATIN COATED ORAL at 06:56

## 2024-12-28 RX ADMIN — CARBIDOPA AND LEVODOPA 1 TABLET: 25; 250 TABLET ORAL at 06:56

## 2024-12-28 RX ADMIN — CARBIDOPA AND LEVODOPA 1 TABLET: 50; 200 TABLET, EXTENDED RELEASE ORAL at 00:08

## 2024-12-28 RX ADMIN — CARBIDOPA AND LEVODOPA 1 TABLET: 50; 200 TABLET, EXTENDED RELEASE ORAL at 22:49

## 2024-12-28 RX ADMIN — CARBIDOPA AND LEVODOPA 1 TABLET: 25; 250 TABLET ORAL at 00:09

## 2024-12-28 RX ADMIN — CARBIDOPA AND LEVODOPA 1.5 TABLET: 25; 250 TABLET ORAL at 21:28

## 2024-12-28 RX ADMIN — ENOXAPARIN SODIUM 40 MG: 40 INJECTION SUBCUTANEOUS at 22:48

## 2024-12-28 ASSESSMENT — COGNITIVE AND FUNCTIONAL STATUS - GENERAL
DRESSING REGULAR UPPER BODY CLOTHING: A LOT
DRESSING REGULAR UPPER BODY CLOTHING: A LITTLE
CLIMB 3 TO 5 STEPS WITH RAILING: A LOT
MOVING TO AND FROM BED TO CHAIR: A LOT
PERSONAL GROOMING: A LITTLE
TOILETING: A LITTLE
CLIMB 3 TO 5 STEPS WITH RAILING: A LOT
DAILY ACTIVITIY SCORE: 17
DAILY ACTIVITIY SCORE: 15
WALKING IN HOSPITAL ROOM: A LOT
MOVING FROM LYING ON BACK TO SITTING ON SIDE OF FLAT BED WITH BEDRAILS: A LITTLE
STANDING UP FROM CHAIR USING ARMS: A LITTLE
TOILETING: A LITTLE
MOBILITY SCORE: 13
MOVING FROM LYING ON BACK TO SITTING ON SIDE OF FLAT BED WITH BEDRAILS: A LOT
PERSONAL GROOMING: A LITTLE
DRESSING REGULAR LOWER BODY CLOTHING: A LOT
HELP NEEDED FOR BATHING: A LOT
MOVING TO AND FROM BED TO CHAIR: A LOT
EATING MEALS: A LITTLE
HELP NEEDED FOR BATHING: A LOT
TURNING FROM BACK TO SIDE WHILE IN FLAT BAD: A LOT
STANDING UP FROM CHAIR USING ARMS: A LOT
MOBILITY SCORE: 14
TURNING FROM BACK TO SIDE WHILE IN FLAT BAD: A LOT
DRESSING REGULAR LOWER BODY CLOTHING: A LOT
WALKING IN HOSPITAL ROOM: A LITTLE

## 2024-12-28 ASSESSMENT — PAIN - FUNCTIONAL ASSESSMENT
PAIN_FUNCTIONAL_ASSESSMENT: 0-10

## 2024-12-28 ASSESSMENT — PAIN DESCRIPTION - DESCRIPTORS: DESCRIPTORS: ACHING

## 2024-12-28 ASSESSMENT — PAIN SCALES - GENERAL
PAINLEVEL_OUTOF10: 0 - NO PAIN
PAINLEVEL_OUTOF10: 0 - NO PAIN
PAINLEVEL_OUTOF10: 8
PAINLEVEL_OUTOF10: 0 - NO PAIN

## 2024-12-28 ASSESSMENT — PAIN DESCRIPTION - LOCATION: LOCATION: OTHER (COMMENT)

## 2024-12-28 NOTE — CONSULTS
Inpatient consult to Neurology  Consult performed by: Evangelina Steiner MD  Consult ordered by: HEMANTH Juarez-CNP          History Of Present Illness  Kalani Keith is a 69 y.o. male who has a history of Parkinson's disease who presented with generalized weakness and recurrent falls.    He follows with Dr Kiarra Rojas.  Last was seen 12/13/2024.  At the time he was noted to have a progressive decline, increasing falls.  She noted that he had levodopa complications including motor fluctuations, hallucinations, impulse control and dyskinesia.  The plan was to obtain an MRI brain to rule out stroke due to the new right facial droop.      He was then evaluated in the hospital that same day.  At the time he was noted to have moderate to advanced Parkinson's disease.  He had facial weakness with the family reported this was chronic.  The plan was to get an MRI brain without contrast to evaluate for stroke though overall low suspicion as well as PT OT and possible placement to SNF rehab.  However due to the observation status of the patient this was financially not feasible and the family decided to take him home.      He presented again to the emergency room 12/27/2024 with an increase in falls.  Patient reports that he fell 3 times yesterday.  He denies any new neurologic symptoms.  He denies headache, vision changes, speech changes, unilateral numbness weakness or groin coordination.      Family was not present at bedside today    Past Medical History  No past medical history on file.  Surgical History  Past Surgical History:   Procedure Laterality Date    HERNIA REPAIR       Social History  Social History     Tobacco Use    Smoking status: Never    Smokeless tobacco: Never   Vaping Use    Vaping status: Never Used   Substance Use Topics    Alcohol use: Never    Drug use: Never     Allergies  Sulfa (sulfonamide antibiotics)  Medications Prior to Admission   Medication Sig Dispense Refill Last Dose/Taking     amLODIPine (Norvasc) 5 mg tablet Take 1 tablet (5 mg) by mouth once daily. 30 tablet 0 Unknown    carbidopa-levodopa (Sinemet CR)  mg ER tablet Take 1 tablet by mouth once daily at bedtime. 90 tablet 3 Unknown    carbidopa-levodopa (Sinemet)  mg tablet TAKE 1 TABLET BY MOUTH EVERY 6 HOURS 120 tablet 0 Unknown    furosemide (Lasix) 20 mg tablet TAKE 1 TABLET BY MOUTH EVERY DAY 90 tablet 0 Unknown    Linzess 145 mcg capsule Take 1 capsule (145 mcg) by mouth once daily in the morning. Take before meals. 30 capsule 1 Unknown    omeprazole (PriLOSEC) 40 mg DR capsule TAKE 1 CAPSULE (40 MG) BY MOUTH ONCE DAILY IN THE MORNING. TAKE BEFORE MEALS. 30 capsule 1 Unknown    sertraline (Zoloft) 25 mg tablet TAKE 1 TABLET (25 MG) BY MOUTH ONCE DAILY AT BEDTIME. 30 tablet 1 Unknown    Vitamin D3 25 mcg (1,000 unit) capsule TAKE 2 CAPSULES (2,000 UNITS) BY MOUTH ONCE DAILY. 60 capsule 0 Unknown       Review of Systems  Neurological Exam  Mental Status  Awake, alert and oriented to person, place and time. Speech is normal.  Moderate hypophonia and hypomimia.    Cranial Nerves  CN III, IV, VI: Extraocular movements intact bilaterally.  CN VII: Full and symmetric facial movement.  CN VIII: Hearing is normal.    Motor     moderate to severe bradykinesia on finger taps and hand opening closure   antigravity strength in the upper extremities without drift but movements were very slowed, able to wiggle toes and bend knees but could not lift his legs off the bed.    Sensory  Light touch is normal in upper and lower extremities.     Coordination  Right: Finger-to-nose normal.Left: Finger-to-nose normal.    Gait  Casual gait is normal including stance, stride, and arm swing.    Physical Exam  Eyes:      Extraocular Movements: Extraocular movements intact.   Psychiatric:         Speech: Speech normal.       Last Recorded Vitals  Blood pressure 97/62, pulse 96, temperature 36.4 °C (97.5 °F), temperature source Temporal, resp. rate  "16, height 1.778 m (5' 10\"), weight 83.1 kg (183 lb 3.2 oz), SpO2 92%.    Relevant Results                    [place] Coma Scale  Best Eye Response: Spontaneous  Best Verbal Response: Oriented  Best Motor Response: Follows commands  [place]erson Coma Scale Score: 15                 I have personally reviewed the following imaging results CT head wo IV contrast    Result Date: 12/27/2024  Interpreted By:  Carlo Cruz, STUDY: CT HEAD WO IV CONTRAST; CT CERVICAL SPINE WO IV CONTRAST;  12/27/2024 6:09 pm   INDICATION: Signs/Symptoms:recurrent falls; Signs/Symptoms:recurrent falls, numbness on arms     COMPARISON: CT head and cervical spine 12/13/2024   ACCESSION NUMBER(S): [id number]; [id number]   ORDERING CLINICIAN: OPAL HANSEN   TECHNIQUE: Axial noncontrast CT images of head with coronal and sagittal reconstructed images. Axial noncontrast CT images of the cervical spine with coronal and sagittal reconstructed images.   FINDINGS: CT HEAD:   BRAIN PARENCHYMA: Gray-white differentiation is preserved. No mass-effect, midline shift or effacement of cerebral sulci. Patchy periventricular and subcortical white matter hypodensities, nonspecific but often seen in the setting of chronic microangiopathic change.   HEMORRHAGE: No acute intracranial hemorrhage.   VENTRICLES and EXTRA-AXIAL SPACES: The ventricles and sulci are within normal limits for brain volume. No abnormal extra-axial fluid collection.   ORBITS: The visualized orbits and globes are within normal limits.   EXTRACRANIAL SOFT TISSUES: Within normal limits.   PARANASAL SINUSES/MASTOIDS: The visualized paranasal sinuses and mastoid air cells are well aerated.   CALVARIUM: No depressed skull fracture.     CT CERVICAL SPINE:   CRANIOCERVICAL JUNCTION: Intact.   ALIGNMENT: No traumatic malalignment or traumatic facet widening. Reversal of the cervical lordotic curvature at C5.   VERTEBRAE/DISC SPACES: No acute fracture. Vertebral body heights are maintained. " Varying degrees of mild-to-moderate multilevel disc space height loss and associated degenerative endplate changes. No high grade spinal canal stenosis evident by CT.   SOFT TISSUES: No significant abnormality. Calcifications of the nuchal ligament at C5-C6.   OTHER: The visualized lung apices are clear.       CT HEAD: 1. No acute intracranial abnormality or calvarial fracture.     CT CERVICAL SPINE: 1. No acute fracture or traumatic malalignment of the cervical spine.   MACRO: None   Signed by: Carlo Cruz 12/27/2024 6:47 PM Dictation workstation:   [id number]    CT cervical spine wo IV contrast    Result Date: 12/27/2024  Interpreted By:  Carlo Cruz, STUDY: CT HEAD WO IV CONTRAST; CT CERVICAL SPINE WO IV CONTRAST;  12/27/2024 6:09 pm   INDICATION: Signs/Symptoms:recurrent falls; Signs/Symptoms:recurrent falls, numbness on arms     COMPARISON: CT head and cervical spine 12/13/2024   ACCESSION NUMBER(S): [id number]; [id number]   ORDERING CLINICIAN: OPAL HANSEN   TECHNIQUE: Axial noncontrast CT images of head with coronal and sagittal reconstructed images. Axial noncontrast CT images of the cervical spine with coronal and sagittal reconstructed images.   FINDINGS: CT HEAD:   BRAIN PARENCHYMA: Gray-white differentiation is preserved. No mass-effect, midline shift or effacement of cerebral sulci. Patchy periventricular and subcortical white matter hypodensities, nonspecific but often seen in the setting of chronic microangiopathic change.   HEMORRHAGE: No acute intracranial hemorrhage.   VENTRICLES and EXTRA-AXIAL SPACES: The ventricles and sulci are within normal limits for brain volume. No abnormal extra-axial fluid collection.   ORBITS: The visualized orbits and globes are within normal limits.   EXTRACRANIAL SOFT TISSUES: Within normal limits.   PARANASAL SINUSES/MASTOIDS: The visualized paranasal sinuses and mastoid air cells are well aerated.   CALVARIUM: No depressed skull fracture.     CT  CERVICAL SPINE:   CRANIOCERVICAL JUNCTION: Intact.   ALIGNMENT: No traumatic malalignment or traumatic facet widening. Reversal of the cervical lordotic curvature at C5.   VERTEBRAE/DISC SPACES: No acute fracture. Vertebral body heights are maintained. Varying degrees of mild-to-moderate multilevel disc space height loss and associated degenerative endplate changes. No high grade spinal canal stenosis evident by CT.   SOFT TISSUES: No significant abnormality. Calcifications of the nuchal ligament at C5-C6.   OTHER: The visualized lung apices are clear.       CT HEAD: 1. No acute intracranial abnormality or calvarial fracture.     CT CERVICAL SPINE: 1. No acute fracture or traumatic malalignment of the cervical spine.   MACRO: None   Signed by: Carlo Cruz 12/27/2024 6:47 PM Dictation workstation:   [id number]    XR chest 1 view    Result Date: 12/27/2024  Interpreted By:  Anson Kim, STUDY: XR CHEST 1 VIEW;  12/27/2024 3:15 pm   INDICATION: Signs/Symptoms:generalized weakness.     COMPARISON: None.   ACCESSION NUMBER(S): [id number]   ORDERING CLINICIAN: OPAL HANSEN   FINDINGS: CARDIOMEDIASTINAL SILHOUETTE: Cardiomediastinal silhouette is normal in size and configuration.   LUNGS: Left basilar mild linear atelectasis is present. No focal consolidation. No pleural effusion or pneumothorax.   ABDOMEN: No remarkable upper abdominal findings.   BONES: Multilevel endplate spurring present in the spine.       1.  Left basilar mild linear atelectasis. No focal consolidation.       MACRO: None.   Signed by: Anson Kim 12/27/2024 4:05 PM Dictation workstation:   [id number]    ECG 12 lead    Result Date: 12/16/2024  Sinus tachycardia Otherwise normal ECG When compared with ECG of 21-NOV-2024 21:08, No significant change was found BASELINE ARTIFACT Confirmed by Abdiel Sloan ([id number]) on 12/16/2024 11:59:49 AM    CT cervical spine wo IV contrast    Result Date: 12/13/2024  Interpreted By:  Michoacano  Ayo, STUDY: CT CERVICAL SPINE WO IV CONTRAST; 12/13/2024 12:37 pm   INDICATION: Signs/Symptoms:fall;   COMPARISON: None   ACCESSION NUMBER(S): [id number]   ORDERING CLINICIAN: OSCAR MATOS   TECHNIQUE: Contiguous axial images were acquired from the skull base to the lung apices. Coronal and sagittal reformatted images were obtained. All CT examinations are performed with 1 or more of the following dose reduction techniques: Automated exposure control, adjustment of mA and/or kv according to patient's size, or use of iterative reconstruction techniques.   FINDINGS: There is straightening of the normal cervical lordosis.  No acute fracture or spondylolisthesis is identified.     The occipital condyles, arch of C1, and the odontoid processes are intact. The atlantoaxial relationship is well maintained.   There is moderate disc space narrowing at C4-5, C5-6. Mild-moderate disc space narrowing at C6-7. Bony spinal canal is patent.   Mild right neural foramina stenosis at C4-5 mild-moderate left neural foramina stenosis at C5-6.   The visualized lung apices are unremarkable.       1. No acute fracture or spondylolisthesis. 2. Degenerative disc disease and spondylosis as described in the body of the report.     Signed by: Ayo Ríos 12/13/2024 1:02 PM Dictation workstation:   [id number]    CT brain attack head wo IV contrast    Result Date: 12/13/2024  Interpreted By:  Ayo Ríos, STUDY: OSCAR MATOS; 12/13/2024 12:37 pm   INDICATION: Signs/Symptoms:Stroke Evaluation;   COMPARISON: 11/21/2024   ACCESSION NUMBER(S): [id number]   ORDERING CLINICIAN: OSCAR MATOS   TECHNIQUE: Contiguous axial images were acquired from the vertex through the posterior fossa without IV contrast. All CT examinations are performed with 1 or more of the following dose reduction techniques: Automated exposure control, adjustment of mA and/or kv according to patient's size, or use of iterative reconstruction techniques.    FINDINGS: No focal mass effect or midline shift is identified. The ventricles and sulci are symmetric and appropriate for the patient's age.   There is a mild degree of nonspecific white matter hypodensity, most consistent with chronic small-vessel ischemic disease. The gray white matter differentiation is preserved.   No acute intracranial hemorrhage is seen. No intra-axial or extra-axial fluid collection is seen.   The visualized paranasal sinuses and mastoid air cells are clear.       No CT evidence for acute intracranial pathology.     Findings discussed with OSCAR MATOS via telephone at 12:50 p.m. on 12/13/2024   Signed by: Ayo Ríos 12/13/2024 12:58 PM Dictation workstation:   [id number]    Vascular US lower extremity venous duplex bilateral    Result Date: 11/30/2024  Interpreted By:  Marcos Zamora, STUDY: [facility] US LOWER EXTREMITY VENOUS DUPLEX BILATERAL;  11/29/2024 4:31 pm   INDICATION: Signs/Symptoms:leg swelling and calf pain b/l.   COMPARISON: None.   ACCESSION NUMBER(S): [id number]   ORDERING CLINICIAN: LINN HOLDEN   TECHNIQUE: Vascular ultrasound of the bilateral lower extremities was performed. Real-time compression views as well as Gray scale, color Doppler and spectral Doppler waveform analysis was performed.   FINDINGS: Evaluation of the visualized portions of the bilateral common femoral, proximal, mid, and distal femoral, and popliteal veins was performed.  Evaluation of the visualized portions of the posterior tibial and peroneal veins was also performed.   Limitations: None   The evaluated veins demonstrate normal compressibility. There is intact venous flow demonstrating normal respiratory variability and normal augmentation of flow with calf compression.         No sonographic evidence for deep vein thrombosis within the evaluated veins of the bilateral lower extremities.   MACRO: None   Signed by: Marcos Zamora 11/30/2024 6:23 AM Dictation workstation:   [id number]  .       Assessment/Plan   Assessment & Plan  Ambulatory dysfunction       Advanced Parkinson's disease with postural instability and recurrent falls  Generalized weakness    No appreciable facial droop on exam today.  Lower suspicion for cerebrovascular event.  Will discontinue MRI brain    Increase carbidopa levodopa  to 1.5 tabs 4 times daily, continue 1 tab  nightly.  Of note patient does have a list of dopamine related hallucinations and dyskinesias so this higher dose may cause some of the symptoms.  If it does then we will reduce back to 1 tab 4 times daily    PT OT evaluation, patient would likely benefit from higher level of care    Evangelina Steiner MD

## 2024-12-28 NOTE — CARE PLAN
The patient's goals for the shift include      The clinical goals for the shift include Patient will remain safe and free from injury throughout shift.      Problem: Pain - Adult  Goal: Verbalizes/displays adequate comfort level or baseline comfort level  Outcome: Progressing     Problem: Safety - Adult  Goal: Free from fall injury  Outcome: Progressing     Problem: Fall/Injury  Goal: Not fall by end of shift  Outcome: Progressing  Goal: Be free from injury by end of the shift  Outcome: Progressing  Goal: Verbalize understanding of personal risk factors for fall in the hospital  Outcome: Progressing  Goal: Verbalize understanding of risk factor reduction measures to prevent injury from fall in the home  Outcome: Progressing  Goal: Use assistive devices by end of the shift  Outcome: Progressing  Goal: Pace activities to prevent fatigue by end of the shift  Outcome: Progressing     Problem: Pain  Goal: Takes deep breaths with improved pain control throughout the shift  Outcome: Progressing  Goal: Turns in bed with improved pain control throughout the shift  Outcome: Progressing  Goal: Walks with improved pain control throughout the shift  Outcome: Progressing  Goal: Performs ADL's with improved pain control throughout shift  Outcome: Progressing  Goal: Participates in PT with improved pain control throughout the shift  Outcome: Progressing  Goal: Free from opioid side effects throughout the shift  Outcome: Progressing  Goal: Free from acute confusion related to pain meds throughout the shift  Outcome: Progressing

## 2024-12-28 NOTE — PROGRESS NOTES
Occupational Therapy    Evaluation    Patient Name: Kalani Keith  MRN: [id number]  Today's Date: 12/28/2024  Time Calculation  Start Time: 1043  Stop Time: 1059  Time Calculation (min): 16 min  3135/3135-A  Eval only    Assessment  IP OT Assessment  Prognosis: Fair  End of Session Communication: Bedside nurse  End of Session Patient Position: Up in chair, Alarm on  Patient presents with decline in ADLs, functional transfers, and functional mobility tasks and would benefit from OT during acute stay to maximize functional independence and safety.  Patient requires 24 hours hands on assistance.  Recommend moderate intensity OT to maximize functional independence and safety.     Plan:  Treatment Interventions: ADL retraining, Functional transfer training, Patient/family training, Equipment evaluation/education, Compensatory technique education  OT Frequency: 3 times per week  OT Discharge Recommendations: Moderate intensity level of continued care  OT - OK to Discharge: Yes from acute care OT services to the next level of care when cleared by medical team      Subjective     Current Problem:  1. Generalized weakness        2. Parkinson's disease with dyskinesia and fluctuating manifestations            General:  General  Reason for Referral: ADLs, safety assessment  Referred By: Ruddy Hill MD  Past Medical History Relevant to Rehab: per EMR:69-year-old male with past medical history of Parkinson's disease who presented to [facility] due to generalized weakness with recurrent falls at home.  On exam patient with noted parkinsonian dementia and will slowed responses to questioning/strength morsels HPI was obtained from the medical record.  Patient has had multiple falls that were nontraumatic and supervised at home by his home physical therapist.  Patient weakness has been worsening.  Patient was recently admitted however left AMA as he was going to be admitted under observation status ambulatory about cost due  to lack of insurance coverage.  Patient family returned with patient today requesting admission for PT and OT evaluation and placement.  Per family reports patient has not had any significant nausea, emesis comparison scalping, back pain or abdominal pain.  No diarrhea.  No fevers, chills or exposure to sick persons.  Patient reportedly has some decreased sensation in his feet.  No tobacco, alcohol or drug use reported.  On exam patient resting in hallway bed with no acute distress on observation.  He is quite rigid throughout the entire body.  Needing assistance x 2 to sit up, dress and change positions.  He responds to his name but is very slow to form words or sentences.  Co-Treatment: PT  Co-Treatment Reason: for safety  Prior to Session Communication: Bedside nurse  Patient Position Received:  (seated in bedside chair, brushing teeth with PCA)  Preferred Learning Style: verbal  General Comment: Patient seated in bedside chair at sink brushing teeth. Agreeable to participate in OT evaluation.    Precautions:  Medical Precautions: Fall precautions    Pain:  Pain Assessment  Pain Assessment: 0-10  0-10 (Numeric) Pain Score: 0 - No pain    Objective   Cognition:  Overall Cognitive Status:  (slow to respond)  Safety/Judgement:  (decreased safety awareness)  Insight: Severe  Impulsive: Severely    Home Living:  Home Living Comments: Lives in 2 story home with spouse and son with 3 JENNIFER. Stays on first floor.  Has WIS     Prior Function:  Prior Function Comments: used rollator for functional mobility tasks.  PRN assist for ADLs.  Family completes IADLs    ADL:  Grooming Assistance: Minimal  LE Dressing Assistance: Maximal (anticipated)  Toileting Assistance with Device: Minimal    Activity Tolerance:  Endurance: Decreased tolerance for upright activites    Bed Mobility/Transfers:      Transfers  Transfer:  (min assist sit <>stand with max verbal cues for proper hand placement and technique)    Ambulation/Gait  Training:  Functional Mobility  Functional Mobility Performed:  (min assist with WW functional mobility task. Patient shuffles with functional mobility task)    Extremities: RUE   RUE : Within Functional Limits and LUE   LUE: Within Functional Limits    Outcome Measures: [facility] Daily Activity  Putting on and taking off regular lower body clothing: A lot  Bathing (including washing, rinsing, drying): A lot  Putting on and taking off regular upper body clothing: A little  Toileting, which includes using toilet, bedpan or urinal: A little  Taking care of personal grooming such as brushing teeth: A little  Eating Meals: None  Daily Activity - Total Score: 17    EDUCATION:  Education  Individual(s) Educated: Patient  Education Provided: Fall precautons  Patient Response to Education: Patient/Caregiver Verbalized Understanding of Information      Goals:   Encounter Problems       Encounter Problems (Active)       Dressings Lower Extremities       STG - Patient will complete lower body dressing with min assist with comp strategies PRN (Progressing)       Start:  12/28/24    Expected End:  01/11/25               Functional Balance       STG-Patient will be SBA with assistive device dynamic stand task >5 minutes for ADL completion   (Progressing)       Start:  12/28/24    Expected End:  01/11/25               Functional Mobility       STG-Patient will be SBA with assistive device functional mobility tasks   (Progressing)       Start:  12/28/24    Expected End:  01/11/25               OT Transfers       STG-Patient will be SBA with functional transfers demonstrating good safety   (Progressing)       Start:  12/28/24    Expected End:  01/11/25

## 2024-12-28 NOTE — PROGRESS NOTES
Emergency Medicine Transition of Care Note.    I received Kalani Keith in signout from Dr. Beckford.  Please see the previous ED provider note for all HPI, PE and MDM up to the time of signout at 1930. This is in addition to the primary record.    In brief Kalani Keith is an 69 y.o. male presenting for   Chief Complaint   Patient presents with    Weakness, Gen     At the time of signout we were awaiting: admission discussion    Medical Decision Making      ED Course as of 12/27/24 1955   Fri Dec 27, 2024   1849 CT head wo IV contrast  CT HEAD:  1. No acute intracranial abnormality or calvarial fracture.          CT CERVICAL SPINE:  1. No acute fracture or traumatic malalignment of the cervical spine.   [IS]   1955 Shared decision making for disposition  Patient and/or patient´s representative was counseled regarding labs, imaging, likely diagnosis. All questions were answered. Recommendation was made   for Admission given the need for further escalation of care to inpatient management. Patient agreed and was admitted in stable condition. Admitting team was notified of any pending labs or imaging to ensure continuity of care.    [CB]      ED Course User Index  [CB] Carlos Alberto Ortega DO  [IS] Greg Beckford MD         Diagnoses as of 12/27/24 1955   Generalized weakness   Parkinson's disease with dyskinesia and fluctuating manifestations         Final diagnoses:   [R53.1] Generalized weakness   [G20.B2] Parkinson's disease with dyskinesia and fluctuating manifestations           Procedure  Procedures    Carlos Alberto Ortega DO     Reviewed and approved by CARLOS ALBERTO ORTEGA on 12/27/24 at 7:55 PM.

## 2024-12-28 NOTE — H&P
History Of Present Illness  This is a 69-year-old male with past medical history of Parkinson's disease who presented to [facility] due to generalized weakness with recurrent falls at home.  On exam patient with noted parkinsonian dementia and will slowed responses to questioning/strength morsels HPI was obtained from the medical record.  Patient has had multiple falls that were nontraumatic and supervised at home by his home physical therapist.  Patient weakness has been worsening.  Patient was recently admitted however left AMA as he was going to be admitted under observation status ambulatory about cost due to lack of insurance coverage.  Patient family returned with patient today requesting admission for PT and OT evaluation and placement.  Per family reports patient has not had any significant nausea, emesis comparison scalping, back pain or abdominal pain.  No diarrhea.  No fevers, chills or exposure to sick persons.  Patient reportedly has some decreased sensation in his feet.  No tobacco, alcohol or drug use reported.  On exam patient resting in hallway bed with no acute distress on observation.  He is quite rigid throughout the entire body.  Needing assistance x 2 to sit up, dress and change positions.  He responds to his name but is very slow to form words or sentences.     Review of systems: 10 system were reviewed and were negative except what was mentioned in history of present illness    Patient labs reviewed.  Blood glucose noted at 128.  Sodium level at 133 with magnesium level at 1.90.  Creatinine 1.27 with a BUN of 13 and GFR of 60.  Anion gap at 17 with a serum bicarb of 23 and serum chloride of 97.  TSH was checked and noted to be at 0.64.  CBC showed a white blood cell count 8.3 with hemoglobin of 14 hematocrit of 47.2.  Platelet count at 217.    CT of the head without any acute intracranial abnormalities, ICH, mass effect or midline shift.  CT of the cervical spine without any  traumatic/acute fracture.  Chest x-ray shows left basilar mild linear atelectasis however patient on exam with nontoxic appearance.  Not utilizing accessory muscles or splinting dyspnea/shortness of breath symptoms and not requiring supplemental O2 at this time.    Past Medical History  Parkinson's disease, Essential hypertension    Surgical History  Past Surgical History:   Procedure Laterality Date    HERNIA REPAIR           Social History  Social History     Socioeconomic History    Marital status:      Spouse name: Not on file    Number of children: Not on file    Years of education: Not on file    Highest education level: Not on file   Occupational History    Not on file   Tobacco Use    Smoking status: Never    Smokeless tobacco: Never   Vaping Use    Vaping status: Never Used   Substance and Sexual Activity    Alcohol use: Never    Drug use: Never    Sexual activity: Defer   Other Topics Concern    Not on file   Social History Narrative    Not on file     Social Drivers of Health     Financial Resource Strain: Patient Declined (5/15/2023)    Received from [facility]    Overall Financial Resource Strain (CARDIA)     Difficulty of Paying Living Expenses: Patient declined   Food Insecurity: Patient Declined (5/15/2023)    Received from [facility]    Hunger Vital Sign     Worried About Running Out of Food in the Last Year: Patient declined     Ran Out of Food in the Last Year: Patient declined   Transportation Needs: No Transportation Needs (12/17/2024)    OASIS : Transportation     Lack of Transportation (Medical): No     Lack of Transportation (Non-Medical): No     Patient Unable or Declines to Respond: No   Physical Activity: Inactive (5/20/2024)    Received from [facility]    Exercise Vital Sign     Days of Exercise per Week: 0 days     Minutes of Exercise per Session: 0 min   Stress: Not on file   Social Connections: Feeling  Socially Integrated (12/17/2024)    OASIS : Social Isolation     Frequency of experiencing loneliness or isolation: Never   Intimate Partner Violence: Not on file   Housing Stability: Not on file        Family History  Reviewed not pertinent to patient presentation     Allergies  Allergies   Allergen Reactions    Sulfa (Sulfonamide Antibiotics) Rash        Physical Exam  Physical Exam  Constitutional:       Appearance: Normal appearance. He is normal weight.   HENT:      Head: Normocephalic and atraumatic.      Mouth/Throat:      Mouth: Mucous membranes are moist.   Eyes:      Extraocular Movements: Extraocular movements intact.      Pupils: Pupils are equal, round, and reactive to light.   Cardiovascular:      Rate and Rhythm: Normal rate and regular rhythm.      Pulses: Normal pulses.      Heart sounds: Normal heart sounds. No murmur heard.     No gallop.   Pulmonary:      Effort: Pulmonary effort is normal. No respiratory distress.      Breath sounds: Normal breath sounds. No wheezing, rhonchi or rales.   Abdominal:      General: Abdomen is flat. Bowel sounds are normal. There is no distension.      Palpations: Abdomen is soft. There is no mass.      Tenderness: There is no abdominal tenderness. There is no rebound.      Hernia: No hernia is present.   Musculoskeletal:         General: No swelling, tenderness or signs of injury.      Cervical back: Normal range of motion and neck supple.      Right lower leg: No edema.      Left lower leg: No edema.      Comments: Rigid at rest in all extremities    Skin:     General: Skin is warm and dry.      Capillary Refill: Capillary refill takes less than 2 seconds.      Findings: No bruising, erythema or rash.   Neurological:      General: No focal deficit present.      Mental Status: He is alert.      Cranial Nerves: No cranial nerve deficit.      Sensory: No sensory deficit.      Motor: Weakness present.      Comments: A/O x1-2 : self and situation. Very slow to  respond verbally.    Psychiatric:         Mood and Affect: Mood normal.         Behavior: Behavior normal.          Last Recorded Vitals  Visit Vitals  BP (!) 179/101 (BP Location: Left arm, Patient Position: Lying)   Pulse 84   Temp 36.8 °C (98.2 °F) (Temporal)   Resp 17        Scheduled medications    Continuous medications    PRN medications       Relevant Results  Results for orders placed or performed during the hospital encounter of 12/27/24 (from the past 96 hours)   CBC and Auto Differential   Result Value Ref Range    WBC 8.3 4.4 - 11.3 x10*3/uL    nRBC 0.0 0.0 - 0.0 /100 WBCs    RBC 5.54 4.50 - 5.90 x10*6/uL    Hemoglobin 15.2 13.5 - 17.5 g/dL    Hematocrit 47.2 41.0 - 52.0 %    MCV 85 80 - 100 fL    MCH 27.4 26.0 - 34.0 pg    MCHC 32.2 32.0 - 36.0 g/dL    RDW 13.6 11.5 - 14.5 %    Platelets 207 150 - 450 x10*3/uL    Neutrophils % 86.4 40.0 - 80.0 %    Immature Granulocytes %, Automated 0.2 0.0 - 0.9 %    Lymphocytes % 6.4 13.0 - 44.0 %    Monocytes % 6.5 2.0 - 10.0 %    Eosinophils % 0.0 0.0 - 6.0 %    Basophils % 0.5 0.0 - 2.0 %    Neutrophils Absolute 7.19 (H) 1.60 - 5.50 x10*3/uL    Immature Granulocytes Absolute, Automated 0.02 0.00 - 0.50 x10*3/uL    Lymphocytes Absolute 0.53 (L) 0.80 - 3.00 x10*3/uL    Monocytes Absolute 0.54 0.05 - 0.80 x10*3/uL    Eosinophils Absolute 0.00 0.00 - 0.40 x10*3/uL    Basophils Absolute 0.04 0.00 - 0.10 x10*3/uL   TSH with reflex to Free T4 if abnormal   Result Value Ref Range    Thyroid Stimulating Hormone 0.64 0.44 - 3.98 mIU/L   Comprehensive metabolic panel   Result Value Ref Range    Glucose 128 (H) 74 - 99 mg/dL    Sodium 133 (L) 136 - 145 mmol/L    Potassium 3.8 3.5 - 5.3 mmol/L    Chloride 97 (L) 98 - 107 mmol/L    Bicarbonate 23 21 - 32 mmol/L    Anion Gap 17 10 - 20 mmol/L    Urea Nitrogen 13 6 - 23 mg/dL    Creatinine 1.27 0.50 - 1.30 mg/dL    eGFR 60 (L) >60 mL/min/1.73m*2    Calcium 8.9 8.6 - 10.3 mg/dL    Albumin 4.1 3.4 - 5.0 g/dL    Alkaline Phosphatase  70 33 - 136 U/L    Total Protein 7.6 6.4 - 8.2 g/dL    AST 25 9 - 39 U/L    Bilirubin, Total 0.8 0.0 - 1.2 mg/dL    ALT 15 10 - 52 U/L   Magnesium   Result Value Ref Range    Magnesium 1.90 1.60 - 2.40 mg/dL        CT head wo IV contrast    Result Date: 12/27/2024  Interpreted By:  Carlo Cruz, STUDY: CT HEAD WO IV CONTRAST; CT CERVICAL SPINE WO IV CONTRAST;  12/27/2024 6:09 pm   INDICATION: Signs/Symptoms:recurrent falls; Signs/Symptoms:recurrent falls, numbness on arms     COMPARISON: CT head and cervical spine 12/13/2024   ACCESSION NUMBER(S): [id number]; [id number]   ORDERING CLINICIAN: OPAL HANSEN   TECHNIQUE: Axial noncontrast CT images of head with coronal and sagittal reconstructed images. Axial noncontrast CT images of the cervical spine with coronal and sagittal reconstructed images.   FINDINGS: CT HEAD:   BRAIN PARENCHYMA: Gray-white differentiation is preserved. No mass-effect, midline shift or effacement of cerebral sulci. Patchy periventricular and subcortical white matter hypodensities, nonspecific but often seen in the setting of chronic microangiopathic change.   HEMORRHAGE: No acute intracranial hemorrhage.   VENTRICLES and EXTRA-AXIAL SPACES: The ventricles and sulci are within normal limits for brain volume. No abnormal extra-axial fluid collection.   ORBITS: The visualized orbits and globes are within normal limits.   EXTRACRANIAL SOFT TISSUES: Within normal limits.   PARANASAL SINUSES/MASTOIDS: The visualized paranasal sinuses and mastoid air cells are well aerated.   CALVARIUM: No depressed skull fracture.     CT CERVICAL SPINE:   CRANIOCERVICAL JUNCTION: Intact.   ALIGNMENT: No traumatic malalignment or traumatic facet widening. Reversal of the cervical lordotic curvature at C5.   VERTEBRAE/DISC SPACES: No acute fracture. Vertebral body heights are maintained. Varying degrees of mild-to-moderate multilevel disc space height loss and associated degenerative endplate changes. No  high grade spinal canal stenosis evident by CT.   SOFT TISSUES: No significant abnormality. Calcifications of the nuchal ligament at C5-C6.   OTHER: The visualized lung apices are clear.       CT HEAD: 1. No acute intracranial abnormality or calvarial fracture.     CT CERVICAL SPINE: 1. No acute fracture or traumatic malalignment of the cervical spine.   MACRO: None   Signed by: Carlo Cruz 12/27/2024 6:47 PM Dictation workstation:   [id number]    CT cervical spine wo IV contrast    Result Date: 12/27/2024  Interpreted By:  Carlo Cruz, STUDY: CT HEAD WO IV CONTRAST; CT CERVICAL SPINE WO IV CONTRAST;  12/27/2024 6:09 pm   INDICATION: Signs/Symptoms:recurrent falls; Signs/Symptoms:recurrent falls, numbness on arms     COMPARISON: CT head and cervical spine 12/13/2024   ACCESSION NUMBER(S): [id number]; [id number]   ORDERING CLINICIAN: OPAL HANSEN   TECHNIQUE: Axial noncontrast CT images of head with coronal and sagittal reconstructed images. Axial noncontrast CT images of the cervical spine with coronal and sagittal reconstructed images.   FINDINGS: CT HEAD:   BRAIN PARENCHYMA: Gray-white differentiation is preserved. No mass-effect, midline shift or effacement of cerebral sulci. Patchy periventricular and subcortical white matter hypodensities, nonspecific but often seen in the setting of chronic microangiopathic change.   HEMORRHAGE: No acute intracranial hemorrhage.   VENTRICLES and EXTRA-AXIAL SPACES: The ventricles and sulci are within normal limits for brain volume. No abnormal extra-axial fluid collection.   ORBITS: The visualized orbits and globes are within normal limits.   EXTRACRANIAL SOFT TISSUES: Within normal limits.   PARANASAL SINUSES/MASTOIDS: The visualized paranasal sinuses and mastoid air cells are well aerated.   CALVARIUM: No depressed skull fracture.     CT CERVICAL SPINE:   CRANIOCERVICAL JUNCTION: Intact.   ALIGNMENT: No traumatic malalignment or traumatic facet widening.  Reversal of the cervical lordotic curvature at C5.   VERTEBRAE/DISC SPACES: No acute fracture. Vertebral body heights are maintained. Varying degrees of mild-to-moderate multilevel disc space height loss and associated degenerative endplate changes. No high grade spinal canal stenosis evident by CT.   SOFT TISSUES: No significant abnormality. Calcifications of the nuchal ligament at C5-C6.   OTHER: The visualized lung apices are clear.       CT HEAD: 1. No acute intracranial abnormality or calvarial fracture.     CT CERVICAL SPINE: 1. No acute fracture or traumatic malalignment of the cervical spine.   MACRO: None   Signed by: Carlo Cruz 12/27/2024 6:47 PM Dictation workstation:   [id number]    XR chest 1 view    Result Date: 12/27/2024  Interpreted By:  Anson Kim, STUDY: XR CHEST 1 VIEW;  12/27/2024 3:15 pm   INDICATION: Signs/Symptoms:generalized weakness.     COMPARISON: None.   ACCESSION NUMBER(S): [id number]   ORDERING CLINICIAN: OPAL HANSEN   FINDINGS: CARDIOMEDIASTINAL SILHOUETTE: Cardiomediastinal silhouette is normal in size and configuration.   LUNGS: Left basilar mild linear atelectasis is present. No focal consolidation. No pleural effusion or pneumothorax.   ABDOMEN: No remarkable upper abdominal findings.   BONES: Multilevel endplate spurring present in the spine.       1.  Left basilar mild linear atelectasis. No focal consolidation.       MACRO: None.   Signed by: Anson Kim 12/27/2024 4:05 PM Dictation workstation:   [id number]    ECG 12 lead    Result Date: 12/16/2024  Sinus tachycardia Otherwise normal ECG When compared with ECG of 21-NOV-2024 21:08, No significant change was found BASELINE ARTIFACT Confirmed by Abdiel Sloan ([id number]) on 12/16/2024 11:59:49 AM    CT cervical spine wo IV contrast    Result Date: 12/13/2024  Interpreted By:  Ayo Ríos, STUDY: CT CERVICAL SPINE WO IV CONTRAST; 12/13/2024 12:37 pm   INDICATION: Signs/Symptoms:fall;   COMPARISON: None    ACCESSION NUMBER(S): [id number]   ORDERING CLINICIAN: OSCAR MATOS   TECHNIQUE: Contiguous axial images were acquired from the skull base to the lung apices. Coronal and sagittal reformatted images were obtained. All CT examinations are performed with 1 or more of the following dose reduction techniques: Automated exposure control, adjustment of mA and/or kv according to patient's size, or use of iterative reconstruction techniques.   FINDINGS: There is straightening of the normal cervical lordosis.  No acute fracture or spondylolisthesis is identified.     The occipital condyles, arch of C1, and the odontoid processes are intact. The atlantoaxial relationship is well maintained.   There is moderate disc space narrowing at C4-5, C5-6. Mild-moderate disc space narrowing at C6-7. Bony spinal canal is patent.   Mild right neural foramina stenosis at C4-5 mild-moderate left neural foramina stenosis at C5-6.   The visualized lung apices are unremarkable.       1. No acute fracture or spondylolisthesis. 2. Degenerative disc disease and spondylosis as described in the body of the report.     Signed by: Ayo Ríos 12/13/2024 1:02 PM Dictation workstation:   [id number]    CT brain attack head wo IV contrast    Result Date: 12/13/2024  Interpreted By:  Ayo Ríos, STUDY: OSCAR MATOS; 12/13/2024 12:37 pm   INDICATION: Signs/Symptoms:Stroke Evaluation;   COMPARISON: 11/21/2024   ACCESSION NUMBER(S): [id number]   ORDERING CLINICIAN: OSCAR MATOS   TECHNIQUE: Contiguous axial images were acquired from the vertex through the posterior fossa without IV contrast. All CT examinations are performed with 1 or more of the following dose reduction techniques: Automated exposure control, adjustment of mA and/or kv according to patient's size, or use of iterative reconstruction techniques.   FINDINGS: No focal mass effect or midline shift is identified. The ventricles and sulci are symmetric and appropriate for the  patient's age.   There is a mild degree of nonspecific white matter hypodensity, most consistent with chronic small-vessel ischemic disease. The gray white matter differentiation is preserved.   No acute intracranial hemorrhage is seen. No intra-axial or extra-axial fluid collection is seen.   The visualized paranasal sinuses and mastoid air cells are clear.       No CT evidence for acute intracranial pathology.     Findings discussed with OSCAR MATOS via telephone at 12:50 p.m. on 12/13/2024   Signed by: Ayo Ríos 12/13/2024 12:58 PM Dictation workstation:   [id number]    Vascular US lower extremity venous duplex bilateral    Result Date: 11/30/2024  Interpreted By:  Marcos Zamora, STUDY: [facility] US LOWER EXTREMITY VENOUS DUPLEX BILATERAL;  11/29/2024 4:31 pm   INDICATION: Signs/Symptoms:leg swelling and calf pain b/l.   COMPARISON: None.   ACCESSION NUMBER(S): [id number]   ORDERING CLINICIAN: LINN HOLDEN   TECHNIQUE: Vascular ultrasound of the bilateral lower extremities was performed. Real-time compression views as well as Gray scale, color Doppler and spectral Doppler waveform analysis was performed.   FINDINGS: Evaluation of the visualized portions of the bilateral common femoral, proximal, mid, and distal femoral, and popliteal veins was performed.  Evaluation of the visualized portions of the posterior tibial and peroneal veins was also performed.   Limitations: None   The evaluated veins demonstrate normal compressibility. There is intact venous flow demonstrating normal respiratory variability and normal augmentation of flow with calf compression.         No sonographic evidence for deep vein thrombosis within the evaluated veins of the bilateral lower extremities.   MACRO: None   Signed by: Marcos Zamora 11/30/2024 6:23 AM Dictation workstation:   [id number]        Assessment and Plan  Assessment & Plan  Ambulatory dysfunction       Generalized weakness   Transient encephalopathy   Parkinson's  disease  HTN urgency  Essential HTN  Frequent falls     PLAN  Admit patient  Labs, prior records and radiological studies reviewed  No indication for telemetry monitoring  Monitor and replace electrolytes per protocol  Resume patient home medications as appropriate once med rec completed  Given patient increased rigidity symptoms with Parkinson's patient to get his nightly extended release Sinemet in addition to lower dose of Sinemet together.  As needed hydralazine for added BP control  Consult neurology for worsening Parkinson's disease symptomology along with transient cephalopathy  Obtain MRI of brain to rule out CVA  PT, OT and ST evaluation  Fall precautions  TCC for discharge planning  Anticipated length of stay 48 to 72 hours pending patient's clinical course and consultants recommendations    DVTP: Lovenox  CODE STATUS: DNR CCA/DNI    A total of 76 minutes was spent on this visit     Plan of care was discussed extensively with patient. Patient verbalized understanding through teach back method. All questions and concerns addressed upon examination.     Of note, this documentation is completed using the Dragon Dictation system (voice recognition software). There may be spelling and/or grammatical errors that were not corrected prior to final submission     This is a shared visit. I have reviewed the Advanced Practice Provider's encounter note, approve the Advanced Practice Provider's documentation, and provide the following additional information from my personal encounter.     69 YOM with worsening ambulatory dysfunction, multiple falls, known PMH of parkinsons on sinemet presenting due to crisis of ambulation. Will resume home sinemet, consult neuro regarding adjusting sinemet, SLP eval, and MRI.  On exam, significant for rigidity, freezing episodes, and generalized weakness.    Gio Benites, DO  Internal Medicine

## 2024-12-28 NOTE — PROGRESS NOTES
Physical Therapy    Physical Therapy Evaluation    Patient Name: Kalani Keith  MRN: [id number]  Today's Date: 12/28/2024   Time Calculation  Start Time: 1042  Stop Time: 1058  Time Calculation (min): 16 min  3135/3135-A    Assessment/Plan   PT Assessment  PT Assessment Results: Decreased strength, Decreased endurance, Decreased mobility, Decreased cognition, Decreased safety awareness  Rehab Prognosis: Fair  Evaluation/Treatment Tolerance: Patient limited by fatigue  Medical Staff Made Aware: Yes  End of Session Communication: Bedside nurse  Assessment Comment: expect slow progress in rehab.  End of Session Patient Position: Up in chair, Alarm on  IP OR SWING BED PT PLAN  Inpatient or Swing Bed: Inpatient  PT Plan  Treatment/Interventions: Transfer training, Bed mobility, Gait training, Strengthening, Endurance training, Therapeutic exercise, Therapeutic activity (Issued a handout on a/p, qs, and gs exercises.  practiced a couple.)  PT Plan: Ongoing PT  PT Frequency: 3 times per week  PT Discharge Recommendations: Moderate intensity level of continued care  Equipment Recommended upon Discharge: Wheeled walker  PT Recommended Transfer Status: Assist x1, Contact guard  PT - OK to Discharge: Yes (When medically allowed.)    Subjective     Current Problem:  1. Generalized weakness        2. Parkinson's disease with dyskinesia and fluctuating manifestations          Patient Active Problem List   Diagnosis    Stroke-like symptoms    TIA (transient ischemic attack)    Parkinson's disease with dyskinesia and fluctuating manifestations    Ambulatory dysfunction       General Visit Information:  General  Reason for Referral: Ambulatory dysfunction/ Impaired Mobility.  Referred By: YANELIS Hill  Past Medical History Relevant to Rehab: Parkinsons, Dementia, essential HTN, transient encephalopathy.  Family/Caregiver Present: No  Co-Treatment: OT  Co-Treatment Reason: for safety  Prior to Session Communication: Bedside nurse  Patient  Position Received: Up in chair, Alarm off, not on at start of session  Preferred Learning Style: verbal, visual, written  General Comment: sitting up to the sink in room chair.  has a pure-wick.  CT head and c-spine were neg..  Kee. Duplex neg. for DVT's..   CXR saw L basilar atelectasis.  No consolidation.  Labs saw low Na+ and elevated glucose.    Home Living:  Home Living  Type of Home: House  Lives With: Spouse, Adult children  Home Adaptive Equipment: Walker rolling or standard  Home Layout: Two level  Home Access: Stairs to enter without rails  Entrance Stairs-Number of Steps: 3  Bathroom Shower/Tub: Walk-in shower  Home Living Comments: bed/bath on 1st floor.    Prior Level of Function:  Prior Function Per Pt/Caregiver Report  Level of Bruno: Needs assistance with functional transfers  Prior Function Comments: rollator ambulator at home.    Precautions:  Precautions  Medical Precautions: Fall precautions    Vital Signs:     Objective     Pain:  Pain Assessment  Pain Assessment: 0-10  0-10 (Numeric) Pain Score: 0 - No pain    Cognition:  Cognition  Overall Cognitive Status: Impaired  Insight: Severe  Impulsive: Severely    General Assessments:  General Observation  General Observation: Pleasant and cooperative.   Activity Tolerance  Endurance: Decreased tolerance for upright activites  Sensation  Light Touch: No apparent deficits  Sensation Comment: reports that  at times he feels like he has no legs.  Strength  Strength Comments: 4/5=b/l Quads.,2/5= A.tibs.     Coordination  Movements are Fluid and Coordinated: No  Postural Control  Postural Control: Within Functional Limits          Functional Assessments:     Bed Mobility  Bed Mobility: No  Transfers  Transfer: Yes  Transfer 1  Transfer From 1: Sit to, Stand to  Transfer Device 1: Walker  Transfer Level of Assistance 1: Minimum assistance (x 1.)  Ambulation/Gait Training  Ambulation/Gait Training Performed: Yes  Ambulation/Gait Training 1  Surface 1:  Level tile  Device 1: Rolling walker  Gait Support Devices: Gait belt  Assistance 1: Minimum assistance (x1.)  Quality of Gait 1: Decreased step length, Shuffling gait  Comments/Distance (ft) 1: x 6 ft. and x 12 ft.  used restroom.  Stairs  Stairs: No       Extremity/Trunk Assessments:        RLE   RLE : Within Functional Limits  LLE   LLE : Within Functional Limits    Outcome Measures:     [facility] Basic Mobility  Turning from your back to your side while in a flat bed without using bedrails: A lot  Moving from lying on your back to sitting on the side of a flat bed without using bedrails: A lot  Moving to and from bed to chair (including a wheelchair): A lot  Standing up from a chair using your arms (e.g. wheelchair or bedside chair): A little  To walk in hospital room: A little  Climbing 3-5 steps with railing: A lot  Basic Mobility - Total Score: 14   Goals:  Encounter Problems       Encounter Problems (Active)       Mobility       STG - Patient will ambulate x 40-50 ft. with w/w, CGA.   (Progressing)       Start:  12/28/24    Expected End:  01/11/25            Goal 1 Demo 3/5= A. tibs. and 5/5= Quads.  (Progressing)       Start:  12/28/24    Expected End:  01/11/25               PT Transfers       STG - Patient will perform bed mobility with CGA, when moving to EOB.   (Progressing)       Start:  12/28/24    Expected End:  01/11/25            STG - Patient will transfer sit to and from stand into a w/w, with CGA.   (Progressing)       Start:  12/28/24    Expected End:  01/11/25               Pain - Adult          Safety       STG - Patient uses gait belt during all transfers and w/w amb. trng..  (Progressing)       Start:  12/28/24    Expected End:  01/11/25                 Education Documentation  Handouts, taught by Anson Barriga, PT at 12/28/2024  3:43 PM.  Learner: Patient  Readiness: Acceptance  Method: Demonstration, Explanation, Handout  Response: Demonstrated Understanding, Needs  Reinforcement    Precautions, taught by Anson Barriga, PT at 12/28/2024  3:43 PM.  Learner: Patient  Readiness: Acceptance  Method: Demonstration, Explanation, Handout  Response: Demonstrated Understanding, Needs Reinforcement    Body Mechanics, taught by Anson Barriga, PT at 12/28/2024  3:43 PM.  Learner: Patient  Readiness: Acceptance  Method: Demonstration, Explanation, Handout  Response: Demonstrated Understanding, Needs Reinforcement    Home Exercise Program, taught by Anson Barriga, PT at 12/28/2024  3:43 PM.  Learner: Patient  Readiness: Acceptance  Method: Demonstration, Explanation, Handout  Response: Demonstrated Understanding, Needs Reinforcement    Mobility Training, taught by Anson Barriga, PT at 12/28/2024  3:43 PM.  Learner: Patient  Readiness: Acceptance  Method: Demonstration, Explanation, Handout  Response: Demonstrated Understanding, Needs Reinforcement    Education Comments  No comments found.

## 2024-12-28 NOTE — H&P
History Of Present Illness      Review of systems: 10 system were reviewed and were negative except what was mentioned in history of present illness    Past Medical History  No past medical history on file.    Surgical History  Past Surgical History:   Procedure Laterality Date    HERNIA REPAIR           Social History  Social History     Socioeconomic History    Marital status:      Spouse name: Not on file    Number of children: Not on file    Years of education: Not on file    Highest education level: Not on file   Occupational History    Not on file   Tobacco Use    Smoking status: Never    Smokeless tobacco: Never   Vaping Use    Vaping status: Never Used   Substance and Sexual Activity    Alcohol use: Never    Drug use: Never    Sexual activity: Defer   Other Topics Concern    Not on file   Social History Narrative    Not on file     Social Drivers of Health     Financial Resource Strain: Patient Declined (5/15/2023)    Received from [facility]    Overall Financial Resource Strain (CARDIA)     Difficulty of Paying Living Expenses: Patient declined   Food Insecurity: Patient Declined (5/15/2023)    Received from [facility]    Hunger Vital Sign     Worried About Running Out of Food in the Last Year: Patient declined     Ran Out of Food in the Last Year: Patient declined   Transportation Needs: No Transportation Needs (12/17/2024)    OASIS : Transportation     Lack of Transportation (Medical): No     Lack of Transportation (Non-Medical): No     Patient Unable or Declines to Respond: No   Physical Activity: Inactive (5/20/2024)    Received from [facility]    Exercise Vital Sign     Days of Exercise per Week: 0 days     Minutes of Exercise per Session: 0 min   Stress: Not on file   Social Connections: Feeling Socially Integrated (12/17/2024)    OASIS : Social Isolation     Frequency of experiencing loneliness or isolation: Never    Intimate Partner Violence: Not on file   Housing Stability: Not on file        Family History        Allergies  Allergies   Allergen Reactions    Sulfa (Sulfonamide Antibiotics) Rash        Review of Systems  Review of Systems   Physical Exam  Physical Exam   Last Recorded Vitals  Visit Vitals  BP (!) 179/101 (BP Location: Left arm, Patient Position: Lying)   Pulse 84   Temp 36.8 °C (98.2 °F) (Temporal)   Resp 17        Scheduled medications    Continuous medications    PRN medications       Relevant Results  Results for orders placed or performed during the hospital encounter of 12/27/24 (from the past 96 hours)   CBC and Auto Differential   Result Value Ref Range    WBC 8.3 4.4 - 11.3 x10*3/uL    nRBC 0.0 0.0 - 0.0 /100 WBCs    RBC 5.54 4.50 - 5.90 x10*6/uL    Hemoglobin 15.2 13.5 - 17.5 g/dL    Hematocrit 47.2 41.0 - 52.0 %    MCV 85 80 - 100 fL    MCH 27.4 26.0 - 34.0 pg    MCHC 32.2 32.0 - 36.0 g/dL    RDW 13.6 11.5 - 14.5 %    Platelets 207 150 - 450 x10*3/uL    Neutrophils % 86.4 40.0 - 80.0 %    Immature Granulocytes %, Automated 0.2 0.0 - 0.9 %    Lymphocytes % 6.4 13.0 - 44.0 %    Monocytes % 6.5 2.0 - 10.0 %    Eosinophils % 0.0 0.0 - 6.0 %    Basophils % 0.5 0.0 - 2.0 %    Neutrophils Absolute 7.19 (H) 1.60 - 5.50 x10*3/uL    Immature Granulocytes Absolute, Automated 0.02 0.00 - 0.50 x10*3/uL    Lymphocytes Absolute 0.53 (L) 0.80 - 3.00 x10*3/uL    Monocytes Absolute 0.54 0.05 - 0.80 x10*3/uL    Eosinophils Absolute 0.00 0.00 - 0.40 x10*3/uL    Basophils Absolute 0.04 0.00 - 0.10 x10*3/uL   TSH with reflex to Free T4 if abnormal   Result Value Ref Range    Thyroid Stimulating Hormone 0.64 0.44 - 3.98 mIU/L   Comprehensive metabolic panel   Result Value Ref Range    Glucose 128 (H) 74 - 99 mg/dL    Sodium 133 (L) 136 - 145 mmol/L    Potassium 3.8 3.5 - 5.3 mmol/L    Chloride 97 (L) 98 - 107 mmol/L    Bicarbonate 23 21 - 32 mmol/L    Anion Gap 17 10 - 20 mmol/L    Urea Nitrogen 13 6 - 23 mg/dL     Creatinine 1.27 0.50 - 1.30 mg/dL    eGFR 60 (L) >60 mL/min/1.73m*2    Calcium 8.9 8.6 - 10.3 mg/dL    Albumin 4.1 3.4 - 5.0 g/dL    Alkaline Phosphatase 70 33 - 136 U/L    Total Protein 7.6 6.4 - 8.2 g/dL    AST 25 9 - 39 U/L    Bilirubin, Total 0.8 0.0 - 1.2 mg/dL    ALT 15 10 - 52 U/L   Magnesium   Result Value Ref Range    Magnesium 1.90 1.60 - 2.40 mg/dL        CT head wo IV contrast    Result Date: 12/27/2024  Interpreted By:  Carlo Cruz, STUDY: CT HEAD WO IV CONTRAST; CT CERVICAL SPINE WO IV CONTRAST;  12/27/2024 6:09 pm   INDICATION: Signs/Symptoms:recurrent falls; Signs/Symptoms:recurrent falls, numbness on arms     COMPARISON: CT head and cervical spine 12/13/2024   ACCESSION NUMBER(S): [id number]; [id number]   ORDERING CLINICIAN: OPAL HANSEN   TECHNIQUE: Axial noncontrast CT images of head with coronal and sagittal reconstructed images. Axial noncontrast CT images of the cervical spine with coronal and sagittal reconstructed images.   FINDINGS: CT HEAD:   BRAIN PARENCHYMA: Gray-white differentiation is preserved. No mass-effect, midline shift or effacement of cerebral sulci. Patchy periventricular and subcortical white matter hypodensities, nonspecific but often seen in the setting of chronic microangiopathic change.   HEMORRHAGE: No acute intracranial hemorrhage.   VENTRICLES and EXTRA-AXIAL SPACES: The ventricles and sulci are within normal limits for brain volume. No abnormal extra-axial fluid collection.   ORBITS: The visualized orbits and globes are within normal limits.   EXTRACRANIAL SOFT TISSUES: Within normal limits.   PARANASAL SINUSES/MASTOIDS: The visualized paranasal sinuses and mastoid air cells are well aerated.   CALVARIUM: No depressed skull fracture.     CT CERVICAL SPINE:   CRANIOCERVICAL JUNCTION: Intact.   ALIGNMENT: No traumatic malalignment or traumatic facet widening. Reversal of the cervical lordotic curvature at C5.   VERTEBRAE/DISC SPACES: No acute fracture.  Vertebral body heights are maintained. Varying degrees of mild-to-moderate multilevel disc space height loss and associated degenerative endplate changes. No high grade spinal canal stenosis evident by CT.   SOFT TISSUES: No significant abnormality. Calcifications of the nuchal ligament at C5-C6.   OTHER: The visualized lung apices are clear.       CT HEAD: 1. No acute intracranial abnormality or calvarial fracture.     CT CERVICAL SPINE: 1. No acute fracture or traumatic malalignment of the cervical spine.   MACRO: None   Signed by: Carlo Cruz 12/27/2024 6:47 PM Dictation workstation:   [id number]    CT cervical spine wo IV contrast    Result Date: 12/27/2024  Interpreted By:  Carlo Cruz, STUDY: CT HEAD WO IV CONTRAST; CT CERVICAL SPINE WO IV CONTRAST;  12/27/2024 6:09 pm   INDICATION: Signs/Symptoms:recurrent falls; Signs/Symptoms:recurrent falls, numbness on arms     COMPARISON: CT head and cervical spine 12/13/2024   ACCESSION NUMBER(S): [id number]; [id number]   ORDERING CLINICIAN: OPAL HANSEN   TECHNIQUE: Axial noncontrast CT images of head with coronal and sagittal reconstructed images. Axial noncontrast CT images of the cervical spine with coronal and sagittal reconstructed images.   FINDINGS: CT HEAD:   BRAIN PARENCHYMA: Gray-white differentiation is preserved. No mass-effect, midline shift or effacement of cerebral sulci. Patchy periventricular and subcortical white matter hypodensities, nonspecific but often seen in the setting of chronic microangiopathic change.   HEMORRHAGE: No acute intracranial hemorrhage.   VENTRICLES and EXTRA-AXIAL SPACES: The ventricles and sulci are within normal limits for brain volume. No abnormal extra-axial fluid collection.   ORBITS: The visualized orbits and globes are within normal limits.   EXTRACRANIAL SOFT TISSUES: Within normal limits.   PARANASAL SINUSES/MASTOIDS: The visualized paranasal sinuses and mastoid air cells are well aerated.   CALVARIUM: No  depressed skull fracture.     CT CERVICAL SPINE:   CRANIOCERVICAL JUNCTION: Intact.   ALIGNMENT: No traumatic malalignment or traumatic facet widening. Reversal of the cervical lordotic curvature at C5.   VERTEBRAE/DISC SPACES: No acute fracture. Vertebral body heights are maintained. Varying degrees of mild-to-moderate multilevel disc space height loss and associated degenerative endplate changes. No high grade spinal canal stenosis evident by CT.   SOFT TISSUES: No significant abnormality. Calcifications of the nuchal ligament at C5-C6.   OTHER: The visualized lung apices are clear.       CT HEAD: 1. No acute intracranial abnormality or calvarial fracture.     CT CERVICAL SPINE: 1. No acute fracture or traumatic malalignment of the cervical spine.   MACRO: None   Signed by: Carlo Cruz 12/27/2024 6:47 PM Dictation workstation:   [id number]    XR chest 1 view    Result Date: 12/27/2024  Interpreted By:  Anson Kim, STUDY: XR CHEST 1 VIEW;  12/27/2024 3:15 pm   INDICATION: Signs/Symptoms:generalized weakness.     COMPARISON: None.   ACCESSION NUMBER(S): [id number]   ORDERING CLINICIAN: OPAL HANSEN   FINDINGS: CARDIOMEDIASTINAL SILHOUETTE: Cardiomediastinal silhouette is normal in size and configuration.   LUNGS: Left basilar mild linear atelectasis is present. No focal consolidation. No pleural effusion or pneumothorax.   ABDOMEN: No remarkable upper abdominal findings.   BONES: Multilevel endplate spurring present in the spine.       1.  Left basilar mild linear atelectasis. No focal consolidation.       MACRO: None.   Signed by: Anson Kim 12/27/2024 4:05 PM Dictation workstation:   [id number]    ECG 12 lead    Result Date: 12/16/2024  Sinus tachycardia Otherwise normal ECG When compared with ECG of 21-NOV-2024 21:08, No significant change was found BASELINE ARTIFACT Confirmed by Abdiel Sloan ([id number]) on 12/16/2024 11:59:49 AM    CT cervical spine wo IV contrast    Result Date:  12/13/2024  Interpreted By:  Ayo Ríos, STUDY: CT CERVICAL SPINE WO IV CONTRAST; 12/13/2024 12:37 pm   INDICATION: Signs/Symptoms:fall;   COMPARISON: None   ACCESSION NUMBER(S): [id number]   ORDERING CLINICIAN: OSCAR MATOS   TECHNIQUE: Contiguous axial images were acquired from the skull base to the lung apices. Coronal and sagittal reformatted images were obtained. All CT examinations are performed with 1 or more of the following dose reduction techniques: Automated exposure control, adjustment of mA and/or kv according to patient's size, or use of iterative reconstruction techniques.   FINDINGS: There is straightening of the normal cervical lordosis.  No acute fracture or spondylolisthesis is identified.     The occipital condyles, arch of C1, and the odontoid processes are intact. The atlantoaxial relationship is well maintained.   There is moderate disc space narrowing at C4-5, C5-6. Mild-moderate disc space narrowing at C6-7. Bony spinal canal is patent.   Mild right neural foramina stenosis at C4-5 mild-moderate left neural foramina stenosis at C5-6.   The visualized lung apices are unremarkable.       1. No acute fracture or spondylolisthesis. 2. Degenerative disc disease and spondylosis as described in the body of the report.     Signed by: Ayo Ríos 12/13/2024 1:02 PM Dictation workstation:   [id number]    CT brain attack head wo IV contrast    Result Date: 12/13/2024  Interpreted By:  Ayo Ríos, STUDY: OSCAR MATOS; 12/13/2024 12:37 pm   INDICATION: Signs/Symptoms:Stroke Evaluation;   COMPARISON: 11/21/2024   ACCESSION NUMBER(S): [id number]   ORDERING CLINICIAN: OSCAR MATOS   TECHNIQUE: Contiguous axial images were acquired from the vertex through the posterior fossa without IV contrast. All CT examinations are performed with 1 or more of the following dose reduction techniques: Automated exposure control, adjustment of mA and/or kv according to patient's size, or use of  iterative reconstruction techniques.   FINDINGS: No focal mass effect or midline shift is identified. The ventricles and sulci are symmetric and appropriate for the patient's age.   There is a mild degree of nonspecific white matter hypodensity, most consistent with chronic small-vessel ischemic disease. The gray white matter differentiation is preserved.   No acute intracranial hemorrhage is seen. No intra-axial or extra-axial fluid collection is seen.   The visualized paranasal sinuses and mastoid air cells are clear.       No CT evidence for acute intracranial pathology.     Findings discussed with OSCAR MATOS via telephone at 12:50 p.m. on 12/13/2024   Signed by: Ayo Ríos 12/13/2024 12:58 PM Dictation workstation:   [id number]    Vascular US lower extremity venous duplex bilateral    Result Date: 11/30/2024  Interpreted By:  Marcos Zamora, STUDY: [facility] US LOWER EXTREMITY VENOUS DUPLEX BILATERAL;  11/29/2024 4:31 pm   INDICATION: Signs/Symptoms:leg swelling and calf pain b/l.   COMPARISON: None.   ACCESSION NUMBER(S): [id number]   ORDERING CLINICIAN: LINN HOLDEN   TECHNIQUE: Vascular ultrasound of the bilateral lower extremities was performed. Real-time compression views as well as Gray scale, color Doppler and spectral Doppler waveform analysis was performed.   FINDINGS: Evaluation of the visualized portions of the bilateral common femoral, proximal, mid, and distal femoral, and popliteal veins was performed.  Evaluation of the visualized portions of the posterior tibial and peroneal veins was also performed.   Limitations: None   The evaluated veins demonstrate normal compressibility. There is intact venous flow demonstrating normal respiratory variability and normal augmentation of flow with calf compression.         No sonographic evidence for deep vein thrombosis within the evaluated veins of the bilateral lower extremities.   MACRO: None   Signed by: Marcos Zamora 11/30/2024 6:23 AM Dictation  workstation:   [id number]        Assessment and Plan      PLAN    A total of - minutes was spent on this visit     Plan of care was discussed extensively with patient. Patient verbalized understanding through teach back method. All questions and concerns addressed upon examination.     Of note, this documentation is completed using the Dragon Dictation system (voice recognition software). There may be spelling and/or grammatical errors that were not corrected prior to final submission

## 2024-12-29 VITALS
HEART RATE: 92 BPM | WEIGHT: 183.2 LBS | HEIGHT: 70 IN | SYSTOLIC BLOOD PRESSURE: 125 MMHG | DIASTOLIC BLOOD PRESSURE: 77 MMHG | RESPIRATION RATE: 16 BRPM | OXYGEN SATURATION: 98 % | BODY MASS INDEX: 26.23 KG/M2 | TEMPERATURE: 96.4 F

## 2024-12-29 LAB
ALBUMIN SERPL BCP-MCNC: 3.4 G/DL (ref 3.4–5)
ANION GAP SERPL CALC-SCNC: 10 MMOL/L (ref 10–20)
BUN SERPL-MCNC: 13 MG/DL (ref 6–23)
CALCIUM SERPL-MCNC: 8.3 MG/DL (ref 8.6–10.3)
CHLORIDE SERPL-SCNC: 100 MMOL/L (ref 98–107)
CO2 SERPL-SCNC: 28 MMOL/L (ref 21–32)
CREAT SERPL-MCNC: 0.92 MG/DL (ref 0.5–1.3)
EGFRCR SERPLBLD CKD-EPI 2021: 89 ML/MIN/1.73M*2
ERYTHROCYTE [DISTWIDTH] IN BLOOD BY AUTOMATED COUNT: 13.6 % (ref 11.5–14.5)
GLUCOSE SERPL-MCNC: 106 MG/DL (ref 74–99)
HCT VFR BLD AUTO: 42.1 % (ref 41–52)
HGB BLD-MCNC: 13.3 G/DL (ref 13.5–17.5)
MAGNESIUM SERPL-MCNC: 2.21 MG/DL (ref 1.6–2.4)
MCH RBC QN AUTO: 27 PG (ref 26–34)
MCHC RBC AUTO-ENTMCNC: 31.6 G/DL (ref 32–36)
MCV RBC AUTO: 86 FL (ref 80–100)
NRBC BLD-RTO: 0 /100 WBCS (ref 0–0)
PHOSPHATE SERPL-MCNC: 2.9 MG/DL (ref 2.5–4.9)
PLATELET # BLD AUTO: 175 X10*3/UL (ref 150–450)
POTASSIUM SERPL-SCNC: 3.7 MMOL/L (ref 3.5–5.3)
RBC # BLD AUTO: 4.92 X10*6/UL (ref 4.5–5.9)
SODIUM SERPL-SCNC: 134 MMOL/L (ref 136–145)
WBC # BLD AUTO: 3.5 X10*3/UL (ref 4.4–11.3)

## 2024-12-29 PROCEDURE — G0378 HOSPITAL OBSERVATION PER HR: HCPCS

## 2024-12-29 PROCEDURE — 99231 SBSQ HOSP IP/OBS SF/LOW 25: CPT | Performed by: STUDENT IN AN ORGANIZED HEALTH CARE EDUCATION/TRAINING PROGRAM

## 2024-12-29 PROCEDURE — 2500000001 HC RX 250 WO HCPCS SELF ADMINISTERED DRUGS (ALT 637 FOR MEDICARE OP): Performed by: NURSE PRACTITIONER

## 2024-12-29 PROCEDURE — 36415 COLL VENOUS BLD VENIPUNCTURE: CPT

## 2024-12-29 PROCEDURE — 2500000001 HC RX 250 WO HCPCS SELF ADMINISTERED DRUGS (ALT 637 FOR MEDICARE OP)

## 2024-12-29 PROCEDURE — 2500000001 HC RX 250 WO HCPCS SELF ADMINISTERED DRUGS (ALT 637 FOR MEDICARE OP): Performed by: STUDENT IN AN ORGANIZED HEALTH CARE EDUCATION/TRAINING PROGRAM

## 2024-12-29 PROCEDURE — 2500000002 HC RX 250 W HCPCS SELF ADMINISTERED DRUGS (ALT 637 FOR MEDICARE OP, ALT 636 FOR OP/ED)

## 2024-12-29 PROCEDURE — 80069 RENAL FUNCTION PANEL: CPT

## 2024-12-29 PROCEDURE — 2500000004 HC RX 250 GENERAL PHARMACY W/ HCPCS (ALT 636 FOR OP/ED)

## 2024-12-29 PROCEDURE — 99233 SBSQ HOSP IP/OBS HIGH 50: CPT | Performed by: STUDENT IN AN ORGANIZED HEALTH CARE EDUCATION/TRAINING PROGRAM

## 2024-12-29 PROCEDURE — 96372 THER/PROPH/DIAG INJ SC/IM: CPT

## 2024-12-29 PROCEDURE — 85027 COMPLETE CBC AUTOMATED: CPT

## 2024-12-29 PROCEDURE — 83735 ASSAY OF MAGNESIUM: CPT

## 2024-12-29 RX ORDER — DIVALPROEX SODIUM 125 MG/1
250 CAPSULE, COATED PELLETS ORAL ONCE
Status: DISCONTINUED | OUTPATIENT
Start: 2024-12-29 | End: 2025-01-01

## 2024-12-29 RX ADMIN — ENOXAPARIN SODIUM 40 MG: 40 INJECTION SUBCUTANEOUS at 23:03

## 2024-12-29 RX ADMIN — CARBIDOPA AND LEVODOPA 1 TABLET: 25; 250 TABLET ORAL at 20:59

## 2024-12-29 RX ADMIN — CARBIDOPA AND LEVODOPA 1.5 TABLET: 25; 250 TABLET ORAL at 05:32

## 2024-12-29 RX ADMIN — LINACLOTIDE 145 MCG: 145 CAPSULE, GELATIN COATED ORAL at 06:50

## 2024-12-29 RX ADMIN — CARBIDOPA AND LEVODOPA 1.5 TABLET: 25; 250 TABLET ORAL at 08:50

## 2024-12-29 RX ADMIN — SERTRALINE 25 MG: 25 TABLET, FILM COATED ORAL at 20:59

## 2024-12-29 RX ADMIN — AMLODIPINE BESYLATE 5 MG: 5 TABLET ORAL at 08:49

## 2024-12-29 RX ADMIN — FUROSEMIDE 20 MG: 20 TABLET ORAL at 08:49

## 2024-12-29 RX ADMIN — CARBIDOPA AND LEVODOPA 1.5 TABLET: 25; 250 TABLET ORAL at 15:54

## 2024-12-29 RX ADMIN — CARBIDOPA AND LEVODOPA 1 TABLET: 50; 200 TABLET, EXTENDED RELEASE ORAL at 20:59

## 2024-12-29 SDOH — HEALTH STABILITY: PHYSICAL HEALTH

## 2024-12-29 SDOH — HEALTH STABILITY: PHYSICAL HEALTH: EXERCISE COMMENTS: FTING IN SIT AND STAND.

## 2024-12-29 ASSESSMENT — PAIN - FUNCTIONAL ASSESSMENT
PAIN_FUNCTIONAL_ASSESSMENT: 0-10

## 2024-12-29 ASSESSMENT — COGNITIVE AND FUNCTIONAL STATUS - GENERAL
PERSONAL GROOMING: A LITTLE
TURNING FROM BACK TO SIDE WHILE IN FLAT BAD: A LITTLE
DAILY ACTIVITIY SCORE: 16
EATING MEALS: A LITTLE
WALKING IN HOSPITAL ROOM: A LITTLE
CLIMB 3 TO 5 STEPS WITH RAILING: A LOT
MOBILITY SCORE: 17
DRESSING REGULAR UPPER BODY CLOTHING: A LITTLE
STANDING UP FROM CHAIR USING ARMS: A LITTLE
DRESSING REGULAR LOWER BODY CLOTHING: A LOT
MOVING TO AND FROM BED TO CHAIR: A LITTLE
MOVING FROM LYING ON BACK TO SITTING ON SIDE OF FLAT BED WITH BEDRAILS: A LITTLE
TOILETING: A LITTLE
HELP NEEDED FOR BATHING: A LOT

## 2024-12-29 ASSESSMENT — PAIN SCALES - GENERAL
PAINLEVEL_OUTOF10: 0 - NO PAIN

## 2024-12-29 ASSESSMENT — ACTIVITIES OF DAILY LIVING (ADL)
AMBULATION ASSISTANCE ON FLAT SURFACES: 1
LACK_OF_TRANSPORTATION: NO
AMBULATION_DISTANCE/DURATION_TOLERATED: 60FT

## 2024-12-29 NOTE — PROGRESS NOTES
"Kalani Keith is a 69 y.o. male on day 0 of admission presenting with Ambulatory dysfunction.      Subjective   Overall stable  Pt reports his symptoms have been worse since his neurologist reduced his carbidopa-levodopa which looks like this was done due to dyskinesias        Objective     Last Recorded Vitals  Blood pressure 117/68, pulse 78, temperature 36.3 °C (97.3 °F), temperature source Temporal, resp. rate 16, height 1.778 m (5' 10\"), weight 83.1 kg (183 lb 3.2 oz), SpO2 94%.    Physical Exam  Eyes:      Extraocular Movements: Extraocular movements intact.   Neurological:      Motor: Motor strength is normal.      Neurological Exam  Mental Status  Awake, alert and oriented to person, place and time.  Mild to moderate hypophonia  Content of speech is not quite comprehensible   Facial dyskinesias noted (were not present yesterday).    Cranial Nerves  CN III, IV, VI: Extraocular movements intact bilaterally.  CN VII: Full and symmetric facial movement.    Motor   Strength is 5/5 throughout all four extremities.  Mild to moderate bradykinesia on finger taps and hand opening/closure  Mild dyskinesias in the bilateral upper extremities .    Relevant Results                    H[place] Coma Scale  Best Eye Response: Spontaneous  Best Verbal Response: Oriented  Best Motor Response: Follows commands  [place] Coma Scale Score: 15                             Assessment/Plan      Assessment & Plan  Ambulatory dysfunction    Advanced parkinson's disease with postural instability    Some improvement in bradykinesia and rigidity with increasing carbidopa-levodopa to 1.5 QID, continued home 50-200mg 1 tablet at bedtime    Though now he has some dopamine related dyskinesias, not overly bothersome today. May need to reduce to somewhere in between 1 tab QID and 1.5 tab QID     Would benefit from rehab    Neurology will continue to follow     Evangelina Steiner MD  "

## 2024-12-29 NOTE — PROGRESS NOTES
"Kalani Keith is a 69 y.o. male on day 0 of admission presenting with Ambulatory dysfunction.    Subjective   Patient seen and examined, no acute events overnight.  Patient is well-known to this physician from last admission, no significant change compared to previous admission.         Objective     Physical Exam by System:       Constitutional: Well developed, awake/alert/oriented x3, slow to respond to questions, masked facies  Eyes: PERRL, EOMI, clear sclera  ENMT: mucous membranes moist  Head/Neck: Neck supple  Respiratory/Thorax: CTA b/l.   Cardiovascular: Regular, rate and rhythm, no murmurs  Gastrointestinal: Nondistended, soft, non-tender  Musculoskeletal: rigid extremities        Last Recorded Vitals  Blood pressure 106/62, pulse 82, temperature 36.9 °C (98.4 °F), temperature source Temporal, resp. rate 16, height 1.778 m (5' 10\"), weight 83.1 kg (183 lb 3.2 oz), SpO2 96%.  Intake/Output last 3 Shifts:  I/O last 3 completed shifts:  In: 240 (2.9 mL/kg) [P.O.:240]  Out: - (0 mL/kg)   Weight: 83.1 kg     Relevant Results  Scheduled medications  amLODIPine, 5 mg, oral, Daily  carbidopa-levodopa, 1 tablet, oral, Nightly  carbidopa-levodopa, 1.5 tablet, oral, q6h  enoxaparin, 40 mg, subcutaneous, q24h  furosemide, 20 mg, oral, Daily  linaCLOtide, 145 mcg, oral, Daily before breakfast  sertraline, 25 mg, oral, Nightly      Continuous medications     PRN medications  PRN medications: acetaminophen, hydrALAZINE  Results from last 7 days   Lab Units 12/28/24  0521 12/27/24  1447   WBC AUTO x10*3/uL 6.0 8.3   RBC AUTO x10*6/uL 5.03 5.54   HEMOGLOBIN g/dL 13.6 15.2     Results from last 7 days   Lab Units 12/28/24  0521 12/27/24  1447   SODIUM mmol/L 134* 133*   POTASSIUM mmol/L 3.7 3.8   CHLORIDE mmol/L 100 97*   CO2 mmol/L 24 23   BUN mg/dL 13 13   CREATININE mg/dL 0.99 1.27   CALCIUM mg/dL 8.4* 8.9   PHOSPHORUS mg/dL 2.6  --    MAGNESIUM mg/dL 2.24 1.90   BILIRUBIN TOTAL mg/dL  --  0.8   ALT U/L  --  15   AST U/L  " --  25                Assessment/Plan   Assessment & Plan  Ambulatory dysfunction    Generalized weakness   Transient encephalopathy   Parkinson's disease  HTN urgency  Essential HTN  Frequent falls      PLAN  Admit patient  Labs, prior records and radiological studies reviewed  No indication for telemetry monitoring  Monitor and replace electrolytes per protocol  Resume patient home medications as appropriate once med rec completed  As needed hydralazine for added BP control  D/w Dr. Steiner from neurology, recommend to increase Sinemet to 1.5 tabs every 6 hours (from 1 tabs every 6 hours), also does not believe MRI is necessary since patient has not had any neurological changes compare to last admission  PT, OT and ST evaluation  Fall precautions  TCC for discharge planning  Anticipated length of stay 48 to 72 hours pending patient's clinical course and consultants recommendations     DVTP: Lovenox  CODE STATUS: DNR CCA/DNI       I spent 25 minutes in the professional and overall care of this patient.        This dictation was created using voice recognition software.  If there are any questions about inaccuracies please do not hesitate to contact me.      Ruddy Hill MD

## 2024-12-29 NOTE — PROGRESS NOTES
Speech-Language Pathology                 Therapy Communication Note    Patient Name: Kalani Keith  MRN: [id number]  Department: [facility] 3 S OBS  Room: [facility]5/3135-A  Today's Date: 12/29/2024     Discipline: Speech Language Pathology    Missed Visit Reason: Patient working with other medical/therapy staff upon SLP arrival. SLP will follow up with next date schedule permits and as patient is able to participate.    Lexi Blevins MA, CCC-SLP  Speech Language Pathologist

## 2024-12-29 NOTE — NURSING NOTE
EOS Note:  NO acute changes this shift.  Pt. Is alert and oriented, however, with slower paced conversation much of what he says needs clarified.  Pt. C/o generalized pain and was order Prn Tyelonol q8h.  Pain resolved with meds and rest.   Continent BM this morning.  Falls precautions in place.  Call light within reach.

## 2024-12-29 NOTE — PROGRESS NOTES
"Kalani Keith is a 69 y.o. male on day 0 of admission presenting with Ambulatory dysfunction.    Subjective   Patient seen examined, no acute events overnight.  Alert and awake this morning, answering questions slowly but appropriately.       Objective     Physical Exam by System:          Constitutional: Well developed, awake/alert/oriented x3, slow to respond to questions, masked facies  Eyes: PERRL, EOMI, clear sclera  ENMT: mucous membranes moist  Head/Neck: Neck supple  Respiratory/Thorax: CTA b/l.   Cardiovascular: Regular, rate and rhythm, no murmurs  Gastrointestinal: Nondistended, soft, non-tender  Musculoskeletal: rigid extremities      Last Recorded Vitals  Blood pressure 142/82, pulse 89, temperature 37.1 °C (98.8 °F), temperature source Temporal, resp. rate 18, height 1.778 m (5' 10\"), weight 83.1 kg (183 lb 3.2 oz), SpO2 96%.  Intake/Output last 3 Shifts:  I/O last 3 completed shifts:  In: 580 (7 mL/kg) [P.O.:580]  Out: 0 (0 mL/kg)   Weight: 83.1 kg     Relevant Results  Scheduled medications  amLODIPine, 5 mg, oral, Daily  carbidopa-levodopa, 1 tablet, oral, Nightly  carbidopa-levodopa, 1.5 tablet, oral, q6h  enoxaparin, 40 mg, subcutaneous, q24h  furosemide, 20 mg, oral, Daily  linaCLOtide, 145 mcg, oral, Daily before breakfast  sertraline, 25 mg, oral, Nightly      Continuous medications     PRN medications  PRN medications: acetaminophen, hydrALAZINE  Results from last 7 days   Lab Units 12/29/24  0758 12/28/24  0521 12/27/24  1447   WBC AUTO x10*3/uL 3.5* 6.0 8.3   RBC AUTO x10*6/uL 4.92 5.03 5.54   HEMOGLOBIN g/dL 13.3* 13.6 15.2     Results from last 7 days   Lab Units 12/29/24  0758 12/28/24  0521 12/27/24  1447   SODIUM mmol/L 134* 134* 133*   POTASSIUM mmol/L 3.7 3.7 3.8   CHLORIDE mmol/L 100 100 97*   CO2 mmol/L 28 24 23   BUN mg/dL 13 13 13   CREATININE mg/dL 0.92 0.99 1.27   CALCIUM mg/dL 8.3* 8.4* 8.9   PHOSPHORUS mg/dL 2.9 2.6  --    MAGNESIUM mg/dL 2.21 2.24 1.90   BILIRUBIN TOTAL mg/dL  " --   --  0.8   ALT U/L  --   --  15   AST U/L  --   --  25                Assessment/Plan   Assessment & Plan  Ambulatory dysfunction    Generalized weakness   Transient encephalopathy   Parkinson's disease  HTN urgency  Essential HTN  Frequent falls      PLAN  Admit patient  Labs, prior records and radiological studies reviewed  No indication for telemetry monitoring  Monitor and replace electrolytes per protocol  Resume patient home medications as appropriate once med rec completed  D/w Dr. Steiner from neurology, recommend to increase Sinemet to 1.5 tabs every 6 hours (from 1 tabs every 6 hours), also does not believe MRI is necessary since patient has not had any neurological changes compare to last admission  PT, OT and ST evaluation  Fall precautions  TCC for discharge planning, need precert for placement  Anticipated length of stay 48 to 72 hours pending patient's clinical course and consultants recommendations     DVTP: Lovenox  CODE STATUS: DNR CCA/DNI       I spent 15 minutes in the professional and overall care of this patient.        This dictation was created using voice recognition software.  If there are any questions about inaccuracies please do not hesitate to contact me.      Ruddy Hill MD

## 2024-12-29 NOTE — CARE PLAN
The patient's goals for the shift include      The clinical goals for the shift include Pt. christine remain safe and free from injury this shift.

## 2024-12-29 NOTE — PROGRESS NOTES
12/29/24 1220   Discharge Planning   Living Arrangements Spouse/significant other;Family members   Support Systems Family members;Spouse/significant other   Assistance Needed yes   Type of Residence Private residence   Home or Post Acute Services Post acute facilities (Rehab/SNF/etc)   Type of Post Acute Facility Services Skilled nursing   Expected Discharge Disposition SNF   Does the patient need discharge transport arranged? Yes   RoundTrip coordination needed? Yes   Has discharge transport been arranged? No   Financial Resource Strain   How hard is it for you to pay for the very basics like food, housing, medical care, and heating? Not hard   Housing Stability   In the last 12 months, was there a time when you were not able to pay the mortgage or rent on time? N   At any time in the past 12 months, were you homeless or living in a shelter (including now)? N   Transportation Needs   In the past 12 months, has lack of transportation kept you from medical appointments or from getting medications? no   In the past 12 months, has lack of transportation kept you from meetings, work, or from getting things needed for daily living? No   Patient Choice   Provider Choice list and CMS website (https://medicare.gov/care-compare#search) for post-acute Quality and Resource Measure Data were provided and reviewed with: Family   Stroke Family Assessment   Stroke Family Assessment Needed No   Intensity of Service   Intensity of Service 0-30 min     I confirmed with patient's family/spouse he was recently at the hospital and they are requesting a skilled level of care at discharge. Therapy evaluation is recommending a moderate intensity level of therapy with an ampac score of 14/17. Patient has frequent falls at home. The family's preference of choice is [facility]. Message sent to [facility] to send a referral to the facility. Patient is medically ready for discharge per attending and will need a pre cert.     1515: I spoke with both  the daughter and spouse regarding the [facility] eligibility status for patient. I instructed them that they need to contact their insurance company regarding his current eligibility status for [facility]. The direct precert team and [facility] team cannot file for insurance status. Message sent to [facility] team if patient can admit under his medicaid ?.

## 2024-12-30 ENCOUNTER — HOME CARE VISIT (OUTPATIENT)
Dept: HOME HEALTH SERVICES | Facility: HOME HEALTH | Age: 69
End: 2024-12-30
Payer: MEDICARE

## 2024-12-30 DIAGNOSIS — E55.9 VITAMIN D DEFICIENCY: ICD-10-CM

## 2024-12-30 LAB
ALBUMIN SERPL BCP-MCNC: 3.9 G/DL (ref 3.4–5)
ALP SERPL-CCNC: 59 U/L (ref 33–136)
ALT SERPL W P-5'-P-CCNC: 8 U/L (ref 10–52)
ANION GAP SERPL CALC-SCNC: 14 MMOL/L (ref 10–20)
AST SERPL W P-5'-P-CCNC: 18 U/L (ref 9–39)
ATRIAL RATE: 115 BPM
BILIRUB DIRECT SERPL-MCNC: 0.1 MG/DL (ref 0–0.3)
BILIRUB SERPL-MCNC: 0.7 MG/DL (ref 0–1.2)
BUN SERPL-MCNC: 9 MG/DL (ref 6–23)
CALCIUM SERPL-MCNC: 8.8 MG/DL (ref 8.6–10.3)
CHLORIDE SERPL-SCNC: 100 MMOL/L (ref 98–107)
CO2 SERPL-SCNC: 26 MMOL/L (ref 21–32)
CREAT SERPL-MCNC: 0.89 MG/DL (ref 0.5–1.3)
EGFRCR SERPLBLD CKD-EPI 2021: >90 ML/MIN/1.73M*2
ERYTHROCYTE [DISTWIDTH] IN BLOOD BY AUTOMATED COUNT: 13.5 % (ref 11.5–14.5)
GLUCOSE SERPL-MCNC: 95 MG/DL (ref 74–99)
HCT VFR BLD AUTO: 47.7 % (ref 41–52)
HGB BLD-MCNC: 14.6 G/DL (ref 13.5–17.5)
MAGNESIUM SERPL-MCNC: 2.32 MG/DL (ref 1.6–2.4)
MCH RBC QN AUTO: 26.5 PG (ref 26–34)
MCHC RBC AUTO-ENTMCNC: 30.6 G/DL (ref 32–36)
MCV RBC AUTO: 87 FL (ref 80–100)
NRBC BLD-RTO: 0 /100 WBCS (ref 0–0)
P AXIS: 48 DEGREES
P OFFSET: 201 MS
P ONSET: 145 MS
PHOSPHATE SERPL-MCNC: 2.8 MG/DL (ref 2.5–4.9)
PLATELET # BLD AUTO: 195 X10*3/UL (ref 150–450)
POTASSIUM SERPL-SCNC: 3.7 MMOL/L (ref 3.5–5.3)
PR INTERVAL: 142 MS
PROT SERPL-MCNC: 7.2 G/DL (ref 6.4–8.2)
Q ONSET: 216 MS
QRS COUNT: 19 BEATS
QRS DURATION: 78 MS
QT INTERVAL: 334 MS
QTC CALCULATION(BAZETT): 462 MS
QTC FREDERICIA: 414 MS
R AXIS: -24 DEGREES
RBC # BLD AUTO: 5.51 X10*6/UL (ref 4.5–5.9)
SODIUM SERPL-SCNC: 136 MMOL/L (ref 136–145)
T AXIS: 59 DEGREES
T OFFSET: 383 MS
VENTRICULAR RATE: 115 BPM
WBC # BLD AUTO: 3.9 X10*3/UL (ref 4.4–11.3)

## 2024-12-30 PROCEDURE — 2500000004 HC RX 250 GENERAL PHARMACY W/ HCPCS (ALT 636 FOR OP/ED)

## 2024-12-30 PROCEDURE — 92610 EVALUATE SWALLOWING FUNCTION: CPT | Mod: GN | Performed by: SPEECH-LANGUAGE PATHOLOGIST

## 2024-12-30 PROCEDURE — 2500000001 HC RX 250 WO HCPCS SELF ADMINISTERED DRUGS (ALT 637 FOR MEDICARE OP): Performed by: NURSE PRACTITIONER

## 2024-12-30 PROCEDURE — 96372 THER/PROPH/DIAG INJ SC/IM: CPT

## 2024-12-30 PROCEDURE — 85027 COMPLETE CBC AUTOMATED: CPT

## 2024-12-30 PROCEDURE — 84100 ASSAY OF PHOSPHORUS: CPT

## 2024-12-30 PROCEDURE — 36415 COLL VENOUS BLD VENIPUNCTURE: CPT

## 2024-12-30 PROCEDURE — 82248 BILIRUBIN DIRECT: CPT

## 2024-12-30 PROCEDURE — 2500000001 HC RX 250 WO HCPCS SELF ADMINISTERED DRUGS (ALT 637 FOR MEDICARE OP)

## 2024-12-30 PROCEDURE — G0378 HOSPITAL OBSERVATION PER HR: HCPCS

## 2024-12-30 PROCEDURE — 2500000002 HC RX 250 W HCPCS SELF ADMINISTERED DRUGS (ALT 637 FOR MEDICARE OP, ALT 636 FOR OP/ED)

## 2024-12-30 PROCEDURE — 99232 SBSQ HOSP IP/OBS MODERATE 35: CPT | Performed by: PSYCHIATRY & NEUROLOGY

## 2024-12-30 PROCEDURE — 99231 SBSQ HOSP IP/OBS SF/LOW 25: CPT | Performed by: STUDENT IN AN ORGANIZED HEALTH CARE EDUCATION/TRAINING PROGRAM

## 2024-12-30 PROCEDURE — 80048 BASIC METABOLIC PNL TOTAL CA: CPT

## 2024-12-30 PROCEDURE — 83735 ASSAY OF MAGNESIUM: CPT

## 2024-12-30 PROCEDURE — 2500000001 HC RX 250 WO HCPCS SELF ADMINISTERED DRUGS (ALT 637 FOR MEDICARE OP): Performed by: STUDENT IN AN ORGANIZED HEALTH CARE EDUCATION/TRAINING PROGRAM

## 2024-12-30 RX ORDER — AMPICILLIN TRIHYDRATE 500 MG
CAPSULE ORAL
Qty: 90 CAPSULE | Refills: 1 | Status: SHIPPED | OUTPATIENT
Start: 2024-12-30

## 2024-12-30 RX ADMIN — AMLODIPINE BESYLATE 5 MG: 5 TABLET ORAL at 10:46

## 2024-12-30 RX ADMIN — CARBIDOPA AND LEVODOPA 1.5 TABLET: 25; 250 TABLET ORAL at 23:12

## 2024-12-30 RX ADMIN — SERTRALINE 25 MG: 25 TABLET, FILM COATED ORAL at 23:15

## 2024-12-30 RX ADMIN — FUROSEMIDE 20 MG: 20 TABLET ORAL at 10:47

## 2024-12-30 RX ADMIN — LINACLOTIDE 145 MCG: 145 CAPSULE, GELATIN COATED ORAL at 06:23

## 2024-12-30 RX ADMIN — CARBIDOPA AND LEVODOPA 1.5 TABLET: 25; 250 TABLET ORAL at 18:41

## 2024-12-30 RX ADMIN — CARBIDOPA AND LEVODOPA 1.5 TABLET: 25; 250 TABLET ORAL at 10:48

## 2024-12-30 RX ADMIN — CARBIDOPA AND LEVODOPA 1 TABLET: 50; 200 TABLET, EXTENDED RELEASE ORAL at 23:15

## 2024-12-30 RX ADMIN — ENOXAPARIN SODIUM 40 MG: 40 INJECTION SUBCUTANEOUS at 23:12

## 2024-12-30 ASSESSMENT — COGNITIVE AND FUNCTIONAL STATUS - GENERAL
STANDING UP FROM CHAIR USING ARMS: A LITTLE
DRESSING REGULAR LOWER BODY CLOTHING: A LOT
MOVING FROM LYING ON BACK TO SITTING ON SIDE OF FLAT BED WITH BEDRAILS: A LITTLE
STANDING UP FROM CHAIR USING ARMS: A LITTLE
MOBILITY SCORE: 17
HELP NEEDED FOR BATHING: A LOT
DAILY ACTIVITIY SCORE: 16
EATING MEALS: A LITTLE
EATING MEALS: A LITTLE
PERSONAL GROOMING: A LITTLE
TURNING FROM BACK TO SIDE WHILE IN FLAT BAD: A LITTLE
WALKING IN HOSPITAL ROOM: A LITTLE
MOBILITY SCORE: 18
DRESSING REGULAR LOWER BODY CLOTHING: A LOT
DRESSING REGULAR UPPER BODY CLOTHING: A LITTLE
MOVING TO AND FROM BED TO CHAIR: A LITTLE
WALKING IN HOSPITAL ROOM: A LITTLE
HELP NEEDED FOR BATHING: A LOT
PERSONAL GROOMING: A LITTLE
TURNING FROM BACK TO SIDE WHILE IN FLAT BAD: A LITTLE
TOILETING: A LITTLE
DRESSING REGULAR UPPER BODY CLOTHING: A LITTLE
MOVING FROM LYING ON BACK TO SITTING ON SIDE OF FLAT BED WITH BEDRAILS: A LITTLE
MOVING TO AND FROM BED TO CHAIR: A LITTLE
CLIMB 3 TO 5 STEPS WITH RAILING: A LOT
DAILY ACTIVITIY SCORE: 16
CLIMB 3 TO 5 STEPS WITH RAILING: A LITTLE
TOILETING: A LITTLE

## 2024-12-30 ASSESSMENT — PAIN SCALES - GENERAL
PAINLEVEL_OUTOF10: 0 - NO PAIN

## 2024-12-30 ASSESSMENT — PAIN - FUNCTIONAL ASSESSMENT
PAIN_FUNCTIONAL_ASSESSMENT: 0-10

## 2024-12-30 NOTE — PROGRESS NOTES
Speech-Language Pathology    SLP Adult Inpatient Speech-Language Pathology Clinical Swallow Evaluation    Patient Name: Kalani Keith  MRN: [id number]  Today's Date: 12/30/2024   Time Calculation  Start Time: 1812  Stop Time: 1822  Time Calculation (min): 10 min         Current Problem:   1. Generalized weakness        2. Parkinson's disease with dyskinesia and fluctuating manifestations              Recommendations:  Risk for Aspiration: Yes  Solid Diet Recommendations : Regular (IDDSI Level 7)  Liquid Diet Recommendations: Thin (IDDSI Level 0)  Compensatory Swallowing Strategies: Decrease distractions during eating/feeding, Upright 90 degrees as possible for all oral intake, Eat/feed slowly, Small bites/sips  Medication Administration Recommendations: With Pureed  Duration of Treatment: 1 week  Follow up treatments: Diet tolerance monitoring  Dysphagia Goals: Patient will tolerate recommended diet without observed clinical signs of aspiration      Assessment:  Assessment Results: Pt presents with functional oral phase of swallow at bedside this date characterized by adequate mastication, A-P transfer, with no observed pocketing and good oral clearance on trials of regular solids and of thin liquids.  Pharyngeal dysphagia not suspected. No overt clinical s/s of aspiration on trials of thin or regular present at bedside. Pt passed 3 oz swallow screen.      Prognosis: Good  Treatment Provided: No  Medical Staff Made Aware: Yes      Plan:  Treatment/Interventions: Assess diet tolerance  SLP Plan: Skilled SLP  Next Treatment Priority: assess diet tolerance  Discussed POC: Nursing  Discussed Risks/Benefits: Nursing      Subjective   Current Problem:  Pt admitted with recurrent falls and weakness. Pt has parkinson dementia. Pt was confused during evaluation and needed ongoing redirection.     General Visit Information:  Patient Class: Inpatient  Ordering Physician: Frances Calix APRN-CNP  Reason for Referral: Swallow  Evaluation  Past Medical History Relevant to Rehab: per EMR:69-year-old male with past medical history of Parkinson's disease who presented to [facility] due to generalized weakness with recurrent falls at home. On exam patient with noted parkinsonian dementia and will slowed responses to questioning/strength morsels HPI was obtained from the medical record. Patient has had multiple falls that were nontraumatic and supervised at home by his home physical therapist. Patient weakness has been worsening. Patient was recently admitted however left AMA as he was going to be admitted under observation status ambulatory about cost due to lack of insurance coverage. Patient family returned with patient today requesting admission for PT and OT evaluation and placement. Per family reports patient has not had any significant nausea, emesis comparison scalping, back pain or abdominal pain. No diarrhea. No fevers, chills or exposure to sick persons. Patient reportedly has some decreased sensation in his feet. No tobacco, alcohol or drug use reported. On exam patient resting in hallway bed with no acute distress on observation. He is quite rigid throughout the entire body. Needing assistance x 2 to sit up, dress and change positions. He responds to his name but is very slow to form words or sentences.  Patient Seen During This Visit: No  Missed Time Reason: Not applicable (Patient working with other medical/therapy staff upon SLP arrival.)  Date of Onset: 12/27/24  Date of Order: 12/27/24  [employer] Diet: Regular Diet with Thin Liquids  Current Diet : Regular Diet with Thin Liquids    Objective       Baseline Assessment:  Respiratory Status: Room air  Behavior/Cognition: Impulsive, Confused, Alert, Distractible  Vision: Functional for self-feeding  Hearing: Within Functional Limits  Patient Positioning: Upright in Bed  Baseline Vocal Quality: Normal      Pain:  Pain Assessment  Pain Assessment: 0-10  0-10 (Numeric)  Pain Score: 0 - No pain    Oral/Motor Assessment:  Oral Hygiene: Clean, moist  Dentition: Adequate/Natural  Oral Motor: Unable to Complete (pt unable to follow commands [facility]; Suspected WFL)  Intelligibility: Intelligibility reduced  Hearing: Within Functional Limits      Consistencies Trialed:  Consistencies Trialed: Yes  Consistencies Trialed: Thin (IDDSI Level 0) - Cup, Regular (IDDSI Level 7)      Clinical Observations:  Patient Positioning: Upright in Bed  Was The 3 oz Swallow Protocol Completed: Yes  Clinical Observation Comment:No clinical indicators of oral dysphagia or pharyngeal dysphagia. Patient's prognosis for diet tolerance is good.  Per the results of this assessment, recommend patient to resume baseline diet of regular solids and thin liquids.  Pt is a low risk of developing pulmonary complications if an aspiration event occurs however due to the pts cognitive status, ST to follow to assess diet tolerance.       Goals:  Pt and family will be educated on swallow strategies for safe and efficient swallow of recommended diet in all given opportunities to reduced re-admission for aspiration complications.   2. Pt will tolerate the recommended diet level with no s/s of aspiration.     Education:  Learner patient;    Barriers to Learning acuteness of illness barrier; cognitive limitations barrier;    Method demonstration; verbal;    Education - Topic ST provided patient education regarding role of ST, purpose of assessment, clinical impressions, goals of treatment, and plan of care. Patient verbalized questionable full comprehension, consistent with cognitive status. Education will be reinforced. ST further coordinated with RN regarding recommendations and precautions per this assessment, with RN verbalizing understanding.     Outcome     needs review/reinforcement

## 2024-12-30 NOTE — PROGRESS NOTES
"Kalani Keith is a 69 y.o. male on day 0 of admission presenting with Ambulatory dysfunction.      Subjective   Pt observed walking to the bathroom with a walker. Shuffling gait.      Objective     Last Recorded Vitals  Blood pressure 119/76, pulse 74, temperature 37.2 °C (99 °F), temperature source Temporal, resp. rate 16, height 1.778 m (5' 10\"), weight 83.1 kg (183 lb 3.2 oz), SpO2 94%.    Physical Exam  Constitutional:       General: He is awake.   Eyes:      Extraocular Movements: Extraocular movements intact.      Pupils: Pupils are equal, round, and reactive to light.   Neurological:      Mental Status: He is alert.      Motor: Motor strength is normal.  Psychiatric:         Speech: Speech normal.       Neurological Exam  Mental Status  Awake and alert. Oriented only to person and place. Speech is normal. Language is fluent with no aphasia.  Slight language barrier..    Cranial Nerves  CN II: Visual fields full to confrontation.  CN III, IV, VI: Extraocular movements intact bilaterally. Pupils equal round and reactive to light bilaterally.  CN V: Facial sensation is normal.  CN VII: Full and symmetric facial movement.  CN IX, X: Palate elevates symmetrically  CN XII: Tongue midline without atrophy or fasciculations.    Motor   Strength is 5/5 throughout all four extremities.    Sensory  Light touch is normal in upper and lower extremities.     Coordination  Right: Finger-to-nose normal.Left: Finger-to-nose normal.    Gait    Shuffled gait.    Relevant Results  Scheduled medications  amLODIPine, 5 mg, oral, Daily  carbidopa-levodopa, 1 tablet, oral, Nightly  carbidopa-levodopa, 1.5 tablet, oral, q6h  divalproex sprinkle, 250 mg, oral, Once  enoxaparin, 40 mg, subcutaneous, q24h  furosemide, 20 mg, oral, Daily  linaCLOtide, 145 mcg, oral, Daily before breakfast  sertraline, 25 mg, oral, Nightly      Continuous medications     PRN medications  PRN medications: acetaminophen  Results for orders placed or performed " during the hospital encounter of 12/27/24 (from the past 24 hours)   CBC   Result Value Ref Range    WBC 3.9 (L) 4.4 - 11.3 x10*3/uL    nRBC 0.0 0.0 - 0.0 /100 WBCs    RBC 5.51 4.50 - 5.90 x10*6/uL    Hemoglobin 14.6 13.5 - 17.5 g/dL    Hematocrit 47.7 41.0 - 52.0 %    MCV 87 80 - 100 fL    MCH 26.5 26.0 - 34.0 pg    MCHC 30.6 (L) 32.0 - 36.0 g/dL    RDW 13.5 11.5 - 14.5 %    Platelets 195 150 - 450 x10*3/uL   Magnesium   Result Value Ref Range    Magnesium 2.32 1.60 - 2.40 mg/dL   Hepatic function panel   Result Value Ref Range    Albumin 3.9 3.4 - 5.0 g/dL    Bilirubin, Total 0.7 0.0 - 1.2 mg/dL    Bilirubin, Direct 0.1 0.0 - 0.3 mg/dL    Alkaline Phosphatase 59 33 - 136 U/L    ALT 8 (L) 10 - 52 U/L    AST 18 9 - 39 U/L    Total Protein 7.2 6.4 - 8.2 g/dL   Phosphorus   Result Value Ref Range    Phosphorus 2.8 2.5 - 4.9 mg/dL   Basic Metabolic Panel   Result Value Ref Range    Glucose 95 74 - 99 mg/dL    Sodium 136 136 - 145 mmol/L    Potassium 3.7 3.5 - 5.3 mmol/L    Chloride 100 98 - 107 mmol/L    Bicarbonate 26 21 - 32 mmol/L    Anion Gap 14 10 - 20 mmol/L    Urea Nitrogen 9 6 - 23 mg/dL    Creatinine 0.89 0.50 - 1.30 mg/dL    eGFR >90 >60 mL/min/1.73m*2    Calcium 8.8 8.6 - 10.3 mg/dL     ECG 12 lead    Result Date: 12/30/2024  Sinus tachycardia Possible Left atrial enlargement Borderline ECG When compared with ECG of 13-DEC-2024 12:43, No significant change was found    CT head wo IV contrast    Result Date: 12/27/2024  Interpreted By:  Carlo Cruz, STUDY: CT HEAD WO IV CONTRAST; CT CERVICAL SPINE WO IV CONTRAST;  12/27/2024 6:09 pm   INDICATION: Signs/Symptoms:recurrent falls; Signs/Symptoms:recurrent falls, numbness on arms     COMPARISON: CT head and cervical spine 12/13/2024   ACCESSION NUMBER(S): [id number]; [id number]   ORDERING CLINICIAN: OPAL HANSEN   TECHNIQUE: Axial noncontrast CT images of head with coronal and sagittal reconstructed images. Axial noncontrast CT images of the cervical  spine with coronal and sagittal reconstructed images.   FINDINGS: CT HEAD:   BRAIN PARENCHYMA: Gray-white differentiation is preserved. No mass-effect, midline shift or effacement of cerebral sulci. Patchy periventricular and subcortical white matter hypodensities, nonspecific but often seen in the setting of chronic microangiopathic change.   HEMORRHAGE: No acute intracranial hemorrhage.   VENTRICLES and EXTRA-AXIAL SPACES: The ventricles and sulci are within normal limits for brain volume. No abnormal extra-axial fluid collection.   ORBITS: The visualized orbits and globes are within normal limits.   EXTRACRANIAL SOFT TISSUES: Within normal limits.   PARANASAL SINUSES/MASTOIDS: The visualized paranasal sinuses and mastoid air cells are well aerated.   CALVARIUM: No depressed skull fracture.     CT CERVICAL SPINE:   CRANIOCERVICAL JUNCTION: Intact.   ALIGNMENT: No traumatic malalignment or traumatic facet widening. Reversal of the cervical lordotic curvature at C5.   VERTEBRAE/DISC SPACES: No acute fracture. Vertebral body heights are maintained. Varying degrees of mild-to-moderate multilevel disc space height loss and associated degenerative endplate changes. No high grade spinal canal stenosis evident by CT.   SOFT TISSUES: No significant abnormality. Calcifications of the nuchal ligament at C5-C6.   OTHER: The visualized lung apices are clear.       CT HEAD: 1. No acute intracranial abnormality or calvarial fracture.     CT CERVICAL SPINE: 1. No acute fracture or traumatic malalignment of the cervical spine.   MACRO: None   Signed by: Carlo Cruz 12/27/2024 6:47 PM Dictation workstation:   [id number]    CT cervical spine wo IV contrast    Result Date: 12/27/2024  Interpreted By:  Carlo Cruz, STUDY: CT HEAD WO IV CONTRAST; CT CERVICAL SPINE WO IV CONTRAST;  12/27/2024 6:09 pm   INDICATION: Signs/Symptoms:recurrent falls; Signs/Symptoms:recurrent falls, numbness on arms     COMPARISON: CT head and cervical  spine 12/13/2024   ACCESSION NUMBER(S): [id number]; [id number]   ORDERING CLINICIAN: OPAL HANSEN   TECHNIQUE: Axial noncontrast CT images of head with coronal and sagittal reconstructed images. Axial noncontrast CT images of the cervical spine with coronal and sagittal reconstructed images.   FINDINGS: CT HEAD:   BRAIN PARENCHYMA: Gray-white differentiation is preserved. No mass-effect, midline shift or effacement of cerebral sulci. Patchy periventricular and subcortical white matter hypodensities, nonspecific but often seen in the setting of chronic microangiopathic change.   HEMORRHAGE: No acute intracranial hemorrhage.   VENTRICLES and EXTRA-AXIAL SPACES: The ventricles and sulci are within normal limits for brain volume. No abnormal extra-axial fluid collection.   ORBITS: The visualized orbits and globes are within normal limits.   EXTRACRANIAL SOFT TISSUES: Within normal limits.   PARANASAL SINUSES/MASTOIDS: The visualized paranasal sinuses and mastoid air cells are well aerated.   CALVARIUM: No depressed skull fracture.     CT CERVICAL SPINE:   CRANIOCERVICAL JUNCTION: Intact.   ALIGNMENT: No traumatic malalignment or traumatic facet widening. Reversal of the cervical lordotic curvature at C5.   VERTEBRAE/DISC SPACES: No acute fracture. Vertebral body heights are maintained. Varying degrees of mild-to-moderate multilevel disc space height loss and associated degenerative endplate changes. No high grade spinal canal stenosis evident by CT.   SOFT TISSUES: No significant abnormality. Calcifications of the nuchal ligament at C5-C6.   OTHER: The visualized lung apices are clear.       CT HEAD: 1. No acute intracranial abnormality or calvarial fracture.     CT CERVICAL SPINE: 1. No acute fracture or traumatic malalignment of the cervical spine.   MACRO: None   Signed by: Carlo Cruz 12/27/2024 6:47 PM Dictation workstation:   [id number]    XR chest 1 view    Result Date: 12/27/2024  Interpreted By:   Anson Kim, STUDY: XR CHEST 1 VIEW;  12/27/2024 3:15 pm   INDICATION: Signs/Symptoms:generalized weakness.     COMPARISON: None.   ACCESSION NUMBER(S): [id number]   ORDERING CLINICIAN: OPAL HANSEN   FINDINGS: CARDIOMEDIASTINAL SILHOUETTE: Cardiomediastinal silhouette is normal in size and configuration.   LUNGS: Left basilar mild linear atelectasis is present. No focal consolidation. No pleural effusion or pneumothorax.   ABDOMEN: No remarkable upper abdominal findings.   BONES: Multilevel endplate spurring present in the spine.       1.  Left basilar mild linear atelectasis. No focal consolidation.       MACRO: None.   Signed by: Anson Kim 12/27/2024 4:05 PM Dictation workstation:   [id number]    ECG 12 lead    Result Date: 12/16/2024  Sinus tachycardia Otherwise normal ECG When compared with ECG of 21-NOV-2024 21:08, No significant change was found BASELINE ARTIFACT Confirmed by Abdiel Sloan ([id number]) on 12/16/2024 11:59:49 AM    CT cervical spine wo IV contrast    Result Date: 12/13/2024  Interpreted By:  Ayo Ríos, STUDY: CT CERVICAL SPINE WO IV CONTRAST; 12/13/2024 12:37 pm   INDICATION: Signs/Symptoms:fall;   COMPARISON: None   ACCESSION NUMBER(S): [id number]   ORDERING CLINICIAN: OSCAR MATOS   TECHNIQUE: Contiguous axial images were acquired from the skull base to the lung apices. Coronal and sagittal reformatted images were obtained. All CT examinations are performed with 1 or more of the following dose reduction techniques: Automated exposure control, adjustment of mA and/or kv according to patient's size, or use of iterative reconstruction techniques.   FINDINGS: There is straightening of the normal cervical lordosis.  No acute fracture or spondylolisthesis is identified.     The occipital condyles, arch of C1, and the odontoid processes are intact. The atlantoaxial relationship is well maintained.   There is moderate disc space narrowing at C4-5, C5-6. Mild-moderate disc space  narrowing at C6-7. Bony spinal canal is patent.   Mild right neural foramina stenosis at C4-5 mild-moderate left neural foramina stenosis at C5-6.   The visualized lung apices are unremarkable.       1. No acute fracture or spondylolisthesis. 2. Degenerative disc disease and spondylosis as described in the body of the report.     Signed by: Ayo Ríos 12/13/2024 1:02 PM Dictation workstation:   [id number]    CT brain attack head wo IV contrast    Result Date: 12/13/2024  Interpreted By:  Ayo Ríos, STUDY: OSCAR MATOS; 12/13/2024 12:37 pm   INDICATION: Signs/Symptoms:Stroke Evaluation;   COMPARISON: 11/21/2024   ACCESSION NUMBER(S): [id number]   ORDERING CLINICIAN: OSCAR MATOS   TECHNIQUE: Contiguous axial images were acquired from the vertex through the posterior fossa without IV contrast. All CT examinations are performed with 1 or more of the following dose reduction techniques: Automated exposure control, adjustment of mA and/or kv according to patient's size, or use of iterative reconstruction techniques.   FINDINGS: No focal mass effect or midline shift is identified. The ventricles and sulci are symmetric and appropriate for the patient's age.   There is a mild degree of nonspecific white matter hypodensity, most consistent with chronic small-vessel ischemic disease. The gray white matter differentiation is preserved.   No acute intracranial hemorrhage is seen. No intra-axial or extra-axial fluid collection is seen.   The visualized paranasal sinuses and mastoid air cells are clear.       No CT evidence for acute intracranial pathology.     Findings discussed with OSCAR MATOS via telephone at 12:50 p.m. on 12/13/2024   Signed by: Ayo Ríos 12/13/2024 12:58 PM Dictation workstation:   [id number]        Assessment/Plan      Assessment & Plan  Ambulatory dysfunction    Advanced parkinson's disease with postural instability     Continue carbidopa-levodopa to 1.5 QID, continued home  "50-200mg 1 tablet at bedtime    PT/OT  Follow up with Dr. Rojas as scheduled.     Would benefit from rehab     Case/plan discussed and pt seen with Dr. Pelaez.    HEMANTH Carrillo-CNP      I saw and evaluated the patient.  I obtained the key portions of the history and examination.  I reviewed the residents/APNs note, discussed the patient and supervised treatment plan formulation.    Subjective:  No overnight event    Objective:  /76 (BP Location: Left arm, Patient Position: Lying)   Pulse 74   Temp 37.2 °C (99 °F) (Temporal)   Resp 16   Ht 1.778 m (5' 10\")   Wt 83.1 kg (183 lb 3.2 oz)   SpO2 94%   BMI 26.29 kg/m²     Gen: NAD  Neuro:  --HIF: A&O X 3, repetition and naming intact  --CN:  PERRLA, EOMI, VFF, no visible facial asymmetry, facial sensation intact, no tongue or palatal deviation, SCM intact  --Motor: Moves all 4 extremities equally; no focal deficits; bilateral bradykinesia; no rest tremor or cogwheel rigidity  --Sensory: Intact to light touch, intact to pinprick  --Reflex: 2+ symmetric, toes down  --Cerebellum: FTN and HTS intact  --Gait: severe shuffling gait, uses walker    Assessment:   Parkinson's Disease with postural instability  - continue Carbidopa-Levodopa 25/100 mg, 1.5 tabs  QID   - continue Carbidopa-Levodopa ER 50/200 mg 1 tab at bedtime  - PT/OT - likely needs placement  - will sign off; please page with additional questions    Donn Pelaez MD  [facility]  Department of Neurology      "

## 2024-12-30 NOTE — CARE PLAN
The patient's goals for the shift include  remain  afebrile    The clinical goals for the shift include Patient will remain safe with no fall/injury and use call light appropriately for assistance as needed thru the end of this shift.

## 2024-12-30 NOTE — CARE PLAN
The clinical goals for the shift include Patient will remain safe with no fall/injury and use call light appropriately for assistance as needed thru the end of this shift. Did not use call light. Kept getting out of bed, trying to leave room. Called daughter who spoke with patient. Afterwards, pt did not try to leave room, just moved from chair to bed often. Unsteady, uses walker. Delay in speaking. Unsure if expressive aphagia or unable to find correct English words.       Problem: Safety - Adult  Goal: Free from fall injury  Outcome: Progressing     Problem: Discharge Planning  Goal: Discharge to home or other facility with appropriate resources  Outcome: Progressing     Problem: Chronic Conditions and Co-morbidities  Goal: Patient's chronic conditions and co-morbidity symptoms are monitored and maintained or improved  Outcome: Progressing     Problem: Pain - Adult  Goal: Verbalizes/displays adequate comfort level or baseline comfort level  Outcome: Progressing     Problem: Skin  Goal: Decreased wound size/increased tissue granulation at next dressing change  Outcome: Progressing  Flowsheets (Taken 12/30/2024 0426)  Decreased wound size/increased tissue granulation at next dressing change: Promote sleep for wound healing  Goal: Participates in plan/prevention/treatment measures  Outcome: Progressing  Flowsheets (Taken 12/30/2024 0426)  Participates in plan/prevention/treatment measures: Elevate heels  Goal: Prevent/manage excess moisture  Outcome: Progressing  Flowsheets (Taken 12/30/2024 0426)  Prevent/manage excess moisture:   Cleanse incontinence/protect with barrier cream   Monitor for/manage infection if present  Goal: Prevent/minimize sheer/friction injuries  Outcome: Progressing  Flowsheets (Taken 12/30/2024 0426)  Prevent/minimize sheer/friction injuries:   Use pull sheet   Increase activity/out of bed for meals  Goal: Promote/optimize nutrition  Outcome: Progressing  Goal: Promote skin healing  Outcome:  Progressing  Flowsheets (Taken 12/30/2024 0426)  Promote skin healing:   Assess skin/pad under line(s)/device(s)   Turn/reposition every 2 hours/use positioning/transfer devices

## 2024-12-30 NOTE — PROGRESS NOTES
12/30/24 1217   Discharge Planning   Home or Post Acute Services Post acute facilities (Rehab/SNF/etc)   Type of Post Acute Facility Services Skilled nursing   Expected Discharge Disposition SNF   Does the patient need discharge transport arranged? Yes   RoundTrip coordination needed? Yes   Has discharge transport been arranged? No     Message sent to the direct pre cert team to send for insurance authorization for [facility]. They can accept when he is medially ready for discharge.     1045: Request from direct pre cert team to verify insurance information.    1210: I verified by phone with spouse his insurance numbers read from the card, they match our information recorded under financial class. Wife is ill and unable to bring in a copy of the card.     1345: Direct pre cert team is unable to submit for pre cert. Message left for [facility] liaison to give me a call regarding patient's insurance/pre cert.

## 2024-12-30 NOTE — PROGRESS NOTES
"Kalani Keith is a 69 y.o. male on day 0 of admission presenting with Ambulatory dysfunction.    Subjective   Patient seen and examined, no acute events overnight.  Resting in bed comfortably this morning not in any distress.       Objective     Physical Exam by System:          Constitutional: Well developed, awake/alert/oriented x3, slow to respond to questions, masked facies  Eyes: PERRL, EOMI, clear sclera  ENMT: mucous membranes moist  Head/Neck: Neck supple  Respiratory/Thorax: CTA b/l.   Cardiovascular: Regular, rate and rhythm, no murmurs  Gastrointestinal: Nondistended, soft, non-tender  Musculoskeletal: rigid extremities         Last Recorded Vitals  Blood pressure 119/76, pulse 74, temperature 37.2 °C (99 °F), temperature source Temporal, resp. rate 16, height 1.778 m (5' 10\"), weight 83.1 kg (183 lb 3.2 oz), SpO2 94%.  Intake/Output last 3 Shifts:  I/O last 3 completed shifts:  In: 560 (6.7 mL/kg) [P.O.:560]  Out: 200 (2.4 mL/kg) [Urine:200 (0.1 mL/kg/hr)]  Weight: 83.1 kg     Relevant Results  Scheduled medications  amLODIPine, 5 mg, oral, Daily  carbidopa-levodopa, 1 tablet, oral, Nightly  carbidopa-levodopa, 1.5 tablet, oral, q6h  divalproex sprinkle, 250 mg, oral, Once  enoxaparin, 40 mg, subcutaneous, q24h  furosemide, 20 mg, oral, Daily  linaCLOtide, 145 mcg, oral, Daily before breakfast  sertraline, 25 mg, oral, Nightly      Continuous medications     PRN medications  PRN medications: acetaminophen  Results from last 7 days   Lab Units 12/30/24  0618 12/29/24  0758 12/28/24  0521   WBC AUTO x10*3/uL 3.9* 3.5* 6.0   RBC AUTO x10*6/uL 5.51 4.92 5.03   HEMOGLOBIN g/dL 14.6 13.3* 13.6     Results from last 7 days   Lab Units 12/30/24  0618 12/29/24  0758 12/28/24  0521 12/27/24  1447   SODIUM mmol/L 136 134* 134* 133*   POTASSIUM mmol/L 3.7 3.7 3.7 3.8   CHLORIDE mmol/L 100 100 100 97*   CO2 mmol/L 26 28 24 23   BUN mg/dL 9 13 13 13   CREATININE mg/dL 0.89 0.92 0.99 1.27   CALCIUM mg/dL 8.8 8.3* 8.4* " 8.9   PHOSPHORUS mg/dL 2.8 2.9 2.6  --    MAGNESIUM mg/dL 2.32 2.21 2.24 1.90   BILIRUBIN TOTAL mg/dL 0.7  --   --  0.8   ALT U/L 8*  --   --  15   AST U/L 18  --   --  25                Assessment/Plan   Assessment & Plan  Ambulatory dysfunction    Generalized weakness   Transient encephalopathy   Parkinson's disease  HTN urgency  Essential HTN  Frequent falls      PLAN  Admit patient  Labs, prior records and radiological studies reviewed  No indication for telemetry monitoring  Monitor and replace electrolytes per protocol  Resume patient home medications as appropriate once med rec completed  D/w Dr. Steiner from neurology, recommend to increase Sinemet to 1.5 tabs every 6 hours (from 1 tabs every 6 hours), also does not believe MRI is necessary since patient has not had any neurological changes compare to last admission  PT, OT and ST evaluation  Fall precautions  TCC for discharge planning, need precert for placement       DVTP: Lovenox  CODE STATUS: DNR CCA/DNI          I spent 15 minutes in the professional and overall care of this patient.        This dictation was created using voice recognition software.  If there are any questions about inaccuracies please do not hesitate to contact me.      Ruddy Hill MD

## 2024-12-31 DIAGNOSIS — F32.A DEPRESSION, UNSPECIFIED DEPRESSION TYPE: ICD-10-CM

## 2024-12-31 PROCEDURE — 2500000004 HC RX 250 GENERAL PHARMACY W/ HCPCS (ALT 636 FOR OP/ED)

## 2024-12-31 PROCEDURE — 2500000001 HC RX 250 WO HCPCS SELF ADMINISTERED DRUGS (ALT 637 FOR MEDICARE OP): Performed by: NURSE PRACTITIONER

## 2024-12-31 PROCEDURE — 99231 SBSQ HOSP IP/OBS SF/LOW 25: CPT | Performed by: STUDENT IN AN ORGANIZED HEALTH CARE EDUCATION/TRAINING PROGRAM

## 2024-12-31 PROCEDURE — 2500000002 HC RX 250 W HCPCS SELF ADMINISTERED DRUGS (ALT 637 FOR MEDICARE OP, ALT 636 FOR OP/ED)

## 2024-12-31 PROCEDURE — 2500000001 HC RX 250 WO HCPCS SELF ADMINISTERED DRUGS (ALT 637 FOR MEDICARE OP)

## 2024-12-31 PROCEDURE — G0378 HOSPITAL OBSERVATION PER HR: HCPCS

## 2024-12-31 PROCEDURE — 96372 THER/PROPH/DIAG INJ SC/IM: CPT

## 2024-12-31 PROCEDURE — 2500000001 HC RX 250 WO HCPCS SELF ADMINISTERED DRUGS (ALT 637 FOR MEDICARE OP): Performed by: STUDENT IN AN ORGANIZED HEALTH CARE EDUCATION/TRAINING PROGRAM

## 2024-12-31 RX ORDER — SERTRALINE HYDROCHLORIDE 25 MG/1
25 TABLET, FILM COATED ORAL NIGHTLY
Qty: 30 TABLET | Refills: 1 | Status: SHIPPED | OUTPATIENT
Start: 2024-12-31 | End: 2025-03-01

## 2024-12-31 RX ADMIN — LINACLOTIDE 145 MCG: 145 CAPSULE, GELATIN COATED ORAL at 06:58

## 2024-12-31 RX ADMIN — CARBIDOPA AND LEVODOPA 1.5 TABLET: 25; 250 TABLET ORAL at 16:45

## 2024-12-31 RX ADMIN — SERTRALINE 25 MG: 25 TABLET, FILM COATED ORAL at 21:06

## 2024-12-31 RX ADMIN — CARBIDOPA AND LEVODOPA 1 TABLET: 50; 200 TABLET, EXTENDED RELEASE ORAL at 21:05

## 2024-12-31 RX ADMIN — AMLODIPINE BESYLATE 5 MG: 5 TABLET ORAL at 08:34

## 2024-12-31 RX ADMIN — ENOXAPARIN SODIUM 40 MG: 40 INJECTION SUBCUTANEOUS at 23:18

## 2024-12-31 RX ADMIN — CARBIDOPA AND LEVODOPA 1.5 TABLET: 25; 250 TABLET ORAL at 08:35

## 2024-12-31 RX ADMIN — FUROSEMIDE 20 MG: 20 TABLET ORAL at 08:35

## 2024-12-31 RX ADMIN — CARBIDOPA AND LEVODOPA 1.5 TABLET: 25; 250 TABLET ORAL at 21:05

## 2024-12-31 ASSESSMENT — COGNITIVE AND FUNCTIONAL STATUS - GENERAL
PERSONAL GROOMING: A LITTLE
MOBILITY SCORE: 24
HELP NEEDED FOR BATHING: A LITTLE
DAILY ACTIVITIY SCORE: 18
CLIMB 3 TO 5 STEPS WITH RAILING: A LITTLE
MOBILITY SCORE: 20
TOILETING: A LITTLE
EATING MEALS: A LITTLE
WALKING IN HOSPITAL ROOM: A LITTLE
DRESSING REGULAR LOWER BODY CLOTHING: A LITTLE
DAILY ACTIVITIY SCORE: 24
MOVING TO AND FROM BED TO CHAIR: A LITTLE
DRESSING REGULAR UPPER BODY CLOTHING: A LITTLE
STANDING UP FROM CHAIR USING ARMS: A LITTLE

## 2024-12-31 ASSESSMENT — PAIN SCALES - GENERAL: PAINLEVEL_OUTOF10: 0 - NO PAIN

## 2024-12-31 ASSESSMENT — PAIN - FUNCTIONAL ASSESSMENT: PAIN_FUNCTIONAL_ASSESSMENT: 0-10

## 2024-12-31 NOTE — PROGRESS NOTES
"Kalani Keith is a 69 y.o. male on day 0 of admission presenting with Ambulatory dysfunction.    Subjective   Patient seen examined, no acute events overnight.  Sitting in the chair comfortably without any complaints.       Objective     Physical Exam by System:          Constitutional: Well developed, awake/alert/oriented x3, slow to respond to questions, masked facies  Eyes: PERRL, EOMI, clear sclera  ENMT: mucous membranes moist  Head/Neck: Neck supple  Respiratory/Thorax: CTA b/l.   Cardiovascular: Regular, rate and rhythm, no murmurs  Gastrointestinal: Nondistended, soft, non-tender  Musculoskeletal: rigid extremities      Last Recorded Vitals  Blood pressure 124/71, pulse 90, temperature 37.1 °C (98.8 °F), temperature source Temporal, resp. rate 17, height 1.778 m (5' 10\"), weight 83.1 kg (183 lb 3.2 oz), SpO2 94%.  Intake/Output last 3 Shifts:  I/O last 3 completed shifts:  In: 440 (5.3 mL/kg) [P.O.:440]  Out: 500 (6 mL/kg) [Urine:500 (0.2 mL/kg/hr)]  Weight: 83.1 kg     Relevant Results  Scheduled medications  amLODIPine, 5 mg, oral, Daily  carbidopa-levodopa, 1 tablet, oral, Nightly  carbidopa-levodopa, 1.5 tablet, oral, q6h  divalproex sprinkle, 250 mg, oral, Once  enoxaparin, 40 mg, subcutaneous, q24h  furosemide, 20 mg, oral, Daily  linaCLOtide, 145 mcg, oral, Daily before breakfast  sertraline, 25 mg, oral, Nightly      Continuous medications     PRN medications  PRN medications: acetaminophen  Results from last 7 days   Lab Units 12/30/24  0618 12/29/24  0758 12/28/24  0521   WBC AUTO x10*3/uL 3.9* 3.5* 6.0   RBC AUTO x10*6/uL 5.51 4.92 5.03   HEMOGLOBIN g/dL 14.6 13.3* 13.6     Results from last 7 days   Lab Units 12/30/24  0618 12/29/24  0758 12/28/24  0521 12/27/24  1447   SODIUM mmol/L 136 134* 134* 133*   POTASSIUM mmol/L 3.7 3.7 3.7 3.8   CHLORIDE mmol/L 100 100 100 97*   CO2 mmol/L 26 28 24 23   BUN mg/dL 9 13 13 13   CREATININE mg/dL 0.89 0.92 0.99 1.27   CALCIUM mg/dL 8.8 8.3* 8.4* 8.9 "   PHOSPHORUS mg/dL 2.8 2.9 2.6  --    MAGNESIUM mg/dL 2.32 2.21 2.24 1.90   BILIRUBIN TOTAL mg/dL 0.7  --   --  0.8   ALT U/L 8*  --   --  15   AST U/L 18  --   --  25                Assessment/Plan   Assessment & Plan  Ambulatory dysfunction    Generalized weakness   Transient encephalopathy   Parkinson's disease  HTN urgency  Essential HTN  Frequent falls      PLAN  Admit patient  Labs, prior records and radiological studies reviewed  No indication for telemetry monitoring  Monitor and replace electrolytes per protocol  Resume patient home medications as appropriate once med rec completed  D/w Dr. Steiner from neurology, recommend to increase Sinemet to 1.5 tabs every 6 hours (from 1 tabs every 6 hours), also does not believe MRI is necessary since patient has not had any neurological changes compare to last admission  PT, OT and ST evaluation  Fall precautions  TCC for discharge planning, need precert for placement      DVTP: Lovenox  CODE STATUS: DNR CCA/DNI       I spent 15 minutes in the professional and overall care of this patient.        This dictation was created using voice recognition software.  If there are any questions about inaccuracies please do not hesitate to contact me.      Ruddy Hill MD

## 2024-12-31 NOTE — CARE PLAN
The clinical goals for the shift include Patient will remain safe with no fall/injury and use call light appropriately for assistance as needed thru the end of this shift. Still restless, back & forth between chair and bed. But did get a few hours of sleep.       Problem: Pain - Adult  Goal: Verbalizes/displays adequate comfort level or baseline comfort level  Outcome: Progressing     Problem: Safety - Adult  Goal: Free from fall injury  Outcome: Progressing     Problem: Fall/Injury  Goal: Not fall by end of shift  Outcome: Progressing  Goal: Be free from injury by end of the shift  Outcome: Progressing  Goal: Verbalize understanding of personal risk factors for fall in the hospital  Outcome: Progressing  Goal: Verbalize understanding of risk factor reduction measures to prevent injury from fall in the home  Outcome: Progressing  Goal: Use assistive devices by end of the shift  Outcome: Progressing  Goal: Pace activities to prevent fatigue by end of the shift  Outcome: Progressing       Problem: Skin  Goal: Participates in plan/prevention/treatment measures  Outcome: Progressing     Problem: Skin  Goal: Prevent/manage excess moisture  Outcome: Progressing     Problem: Skin  Goal: Prevent/minimize sheer/friction injuries  Outcome: Progressing     Problem: Skin  Goal: Promote/optimize nutrition  Outcome: Progressing

## 2024-12-31 NOTE — PROGRESS NOTES
12/31/24 0925   Discharge Planning   Home or Post Acute Services Post acute facilities (Rehab/SNF/etc)   Expected Discharge Disposition SNF   Does the patient need discharge transport arranged? Yes   RoundTrip coordination needed? Yes   Has discharge transport been arranged? No   What day is the transport expected? 12/31/24     Provide line contacted who referred me to member services to check benefits regarding post care eligibility. Member services referred me to provider services and then I was referred to dual provider services. Nhung (representative) states member's plan is not supported by the [employer] portal and must request for authorization must be faxed to: [phone] or called into [phone] and request the authorization . Direct pre cert team notified and asked me to notify the facility to send for authorization. Message sent to Aissatou Blandon to send for authorization.

## 2025-01-01 PROCEDURE — 2500000001 HC RX 250 WO HCPCS SELF ADMINISTERED DRUGS (ALT 637 FOR MEDICARE OP): Performed by: NURSE PRACTITIONER

## 2025-01-01 PROCEDURE — 2500000001 HC RX 250 WO HCPCS SELF ADMINISTERED DRUGS (ALT 637 FOR MEDICARE OP): Performed by: STUDENT IN AN ORGANIZED HEALTH CARE EDUCATION/TRAINING PROGRAM

## 2025-01-01 PROCEDURE — 92526 ORAL FUNCTION THERAPY: CPT | Mod: GN

## 2025-01-01 PROCEDURE — 2500000001 HC RX 250 WO HCPCS SELF ADMINISTERED DRUGS (ALT 637 FOR MEDICARE OP)

## 2025-01-01 PROCEDURE — 2500000004 HC RX 250 GENERAL PHARMACY W/ HCPCS (ALT 636 FOR OP/ED)

## 2025-01-01 PROCEDURE — 99231 SBSQ HOSP IP/OBS SF/LOW 25: CPT | Performed by: STUDENT IN AN ORGANIZED HEALTH CARE EDUCATION/TRAINING PROGRAM

## 2025-01-01 PROCEDURE — G0378 HOSPITAL OBSERVATION PER HR: HCPCS

## 2025-01-01 PROCEDURE — 96372 THER/PROPH/DIAG INJ SC/IM: CPT

## 2025-01-01 PROCEDURE — 2500000002 HC RX 250 W HCPCS SELF ADMINISTERED DRUGS (ALT 637 FOR MEDICARE OP, ALT 636 FOR OP/ED)

## 2025-01-01 RX ORDER — DIVALPROEX SODIUM 125 MG/1
250 CAPSULE, COATED PELLETS ORAL ONCE
Status: DISCONTINUED | OUTPATIENT
Start: 2025-01-01 | End: 2025-01-07 | Stop reason: HOSPADM

## 2025-01-01 RX ADMIN — AMLODIPINE BESYLATE 5 MG: 5 TABLET ORAL at 09:18

## 2025-01-01 RX ADMIN — LINACLOTIDE 145 MCG: 145 CAPSULE, GELATIN COATED ORAL at 09:18

## 2025-01-01 RX ADMIN — SERTRALINE 25 MG: 25 TABLET, FILM COATED ORAL at 21:39

## 2025-01-01 RX ADMIN — CARBIDOPA AND LEVODOPA 1.5 TABLET: 25; 250 TABLET ORAL at 21:36

## 2025-01-01 RX ADMIN — FUROSEMIDE 20 MG: 20 TABLET ORAL at 09:18

## 2025-01-01 RX ADMIN — ENOXAPARIN SODIUM 40 MG: 40 INJECTION SUBCUTANEOUS at 21:46

## 2025-01-01 RX ADMIN — CARBIDOPA AND LEVODOPA 1 TABLET: 50; 200 TABLET, EXTENDED RELEASE ORAL at 21:37

## 2025-01-01 RX ADMIN — CARBIDOPA AND LEVODOPA 1.5 TABLET: 25; 250 TABLET ORAL at 02:50

## 2025-01-01 RX ADMIN — CARBIDOPA AND LEVODOPA 1.5 TABLET: 25; 250 TABLET ORAL at 09:18

## 2025-01-01 RX ADMIN — CARBIDOPA AND LEVODOPA 1.5 TABLET: 25; 250 TABLET ORAL at 14:57

## 2025-01-01 ASSESSMENT — COGNITIVE AND FUNCTIONAL STATUS - GENERAL
DRESSING REGULAR LOWER BODY CLOTHING: A LITTLE
CLIMB 3 TO 5 STEPS WITH RAILING: A LOT
CLIMB 3 TO 5 STEPS WITH RAILING: A LOT
WALKING IN HOSPITAL ROOM: A LITTLE
DAILY ACTIVITIY SCORE: 19
DAILY ACTIVITIY SCORE: 18
DRESSING REGULAR LOWER BODY CLOTHING: A LITTLE
PERSONAL GROOMING: A LITTLE
STANDING UP FROM CHAIR USING ARMS: A LITTLE
WALKING IN HOSPITAL ROOM: A LITTLE
DRESSING REGULAR UPPER BODY CLOTHING: A LITTLE
HELP NEEDED FOR BATHING: A LITTLE
DRESSING REGULAR UPPER BODY CLOTHING: A LITTLE
PERSONAL GROOMING: A LITTLE
MOBILITY SCORE: 19
HELP NEEDED FOR BATHING: A LITTLE
MOVING TO AND FROM BED TO CHAIR: A LITTLE
EATING MEALS: A LITTLE
MOVING TO AND FROM BED TO CHAIR: A LITTLE
MOBILITY SCORE: 19
TOILETING: A LITTLE
TOILETING: A LITTLE
STANDING UP FROM CHAIR USING ARMS: A LITTLE

## 2025-01-01 ASSESSMENT — PAIN SCALES - GENERAL
PAINLEVEL_OUTOF10: 0 - NO PAIN

## 2025-01-01 ASSESSMENT — PAIN - FUNCTIONAL ASSESSMENT: PAIN_FUNCTIONAL_ASSESSMENT: 0-10

## 2025-01-01 NOTE — PROGRESS NOTES
Speech-Language Pathology  SLP Adult Inpatient  Speech-Language Pathology Treatment     Patient Name: Kalani Keith  MRN: [id number]  Today's Date: 1/1/2025  Time Calculation  Start Time: 0936  Stop Time: 0948  Time Calculation (min): 12 min       Current Problem:   1. Generalized weakness        2. Parkinson's disease with dyskinesia and fluctuating manifestations          Recommendations   Therapeutic Swallow Intervention : PO Trials  Solid Diet Recommendations: Regular (IDDSI Level 7)  Liquid Diet Recommendations: Thin (IDDSI Level 0)  Medications: per patient preference and tolerance    Assessment  SLP TX Intervention Outcome: Making Progress Towards Goals  Prognosis: Good  Treatment Tolerance: Patient tolerated treatment well  Medical Staff Made Aware: Yes  Education Provided: Yes  Therapeutic Swallow:  Swallow Comments: Patient consumed thin liquid via single and sequential straw sips, puree, and dry solid. WFL bilabial seal, no anterior spillage, timely and complete mastication, and no oral residue. Patient independently utilizing thin liquid wash as needed. No overt s/s of cardiopulmonary distress/decline with any PO. Patient appears to be tolerating current baseline regular/thin diet. No further skilled SLP services indicated.     Plan:  Inpatient/Swing Bed or Outpatient: Inpatient  Treatment/Interventions: Dysphagia treatment  SLP TX Plan: Discharge from Speech Therapy  SLP Plan: No skilled SLP  No Skilled SLP: At baseline function  SLP Discharge Recommendations: Other (Comment) (No further skilled SLP needs)  Discussed POC: Patient, Nursing  Discussed Risks/Benefits: Yes, Nursing  Patient/Caregiver Agreeable: Yes  SLP - OK to Discharge: Yes    Subjective   Patient alert and sitting upright in bed upon SLP arrival. Patient reports no dysphagia concerns. Patient fully participated throughout session with SLP.    Most Recent Visit:  SLP Received On: 01/01/25    General Visit Information:   Total Number of Visits  : 2    Pain Assessment:  Pain Assessment  Pain Assessment: 0-10  0-10 (Numeric) Pain Score: 0 - No pain      Objective   Goals (established 12/30/24)  Pt and family will be educated on swallow strategies for safe and efficient swallow of recommended diet in all given opportunities to reduced re-admission for aspiration complications. Goal met, discontinue. Patient independently utilizing slow rate, small bits/sips, and liquid wash as needed.  Pt will tolerate the recommended diet level with no s/s of aspiration. Goal met, discontinue. No overt s/s of cardiopulmonary distress/decline. No change in respiratory status and continues to tolerate room air.    Education: SLP educated patient and nursing staff on results/recommendations and strategies.    Lexi Blevins MA, CCC-SLP  Speech Language Pathologist

## 2025-01-01 NOTE — PROGRESS NOTES
"Kalani Keith is a 69 y.o. male on day 0 of admission presenting with Ambulatory dysfunction.    Subjective   Patient seen and examined, no acute events overnight.  Still waiting for placement.  Patient states yesterday evening, he was able to walk laps with walker with assistance in the hallway.       Objective     Physical Exam by System:     Constitutional: Well developed, awake/alert/oriented x3, slow to respond to questions, masked facies  Eyes: PERRL, EOMI, clear sclera  ENMT: mucous membranes moist  Head/Neck: Neck supple  Respiratory/Thorax: CTA b/l.   Cardiovascular: Regular, rate and rhythm, no murmurs  Gastrointestinal: Nondistended, soft, non-tender  Musculoskeletal: rigid extremities      Last Recorded Vitals  Blood pressure 105/61, pulse 81, temperature 36.1 °C (97 °F), temperature source Temporal, resp. rate 20, height 1.778 m (5' 10\"), weight 83.1 kg (183 lb 3.2 oz), SpO2 95%.  Intake/Output last 3 Shifts:  I/O last 3 completed shifts:  In: 800 (9.6 mL/kg) [P.O.:800]  Out: 300 (3.6 mL/kg) [Urine:300 (0.1 mL/kg/hr)]  Weight: 83.1 kg     Relevant Results  Scheduled medications  amLODIPine, 5 mg, oral, Daily  carbidopa-levodopa, 1 tablet, oral, Nightly  carbidopa-levodopa, 1.5 tablet, oral, q6h  divalproex sprinkle, 250 mg, oral, Once  enoxaparin, 40 mg, subcutaneous, q24h  furosemide, 20 mg, oral, Daily  linaCLOtide, 145 mcg, oral, Daily before breakfast  sertraline, 25 mg, oral, Nightly      Continuous medications     PRN medications  PRN medications: acetaminophen  Results from last 7 days   Lab Units 12/30/24  0618 12/29/24  0758 12/28/24  0521   WBC AUTO x10*3/uL 3.9* 3.5* 6.0   RBC AUTO x10*6/uL 5.51 4.92 5.03   HEMOGLOBIN g/dL 14.6 13.3* 13.6     Results from last 7 days   Lab Units 12/30/24  0618 12/29/24  0758 12/28/24  0521 12/27/24  1447   SODIUM mmol/L 136 134* 134* 133*   POTASSIUM mmol/L 3.7 3.7 3.7 3.8   CHLORIDE mmol/L 100 100 100 97*   CO2 mmol/L 26 28 24 23   BUN mg/dL 9 13 13 13 "   CREATININE mg/dL 0.89 0.92 0.99 1.27   CALCIUM mg/dL 8.8 8.3* 8.4* 8.9   PHOSPHORUS mg/dL 2.8 2.9 2.6  --    MAGNESIUM mg/dL 2.32 2.21 2.24 1.90   BILIRUBIN TOTAL mg/dL 0.7  --   --  0.8   ALT U/L 8*  --   --  15   AST U/L 18  --   --  25                Assessment/Plan   Assessment & Plan  Ambulatory dysfunction    Generalized weakness   Transient encephalopathy   Parkinson's disease  HTN urgency  Essential HTN  Frequent falls      PLAN  Admit patient  Labs, prior records and radiological studies reviewed  No indication for telemetry monitoring  Monitor and replace electrolytes per protocol  Resume patient home medications as appropriate once med rec completed  D/w Dr. Steiner from neurology, recommend to increase Sinemet to 1.5 tabs every 6 hours (from 1 tabs every 6 hours), also does not believe MRI is necessary since patient has not had any neurological changes compare to last admission  PT, OT and ST evaluation  Fall precautions  TCC for discharge planning, need precert for placement       I spent 15 minutes in the professional and overall care of this patient.        This dictation was created using voice recognition software.  If there are any questions about inaccuracies please do not hesitate to contact me.      Ruddy Hill MD

## 2025-01-01 NOTE — CARE PLAN
The clinical goals for the shift include Patient will remain safe with no fall/injury and use call light appropriately for assistance as needed thru the end of this shift.  Restless overnight. Went for multiple walks in [place] with staff using walker & gait belt. Slept only a few hours.       Problem: Safety - Adult  Goal: Free from fall injury  Outcome: Progressing     Problem: Chronic Conditions and Co-morbidities  Goal: Patient's chronic conditions and co-morbidity symptoms are monitored and maintained or improved  Outcome: Progressing     Problem: Fall/Injury  Goal: Not fall by end of shift  Outcome: Progressing  Goal: Be free from injury by end of the shift  Outcome: Progressing  Goal: Verbalize understanding of personal risk factors for fall in the hospital  Outcome: Progressing  Goal: Verbalize understanding of risk factor reduction measures to prevent injury from fall in the home  Outcome: Progressing  Goal: Use assistive devices by end of the shift  Outcome: Progressing  Goal: Pace activities to prevent fatigue by end of the shift  Outcome: Progressing     Problem: Pain - Adult  Goal: Verbalizes/displays adequate comfort level or baseline comfort level  Outcome: Progressing     Problem: Skin  Goal: Participates in plan/prevention/treatment measures  Outcome: Progressing  Goal: Prevent/manage excess moisture  Outcome: Progressing  Goal: Prevent/minimize sheer/friction injuries  Outcome: Progressing  Goal: Promote/optimize nutrition  Outcome: Progressing

## 2025-01-02 PROCEDURE — 97530 THERAPEUTIC ACTIVITIES: CPT | Mod: GP,CQ

## 2025-01-02 PROCEDURE — 2500000001 HC RX 250 WO HCPCS SELF ADMINISTERED DRUGS (ALT 637 FOR MEDICARE OP): Performed by: STUDENT IN AN ORGANIZED HEALTH CARE EDUCATION/TRAINING PROGRAM

## 2025-01-02 PROCEDURE — 99231 SBSQ HOSP IP/OBS SF/LOW 25: CPT | Performed by: STUDENT IN AN ORGANIZED HEALTH CARE EDUCATION/TRAINING PROGRAM

## 2025-01-02 PROCEDURE — G0378 HOSPITAL OBSERVATION PER HR: HCPCS

## 2025-01-02 PROCEDURE — 97116 GAIT TRAINING THERAPY: CPT | Mod: GP,CQ

## 2025-01-02 PROCEDURE — 2500000001 HC RX 250 WO HCPCS SELF ADMINISTERED DRUGS (ALT 637 FOR MEDICARE OP): Performed by: NURSE PRACTITIONER

## 2025-01-02 PROCEDURE — 2500000004 HC RX 250 GENERAL PHARMACY W/ HCPCS (ALT 636 FOR OP/ED)

## 2025-01-02 PROCEDURE — 97535 SELF CARE MNGMENT TRAINING: CPT | Mod: GO,CO

## 2025-01-02 PROCEDURE — 2500000002 HC RX 250 W HCPCS SELF ADMINISTERED DRUGS (ALT 637 FOR MEDICARE OP, ALT 636 FOR OP/ED)

## 2025-01-02 PROCEDURE — 96372 THER/PROPH/DIAG INJ SC/IM: CPT

## 2025-01-02 PROCEDURE — 2500000001 HC RX 250 WO HCPCS SELF ADMINISTERED DRUGS (ALT 637 FOR MEDICARE OP)

## 2025-01-02 RX ADMIN — CARBIDOPA AND LEVODOPA 1 TABLET: 50; 200 TABLET, EXTENDED RELEASE ORAL at 21:42

## 2025-01-02 RX ADMIN — ENOXAPARIN SODIUM 40 MG: 40 INJECTION SUBCUTANEOUS at 23:07

## 2025-01-02 RX ADMIN — FUROSEMIDE 20 MG: 20 TABLET ORAL at 08:33

## 2025-01-02 RX ADMIN — CARBIDOPA AND LEVODOPA 1.5 TABLET: 25; 250 TABLET ORAL at 18:50

## 2025-01-02 RX ADMIN — AMLODIPINE BESYLATE 5 MG: 5 TABLET ORAL at 08:33

## 2025-01-02 RX ADMIN — CARBIDOPA AND LEVODOPA 1.5 TABLET: 25; 250 TABLET ORAL at 06:21

## 2025-01-02 RX ADMIN — CARBIDOPA AND LEVODOPA 1.5 TABLET: 25; 250 TABLET ORAL at 12:39

## 2025-01-02 RX ADMIN — LINACLOTIDE 145 MCG: 145 CAPSULE, GELATIN COATED ORAL at 08:33

## 2025-01-02 RX ADMIN — SERTRALINE 25 MG: 25 TABLET, FILM COATED ORAL at 21:38

## 2025-01-02 ASSESSMENT — COGNITIVE AND FUNCTIONAL STATUS - GENERAL
MOBILITY SCORE: 15
HELP NEEDED FOR BATHING: A LITTLE
EATING MEALS: A LITTLE
MOVING TO AND FROM BED TO CHAIR: A LOT
TURNING FROM BACK TO SIDE WHILE IN FLAT BAD: A LITTLE
TURNING FROM BACK TO SIDE WHILE IN FLAT BAD: A LOT
STANDING UP FROM CHAIR USING ARMS: A LITTLE
MOVING TO AND FROM BED TO CHAIR: A LITTLE
DRESSING REGULAR LOWER BODY CLOTHING: A LOT
CLIMB 3 TO 5 STEPS WITH RAILING: A LOT
HELP NEEDED FOR BATHING: A LOT
PERSONAL GROOMING: A LITTLE
TOILETING: A LITTLE
DRESSING REGULAR UPPER BODY CLOTHING: A LITTLE
MOBILITY SCORE: 17
STANDING UP FROM CHAIR USING ARMS: A LITTLE
CLIMB 3 TO 5 STEPS WITH RAILING: A LOT
DRESSING REGULAR UPPER BODY CLOTHING: A LITTLE
DAILY ACTIVITIY SCORE: 16
TOILETING: A LITTLE
DRESSING REGULAR LOWER BODY CLOTHING: A LITTLE
MOVING FROM LYING ON BACK TO SITTING ON SIDE OF FLAT BED WITH BEDRAILS: A LITTLE
DAILY ACTIVITIY SCORE: 19
PERSONAL GROOMING: A LITTLE
WALKING IN HOSPITAL ROOM: A LITTLE
WALKING IN HOSPITAL ROOM: A LITTLE
MOVING FROM LYING ON BACK TO SITTING ON SIDE OF FLAT BED WITH BEDRAILS: A LITTLE

## 2025-01-02 ASSESSMENT — PAIN SCALES - GENERAL
PAINLEVEL_OUTOF10: 0 - NO PAIN

## 2025-01-02 ASSESSMENT — ACTIVITIES OF DAILY LIVING (ADL)
EFFECT OF PAIN ON DAILY ACTIVITIES: .
HOME_MANAGEMENT_TIME_ENTRY: 25

## 2025-01-02 ASSESSMENT — PAIN - FUNCTIONAL ASSESSMENT
PAIN_FUNCTIONAL_ASSESSMENT: 0-10

## 2025-01-02 NOTE — PROGRESS NOTES
Spiritual Care Visit  Spiritual Care Request    Reason for Visit:  Routine Visit: Introduction  Continue Visiting: No     Request Received From:       Focus of Care:  Visited With: Patient (I saw him on Dec. 30.)         Refer to :          Spiritual Care Assessment    Spiritual Assessment:                      Care Provided:  Intended Effects: Establish rapport and connectedness, Alaina affirmation, Build relationship of care and support, Promote sense of peace, Demonstrate caring and concern (I saw him on Dec. 30.)    Sense of Community and or Oriental orthodox Affiliation:  Gnosticist         Addressed Needs/Concerns and/or Aleks Through:  Oriental orthodox Encounters  Oriental orthodox Needs: Prayer (I saw him on Dec. 30.)       Outcome:  Outcome of Spiritual Care Visit: Affirmation, Spirituality connected (I saw him on Dec. 30.)     Advance Directives:         Spiritual Care Annotation    Annotation:  I visited him on Dec. 30.

## 2025-01-02 NOTE — NURSING NOTE
Pt arrived to floor via bed. Pt orientated to floor and surroundings. Call light within reach. Vitals stable see flowsheet. Belongings at bedside

## 2025-01-02 NOTE — PROGRESS NOTES
Spiritual Care Visit  Spiritual Care Request    Reason for Visit:  Routine Visit: Introduction     Request Received From:       Focus of Care:  Visited With: Patient         Refer to :          Spiritual Care Assessment    Spiritual Assessment:                      Care Provided:  Intended Effects: Promote sense of peace, Preserve dignity and respect, Meaning-making  Methods: Offer spiritual/Presybeterian support  Interventions: Share words of hope and inspiration, [place]    Sense of Community and or Sabianism Affiliation:  Religious         Addressed Needs/Concerns and/or Aleks Through:          Outcome:        Advance Directives:         Spiritual Care Annotation    Annotation:  Patient is a Hinduism.  Patient talked about his elham and his family and his hopes to get better.   prayed with the patient at his request.

## 2025-01-02 NOTE — PROGRESS NOTES
Spiritual Care Visit  Spiritual Care Request    Reason for Visit:  Routine Visit: Introduction  Continue Visiting: No     Request Received From:       Focus of Care:  Visited With: Patient (I saw him on Dec. 30.)         Refer to :          Spiritual Care Assessment    Spiritual Assessment:                      Care Provided:  Intended Effects: Establish rapport and connectedness, Alaina affirmation, Build relationship of care and support, Promote sense of peace, Demonstrate caring and concern (I saw him on Dec. 30.)    Sense of Community and or Rastafarian Affiliation:  [place]         Addressed Needs/Concerns and/or Aleks Through:  Rastafarian Encounters  Rastafarian Needs: Prayer (I saw him on Dec. 30.)       Outcome:  Outcome of Spiritual Care Visit: Affirmation, Spirituality connected (I saw him on Dec. 30.)     Advance Directives:         Spiritual Care Annotation    Annotation:  I visited him on Dec. 30.

## 2025-01-02 NOTE — CARE PLAN
The patient's goals for the shift include      The clinical goals for the shift include pt to be free from falls    Pt resting in bed  Tolerated his diet  Bed alarm on, call light in place

## 2025-01-02 NOTE — PROGRESS NOTES
Occupational Therapy    OT Treatment    Patient Name: Kalani Keith  MRN: [id number]  Today's Date: 1/2/2025  Time Calculation  Start Time: 1410  Stop Time: 1435  Time Calculation (min): 25 min       4120/4120-A    Assessment:  End of Session Communication: Bedside nurse  End of Session Patient Position: Up in chair, Alarm on       Plan:  OT Frequency: 3 times per week  OT Discharge Recommendations: Moderate intensity level of continued care     Subjective      01/02/25 1410   OT Last Visit   OT Received On 01/02/25   General   Prior to Session Communication Bedside nurse   Patient Position Received Bed, 3 rail up;Alarm on   Preferred Learning Style verbal;visual   General Comment Pt pleasant and cooperative, RN cleared for therapy.   Pain Assessment   Pain Assessment 0-10   0-10 (Numeric) Pain Score 0 - No pain   Cognition   Overall Cognitive Status WFL   Grooming   Grooming Level of Assistance Minimum assistance   Grooming Where Assessed Standing sinkside   UE Bathing   UE Bathing Level of Assistance Minimum assistance   UE Bathing Where Assessed Edge of bed   UE Dressing   UE Dressing Level of Assistance Minimum assistance   UE Dressing Where Assessed Edge of bed   UE Dressing Comments doff/don gown   Toileting   Toileting Level of Assistance Minimum assistance   Where Assessed Toilet   Bed Mobility   Bed Mobility Yes   Bed Mobility 1   Bed Mobility 1 Supine to sitting   Level of Assistance 1 Moderate assistance   Transfers   Transfer Yes   Transfer 1   Transfer From 1 Sit to   Transfer to 1 Stand   Technique 1 Sit to stand;Stand to sit   Transfer Device 1 Walker;Gait belt   Transfer Level of Assistance 1 Minimum assistance;+2   Trials/Comments 1 FM performed within household distances Min Ax2, impuslive at times, cues for safety throughout.   Toilet Transfers   Toilet Transfer From Chair   Toilet Transfer Type To and from   Toilet Transfer to Standard toilet   Toilet Transfer Technique Ambulating   Toilet Transfers  Minimal assistance   IP OT Assessment   End of Session Communication Bedside nurse   End of Session Patient Position Up in chair;Alarm on   Inpatient Plan   OT Frequency 3 times per week   OT Discharge Recommendations Moderate intensity level of continued care         Outcome Measures:[facility] Daily Activity  Putting on and taking off regular lower body clothing: A lot  Bathing (including washing, rinsing, drying): A lot  Putting on and taking off regular upper body clothing: A little  Toileting, which includes using toilet, bedpan or urinal: A little  Taking care of personal grooming such as brushing teeth: A little  Eating Meals: A little  Daily Activity - Total Score: 16  Education Documentation  Body Mechanics, taught by HARVEY Riley at 1/2/2025  3:30 PM.  Learner: Patient  Readiness: Acceptance  Method: Explanation, Demonstration  Response: Verbalizes Understanding, Demonstrated Understanding, Needs Reinforcement    Precautions, taught by HARVEY Riley at 1/2/2025  3:30 PM.  Learner: Patient  Readiness: Acceptance  Method: Explanation, Demonstration  Response: Verbalizes Understanding, Demonstrated Understanding, Needs Reinforcement    ADL Training, taught by HARVEY Riley at 1/2/2025  3:30 PM.  Learner: Patient  Readiness: Acceptance  Method: Explanation, Demonstration  Response: Verbalizes Understanding, Demonstrated Understanding, Needs Reinforcement    Education Comments  No comments found.            Goals:  Encounter Problems       Encounter Problems (Active)       Dressings Lower Extremities       STG - Patient will complete lower body dressing with min assist with comp strategies PRN (Progressing)       Start:  12/28/24    Expected End:  01/03/25               Functional Balance       STG-Patient will be SBA with assistive device dynamic stand task >5 minutes for ADL completion   (Progressing)       Start:  12/28/24    Expected End:  01/11/25                Functional Mobility       STG-Patient will be SBA with assistive device functional mobility tasks   (Progressing)       Start:  12/28/24    Expected End:  01/11/25               OT Transfers       STG-Patient will be SBA with functional transfers demonstrating good safety   (Progressing)       Start:  12/28/24    Expected End:  01/11/25

## 2025-01-02 NOTE — PROGRESS NOTES
"Kalani Keith is a 69 y.o. male on day 0 of admission presenting with Ambulatory dysfunction.    Subjective   Patient seen examined, no acute events overnight.  Resting in bed comfortably without any complaints.  Still waiting for placement.       Objective     Physical Exam by System:       Constitutional: Well developed, awake/alert/oriented x3, slow to respond to questions, masked facies  Eyes: PERRL, EOMI, clear sclera  ENMT: mucous membranes moist  Head/Neck: Neck supple  Respiratory/Thorax: CTA b/l.   Cardiovascular: Regular, rate and rhythm, no murmurs  Gastrointestinal: Nondistended, soft, non-tender  Musculoskeletal: rigid extremities         Last Recorded Vitals  Blood pressure 150/84, pulse 80, temperature 37.2 °C (99 °F), temperature source Temporal, resp. rate 16, height 1.778 m (5' 10\"), weight 83.1 kg (183 lb 3.2 oz), SpO2 96%.  Intake/Output last 3 Shifts:  I/O last 3 completed shifts:  In: 600 (7.2 mL/kg) [P.O.:600]  Out: 250 (3 mL/kg) [Urine:250 (0.1 mL/kg/hr)]  Weight: 83.1 kg     Relevant Results  Scheduled medications  amLODIPine, 5 mg, oral, Daily  carbidopa-levodopa, 1 tablet, oral, Nightly  carbidopa-levodopa, 1.5 tablet, oral, q6h  divalproex sprinkle, 250 mg, oral, Once  enoxaparin, 40 mg, subcutaneous, q24h  furosemide, 20 mg, oral, Daily  linaCLOtide, 145 mcg, oral, Daily before breakfast  sertraline, 25 mg, oral, Nightly      Continuous medications     PRN medications  PRN medications: acetaminophen  Results from last 7 days   Lab Units 12/30/24  0618 12/29/24  0758 12/28/24  0521   WBC AUTO x10*3/uL 3.9* 3.5* 6.0   RBC AUTO x10*6/uL 5.51 4.92 5.03   HEMOGLOBIN g/dL 14.6 13.3* 13.6     Results from last 7 days   Lab Units 12/30/24  0618 12/29/24  0758 12/28/24  0521 12/27/24  1447   SODIUM mmol/L 136 134* 134* 133*   POTASSIUM mmol/L 3.7 3.7 3.7 3.8   CHLORIDE mmol/L 100 100 100 97*   CO2 mmol/L 26 28 24 23   BUN mg/dL 9 13 13 13   CREATININE mg/dL 0.89 0.92 0.99 1.27   CALCIUM mg/dL 8.8 " 8.3* 8.4* 8.9   PHOSPHORUS mg/dL 2.8 2.9 2.6  --    MAGNESIUM mg/dL 2.32 2.21 2.24 1.90   BILIRUBIN TOTAL mg/dL 0.7  --   --  0.8   ALT U/L 8*  --   --  15   AST U/L 18  --   --  25                Assessment/Plan   Assessment & Plan  Ambulatory dysfunction      Generalized weakness   Transient encephalopathy   Parkinson's disease  HTN urgency  Essential HTN  Frequent falls      PLAN  Admit patient  Labs, prior records and radiological studies reviewed  No indication for telemetry monitoring  Monitor and replace electrolytes per protocol  Resume patient home medications as appropriate once med rec completed  D/w Dr. Steiner from neurology, recommend to increase Sinemet to 1.5 tabs every 6 hours (from 1 tabs every 6 hours), also does not believe MRI is necessary since patient has not had any neurological changes compare to last admission  PT, OT and ST evaluation  Fall precautions  TCC for discharge planning, need precert for placement for [place]          I spent 15 minutes in the professional and overall care of this patient.        This dictation was created using voice recognition software.  If there are any questions about inaccuracies please do not hesitate to contact me.      Ruddy Hill MD

## 2025-01-02 NOTE — CARE PLAN
The patient's goals for the shift include      The clinical goals for the shift include Pt will be free from falls throughout shift.    Over the shift, the patient did not make progress toward the following goals. Barriers to progression include . Recommendations to address these barriers include .

## 2025-01-02 NOTE — PROGRESS NOTES
Physical Therapy    Physical Therapy    Physical Therapy Treatment    Patient Name: Kalani Keith  MRN: [id number]  Today's Date: 1/2/2025  Time Calculation  Start Time: 1404  Stop Time: 1429  Time Calculation (min): 25 min     4120/4120-A       01/02/25 1404   PT  Visit   PT Received On 01/02/25   Response to Previous Treatment Patient with no complaints from previous session.   General   Reason for Referral ADLs, safety assessment   Referred By Ruddy Hill MD   Past Medical History Relevant to Rehab per EMR:69-year-old male with past medical history of Parkinson's disease who presented to [facility] due to generalized weakness with recurrent falls at home. On exam patient with noted parkinsonian dementia and will slowed responses to questioning/strength morsels HPI was obtained from the medical record. Patient has had multiple falls that were nontraumatic and supervised at home by his home physical therapist. Patient weakness has been worsening. Patient was recently admitted however left AMA as he was going to be admitted under observation status ambulatory about cost due to lack of insurance coverage. Patient family returned with patient today requesting admission for PT and OT evaluation and placement. Per family reports patient has not had any significant nausea, emesis comparison scalping, back pain or abdominal pain. No diarrhea. No fevers, chills or exposure to sick persons. Patient reportedly has some decreased sensation in his feet. No tobacco, alcohol or drug use reported. On exam patient resting in hallway bed with no acute distress on observation. He is quite rigid throughout the entire body. Needing assistance x 2 to sit up, dress and change positions. He responds to his name but is very slow to form words or sentences.   Prior to Session Communication Bedside nurse   Patient Position Received Bed, 3 rail up;Alarm on   General Comment Pt agreeable to therapy and cleared for participation  "  Precautions   Medical Precautions Fall precautions   Pain Assessment   Pain Assessment 0-10   0-10 (Numeric) Pain Score 0 - No pain   Effect of Pain on Daily Activities .   Cognition   Overall Cognitive Status WFL  (slow to respond)   Safety/Judgement   (decreased safety awareness)   Impulsive Severely   Processing Speed Delayed   Therapeutic Exercise   Therapeutic Exercise Performed Yes   Therapeutic Exercise Activity 1 AP x20, LAQ x10 retropulsive with cues for anterior weight shift to maintain upright position, minAx1 to maintain erect posture   Therapeutic Activity   Therapeutic Activity Performed Yes   Therapeutic Activity 1 standing activity at sink using unilateral UE support, minAx1-2  with cues for safety and upright stance.   Bed Mobility   Bed Mobility Yes   Bed Mobility 1   Bed Mobility 1 Supine to sitting   Level of Assistance 1 Moderate assistance   Bed Mobility Comments 1 HOB elevated, increased time and effort to complete   Ambulation/Gait Training   Ambulation/Gait Training Performed Yes   Ambulation/Gait Training 1   Surface 1 Level tile   Device 1 Rolling walker   Gait Support Devices Gait belt   Assistance 1 Minimum assistance;Minimal verbal cues   Quality of Gait 1 Decreased step length;Festinating   Comments/Distance (ft) 1 10', 10', 40', 40' Intermittent festinating gait vs \"high knees\" gait d/t parkinsons. Flexed posture with cue to facilitate upright posture with forward gaze. Cues for WW management and safet d/t pushing WW off to the side prematuely increasing fall risk.   Transfers   Transfer Yes   Transfer 1   Transfer From 1 Bed to   Transfer to 1 Stand;Sit;Commode-standard   Technique 1 Sit to stand;Stand to sit   Transfer Device 1 Walker;Gait belt   Transfer Level of Assistance 1 Minimum assistance;Moderate verbal cues;+2   Trials/Comments 1 Mod cues for sequencing and safety with WW as pt pushes WW off to the side and side stepping into the bathroom despite safety cues, minAx1-2  " for safety.   Transfers 2   Transfer From 2 Chair with arms to   Transfer to 2 Stand;Sit   Technique 2 Sit to stand;Stand to sit   Transfer Device 2 Walker;Gait belt   Transfer Level of Assistance 2 Minimum assistance;Minimal verbal cues   Trials/Comments 2 Multiple functional STS transfers with cues for safe UE placement and sequencing for increased safety, fair follow through.   Activity Tolerance   Endurance Tolerates 30 min exercise with multiple rests   PT Assessment   PT Assessment Results Decreased strength;Decreased endurance;Impaired balance;Decreased mobility   Rehab Prognosis Good   End of Session Communication Bedside nurse   Assessment Comment Pt put forth good effort. Pt is impulsive requiring minAx1-2 for increased safety. Mod cues for sequencing and safety especially with WW management. Pt would benefit from sitting in chair for all meals and ambulating to/from bathroom with hospital staff members.Nurse aware.   End of Session Patient Position Up in chair;Alarm on     Outcome Measures:  [facility] Basic Mobility  Turning from your back to your side while in a flat bed without using bedrails: A little  Moving from lying on your back to sitting on the side of a flat bed without using bedrails: A lot  Moving to and from bed to chair (including a wheelchair): A lot  Standing up from a chair using your arms (e.g. wheelchair or bedside chair): A little  To walk in hospital room: A little  Climbing 3-5 steps with railing: A lot  Basic Mobility - Total Score: 15                             EDUCATION:  Outpatient Education  Individual(s) Educated: Patient  Education Provided: Fall Risk  Education Documentation  Handouts, taught by Janki Villafuerte PTA at 1/2/2025  2:49 PM.  Learner: Patient  Readiness: Acceptance  Method: Explanation, Demonstration  Response: Verbalizes Understanding, Demonstrated Understanding, Needs Reinforcement    Precautions, taught by Janki Villafuerte PTA at 1/2/2025  2:49 PM.  Learner:  Patient  Readiness: Acceptance  Method: Explanation, Demonstration  Response: Verbalizes Understanding, Demonstrated Understanding, Needs Reinforcement    Body Mechanics, taught by Janki Villafuerte PTA at 1/2/2025  2:49 PM.  Learner: Patient  Readiness: Acceptance  Method: Explanation, Demonstration  Response: Verbalizes Understanding, Demonstrated Understanding, Needs Reinforcement    Home Exercise Program, taught by Janki Villafuerte PTA at 1/2/2025  2:49 PM.  Learner: Patient  Readiness: Acceptance  Method: Explanation, Demonstration  Response: Verbalizes Understanding, Demonstrated Understanding, Needs Reinforcement    Mobility Training, taught by Janki Villafuerte PTA at 1/2/2025  2:49 PM.  Learner: Patient  Readiness: Acceptance  Method: Explanation, Demonstration  Response: Verbalizes Understanding, Demonstrated Understanding, Needs Reinforcement    Education Comments  No comments found.        GOALS:  Encounter Problems       Encounter Problems (Active)       Mobility       STG - Patient will ambulate x 40-50 ft. with w/w, CGA.   (Progressing)       Start:  12/28/24    Expected End:  01/11/25            Goal 1 Demo 3/5= A. tibs. and 5/5= Quads.  (Progressing)       Start:  12/28/24    Expected End:  01/11/25               PT Transfers       STG - Patient will perform bed mobility with CGA, when moving to EOB.   (Progressing)       Start:  12/28/24    Expected End:  01/11/25            STG - Patient will transfer sit to and from stand into a w/w, with CGA.   (Progressing)       Start:  12/28/24    Expected End:  01/11/25               Pain - Adult          Safety       STG - Patient uses gait belt during all transfers and w/w amb. trng..  (Progressing)       Start:  12/28/24    Expected End:  01/11/25

## 2025-01-03 ENCOUNTER — HOME CARE VISIT (OUTPATIENT)
Dept: HOME HEALTH SERVICES | Facility: HOME HEALTH | Age: 70
End: 2025-01-03
Payer: MEDICARE

## 2025-01-03 ENCOUNTER — APPOINTMENT (OUTPATIENT)
Dept: CARDIOLOGY | Facility: HOSPITAL | Age: 70
End: 2025-01-03
Payer: MEDICARE

## 2025-01-03 PROCEDURE — 2500000001 HC RX 250 WO HCPCS SELF ADMINISTERED DRUGS (ALT 637 FOR MEDICARE OP): Performed by: NURSE PRACTITIONER

## 2025-01-03 PROCEDURE — G0378 HOSPITAL OBSERVATION PER HR: HCPCS

## 2025-01-03 PROCEDURE — 2500000001 HC RX 250 WO HCPCS SELF ADMINISTERED DRUGS (ALT 637 FOR MEDICARE OP): Performed by: INTERNAL MEDICINE

## 2025-01-03 PROCEDURE — 2500000001 HC RX 250 WO HCPCS SELF ADMINISTERED DRUGS (ALT 637 FOR MEDICARE OP): Performed by: STUDENT IN AN ORGANIZED HEALTH CARE EDUCATION/TRAINING PROGRAM

## 2025-01-03 PROCEDURE — 99238 HOSP IP/OBS DSCHRG MGMT 30/<: CPT | Performed by: INTERNAL MEDICINE

## 2025-01-03 PROCEDURE — 2500000001 HC RX 250 WO HCPCS SELF ADMINISTERED DRUGS (ALT 637 FOR MEDICARE OP)

## 2025-01-03 PROCEDURE — 2500000002 HC RX 250 W HCPCS SELF ADMINISTERED DRUGS (ALT 637 FOR MEDICARE OP, ALT 636 FOR OP/ED)

## 2025-01-03 PROCEDURE — 93005 ELECTROCARDIOGRAM TRACING: CPT

## 2025-01-03 PROCEDURE — 96372 THER/PROPH/DIAG INJ SC/IM: CPT

## 2025-01-03 PROCEDURE — 2500000004 HC RX 250 GENERAL PHARMACY W/ HCPCS (ALT 636 FOR OP/ED)

## 2025-01-03 RX ORDER — ACETAMINOPHEN 325 MG/1
650 TABLET ORAL EVERY 8 HOURS PRN
Qty: 30 TABLET | Refills: 0 | Status: SHIPPED | OUTPATIENT
Start: 2025-01-03

## 2025-01-03 RX ORDER — LACTULOSE 10 G/15ML
30 SOLUTION ORAL ONCE
Status: COMPLETED | OUTPATIENT
Start: 2025-01-03 | End: 2025-01-03

## 2025-01-03 RX ADMIN — CARBIDOPA AND LEVODOPA 1.5 TABLET: 25; 250 TABLET ORAL at 12:23

## 2025-01-03 RX ADMIN — FUROSEMIDE 20 MG: 20 TABLET ORAL at 08:06

## 2025-01-03 RX ADMIN — LACTULOSE 30 G: 20 SOLUTION ORAL at 16:30

## 2025-01-03 RX ADMIN — CARBIDOPA AND LEVODOPA 1.5 TABLET: 25; 250 TABLET ORAL at 23:14

## 2025-01-03 RX ADMIN — SERTRALINE 25 MG: 25 TABLET, FILM COATED ORAL at 20:33

## 2025-01-03 RX ADMIN — CARBIDOPA AND LEVODOPA 1.5 TABLET: 25; 250 TABLET ORAL at 06:33

## 2025-01-03 RX ADMIN — CARBIDOPA AND LEVODOPA 1.5 TABLET: 25; 250 TABLET ORAL at 17:45

## 2025-01-03 RX ADMIN — CARBIDOPA AND LEVODOPA 1.5 TABLET: 25; 250 TABLET ORAL at 00:51

## 2025-01-03 RX ADMIN — AMLODIPINE BESYLATE 5 MG: 5 TABLET ORAL at 08:06

## 2025-01-03 RX ADMIN — ENOXAPARIN SODIUM 40 MG: 40 INJECTION SUBCUTANEOUS at 23:14

## 2025-01-03 RX ADMIN — LINACLOTIDE 145 MCG: 145 CAPSULE, GELATIN COATED ORAL at 08:06

## 2025-01-03 RX ADMIN — CARBIDOPA AND LEVODOPA 1 TABLET: 50; 200 TABLET, EXTENDED RELEASE ORAL at 20:33

## 2025-01-03 ASSESSMENT — COGNITIVE AND FUNCTIONAL STATUS - GENERAL
CLIMB 3 TO 5 STEPS WITH RAILING: A LITTLE
DAILY ACTIVITIY SCORE: 19
MOBILITY SCORE: 18
MOVING TO AND FROM BED TO CHAIR: A LITTLE
WALKING IN HOSPITAL ROOM: A LITTLE
STANDING UP FROM CHAIR USING ARMS: A LITTLE
MOVING FROM LYING ON BACK TO SITTING ON SIDE OF FLAT BED WITH BEDRAILS: A LITTLE
DRESSING REGULAR UPPER BODY CLOTHING: A LITTLE
TOILETING: A LITTLE
DRESSING REGULAR LOWER BODY CLOTHING: A LITTLE
PERSONAL GROOMING: A LITTLE
TURNING FROM BACK TO SIDE WHILE IN FLAT BAD: A LITTLE
HELP NEEDED FOR BATHING: A LITTLE

## 2025-01-03 ASSESSMENT — PAIN SCALES - GENERAL
PAINLEVEL_OUTOF10: 0 - NO PAIN
PAINLEVEL_OUTOF10: 0 - NO PAIN

## 2025-01-03 ASSESSMENT — PAIN - FUNCTIONAL ASSESSMENT: PAIN_FUNCTIONAL_ASSESSMENT: 0-10

## 2025-01-03 NOTE — CARE PLAN
The patient's goals for the shift include      The clinical goals for the shift include to help pt with his ambulation, ordering meals & keeping pt safe      Problem: Pain - Adult  Goal: Verbalizes/displays adequate comfort level or baseline comfort level  Outcome: Progressing     Problem: Safety - Adult  Goal: Free from fall injury  Outcome: Progressing     Problem: Discharge Planning  Goal: Discharge to home or other facility with appropriate resources  Outcome: Progressing     Problem: Chronic Conditions and Co-morbidities  Goal: Patient's chronic conditions and co-morbidity symptoms are monitored and maintained or improved  Outcome: Progressing     Problem: Fall/Injury  Goal: Not fall by end of shift  Outcome: Progressing  Goal: Be free from injury by end of the shift  Outcome: Progressing  Goal: Verbalize understanding of personal risk factors for fall in the hospital  Outcome: Progressing  Goal: Verbalize understanding of risk factor reduction measures to prevent injury from fall in the home  Outcome: Progressing  Goal: Use assistive devices by end of the shift  Outcome: Progressing  Goal: Pace activities to prevent fatigue by end of the shift  Outcome: Progressing

## 2025-01-03 NOTE — PROGRESS NOTES
01/03/25 1039   Discharge Planning   Living Arrangements Spouse/significant other   Support Systems Family members   Type of Residence Private residence   Home or Post Acute Services Post acute facilities (Rehab/SNF/etc)   Type of Post Acute Facility Services Skilled nursing   Expected Discharge Disposition SNF   Does the patient need discharge transport arranged? Yes   RoundTrip coordination needed? Yes     Per Aissatou Blandon, precert for SNF is still pending.

## 2025-01-03 NOTE — HOSPITAL COURSE
69-year-old male with past medical history of Parkinson's disease presented for generalized weakness and recurrent falls at home.  Patient also has underlying dementia and slow to response and it was difficult to obtain proper information from him.  Medically he is ready and PT OT recommended moderate intensity level of continued care given patient's recurrent falls and advanced parkinsonism.  Patient will be discharged to skilled nursing facility for rehab approved.  He is at a high risk of readmission given advanced Parkinson's disease with underlying dementia.    24 minutes spent in discharge timing

## 2025-01-03 NOTE — DISCHARGE SUMMARY
Discharge Diagnosis  Ambulatory dysfunction    Issues Requiring Follow-Up  Follow-up with primary care provider and neurology as outpatient    Discharge Meds     Medication List      START taking these medications     acetaminophen 325 mg tablet; Commonly known as: Tylenol; Take 2 tablets   (650 mg) by mouth every 8 hours if needed for mild pain (1 - 3), fever   (temp greater than 38.0 C) or headaches (mild pain (1-3) or headache) for   up to 15 doses.     CONTINUE taking these medications     amLODIPine 5 mg tablet; Commonly known as: Norvasc; Take 1 tablet (5 mg)   by mouth once daily.   * carbidopa-levodopa  mg ER tablet; Commonly known as: Sinemet CR;   Take 1 tablet by mouth once daily at bedtime.   * carbidopa-levodopa  mg tablet; Commonly known as: Sinemet; TAKE   1 TABLET BY MOUTH EVERY 6 HOURS   furosemide 20 mg tablet; Commonly known as: Lasix; TAKE 1 TABLET BY   MOUTH EVERY DAY   Linzess 145 mcg capsule; Generic drug: linaCLOtide; Take 1 capsule (145   mcg) by mouth once daily in the morning. Take before meals.   omeprazole 40 mg DR capsule; Commonly known as: PriLOSEC; TAKE 1 CAPSULE   (40 MG) BY MOUTH ONCE DAILY IN THE MORNING. TAKE BEFORE MEALS.   sertraline 25 mg tablet; Commonly known as: Zoloft; TAKE 1 TABLET (25   MG) BY MOUTH ONCE DAILY AT BEDTIME.   Vitamin D3 25 mcg (1,000 unit) capsule; Generic drug: cholecalciferol;   TAKE 2 CAPSULES (2,000 UNITS) BY MOUTH ONCE DAILY.  * This list has 2 medication(s) that are the same as other medications   prescribed for you. Read the directions carefully, and ask your doctor or   other care provider to review them with you.       Test Results Pending At Discharge  Pending Labs       No current pending labs.            Hospital Course  69-year-old male with past medical history of Parkinson's disease presented for generalized weakness and recurrent falls at home.  Patient also has underlying dementia and slow to response and it was difficult to  obtain proper information from him.  Medically he is ready and PT OT recommended moderate intensity level of continued care given patient's recurrent falls and advanced parkinsonism.  Patient will be discharged to skilled nursing facility for rehab approved.  He is at a high risk of readmission given advanced Parkinson's disease with underlying dementia.    24 minutes spent in discharge timing    Pertinent Physical Exam At Time of Discharge  General: Not in acute distress, alert  HEENT: PERRLA, head intact and normocephalic  Neck: Normal to inspection  Lungs: Clear to auscultation, work of breathing within normal limit  Cardiac: Regular rate and rhythm  Abdomen: Soft nontender, positive bowel sounds  : Exam deferred  Skin: Intact  Hematology: No petechia or excessive ecchymosis  Musculoskeletal: Without significant trauma  Neurological: Alert awake oriented x 2, slow to respond, no focal deficit, cranial nerves grossly intact  Psych: No suicidal ideation or homicidal ideation    Outpatient Follow-Up  Future Appointments   Date Time Provider Department Center   1/6/2025 12:30 PM Bronwyn Quick, PT [facility]   1/7/2025 To Be Determined Hollie Lal, OT [facility]   1/28/2025  2:40 PM Kiarra Rojas MD [id number] [place]   1/29/2025 10:00 AM Tracy Looney DO [id number] [place]         Chase Gonzalez MD    1/6/2025: No changes discharge summary but new discharge date is 1/6/2025 as patient got insurance precertification.

## 2025-01-03 NOTE — CARE PLAN
The clinical goals for the shift include Pt will remain safe this shift    Over the shift, the patient did make progress toward the following goals.       Problem: Pain - Adult  Goal: Verbalizes/displays adequate comfort level or baseline comfort level  Outcome: Progressing     Problem: Safety - Adult  Goal: Free from fall injury  Outcome: Progressing     Problem: Discharge Planning  Goal: Discharge to home or other facility with appropriate resources  Outcome: Progressing     Problem: Chronic Conditions and Co-morbidities  Goal: Patient's chronic conditions and co-morbidity symptoms are monitored and maintained or improved  Outcome: Progressing     Problem: Fall/Injury  Goal: Not fall by end of shift  Outcome: Progressing  Goal: Be free from injury by end of the shift  Outcome: Progressing  Goal: Verbalize understanding of personal risk factors for fall in the hospital  Outcome: Progressing  Goal: Verbalize understanding of risk factor reduction measures to prevent injury from fall in the home  Outcome: Progressing  Goal: Use assistive devices by end of the shift  Outcome: Progressing  Goal: Pace activities to prevent fatigue by end of the shift  Outcome: Progressing     Problem: Pain  Goal: Takes deep breaths with improved pain control throughout the shift  Outcome: Progressing  Goal: Turns in bed with improved pain control throughout the shift  Outcome: Progressing  Goal: Walks with improved pain control throughout the shift  Outcome: Progressing  Goal: Performs ADL's with improved pain control throughout shift  Outcome: Progressing  Goal: Participates in PT with improved pain control throughout the shift  Outcome: Progressing  Goal: Free from opioid side effects throughout the shift  Outcome: Progressing  Goal: Free from acute confusion related to pain meds throughout the shift  Outcome: Progressing     Problem: Skin  Goal: Decreased wound size/increased tissue granulation at next dressing change  Outcome:  Progressing  Goal: Participates in plan/prevention/treatment measures  Outcome: Progressing  Goal: Prevent/manage excess moisture  Outcome: Progressing  Goal: Prevent/minimize sheer/friction injuries  Outcome: Progressing  Goal: Promote/optimize nutrition  Outcome: Progressing  Goal: Promote skin healing  Outcome: Progressing     Problem: Dressings Lower Extremities  Goal: STG - Patient will complete lower body dressing with min assist with comp strategies PRN  Outcome: Progressing

## 2025-01-04 PROCEDURE — 2500000001 HC RX 250 WO HCPCS SELF ADMINISTERED DRUGS (ALT 637 FOR MEDICARE OP): Performed by: NURSE PRACTITIONER

## 2025-01-04 PROCEDURE — 2500000001 HC RX 250 WO HCPCS SELF ADMINISTERED DRUGS (ALT 637 FOR MEDICARE OP): Performed by: STUDENT IN AN ORGANIZED HEALTH CARE EDUCATION/TRAINING PROGRAM

## 2025-01-04 PROCEDURE — 2500000002 HC RX 250 W HCPCS SELF ADMINISTERED DRUGS (ALT 637 FOR MEDICARE OP, ALT 636 FOR OP/ED)

## 2025-01-04 PROCEDURE — G0378 HOSPITAL OBSERVATION PER HR: HCPCS

## 2025-01-04 PROCEDURE — 2500000004 HC RX 250 GENERAL PHARMACY W/ HCPCS (ALT 636 FOR OP/ED)

## 2025-01-04 PROCEDURE — 99231 SBSQ HOSP IP/OBS SF/LOW 25: CPT | Performed by: INTERNAL MEDICINE

## 2025-01-04 PROCEDURE — 2500000001 HC RX 250 WO HCPCS SELF ADMINISTERED DRUGS (ALT 637 FOR MEDICARE OP)

## 2025-01-04 PROCEDURE — 96372 THER/PROPH/DIAG INJ SC/IM: CPT

## 2025-01-04 RX ADMIN — LINACLOTIDE 145 MCG: 145 CAPSULE, GELATIN COATED ORAL at 06:01

## 2025-01-04 RX ADMIN — CARBIDOPA AND LEVODOPA 1.5 TABLET: 25; 250 TABLET ORAL at 06:01

## 2025-01-04 RX ADMIN — CARBIDOPA AND LEVODOPA 1 TABLET: 50; 200 TABLET, EXTENDED RELEASE ORAL at 20:44

## 2025-01-04 RX ADMIN — CARBIDOPA AND LEVODOPA 1.5 TABLET: 25; 250 TABLET ORAL at 12:14

## 2025-01-04 RX ADMIN — CARBIDOPA AND LEVODOPA 1.5 TABLET: 25; 250 TABLET ORAL at 18:38

## 2025-01-04 RX ADMIN — SERTRALINE 25 MG: 25 TABLET, FILM COATED ORAL at 20:44

## 2025-01-04 RX ADMIN — AMLODIPINE BESYLATE 5 MG: 5 TABLET ORAL at 08:40

## 2025-01-04 RX ADMIN — FUROSEMIDE 20 MG: 20 TABLET ORAL at 08:40

## 2025-01-04 RX ADMIN — ENOXAPARIN SODIUM 40 MG: 40 INJECTION SUBCUTANEOUS at 22:31

## 2025-01-04 ASSESSMENT — PAIN SCALES - GENERAL
PAINLEVEL_OUTOF10: 0 - NO PAIN
PAINLEVEL_OUTOF10: 0 - NO PAIN

## 2025-01-04 ASSESSMENT — PAIN - FUNCTIONAL ASSESSMENT
PAIN_FUNCTIONAL_ASSESSMENT: 0-10
PAIN_FUNCTIONAL_ASSESSMENT: 0-10

## 2025-01-04 NOTE — PROGRESS NOTES
Kalani Keith is a 69 y.o. male on day 0 of admission presenting with Ambulatory dysfunction.      Subjective   Patient doing well.  No complaints.  Awaiting placement to [facility] with precertification pending.       Objective     Last Recorded Vitals  /86 (BP Location: Left arm, Patient Position: Lying)   Pulse 81   Temp 36.7 °C (98.1 °F) (Temporal)   Resp 16   Wt 83.1 kg (183 lb 3.2 oz)   SpO2 95%   Intake/Output last 3 Shifts:    Intake/Output Summary (Last 24 hours) at 1/4/2025 1021  Last data filed at 1/4/2025 0840  Gross per 24 hour   Intake 360 ml   Output 1530 ml   Net -1170 ml       Admission Weight  Weight: 83.9 kg (185 lb) (12/27/24 1408)    Daily Weight  12/27/24 : 83.1 kg (183 lb 3.2 oz)      Physical Exam:  General: Not in acute distress, alert, sitting upright in a recliner today  HEENT: PERRLA, head intact and normocephalic  Neck: Normal to inspection  Lungs: Clear to auscultation, work of breathing within normal limit  Cardiac: Regular rate and rhythm  Abdomen: Soft nontender, positive bowel sounds  : Exam deferred  Skin: Intact  Hematology: No petechia or excessive ecchymosis  Musculoskeletal: Without significant trauma  Neurological: Alert awake oriented x 2, slow to respond, no focal deficit, cranial nerves grossly intact  Psych: No suicidal ideation or homicidal ideation patient is     Relevant Results  Scheduled medications  amLODIPine, 5 mg, oral, Daily  carbidopa-levodopa, 1 tablet, oral, Nightly  carbidopa-levodopa, 1.5 tablet, oral, q6h  divalproex sprinkle, 250 mg, oral, Once  enoxaparin, 40 mg, subcutaneous, q24h  furosemide, 20 mg, oral, Daily  linaCLOtide, 145 mcg, oral, Daily before breakfast  sertraline, 25 mg, oral, Nightly      Continuous medications     PRN medications  PRN medications: acetaminophen   Labs  Results from last 7 days   Lab Units 12/30/24  0618 12/29/24  0758   WBC AUTO x10*3/uL 3.9* 3.5*   HEMOGLOBIN g/dL 14.6 13.3*   HEMATOCRIT % 47.7 42.1   PLATELETS  AUTO x10*3/uL 195 175     Results from last 7 days   Lab Units 12/30/24  0618 12/29/24  0758   SODIUM mmol/L 136 134*   POTASSIUM mmol/L 3.7 3.7   CHLORIDE mmol/L 100 100   CO2 mmol/L 26 28   BUN mg/dL 9 13   CREATININE mg/dL 0.89 0.92   CALCIUM mg/dL 8.8 8.3*   PROTEIN TOTAL g/dL 7.2  --    BILIRUBIN TOTAL mg/dL 0.7  --    ALK PHOS U/L 59  --    ALT U/L 8*  --    AST U/L 18  --    GLUCOSE mg/dL 95 106*       ECG 12 lead    Result Date: 12/30/2024  Sinus tachycardia Possible Left atrial enlargement Borderline ECG When compared with ECG of 13-DEC-2024 12:43, No significant change was found    CT head wo IV contrast    Result Date: 12/27/2024  Interpreted By:  Carlo Cruz, STUDY: CT HEAD WO IV CONTRAST; CT CERVICAL SPINE WO IV CONTRAST;  12/27/2024 6:09 pm   INDICATION: Signs/Symptoms:recurrent falls; Signs/Symptoms:recurrent falls, numbness on arms     COMPARISON: CT head and cervical spine 12/13/2024   ACCESSION NUMBER(S): [id number]; [id number]   ORDERING CLINICIAN: OPAL HANSEN   TECHNIQUE: Axial noncontrast CT images of head with coronal and sagittal reconstructed images. Axial noncontrast CT images of the cervical spine with coronal and sagittal reconstructed images.   FINDINGS: CT HEAD:   BRAIN PARENCHYMA: Gray-white differentiation is preserved. No mass-effect, midline shift or effacement of cerebral sulci. Patchy periventricular and subcortical white matter hypodensities, nonspecific but often seen in the setting of chronic microangiopathic change.   HEMORRHAGE: No acute intracranial hemorrhage.   VENTRICLES and EXTRA-AXIAL SPACES: The ventricles and sulci are within normal limits for brain volume. No abnormal extra-axial fluid collection.   ORBITS: The visualized orbits and globes are within normal limits.   EXTRACRANIAL SOFT TISSUES: Within normal limits.   PARANASAL SINUSES/MASTOIDS: The visualized paranasal sinuses and mastoid air cells are well aerated.   CALVARIUM: No depressed skull  fracture.     CT CERVICAL SPINE:   CRANIOCERVICAL JUNCTION: Intact.   ALIGNMENT: No traumatic malalignment or traumatic facet widening. Reversal of the cervical lordotic curvature at C5.   VERTEBRAE/DISC SPACES: No acute fracture. Vertebral body heights are maintained. Varying degrees of mild-to-moderate multilevel disc space height loss and associated degenerative endplate changes. No high grade spinal canal stenosis evident by CT.   SOFT TISSUES: No significant abnormality. Calcifications of the nuchal ligament at C5-C6.   OTHER: The visualized lung apices are clear.       CT HEAD: 1. No acute intracranial abnormality or calvarial fracture.     CT CERVICAL SPINE: 1. No acute fracture or traumatic malalignment of the cervical spine.   MACRO: None   Signed by: Carlo Cruz 12/27/2024 6:47 PM Dictation workstation:   [id number]    CT cervical spine wo IV contrast    Result Date: 12/27/2024  Interpreted By:  Carlo Cruz, STUDY: CT HEAD WO IV CONTRAST; CT CERVICAL SPINE WO IV CONTRAST;  12/27/2024 6:09 pm   INDICATION: Signs/Symptoms:recurrent falls; Signs/Symptoms:recurrent falls, numbness on arms     COMPARISON: CT head and cervical spine 12/13/2024   ACCESSION NUMBER(S): [id number]; [id number]   ORDERING CLINICIAN: OPAL HANSEN   TECHNIQUE: Axial noncontrast CT images of head with coronal and sagittal reconstructed images. Axial noncontrast CT images of the cervical spine with coronal and sagittal reconstructed images.   FINDINGS: CT HEAD:   BRAIN PARENCHYMA: Gray-white differentiation is preserved. No mass-effect, midline shift or effacement of cerebral sulci. Patchy periventricular and subcortical white matter hypodensities, nonspecific but often seen in the setting of chronic microangiopathic change.   HEMORRHAGE: No acute intracranial hemorrhage.   VENTRICLES and EXTRA-AXIAL SPACES: The ventricles and sulci are within normal limits for brain volume. No abnormal extra-axial fluid collection.    ORBITS: The visualized orbits and globes are within normal limits.   EXTRACRANIAL SOFT TISSUES: Within normal limits.   PARANASAL SINUSES/MASTOIDS: The visualized paranasal sinuses and mastoid air cells are well aerated.   CALVARIUM: No depressed skull fracture.     CT CERVICAL SPINE:   CRANIOCERVICAL JUNCTION: Intact.   ALIGNMENT: No traumatic malalignment or traumatic facet widening. Reversal of the cervical lordotic curvature at C5.   VERTEBRAE/DISC SPACES: No acute fracture. Vertebral body heights are maintained. Varying degrees of mild-to-moderate multilevel disc space height loss and associated degenerative endplate changes. No high grade spinal canal stenosis evident by CT.   SOFT TISSUES: No significant abnormality. Calcifications of the nuchal ligament at C5-C6.   OTHER: The visualized lung apices are clear.       CT HEAD: 1. No acute intracranial abnormality or calvarial fracture.     CT CERVICAL SPINE: 1. No acute fracture or traumatic malalignment of the cervical spine.   MACRO: None   Signed by: Carlo Cruz 12/27/2024 6:47 PM Dictation workstation:   [id number]    XR chest 1 view    Result Date: 12/27/2024  Interpreted By:  Anson Kim, STUDY: XR CHEST 1 VIEW;  12/27/2024 3:15 pm   INDICATION: Signs/Symptoms:generalized weakness.     COMPARISON: None.   ACCESSION NUMBER(S): [id number]   ORDERING CLINICIAN: OPAL HANSEN   FINDINGS: CARDIOMEDIASTINAL SILHOUETTE: Cardiomediastinal silhouette is normal in size and configuration.   LUNGS: Left basilar mild linear atelectasis is present. No focal consolidation. No pleural effusion or pneumothorax.   ABDOMEN: No remarkable upper abdominal findings.   BONES: Multilevel endplate spurring present in the spine.       1.  Left basilar mild linear atelectasis. No focal consolidation.       MACRO: None.   Signed by: Anson Kim 12/27/2024 4:05 PM Dictation workstation:   [id number]    ECG 12 lead    Result Date: 12/16/2024  Sinus tachycardia  Otherwise normal ECG When compared with ECG of 21-NOV-2024 21:08, No significant change was found BASELINE ARTIFACT Confirmed by Abdiel Sloan ([id number]) on 12/16/2024 11:59:49 AM    CT cervical spine wo IV contrast    Result Date: 12/13/2024  Interpreted By:  Ayo Ríos, STUDY: CT CERVICAL SPINE WO IV CONTRAST; 12/13/2024 12:37 pm   INDICATION: Signs/Symptoms:fall;   COMPARISON: None   ACCESSION NUMBER(S): [id number]   ORDERING CLINICIAN: OSCAR MATOS   TECHNIQUE: Contiguous axial images were acquired from the skull base to the lung apices. Coronal and sagittal reformatted images were obtained. All CT examinations are performed with 1 or more of the following dose reduction techniques: Automated exposure control, adjustment of mA and/or kv according to patient's size, or use of iterative reconstruction techniques.   FINDINGS: There is straightening of the normal cervical lordosis.  No acute fracture or spondylolisthesis is identified.     The occipital condyles, arch of C1, and the odontoid processes are intact. The atlantoaxial relationship is well maintained.   There is moderate disc space narrowing at C4-5, C5-6. Mild-moderate disc space narrowing at C6-7. Bony spinal canal is patent.   Mild right neural foramina stenosis at C4-5 mild-moderate left neural foramina stenosis at C5-6.   The visualized lung apices are unremarkable.       1. No acute fracture or spondylolisthesis. 2. Degenerative disc disease and spondylosis as described in the body of the report.     Signed by: Ayo Ríos 12/13/2024 1:02 PM Dictation workstation:   [id number]    CT brain attack head wo IV contrast    Result Date: 12/13/2024  Interpreted By:  Ayo Ríos, STUDY: OSCAR MATOS; 12/13/2024 12:37 pm   INDICATION: Signs/Symptoms:Stroke Evaluation;   COMPARISON: 11/21/2024   ACCESSION NUMBER(S): [id number]   ORDERING CLINICIAN: OSCAR MATOS   TECHNIQUE: Contiguous axial images were acquired from the vertex through the  posterior fossa without IV contrast. All CT examinations are performed with 1 or more of the following dose reduction techniques: Automated exposure control, adjustment of mA and/or kv according to patient's size, or use of iterative reconstruction techniques.   FINDINGS: No focal mass effect or midline shift is identified. The ventricles and sulci are symmetric and appropriate for the patient's age.   There is a mild degree of nonspecific white matter hypodensity, most consistent with chronic small-vessel ischemic disease. The gray white matter differentiation is preserved.   No acute intracranial hemorrhage is seen. No intra-axial or extra-axial fluid collection is seen.   The visualized paranasal sinuses and mastoid air cells are clear.       No CT evidence for acute intracranial pathology.     Findings discussed with OSCAR MATOS via telephone at 12:50 p.m. on 12/13/2024   Signed by: Ayo Ríos 12/13/2024 12:58 PM Dictation workstation:   [id number]                    Assessment/Plan   Kalani Keith is a 69 y.o. male on day 0 of admission presenting with Ambulatory dysfunction.  Assessment & Plan  Ambulatory dysfunction  Awaiting insurance precertification to [facility]  Clinically stable  Bowel regimen  Continue with PT OT  Fall precaution  Continue chronic medications       Plan discussed with patient at bedside    Low level of MDM based on above issue and discussing plan    This note is created using voice recognition software. All efforts are made to minimize errors, if there are errors there due to transcription.    Chase Gonzalez  Hospitalist

## 2025-01-04 NOTE — CARE PLAN
The clinical goals for the shift include Pt will remain safe this shift.    Over the shift, the patient did  make progress toward the following goals.       Problem: Pain - Adult  Goal: Verbalizes/displays adequate comfort level or baseline comfort level  Outcome: Progressing     Problem: Safety - Adult  Goal: Free from fall injury  Outcome: Progressing     Problem: Discharge Planning  Goal: Discharge to home or other facility with appropriate resources  Outcome: Progressing     Problem: Chronic Conditions and Co-morbidities  Goal: Patient's chronic conditions and co-morbidity symptoms are monitored and maintained or improved  Outcome: Progressing     Problem: Fall/Injury  Goal: Not fall by end of shift  Outcome: Progressing  Goal: Be free from injury by end of the shift  Outcome: Progressing  Goal: Verbalize understanding of personal risk factors for fall in the hospital  Outcome: Progressing  Goal: Verbalize understanding of risk factor reduction measures to prevent injury from fall in the home  Outcome: Progressing  Goal: Use assistive devices by end of the shift  Outcome: Progressing  Goal: Pace activities to prevent fatigue by end of the shift  Outcome: Progressing     Problem: Pain  Goal: Takes deep breaths with improved pain control throughout the shift  Outcome: Progressing  Goal: Turns in bed with improved pain control throughout the shift  Outcome: Progressing  Goal: Walks with improved pain control throughout the shift  Outcome: Progressing  Goal: Performs ADL's with improved pain control throughout shift  Outcome: Progressing  Goal: Participates in PT with improved pain control throughout the shift  Outcome: Progressing  Goal: Free from opioid side effects throughout the shift  Outcome: Progressing  Goal: Free from acute confusion related to pain meds throughout the shift  Outcome: Progressing     Problem: Skin  Goal: Decreased wound size/increased tissue granulation at next dressing change  Outcome:  Progressing  Goal: Participates in plan/prevention/treatment measures  Outcome: Progressing  Goal: Prevent/manage excess moisture  Outcome: Progressing  Goal: Prevent/minimize sheer/friction injuries  Outcome: Progressing  Goal: Promote/optimize nutrition  Outcome: Progressing  Goal: Promote skin healing  Outcome: Progressing     Problem: Dressings Lower Extremities  Goal: STG - Patient will complete lower body dressing with min assist with comp strategies PRN  Outcome: Progressing

## 2025-01-04 NOTE — ASSESSMENT & PLAN NOTE
Awaiting insurance precertification to [employer]  Clinically stable  Bowel regimen  Continue with PT OT  Fall precaution  Continue chronic medications

## 2025-01-04 NOTE — CARE PLAN
The patient's goals for the shift include      The clinical goals for the shift include Pt will remain safe this shift.      Problem: Pain - Adult  Goal: Verbalizes/displays adequate comfort level or baseline comfort level  Outcome: Progressing     Problem: Safety - Adult  Goal: Free from fall injury  Outcome: Progressing     Problem: Discharge Planning  Goal: Discharge to home or other facility with appropriate resources  Outcome: Progressing     Problem: Chronic Conditions and Co-morbidities  Goal: Patient's chronic conditions and co-morbidity symptoms are monitored and maintained or improved  Outcome: Progressing     Problem: Fall/Injury  Goal: Not fall by end of shift  Outcome: Progressing  Goal: Be free from injury by end of the shift  Outcome: Progressing  Goal: Verbalize understanding of personal risk factors for fall in the hospital  Outcome: Progressing  Goal: Verbalize understanding of risk factor reduction measures to prevent injury from fall in the home  Outcome: Progressing  Goal: Use assistive devices by end of the shift  Outcome: Progressing  Goal: Pace activities to prevent fatigue by end of the shift  Outcome: Progressing     Problem: Pain  Goal: Takes deep breaths with improved pain control throughout the shift  Outcome: Progressing  Goal: Turns in bed with improved pain control throughout the shift  Outcome: Progressing  Goal: Walks with improved pain control throughout the shift  Outcome: Progressing  Goal: Performs ADL's with improved pain control throughout shift  Outcome: Progressing  Goal: Participates in PT with improved pain control throughout the shift  Outcome: Progressing  Goal: Free from opioid side effects throughout the shift  Outcome: Progressing  Goal: Free from acute confusion related to pain meds throughout the shift  Outcome: Progressing     Problem: Skin  Goal: Decreased wound size/increased tissue granulation at next dressing change  Outcome: Progressing  Goal: Participates in  plan/prevention/treatment measures  Outcome: Progressing  Goal: Prevent/manage excess moisture  Outcome: Progressing  Flowsheets (Taken 1/4/2025 1139)  Prevent/manage excess moisture:   Cleanse incontinence/protect with barrier cream   Follow provider orders for dressing changes   Moisturize dry skin  Goal: Prevent/minimize sheer/friction injuries  Outcome: Progressing  Goal: Promote/optimize nutrition  Outcome: Progressing  Goal: Promote skin healing  Outcome: Progressing     Problem: Dressings Lower Extremities  Goal: STG - Patient will complete lower body dressing with min assist with comp strategies PRN  Outcome: Progressing

## 2025-01-05 ENCOUNTER — HOME CARE VISIT (OUTPATIENT)
Dept: HOME HEALTH SERVICES | Facility: HOME HEALTH | Age: 70
End: 2025-01-05
Payer: MEDICARE

## 2025-01-05 PROCEDURE — 99231 SBSQ HOSP IP/OBS SF/LOW 25: CPT | Performed by: INTERNAL MEDICINE

## 2025-01-05 PROCEDURE — 2500000001 HC RX 250 WO HCPCS SELF ADMINISTERED DRUGS (ALT 637 FOR MEDICARE OP): Performed by: NURSE PRACTITIONER

## 2025-01-05 PROCEDURE — 2500000001 HC RX 250 WO HCPCS SELF ADMINISTERED DRUGS (ALT 637 FOR MEDICARE OP): Performed by: STUDENT IN AN ORGANIZED HEALTH CARE EDUCATION/TRAINING PROGRAM

## 2025-01-05 PROCEDURE — 2500000004 HC RX 250 GENERAL PHARMACY W/ HCPCS (ALT 636 FOR OP/ED)

## 2025-01-05 PROCEDURE — 96372 THER/PROPH/DIAG INJ SC/IM: CPT

## 2025-01-05 PROCEDURE — G0378 HOSPITAL OBSERVATION PER HR: HCPCS

## 2025-01-05 PROCEDURE — 2500000002 HC RX 250 W HCPCS SELF ADMINISTERED DRUGS (ALT 637 FOR MEDICARE OP, ALT 636 FOR OP/ED)

## 2025-01-05 PROCEDURE — 2500000001 HC RX 250 WO HCPCS SELF ADMINISTERED DRUGS (ALT 637 FOR MEDICARE OP)

## 2025-01-05 RX ADMIN — ENOXAPARIN SODIUM 40 MG: 40 INJECTION SUBCUTANEOUS at 22:29

## 2025-01-05 RX ADMIN — LINACLOTIDE 145 MCG: 145 CAPSULE, GELATIN COATED ORAL at 06:34

## 2025-01-05 RX ADMIN — AMLODIPINE BESYLATE 5 MG: 5 TABLET ORAL at 08:49

## 2025-01-05 RX ADMIN — CARBIDOPA AND LEVODOPA 1.5 TABLET: 25; 250 TABLET ORAL at 00:15

## 2025-01-05 RX ADMIN — SERTRALINE 25 MG: 25 TABLET, FILM COATED ORAL at 21:12

## 2025-01-05 RX ADMIN — CARBIDOPA AND LEVODOPA 1 TABLET: 50; 200 TABLET, EXTENDED RELEASE ORAL at 21:13

## 2025-01-05 RX ADMIN — CARBIDOPA AND LEVODOPA 1.5 TABLET: 25; 250 TABLET ORAL at 18:03

## 2025-01-05 RX ADMIN — CARBIDOPA AND LEVODOPA 1.5 TABLET: 25; 250 TABLET ORAL at 12:36

## 2025-01-05 RX ADMIN — CARBIDOPA AND LEVODOPA 1.5 TABLET: 25; 250 TABLET ORAL at 06:33

## 2025-01-05 RX ADMIN — FUROSEMIDE 20 MG: 20 TABLET ORAL at 08:49

## 2025-01-05 ASSESSMENT — PAIN SCALES - GENERAL: PAINLEVEL_OUTOF10: 0 - NO PAIN

## 2025-01-05 NOTE — ASSESSMENT & PLAN NOTE
Awaiting insurance precertification to [employer] [facility].  Will have to wait till Monday  Clinically stable  Bowel regimen  Continue with PT OT  Fall precautions  Continue chronic medications

## 2025-01-05 NOTE — PROGRESS NOTES
Kalani Keith is a 69 y.o. male on day 0 of admission presenting with Ambulatory dysfunction.      Subjective   Doing well.  Sitting up in recliner and already ate his breakfast.  Informed him that precertification likely will not come till Monday at this point.       Objective     Last Recorded Vitals  /79 (BP Location: Left arm, Patient Position: Lying)   Pulse 81   Temp 36.5 °C (97.7 °F) (Temporal)   Resp 17   Wt 83.1 kg (183 lb 3.2 oz)   SpO2 95%   Intake/Output last 3 Shifts:    Intake/Output Summary (Last 24 hours) at 1/5/2025 1049  Last data filed at 1/5/2025 0600  Gross per 24 hour   Intake 120 ml   Output --   Net 120 ml       Admission Weight  Weight: 83.9 kg (185 lb) (12/27/24 1408)    Daily Weight  12/27/24 : 83.1 kg (183 lb 3.2 oz)      Physical Exam:  General: Not in acute distress, alert, again sitting up in recliner  HEENT: PERRLA, head intact and normocephalic  Neck: Normal to inspection  Lungs: Clear to auscultation, work of breathing within normal limit  Cardiac: Regular rate and rhythm  Abdomen: Soft nontender, positive bowel sounds  : Exam deferred  Skin: Intact  Hematology: No petechia or excessive ecchymosis  Musculoskeletal: Without significant trauma  Neurological: Alert awake oriented x 2, slow to respond, no focal deficit, cranial nerves grossly intact, very difficult to understand his speech  Psych: No suicidal ideation or homicidal ideation patient is     Relevant Results  Scheduled medications  amLODIPine, 5 mg, oral, Daily  carbidopa-levodopa, 1 tablet, oral, Nightly  carbidopa-levodopa, 1.5 tablet, oral, q6h  divalproex sprinkle, 250 mg, oral, Once  enoxaparin, 40 mg, subcutaneous, q24h  furosemide, 20 mg, oral, Daily  linaCLOtide, 145 mcg, oral, Daily before breakfast  sertraline, 25 mg, oral, Nightly      Continuous medications     PRN medications  PRN medications: acetaminophen   Labs  Results from last 7 days   Lab Units 12/30/24  0618   WBC AUTO x10*3/uL 3.9*    HEMOGLOBIN g/dL 14.6   HEMATOCRIT % 47.7   PLATELETS AUTO x10*3/uL 195     Results from last 7 days   Lab Units 12/30/24  0618   SODIUM mmol/L 136   POTASSIUM mmol/L 3.7   CHLORIDE mmol/L 100   CO2 mmol/L 26   BUN mg/dL 9   CREATININE mg/dL 0.89   CALCIUM mg/dL 8.8   PROTEIN TOTAL g/dL 7.2   BILIRUBIN TOTAL mg/dL 0.7   ALK PHOS U/L 59   ALT U/L 8*   AST U/L 18   GLUCOSE mg/dL 95       ECG 12 lead    Result Date: 12/30/2024  Sinus tachycardia Possible Left atrial enlargement Borderline ECG When compared with ECG of 13-DEC-2024 12:43, No significant change was found    CT head wo IV contrast    Result Date: 12/27/2024  Interpreted By:  Carlo Cruz, STUDY: CT HEAD WO IV CONTRAST; CT CERVICAL SPINE WO IV CONTRAST;  12/27/2024 6:09 pm   INDICATION: Signs/Symptoms:recurrent falls; Signs/Symptoms:recurrent falls, numbness on arms     COMPARISON: CT head and cervical spine 12/13/2024   ACCESSION NUMBER(S): [id number]; [id number]   ORDERING CLINICIAN: OPAL HANSEN   TECHNIQUE: Axial noncontrast CT images of head with coronal and sagittal reconstructed images. Axial noncontrast CT images of the cervical spine with coronal and sagittal reconstructed images.   FINDINGS: CT HEAD:   BRAIN PARENCHYMA: Gray-white differentiation is preserved. No mass-effect, midline shift or effacement of cerebral sulci. Patchy periventricular and subcortical white matter hypodensities, nonspecific but often seen in the setting of chronic microangiopathic change.   HEMORRHAGE: No acute intracranial hemorrhage.   VENTRICLES and EXTRA-AXIAL SPACES: The ventricles and sulci are within normal limits for brain volume. No abnormal extra-axial fluid collection.   ORBITS: The visualized orbits and globes are within normal limits.   EXTRACRANIAL SOFT TISSUES: Within normal limits.   PARANASAL SINUSES/MASTOIDS: The visualized paranasal sinuses and mastoid air cells are well aerated.   CALVARIUM: No depressed skull fracture.     CT CERVICAL  SPINE:   CRANIOCERVICAL JUNCTION: Intact.   ALIGNMENT: No traumatic malalignment or traumatic facet widening. Reversal of the cervical lordotic curvature at C5.   VERTEBRAE/DISC SPACES: No acute fracture. Vertebral body heights are maintained. Varying degrees of mild-to-moderate multilevel disc space height loss and associated degenerative endplate changes. No high grade spinal canal stenosis evident by CT.   SOFT TISSUES: No significant abnormality. Calcifications of the nuchal ligament at C5-C6.   OTHER: The visualized lung apices are clear.       CT HEAD: 1. No acute intracranial abnormality or calvarial fracture.     CT CERVICAL SPINE: 1. No acute fracture or traumatic malalignment of the cervical spine.   MACRO: None   Signed by: Carlo Cruz 12/27/2024 6:47 PM Dictation workstation:   [id number]    CT cervical spine wo IV contrast    Result Date: 12/27/2024  Interpreted By:  Carlo Cruz, STUDY: CT HEAD WO IV CONTRAST; CT CERVICAL SPINE WO IV CONTRAST;  12/27/2024 6:09 pm   INDICATION: Signs/Symptoms:recurrent falls; Signs/Symptoms:recurrent falls, numbness on arms     COMPARISON: CT head and cervical spine 12/13/2024   ACCESSION NUMBER(S): [id number]; [id number]   ORDERING CLINICIAN: OPAL HANSEN   TECHNIQUE: Axial noncontrast CT images of head with coronal and sagittal reconstructed images. Axial noncontrast CT images of the cervical spine with coronal and sagittal reconstructed images.   FINDINGS: CT HEAD:   BRAIN PARENCHYMA: Gray-white differentiation is preserved. No mass-effect, midline shift or effacement of cerebral sulci. Patchy periventricular and subcortical white matter hypodensities, nonspecific but often seen in the setting of chronic microangiopathic change.   HEMORRHAGE: No acute intracranial hemorrhage.   VENTRICLES and EXTRA-AXIAL SPACES: The ventricles and sulci are within normal limits for brain volume. No abnormal extra-axial fluid collection.   ORBITS: The visualized orbits  and globes are within normal limits.   EXTRACRANIAL SOFT TISSUES: Within normal limits.   PARANASAL SINUSES/MASTOIDS: The visualized paranasal sinuses and mastoid air cells are well aerated.   CALVARIUM: No depressed skull fracture.     CT CERVICAL SPINE:   CRANIOCERVICAL JUNCTION: Intact.   ALIGNMENT: No traumatic malalignment or traumatic facet widening. Reversal of the cervical lordotic curvature at C5.   VERTEBRAE/DISC SPACES: No acute fracture. Vertebral body heights are maintained. Varying degrees of mild-to-moderate multilevel disc space height loss and associated degenerative endplate changes. No high grade spinal canal stenosis evident by CT.   SOFT TISSUES: No significant abnormality. Calcifications of the nuchal ligament at C5-C6.   OTHER: The visualized lung apices are clear.       CT HEAD: 1. No acute intracranial abnormality or calvarial fracture.     CT CERVICAL SPINE: 1. No acute fracture or traumatic malalignment of the cervical spine.   MACRO: None   Signed by: Carlo Cruz 12/27/2024 6:47 PM Dictation workstation:   [id number]    XR chest 1 view    Result Date: 12/27/2024  Interpreted By:  Anson Kim, STUDY: XR CHEST 1 VIEW;  12/27/2024 3:15 pm   INDICATION: Signs/Symptoms:generalized weakness.     COMPARISON: None.   ACCESSION NUMBER(S): [id number]   ORDERING CLINICIAN: OPAL HANSEN   FINDINGS: CARDIOMEDIASTINAL SILHOUETTE: Cardiomediastinal silhouette is normal in size and configuration.   LUNGS: Left basilar mild linear atelectasis is present. No focal consolidation. No pleural effusion or pneumothorax.   ABDOMEN: No remarkable upper abdominal findings.   BONES: Multilevel endplate spurring present in the spine.       1.  Left basilar mild linear atelectasis. No focal consolidation.       MACRO: None.   Signed by: Anson Kim 12/27/2024 4:05 PM Dictation workstation:   [id number]    ECG 12 lead    Result Date: 12/16/2024  Sinus tachycardia Otherwise normal ECG When  compared with ECG of 21-NOV-2024 21:08, No significant change was found BASELINE ARTIFACT Confirmed by Abdiel Sloan ([id number]) on 12/16/2024 11:59:49 AM    CT cervical spine wo IV contrast    Result Date: 12/13/2024  Interpreted By:  Ayo Ríos, STUDY: CT CERVICAL SPINE WO IV CONTRAST; 12/13/2024 12:37 pm   INDICATION: Signs/Symptoms:fall;   COMPARISON: None   ACCESSION NUMBER(S): [id number]   ORDERING CLINICIAN: OSCAR MATOS   TECHNIQUE: Contiguous axial images were acquired from the skull base to the lung apices. Coronal and sagittal reformatted images were obtained. All CT examinations are performed with 1 or more of the following dose reduction techniques: Automated exposure control, adjustment of mA and/or kv according to patient's size, or use of iterative reconstruction techniques.   FINDINGS: There is straightening of the normal cervical lordosis.  No acute fracture or spondylolisthesis is identified.     The occipital condyles, arch of C1, and the odontoid processes are intact. The atlantoaxial relationship is well maintained.   There is moderate disc space narrowing at C4-5, C5-6. Mild-moderate disc space narrowing at C6-7. Bony spinal canal is patent.   Mild right neural foramina stenosis at C4-5 mild-moderate left neural foramina stenosis at C5-6.   The visualized lung apices are unremarkable.       1. No acute fracture or spondylolisthesis. 2. Degenerative disc disease and spondylosis as described in the body of the report.     Signed by: Ayo Ríos 12/13/2024 1:02 PM Dictation workstation:   [id number]    CT brain attack head wo IV contrast    Result Date: 12/13/2024  Interpreted By:  Ayo Ríos, STUDY: OSCAR MATOS; 12/13/2024 12:37 pm   INDICATION: Signs/Symptoms:Stroke Evaluation;   COMPARISON: 11/21/2024   ACCESSION NUMBER(S): [id number]   ORDERING CLINICIAN: OSCAR MATOS   TECHNIQUE: Contiguous axial images were acquired from the vertex through the posterior fossa without IV  contrast. All CT examinations are performed with 1 or more of the following dose reduction techniques: Automated exposure control, adjustment of mA and/or kv according to patient's size, or use of iterative reconstruction techniques.   FINDINGS: No focal mass effect or midline shift is identified. The ventricles and sulci are symmetric and appropriate for the patient's age.   There is a mild degree of nonspecific white matter hypodensity, most consistent with chronic small-vessel ischemic disease. The gray white matter differentiation is preserved.   No acute intracranial hemorrhage is seen. No intra-axial or extra-axial fluid collection is seen.   The visualized paranasal sinuses and mastoid air cells are clear.       No CT evidence for acute intracranial pathology.     Findings discussed with OSCAR MATOS via telephone at 12:50 p.m. on 12/13/2024   Signed by: Ayo Ríos 12/13/2024 12:58 PM Dictation workstation:   [id number]                    Assessment/Plan   Kalani Keith is a 69 y.o. male on day 0 of admission presenting with Ambulatory dysfunction.  Assessment & Plan  Ambulatory dysfunction  Awaiting insurance precertification to [facility].  Will have to wait till Monday  Clinically stable  Bowel regimen  Continue with PT OT  Fall precautions  Continue chronic medications       Plan discussed with patient at bedside along with primary nurse Shashi    Low level of MDM based on above issue and discussing plan    This note is created using voice recognition software. All efforts are made to minimize errors, if there are errors there due to transcription.    Chase Gonzalez  Hospitalist

## 2025-01-05 NOTE — CARE PLAN
The patient's goals for the shift include      The clinical goals for the shift include Patient will remain safe      Problem: Pain - Adult  Goal: Verbalizes/displays adequate comfort level or baseline comfort level  Outcome: Progressing     Problem: Safety - Adult  Goal: Free from fall injury  Outcome: Progressing     Problem: Discharge Planning  Goal: Discharge to home or other facility with appropriate resources  Outcome: Progressing     Problem: Pain  Goal: Turns in bed with improved pain control throughout the shift  Outcome: Progressing     Problem: Skin  Goal: Prevent/manage excess moisture  Outcome: Progressing  Flowsheets (Taken 1/5/2025 0520)  Prevent/manage excess moisture: Cleanse incontinence/protect with barrier cream     Problem: Skin  Goal: Promote/optimize nutrition  Outcome: Progressing  Flowsheets (Taken 1/5/2025 0520)  Promote/optimize nutrition:   Assist with feeding   Monitor/record intake including meals

## 2025-01-06 ENCOUNTER — HOME CARE VISIT (OUTPATIENT)
Dept: HOME HEALTH SERVICES | Facility: HOME HEALTH | Age: 70
End: 2025-01-06
Payer: MEDICARE

## 2025-01-06 PROCEDURE — 97116 GAIT TRAINING THERAPY: CPT | Mod: GP,CQ

## 2025-01-06 PROCEDURE — 2500000004 HC RX 250 GENERAL PHARMACY W/ HCPCS (ALT 636 FOR OP/ED)

## 2025-01-06 PROCEDURE — 97535 SELF CARE MNGMENT TRAINING: CPT | Mod: GO,CO

## 2025-01-06 PROCEDURE — 99231 SBSQ HOSP IP/OBS SF/LOW 25: CPT | Performed by: INTERNAL MEDICINE

## 2025-01-06 PROCEDURE — 2500000001 HC RX 250 WO HCPCS SELF ADMINISTERED DRUGS (ALT 637 FOR MEDICARE OP)

## 2025-01-06 PROCEDURE — 96372 THER/PROPH/DIAG INJ SC/IM: CPT

## 2025-01-06 PROCEDURE — 2500000001 HC RX 250 WO HCPCS SELF ADMINISTERED DRUGS (ALT 637 FOR MEDICARE OP): Performed by: STUDENT IN AN ORGANIZED HEALTH CARE EDUCATION/TRAINING PROGRAM

## 2025-01-06 PROCEDURE — 97530 THERAPEUTIC ACTIVITIES: CPT | Mod: GP,CQ

## 2025-01-06 PROCEDURE — G0378 HOSPITAL OBSERVATION PER HR: HCPCS

## 2025-01-06 PROCEDURE — 2500000001 HC RX 250 WO HCPCS SELF ADMINISTERED DRUGS (ALT 637 FOR MEDICARE OP): Performed by: NURSE PRACTITIONER

## 2025-01-06 RX ADMIN — CARBIDOPA AND LEVODOPA 1 TABLET: 50; 200 TABLET, EXTENDED RELEASE ORAL at 22:45

## 2025-01-06 RX ADMIN — CARBIDOPA AND LEVODOPA 1.5 TABLET: 25; 250 TABLET ORAL at 00:04

## 2025-01-06 RX ADMIN — CARBIDOPA AND LEVODOPA 1.5 TABLET: 25; 250 TABLET ORAL at 18:49

## 2025-01-06 RX ADMIN — CARBIDOPA AND LEVODOPA 1.5 TABLET: 25; 250 TABLET ORAL at 06:20

## 2025-01-06 RX ADMIN — CARBIDOPA AND LEVODOPA 1.5 TABLET: 25; 250 TABLET ORAL at 12:33

## 2025-01-06 RX ADMIN — ENOXAPARIN SODIUM 40 MG: 40 INJECTION SUBCUTANEOUS at 22:45

## 2025-01-06 RX ADMIN — FUROSEMIDE 20 MG: 20 TABLET ORAL at 08:57

## 2025-01-06 RX ADMIN — LINACLOTIDE 145 MCG: 145 CAPSULE, GELATIN COATED ORAL at 06:20

## 2025-01-06 RX ADMIN — AMLODIPINE BESYLATE 5 MG: 5 TABLET ORAL at 08:57

## 2025-01-06 ASSESSMENT — COGNITIVE AND FUNCTIONAL STATUS - GENERAL
MOVING FROM LYING ON BACK TO SITTING ON SIDE OF FLAT BED WITH BEDRAILS: A LITTLE
HELP NEEDED FOR BATHING: A LOT
TURNING FROM BACK TO SIDE WHILE IN FLAT BAD: A LITTLE
WALKING IN HOSPITAL ROOM: A LOT
TOILETING: A LITTLE
DRESSING REGULAR UPPER BODY CLOTHING: A LITTLE
EATING MEALS: A LITTLE
MOVING TO AND FROM BED TO CHAIR: A LOT
DRESSING REGULAR LOWER BODY CLOTHING: A LOT
CLIMB 3 TO 5 STEPS WITH RAILING: A LOT
MOBILITY SCORE: 14
PERSONAL GROOMING: A LITTLE
STANDING UP FROM CHAIR USING ARMS: A LOT
DAILY ACTIVITIY SCORE: 16

## 2025-01-06 ASSESSMENT — PAIN SCALES - GENERAL
PAINLEVEL_OUTOF10: 0 - NO PAIN
PAINLEVEL_OUTOF10: 0 - NO PAIN

## 2025-01-06 ASSESSMENT — ACTIVITIES OF DAILY LIVING (ADL)
HOME_MANAGEMENT_TIME_ENTRY: 41
EFFECT OF PAIN ON DAILY ACTIVITIES: .

## 2025-01-06 ASSESSMENT — PAIN - FUNCTIONAL ASSESSMENT
PAIN_FUNCTIONAL_ASSESSMENT: 0-10
PAIN_FUNCTIONAL_ASSESSMENT: 0-10

## 2025-01-06 NOTE — PROGRESS NOTES
"Kalani Keith is a 69 y.o. male on day 0 of admission presenting with Ambulatory dysfunction.      Subjective   Walked with physical therapy today with a shuffling gait.  Called patient's daughter and left a voicemail with an update       Objective     Last Recorded Vitals  /82 (BP Location: Left arm, Patient Position: Lying)   Pulse 87   Temp 36.9 °C (98.4 °F) (Temporal)   Resp 16   Wt 83.1 kg (183 lb 3.2 oz)   SpO2 98%   Intake/Output last 3 Shifts:    Intake/Output Summary (Last 24 hours) at 1/6/2025 1344  Last data filed at 1/6/2025 1313  Gross per 24 hour   Intake --   Output 1005 ml   Net -1005 ml       Admission Weight  Weight: 83.9 kg (185 lb) (12/27/24 1408)    Daily Weight  12/27/24 : 83.1 kg (183 lb 3.2 oz)      Physical Exam:  General: Not in acute distress, alert, again sitting up in recliner  HEENT: PERRLA, head intact and normocephalic  Neck: Normal to inspection  Lungs: Clear to auscultation, work of breathing within normal limit  Cardiac: Regular rate and rhythm  Abdomen: Soft nontender, positive bowel sounds  : Exam deferred  Skin: Intact  Hematology: No petechia or excessive ecchymosis  Musculoskeletal: Without significant trauma  Neurological: Alert awake oriented x 3, slow to respond, no focal deficit, cranial nerves grossly intact, very difficult to understand his speech  Psych: No suicidal ideation or homicidal ideation patient is     Relevant Results  Scheduled medications  amLODIPine, 5 mg, oral, Daily  carbidopa-levodopa, 1 tablet, oral, Nightly  carbidopa-levodopa, 1.5 tablet, oral, q6h  divalproex sprinkle, 250 mg, oral, Once  enoxaparin, 40 mg, subcutaneous, q24h  furosemide, 20 mg, oral, Daily  linaCLOtide, 145 mcg, oral, Daily before breakfast  sertraline, 25 mg, oral, Nightly      Continuous medications     PRN medications  PRN medications: acetaminophen   Labs              No lab exists for component: \"LABALBU\"      ECG 12 lead    Result Date: 12/30/2024  Sinus tachycardia " Possible Left atrial enlargement Borderline ECG When compared with ECG of 13-DEC-2024 12:43, No significant change was found    CT head wo IV contrast    Result Date: 12/27/2024  Interpreted By:  Carlo Cruz, STUDY: CT HEAD WO IV CONTRAST; CT CERVICAL SPINE WO IV CONTRAST;  12/27/2024 6:09 pm   INDICATION: Signs/Symptoms:recurrent falls; Signs/Symptoms:recurrent falls, numbness on arms     COMPARISON: CT head and cervical spine 12/13/2024   ACCESSION NUMBER(S): [id number]; [id number]   ORDERING CLINICIAN: OPAL HANSEN   TECHNIQUE: Axial noncontrast CT images of head with coronal and sagittal reconstructed images. Axial noncontrast CT images of the cervical spine with coronal and sagittal reconstructed images.   FINDINGS: CT HEAD:   BRAIN PARENCHYMA: Gray-white differentiation is preserved. No mass-effect, midline shift or effacement of cerebral sulci. Patchy periventricular and subcortical white matter hypodensities, nonspecific but often seen in the setting of chronic microangiopathic change.   HEMORRHAGE: No acute intracranial hemorrhage.   VENTRICLES and EXTRA-AXIAL SPACES: The ventricles and sulci are within normal limits for brain volume. No abnormal extra-axial fluid collection.   ORBITS: The visualized orbits and globes are within normal limits.   EXTRACRANIAL SOFT TISSUES: Within normal limits.   PARANASAL SINUSES/MASTOIDS: The visualized paranasal sinuses and mastoid air cells are well aerated.   CALVARIUM: No depressed skull fracture.     CT CERVICAL SPINE:   CRANIOCERVICAL JUNCTION: Intact.   ALIGNMENT: No traumatic malalignment or traumatic facet widening. Reversal of the cervical lordotic curvature at C5.   VERTEBRAE/DISC SPACES: No acute fracture. Vertebral body heights are maintained. Varying degrees of mild-to-moderate multilevel disc space height loss and associated degenerative endplate changes. No high grade spinal canal stenosis evident by CT.   SOFT TISSUES: No significant  abnormality. Calcifications of the nuchal ligament at C5-C6.   OTHER: The visualized lung apices are clear.       CT HEAD: 1. No acute intracranial abnormality or calvarial fracture.     CT CERVICAL SPINE: 1. No acute fracture or traumatic malalignment of the cervical spine.   MACRO: None   Signed by: Carlo Cruz 12/27/2024 6:47 PM Dictation workstation:   [id number]    CT cervical spine wo IV contrast    Result Date: 12/27/2024  Interpreted By:  Carlo Cruz, STUDY: CT HEAD WO IV CONTRAST; CT CERVICAL SPINE WO IV CONTRAST;  12/27/2024 6:09 pm   INDICATION: Signs/Symptoms:recurrent falls; Signs/Symptoms:recurrent falls, numbness on arms     COMPARISON: CT head and cervical spine 12/13/2024   ACCESSION NUMBER(S): [id number]; [id number]   ORDERING CLINICIAN: OPAL HANSEN   TECHNIQUE: Axial noncontrast CT images of head with coronal and sagittal reconstructed images. Axial noncontrast CT images of the cervical spine with coronal and sagittal reconstructed images.   FINDINGS: CT HEAD:   BRAIN PARENCHYMA: Gray-white differentiation is preserved. No mass-effect, midline shift or effacement of cerebral sulci. Patchy periventricular and subcortical white matter hypodensities, nonspecific but often seen in the setting of chronic microangiopathic change.   HEMORRHAGE: No acute intracranial hemorrhage.   VENTRICLES and EXTRA-AXIAL SPACES: The ventricles and sulci are within normal limits for brain volume. No abnormal extra-axial fluid collection.   ORBITS: The visualized orbits and globes are within normal limits.   EXTRACRANIAL SOFT TISSUES: Within normal limits.   PARANASAL SINUSES/MASTOIDS: The visualized paranasal sinuses and mastoid air cells are well aerated.   CALVARIUM: No depressed skull fracture.     CT CERVICAL SPINE:   CRANIOCERVICAL JUNCTION: Intact.   ALIGNMENT: No traumatic malalignment or traumatic facet widening. Reversal of the cervical lordotic curvature at C5.   VERTEBRAE/DISC SPACES: No acute  fracture. Vertebral body heights are maintained. Varying degrees of mild-to-moderate multilevel disc space height loss and associated degenerative endplate changes. No high grade spinal canal stenosis evident by CT.   SOFT TISSUES: No significant abnormality. Calcifications of the nuchal ligament at C5-C6.   OTHER: The visualized lung apices are clear.       CT HEAD: 1. No acute intracranial abnormality or calvarial fracture.     CT CERVICAL SPINE: 1. No acute fracture or traumatic malalignment of the cervical spine.   MACRO: None   Signed by: Carlo Cruz 12/27/2024 6:47 PM Dictation workstation:   [id number]    XR chest 1 view    Result Date: 12/27/2024  Interpreted By:  Anson Kim, STUDY: XR CHEST 1 VIEW;  12/27/2024 3:15 pm   INDICATION: Signs/Symptoms:generalized weakness.     COMPARISON: None.   ACCESSION NUMBER(S): [id number]   ORDERING CLINICIAN: OPAL HANSEN   FINDINGS: CARDIOMEDIASTINAL SILHOUETTE: Cardiomediastinal silhouette is normal in size and configuration.   LUNGS: Left basilar mild linear atelectasis is present. No focal consolidation. No pleural effusion or pneumothorax.   ABDOMEN: No remarkable upper abdominal findings.   BONES: Multilevel endplate spurring present in the spine.       1.  Left basilar mild linear atelectasis. No focal consolidation.       MACRO: None.   Signed by: Anson Kim 12/27/2024 4:05 PM Dictation workstation:   [id number]    ECG 12 lead    Result Date: 12/16/2024  Sinus tachycardia Otherwise normal ECG When compared with ECG of 21-NOV-2024 21:08, No significant change was found BASELINE ARTIFACT Confirmed by Abdiel Sloan ([id number]) on 12/16/2024 11:59:49 AM    CT cervical spine wo IV contrast    Result Date: 12/13/2024  Interpreted By:  Ayo Ríos, STUDY: CT CERVICAL SPINE WO IV CONTRAST; 12/13/2024 12:37 pm   INDICATION: Signs/Symptoms:fall;   COMPARISON: None   ACCESSION NUMBER(S): [id number]   ORDERING CLINICIAN: OSCAR MATOS   TECHNIQUE:  Contiguous axial images were acquired from the skull base to the lung apices. Coronal and sagittal reformatted images were obtained. All CT examinations are performed with 1 or more of the following dose reduction techniques: Automated exposure control, adjustment of mA and/or kv according to patient's size, or use of iterative reconstruction techniques.   FINDINGS: There is straightening of the normal cervical lordosis.  No acute fracture or spondylolisthesis is identified.     The occipital condyles, arch of C1, and the odontoid processes are intact. The atlantoaxial relationship is well maintained.   There is moderate disc space narrowing at C4-5, C5-6. Mild-moderate disc space narrowing at C6-7. Bony spinal canal is patent.   Mild right neural foramina stenosis at C4-5 mild-moderate left neural foramina stenosis at C5-6.   The visualized lung apices are unremarkable.       1. No acute fracture or spondylolisthesis. 2. Degenerative disc disease and spondylosis as described in the body of the report.     Signed by: Ayo Ríos 12/13/2024 1:02 PM Dictation workstation:   [id number]    CT brain attack head wo IV contrast    Result Date: 12/13/2024  Interpreted By:  Ayo Ríos, STUDY: OSCAR MATOS; 12/13/2024 12:37 pm   INDICATION: Signs/Symptoms:Stroke Evaluation;   COMPARISON: 11/21/2024   ACCESSION NUMBER(S): [id number]   ORDERING CLINICIAN: OSCAR MATOS   TECHNIQUE: Contiguous axial images were acquired from the vertex through the posterior fossa without IV contrast. All CT examinations are performed with 1 or more of the following dose reduction techniques: Automated exposure control, adjustment of mA and/or kv according to patient's size, or use of iterative reconstruction techniques.   FINDINGS: No focal mass effect or midline shift is identified. The ventricles and sulci are symmetric and appropriate for the patient's age.   There is a mild degree of nonspecific white matter hypodensity, most  consistent with chronic small-vessel ischemic disease. The gray white matter differentiation is preserved.   No acute intracranial hemorrhage is seen. No intra-axial or extra-axial fluid collection is seen.   The visualized paranasal sinuses and mastoid air cells are clear.       No CT evidence for acute intracranial pathology.     Findings discussed with OSCAR MATOS via telephone at 12:50 p.m. on 12/13/2024   Signed by: Ayo Ríos 12/13/2024 12:58 PM Dictation workstation:   [id number]                    Assessment/Plan   Kalani Keith is a 69 y.o. male on day 0 of admission presenting with Ambulatory dysfunction.  Assessment & Plan  Ambulatory dysfunction  Awaiting insurance precertification to [facility].  Will have to wait till Monday  Clinically stable  Bowel regimen  Continue with PT OT  Fall precautions  Continue chronic medications       Plan discussed with patient at bedside and with primary nurse Mary.  Called patient's daughter Alejandra and tried leaving a voicemail with an update    Low level of MDM based on above issue and discussing plan    This note is created using voice recognition software. All efforts are made to minimize errors, if there are errors there due to transcription.    Chase Gonzalez  Hospitalist

## 2025-01-06 NOTE — PROGRESS NOTES
01/06/25 1103   Discharge Planning   Living Arrangements Spouse/significant other   Support Systems Family members   Type of Residence Private residence   Home or Post Acute Services Post acute facilities (Rehab/SNF/etc)   Type of Post Acute Facility Services Skilled nursing   Expected Discharge Disposition SNF   Does the patient need discharge transport arranged? Yes   RoundTrip coordination needed? Yes     Precert pending for [facility]. Facility requesting updates. Sent in [facility]. Requested updated therapy notes.     [id number] Updated PT note sent in [facility].

## 2025-01-06 NOTE — CARE PLAN
Problem: Pain - Adult  Goal: Verbalizes/displays adequate comfort level or baseline comfort level  Outcome: Progressing     Problem: Safety - Adult  Goal: Free from fall injury  Outcome: Progressing     Problem: Discharge Planning  Goal: Discharge to home or other facility with appropriate resources  Outcome: Progressing     Problem: Chronic Conditions and Co-morbidities  Goal: Patient's chronic conditions and co-morbidity symptoms are monitored and maintained or improved  Outcome: Progressing     Problem: Fall/Injury  Goal: Not fall by end of shift  Outcome: Progressing     Problem: Pain  Goal: Takes deep breaths with improved pain control throughout the shift  Outcome: Progressing     Problem: Skin  Goal: Decreased wound size/increased tissue granulation at next dressing change  Outcome: Progressing     Problem: Dressings Lower Extremities  Goal: STG - Patient will complete lower body dressing with min assist with comp strategies PRN  Outcome: Progressing

## 2025-01-06 NOTE — PROGRESS NOTES
01/06/25 1424   Discharge Planning   Living Arrangements Spouse/significant other   Support Systems Spouse/significant other   Type of Residence Private residence   Home or Post Acute Services Post acute facilities (Rehab/SNF/etc)   Type of Post Acute Facility Services Skilled nursing   Expected Discharge Disposition SNF   Does the patient need discharge transport arranged? Yes   RoundTrip coordination needed? Yes     Precert obtained for [facility]. Requested DSC send [phone], GF and AVS. Transport scheduled

## 2025-01-06 NOTE — PROGRESS NOTES
Occupational Therapy    OT Treatment    Patient Name: Kalani Keith  MRN: [id number]  Today's Date: 1/6/2025  Time Calculation  Start Time: 1049  Stop Time: 1130  Time Calculation (min): 41 min       4120/4120-A    Assessment:  End of Session Communication: Bedside nurse  End of Session Patient Position: Up in chair, Alarm on       Plan:  OT Frequency: 3 times per week  OT Discharge Recommendations: Moderate intensity level of continued care     Subjective        01/06/25 1049   OT Last Visit   OT Received On 01/06/25   General   Prior to Session Communication Bedside nurse   Patient Position Received Up in chair;Alarm on   General Comment Pt agreeable to therapy, RN cleared for therapy.   Pain Assessment   Pain Assessment 0-10   0-10 (Numeric) Pain Score 0 - No pain   Grooming   Grooming Level of Assistance Minimum assistance   Grooming Where Assessed Standing sinkside   Toileting   Toileting Level of Assistance Minimum assistance   Where Assessed Toilet   Transfers   Transfer Yes   Transfer 1   Transfer From 1 Sit to   Transfer to 1 Stand   Technique 1 Stand to sit;Sit to stand   Transfer Device 1 Walker;Gait belt   Transfer Level of Assistance 1 Minimum assistance;+2;Moderate verbal cues   Trials/Comments 1 Multiple sit<>stands from chair and raised toilet seat. FM performed within hgousehold distances Min Ax2, chair>sink>hallway>chair>bathroom>chair, slow shuffling gait.   Toilet Transfers   Toilet Transfer From Chair   Toilet Transfer Type To and from   Toilet Transfer to Raised toilet seat with rails   Toilet Transfer Technique Ambulating   Toilet Transfers Minimal assistance, Moderate verbal cues  (x2)   IP OT Assessment   End of Session Communication Bedside nurse   End of Session Patient Position Up in chair;Alarm on   Inpatient Plan   OT Frequency 3 times per week   OT Discharge Recommendations Moderate intensity level of continued care       Outcome Measures:[facility] Daily Activity  Putting on and taking off  regular lower body clothing: A lot  Bathing (including washing, rinsing, drying): A lot  Putting on and taking off regular upper body clothing: A little  Toileting, which includes using toilet, bedpan or urinal: A little  Taking care of personal grooming such as brushing teeth: A little  Eating Meals: A little  Daily Activity - Total Score: 16  Education Documentation  Body Mechanics, taught by HARVEY Riley at 1/6/2025  2:16 PM.  Learner: Patient  Readiness: Acceptance  Method: Explanation, Demonstration  Response: Verbalizes Understanding, Demonstrated Understanding, Needs Reinforcement    Precautions, taught by HARVEY Riely at 1/6/2025  2:16 PM.  Learner: Patient  Readiness: Acceptance  Method: Explanation, Demonstration  Response: Verbalizes Understanding, Demonstrated Understanding, Needs Reinforcement    ADL Training, taught by HARVEY Riley at 1/6/2025  2:16 PM.  Learner: Patient  Readiness: Acceptance  Method: Explanation, Demonstration  Response: Verbalizes Understanding, Demonstrated Understanding, Needs Reinforcement    Education Comments  No comments found.            Goals:  Encounter Problems       Encounter Problems (Active)       Dressings Lower Extremities       STG - Patient will complete lower body dressing with min assist with comp strategies PRN (Progressing)       Start:  12/28/24    Expected End:  01/03/25               Functional Balance       STG-Patient will be SBA with assistive device dynamic stand task >5 minutes for ADL completion   (Progressing)       Start:  12/28/24    Expected End:  01/11/25               Functional Mobility       STG-Patient will be SBA with assistive device functional mobility tasks   (Progressing)       Start:  12/28/24    Expected End:  01/11/25               OT Transfers       STG-Patient will be SBA with functional transfers demonstrating good safety   (Progressing)       Start:  12/28/24    Expected End:  01/11/25

## 2025-01-06 NOTE — PROGRESS NOTES
Physical Therapy    Physical Therapy    Physical Therapy Treatment    Patient Name: Kalani Keith  MRN: [id number]  Today's Date: 1/6/2025  Time Calculation  Start Time: 1044  Stop Time: 1123  Time Calculation (min): 39 min     4120/4120-A       01/06/25 1044   PT  Visit   PT Received On 01/06/25   Response to Previous Treatment Patient with no complaints from previous session.   General   Reason for Referral ADLs, safety assessment   Referred By Ruddy Hill MD   Past Medical History Relevant to Rehab per EMR:69-year-old male with past medical history of Parkinson's disease who presented to [facility] due to generalized weakness with recurrent falls at home. On exam patient with noted parkinsonian dementia and will slowed responses to questioning/strength morsels HPI was obtained from the medical record. Patient has had multiple falls that were nontraumatic and supervised at home by his home physical therapist. Patient weakness has been worsening. Patient was recently admitted however left AMA as he was going to be admitted under observation status ambulatory about cost due to lack of insurance coverage. Patient family returned with patient today requesting admission for PT and OT evaluation and placement. Per family reports patient has not had any significant nausea, emesis comparison scalping, back pain or abdominal pain. No diarrhea. No fevers, chills or exposure to sick persons. Patient reportedly has some decreased sensation in his feet. No tobacco, alcohol or drug use reported. On exam patient resting in hallway bed with no acute distress on observation. He is quite rigid throughout the entire body. Needing assistance x 2 to sit up, dress and change positions. He responds to his name but is very slow to form words or sentences.   Prior to Session Communication Bedside nurse   Patient Position Received Up in chair;Alarm on   General Comment Pt agreeable to therapy, RN cleared for therapy.   Precautions    Medical Precautions Fall precautions   Pain Assessment   Pain Assessment 0-10   0-10 (Numeric) Pain Score 0 - No pain   Effect of Pain on Daily Activities .   Cognition   Overall Cognitive Status WFL   Orientation Level Disoriented to place;Disoriented to time;Disoriented to situation   Therapeutic Activity   Therapeutic Activity Performed Yes   Therapeutic Activity 1 standing balance using unilateral  UE support, minAx2   Ambulation/Gait Training   Ambulation/Gait Training Performed Yes   Ambulation/Gait Training 1   Surface 1 Level tile   Device 1 Rolling walker   Gait Support Devices Gait belt   Assistance 1 Minimum assistance;Moderate verbal cues  (x2)   Quality of Gait 1 Festinating   Comments/Distance (ft) 1 10', 40', 40 very slow cornelius with festinating steps, unable to increase step length despite max cues and demo   Transfers   Transfer Yes   Transfer 1   Transfer From 1 Chair with arms to   Transfer to 1 Stand;Sit   Technique 1 Stand to sit;Sit to stand   Transfer Device 1 Walker;Gait belt   Transfer Level of Assistance 1 Minimum assistance;+2;Moderate verbal cues   Trials/Comments 1 mod cues for safe UE placement and sequencing with poor>fair follow through   Transfers 2   Transfer From 2 Stand to   Transfer to 2 Commode-drop arm   Technique 2 Sit to stand;Stand to sit   Transfer Device 2 Walker;Gait belt   Transfer Level of Assistance 2 Minimum assistance;+2;Maximum verbal cues   Trials/Comments 2 max cues for turning sequence, poor >fair follow through   Activity Tolerance   Endurance Tolerates 30 min exercise with multiple rests   PT Assessment   PT Assessment Results Decreased strength;Decreased endurance;Impaired balance;Decreased mobility   Rehab Prognosis Good   End of Session Communication Bedside nurse   End of Session Patient Position Up in chair;Alarm on     Outcome Measures:  [facility] Basic Mobility  Turning from your back to your side while in a flat bed without using bedrails: KIERAN  little  Moving from lying on your back to sitting on the side of a flat bed without using bedrails: A little  Moving to and from bed to chair (including a wheelchair): A lot  Standing up from a chair using your arms (e.g. wheelchair or bedside chair): A lot  To walk in hospital room: A lot  Climbing 3-5 steps with railing: A lot  Basic Mobility - Total Score: 14                             EDUCATION:  Outpatient Education  Individual(s) Educated: Patient  Education Provided: Fall Risk  Education Documentation  Body Mechanics, taught by Janki Villafuerte PTA at 1/6/2025 11:39 AM.  Learner: Patient  Readiness: Acceptance  Method: Explanation, Demonstration  Response: Verbalizes Understanding, Needs Reinforcement    Precautions, taught by Janki Villafuerte PTA at 1/6/2025 11:39 AM.  Learner: Patient  Readiness: Acceptance  Method: Explanation, Demonstration  Response: Verbalizes Understanding, Needs Reinforcement    ADL Training, taught by Janki Villafuerte PTA at 1/6/2025 11:39 AM.  Learner: Patient  Readiness: Acceptance  Method: Explanation, Demonstration  Response: Verbalizes Understanding, Needs Reinforcement    Handouts, taught by Janki Villafuerte PTA at 1/6/2025 11:39 AM.  Learner: Patient  Readiness: Acceptance  Method: Explanation, Demonstration  Response: Verbalizes Understanding, Needs Reinforcement    Precautions, taught by Janki Villafuerte PTA at 1/6/2025 11:39 AM.  Learner: Patient  Readiness: Acceptance  Method: Explanation, Demonstration  Response: Verbalizes Understanding, Needs Reinforcement    Body Mechanics, taught by Janki Villafuerte PTA at 1/6/2025 11:39 AM.  Learner: Patient  Readiness: Acceptance  Method: Explanation, Demonstration  Response: Verbalizes Understanding, Needs Reinforcement    Home Exercise Program, taught by Janki Villafuerte PTA at 1/6/2025 11:39 AM.  Learner: Patient  Readiness: Acceptance  Method: Explanation, Demonstration  Response: Verbalizes Understanding, Needs Reinforcement    Mobility  Training, taught by Janki Villafuerte PTA at 1/6/2025 11:39 AM.  Learner: Patient  Readiness: Acceptance  Method: Explanation, Demonstration  Response: Verbalizes Understanding, Needs Reinforcement    Education Comments  No comments found.        GOALS:  Encounter Problems       Encounter Problems (Active)       Mobility       STG - Patient will ambulate x 40-50 ft. with w/w, CGA.   (Progressing)       Start:  12/28/24    Expected End:  01/11/25            Goal 1 Demo 3/5= A. tibs. and 5/5= Quads.  (Progressing)       Start:  12/28/24    Expected End:  01/11/25               PT Transfers       STG - Patient will perform bed mobility with CGA, when moving to EOB.   (Progressing)       Start:  12/28/24    Expected End:  01/11/25            STG - Patient will transfer sit to and from stand into a w/w, with CGA.   (Progressing)       Start:  12/28/24    Expected End:  01/11/25               Pain - Adult          Safety       STG - Patient uses gait belt during all transfers and w/w amb. trng..  (Progressing)       Start:  12/28/24    Expected End:  01/11/25

## 2025-01-07 VITALS
WEIGHT: 183.2 LBS | RESPIRATION RATE: 18 BRPM | SYSTOLIC BLOOD PRESSURE: 153 MMHG | HEIGHT: 70 IN | OXYGEN SATURATION: 98 % | TEMPERATURE: 98.2 F | DIASTOLIC BLOOD PRESSURE: 87 MMHG | HEART RATE: 84 BPM | BODY MASS INDEX: 26.23 KG/M2

## 2025-01-07 PROCEDURE — G0378 HOSPITAL OBSERVATION PER HR: HCPCS

## 2025-01-07 NOTE — NURSING NOTE
Transport here for discharge to [facility]. Called [place] and no issues taking patient at this time, Patient agreeable to go. VSS. All belongings accounted for.

## 2025-01-08 ENCOUNTER — NURSING HOME VISIT (OUTPATIENT)
Dept: POST ACUTE CARE | Facility: EXTERNAL LOCATION | Age: 70
End: 2025-01-08
Payer: MEDICARE

## 2025-01-08 DIAGNOSIS — R26.2 AMBULATORY DYSFUNCTION: Primary | ICD-10-CM

## 2025-01-08 DIAGNOSIS — R29.90 STROKE-LIKE SYMPTOMS: ICD-10-CM

## 2025-01-08 DIAGNOSIS — G20.B2 PARKINSON'S DISEASE WITH DYSKINESIA AND FLUCTUATING MANIFESTATIONS: ICD-10-CM

## 2025-01-08 PROCEDURE — 99309 SBSQ NF CARE MODERATE MDM 30: CPT

## 2025-01-08 NOTE — PROGRESS NOTES
Visit  Note   Subjective   Kalani Keith is a 69 y.o. male who is being seen at [facility] and evaluated for multiple medical problems. Nursing notes, vital signs, and labs were reviewed in the local facility chart. Orders and medications were reviewed on the local chart.  No chief complaint on file.     This is a 69-year-old male with a past medical history of Parkinson's disease who was hospitalized December 27 through January 3, 2025 for multiple mechanical falls at home.  Neurology evaluated him while he was hospitalized and had a low suspicion for cerebrovascular event.  They initially increased his carbidopa-levodopa.  Neurologist noted some improvement in bradykinesia and rigidity however he did develop some dopamine related dyskinesias and they recommended keeping his dose between 1 tab 4 times daily and 1.5 tab (25/100mg dosing) 4 times daily.  There were no other complications during his hospitalization and he was discharged to [facility] for rehabilitation with physical and occupational therapies.    On examination today he is alert and oriented x 3 and he has no concerns for medical team today. He denies any chest pain, shortness of breath, abdominal pain, nausea/vomiting, or problems with bowel or bladder.          Objective   There were no vitals taken for this visit.  Physical Exam  Vitals reviewed.   Constitutional:       Appearance: Normal appearance.      Comments: Masked face   HENT:      Head: Normocephalic.   Cardiovascular:      Rate and Rhythm: Normal rate and regular rhythm.   Pulmonary:      Breath sounds: Normal breath sounds.   Musculoskeletal:      Cervical back: Neck supple.      Right lower leg: No edema.      Left lower leg: No edema.   Skin:     General: Skin is warm and dry.   Neurological:      Mental Status: He is alert and oriented to person, place, and time.      Comments: +Resting tremor, rigidity with movement         Assessment & Plan  Ambulatory dysfunction  This is the main  reason for his hospitalization as he has been experiencing multiple falls recently. His work up was negative and his parkinsons medications were adjusted by neurology while he was hospitalized.        Parkinson's disease with dyskinesia and fluctuating manifestations  His sinemet was adjusted by neurology to 1.5 tab of the 25/100mg dosing, four times a day while continuing his home nightly dosing of the extended release sinemet (50/200 mg). He does have advanced parkinsons; postural rigidity, masked face, and dyskinesias. He will benefit from physical and occupational therapies here for recovery.          Stroke-like symptoms  CT head was negative for any acute findings and his symptoms were thought to be caused by his parkinsons disease. Neurology notes dopamine related dyskinesia and adjusted dosing.                Please excuse any errors in grammar or translation related to this dictation. Voice recognition software was utilized to prepare this document.

## 2025-01-08 NOTE — LETTER
Patient: Kalani Keith  : 1955    Encounter Date: 2025    Visit  Note   Subjective  Kalani Keith is a 69 y.o. male who is being seen at [facility] and evaluated for multiple medical problems. Nursing notes, vital signs, and labs were reviewed in the local facility chart. Orders and medications were reviewed on the local chart.  No chief complaint on file.     This is a 69-year-old male with a past medical history of Parkinson's disease who was hospitalized  through January 3, 2025 for multiple mechanical falls at home.  Neurology evaluated him while he was hospitalized and had a low suspicion for cerebrovascular event.  They initially increased his carbidopa-levodopa.  Neurologist noted some improvement in bradykinesia and rigidity however he did develop some dopamine related dyskinesias and they recommended keeping his dose between 1 tab 4 times daily and 1.5 tab (25/100mg dosing) 4 times daily.  There were no other complications during his hospitalization and he was discharged to [facility] for rehabilitation with physical and occupational therapies.    On examination today he is alert and oriented x 3 and he has no concerns for medical team today. He denies any chest pain, shortness of breath, abdominal pain, nausea/vomiting, or problems with bowel or bladder.          Objective  There were no vitals taken for this visit.  Physical Exam  Vitals reviewed.   Constitutional:       Appearance: Normal appearance.      Comments: Masked face   HENT:      Head: Normocephalic.   Cardiovascular:      Rate and Rhythm: Normal rate and regular rhythm.   Pulmonary:      Breath sounds: Normal breath sounds.   Musculoskeletal:      Cervical back: Neck supple.      Right lower leg: No edema.      Left lower leg: No edema.   Skin:     General: Skin is warm and dry.   Neurological:      Mental Status: He is alert and oriented to person, place, and time.      Comments: +Resting tremor, rigidity with movement          Assessment & Plan  Ambulatory dysfunction  This is the main reason for his hospitalization as he has been experiencing multiple falls recently. His work up was negative and his parkinsons medications were adjusted by neurology while he was hospitalized.        Parkinson's disease with dyskinesia and fluctuating manifestations  His sinemet was adjusted by neurology to 1.5 tab of the 25/100mg dosing, four times a day while continuing his home nightly dosing of the extended release sinemet (50/200 mg). He does have advanced parkinsons; postural rigidity, masked face, and dyskinesias. He will benefit from physical and occupational therapies here for recovery.          Stroke-like symptoms  CT head was negative for any acute findings and his symptoms were thought to be caused by his parkinsons disease. Neurology notes dopamine related dyskinesia and adjusted dosing.                Please excuse any errors in grammar or translation related to this dictation. Voice recognition software was utilized to prepare this document.       Electronically Signed By: FLORINDA Shane   1/9/25  8:17 AM

## 2025-01-09 ENCOUNTER — HOME CARE VISIT (OUTPATIENT)
Dept: HOME HEALTH SERVICES | Facility: HOME HEALTH | Age: 70
End: 2025-01-09
Payer: MEDICARE

## 2025-01-09 NOTE — ASSESSMENT & PLAN NOTE
This is the main reason for his hospitalization as he has been experiencing multiple falls recently. His work up was negative and his parkinsons medications were adjusted by neurology while he was hospitalized.

## 2025-01-09 NOTE — ASSESSMENT & PLAN NOTE
His sinemet was adjusted by neurology to 1.5 tab of the 25/100mg dosing, four times a day while continuing his home nightly dosing of the extended release sinemet (50/200 mg). He does have advanced parkinsons; postural rigidity, masked face, and dyskinesias. He will benefit from physical and occupational therapies here for recovery.

## 2025-01-09 NOTE — ASSESSMENT & PLAN NOTE
CT head was negative for any acute findings and his symptoms were thought to be caused by his parkinsons disease. Neurology notes dopamine related dyskinesia and adjusted dosing.

## 2025-01-12 LAB
ATRIAL RATE: 115 BPM
P AXIS: 48 DEGREES
P OFFSET: 201 MS
P ONSET: 145 MS
PR INTERVAL: 142 MS
Q ONSET: 216 MS
QRS COUNT: 19 BEATS
QRS DURATION: 78 MS
QT INTERVAL: 334 MS
QTC CALCULATION(BAZETT): 462 MS
QTC FREDERICIA: 414 MS
R AXIS: -24 DEGREES
T AXIS: 59 DEGREES
T OFFSET: 383 MS
VENTRICULAR RATE: 115 BPM

## 2025-01-13 ENCOUNTER — NURSING HOME VISIT (OUTPATIENT)
Dept: POST ACUTE CARE | Facility: EXTERNAL LOCATION | Age: 70
End: 2025-01-13
Payer: MEDICARE

## 2025-01-13 DIAGNOSIS — G20.B2 PARKINSON'S DISEASE WITH DYSKINESIA AND FLUCTUATING MANIFESTATIONS: ICD-10-CM

## 2025-01-13 DIAGNOSIS — R26.2 AMBULATORY DYSFUNCTION: Primary | ICD-10-CM

## 2025-01-13 DIAGNOSIS — R29.6 MULTIPLE FALLS: ICD-10-CM

## 2025-01-13 PROCEDURE — 99308 SBSQ NF CARE LOW MDM 20: CPT

## 2025-01-13 NOTE — PROGRESS NOTES
Visit  Note   Subjective   Kalani Keith is a 69 y.o. male who is being seen at [facility] and evaluated for multiple medical problems. Nursing notes, vital signs, and labs were reviewed in the local facility chart. Orders and medications were reviewed on the local chart.  No chief complaint on file.     This is a 69 year old male who is evaluated today during his skilled stay at [facility] following hospitalization for multiple falls. He is currently resting in his recliner and denies chest pain, shortness of breath, abdominal pain or problems with bowel or `bladder. He is asking about his therapy schedule today. No other concerns on examination.          Objective   There were no vitals taken for this visit.  Physical Exam  Vitals reviewed.   Constitutional:       Comments: Masked face    HENT:      Head: Normocephalic.   Cardiovascular:      Rate and Rhythm: Normal rate and regular rhythm.   Pulmonary:      Effort: Pulmonary effort is normal. No respiratory distress.      Breath sounds: Normal breath sounds. No wheezing, rhonchi or rales.   Musculoskeletal:      Cervical back: Neck supple.   Skin:     General: Skin is warm and dry.   Neurological:      Mental Status: He is alert. Mental status is at baseline.      Comments: + resting tremor   Psychiatric:         Mood and Affect: Mood normal.         Behavior: Behavior normal.         Assessment & Plan  Ambulatory dysfunction  This is the main reason for his hospitalization as he has been experiencing multiple falls recently. His work up was negative and his parkinsons medications were adjusted by neurology while he was hospitalized. He will continue to benefit from the physical and occupational therapies in hopes of rebuilding his strength and ability to ambulate safely.          Parkinson's disease with dyskinesia and fluctuating manifestations  His sinemet was adjusted by neurology to 1.5 tab of the 25/100mg dosing, four times a day while continuing his home  nightly dosing of the extended release sinemet (50/200 mg). He does have advanced parkinsons; postural rigidity, masked face, and dyskinesias. He will benefit from physical and occupational therapies here for recovery.          Multiple falls                Please excuse any errors in grammar or translation related to this dictation. Voice recognition software was utilized to prepare this document.

## 2025-01-13 NOTE — LETTER
Patient: Kalani Keith  : 1955    Encounter Date: 2025    Visit  Note   Subjective  Kalani Keith is a 69 y.o. male who is being seen at [facility] and evaluated for multiple medical problems. Nursing notes, vital signs, and labs were reviewed in the local facility chart. Orders and medications were reviewed on the local chart.  No chief complaint on file.     This is a 69 year old male who is evaluated today during his skilled stay at [facility] following hospitalization for multiple falls. He is currently resting in his recliner and denies chest pain, shortness of breath, abdominal pain or problems with bowel or `bladder. He is asking about his therapy schedule today. No other concerns on examination.          Objective  There were no vitals taken for this visit.  Physical Exam  Vitals reviewed.   Constitutional:       Comments: Masked face    HENT:      Head: Normocephalic.   Cardiovascular:      Rate and Rhythm: Normal rate and regular rhythm.   Pulmonary:      Effort: Pulmonary effort is normal. No respiratory distress.      Breath sounds: Normal breath sounds. No wheezing, rhonchi or rales.   Musculoskeletal:      Cervical back: Neck supple.   Skin:     General: Skin is warm and dry.   Neurological:      Mental Status: He is alert. Mental status is at baseline.      Comments: + resting tremor   Psychiatric:         Mood and Affect: Mood normal.         Behavior: Behavior normal.         Assessment & Plan  Ambulatory dysfunction  This is the main reason for his hospitalization as he has been experiencing multiple falls recently. His work up was negative and his parkinsons medications were adjusted by neurology while he was hospitalized. He will continue to benefit from the physical and occupational therapies in hopes of rebuilding his strength and ability to ambulate safely.          Parkinson's disease with dyskinesia and fluctuating manifestations  His sinemet was adjusted by neurology to 1.5 tab  of the 25/100mg dosing, four times a day while continuing his home nightly dosing of the extended release sinemet (50/200 mg). He does have advanced parkinsons; postural rigidity, masked face, and dyskinesias. He will benefit from physical and occupational therapies here for recovery.          Multiple falls                Please excuse any errors in grammar or translation related to this dictation. Voice recognition software was utilized to prepare this document.       Electronically Signed By: FLORINDA Shane   1/14/25  9:09 AM

## 2025-01-14 ENCOUNTER — NURSING HOME VISIT (OUTPATIENT)
Dept: POST ACUTE CARE | Facility: EXTERNAL LOCATION | Age: 70
End: 2025-01-14
Payer: MEDICARE

## 2025-01-14 DIAGNOSIS — G20.B2 PARKINSON'S DISEASE WITH DYSKINESIA AND FLUCTUATING MANIFESTATIONS: Primary | ICD-10-CM

## 2025-01-14 DIAGNOSIS — R26.2 AMBULATORY DYSFUNCTION: ICD-10-CM

## 2025-01-14 PROCEDURE — 99305 1ST NF CARE MODERATE MDM 35: CPT | Performed by: FAMILY MEDICINE

## 2025-01-14 NOTE — PROGRESS NOTES
Admission H&P  Subjective   Kalani Keith is a 69 y.o. male who is being seen for an admission H&P at [facility].  Nursing notes, vital signs, and labs were reviewed in the local facility chart and he  is being evaluated for multiple medical problems. Orders and medications were reviewed on the local chart.  HPI   This 69-year-old male with a medical history of Parkinson's disease was hospitalized December 27 after multiple mechanical falls at home.  He was evaluated by neurology.  His dopamine supplement was adjusted but then he had dyskinesia and his dose was reduced.  There were no other complications during his hospitalization.  He comes to the skilled nursing facility for PT/OT and supportive care.  He has no specific complaints at the time of this examination.  Objective   There were no vitals taken for this visit.  Physical Exam  Constitutional:       Appearance: Normal appearance.   HENT:      Head: Normocephalic.   Eyes:      Conjunctiva/sclera: Conjunctivae normal.   Cardiovascular:      Rate and Rhythm: Normal rate and regular rhythm.      Heart sounds: Normal heart sounds.   Pulmonary:      Effort: Pulmonary effort is normal.      Breath sounds: Normal breath sounds.   Musculoskeletal:      Cervical back: Neck supple.   Skin:     General: Skin is warm and dry.   Neurological:      Mental Status: He is alert.      Comments: There is extreme slowness of movement, soft voice, slow speech, no visible tremor.         Assessment & Plan  Parkinson's disease with dyskinesia and fluctuating manifestations  This was most likely the underlying etiology of his mechanical falls.  He has been thoroughly evaluated by neurology and his dopamine agonists were adjusted.  He comes here for PT and OT and hopefully to improve functional ability, ADLs, and prevent falls.  He will keep follow-up with neurology and plans to return home.         Ambulatory dysfunction                Please excuse any errors in grammar or  translation related to this dictation. Voice recognition software was utilized to prepare this document.

## 2025-01-14 NOTE — ASSESSMENT & PLAN NOTE
This is the main reason for his hospitalization as he has been experiencing multiple falls recently. His work up was negative and his parkinsons medications were adjusted by neurology while he was hospitalized. He will continue to benefit from the physical and occupational therapies in hopes of rebuilding his strength and ability to ambulate safely.

## 2025-01-14 NOTE — ASSESSMENT & PLAN NOTE
This was most likely the underlying etiology of his mechanical falls.  He has been thoroughly evaluated by neurology and his dopamine agonists were adjusted.  He comes here for PT and OT and hopefully to improve functional ability, ADLs, and prevent falls.  He will keep follow-up with neurology and plans to return home.

## 2025-01-14 NOTE — LETTER
Patient: Kalani Keith  : 1955    Encounter Date: 2025    Admission H&P  Subjective  Kalani Keith is a 69 y.o. male who is being seen for an admission H&P at [facility].  Nursing notes, vital signs, and labs were reviewed in the local facility chart and he  is being evaluated for multiple medical problems. Orders and medications were reviewed on the local chart.  HPI   This 69-year-old male with a medical history of Parkinson's disease was hospitalized  after multiple mechanical falls at home.  He was evaluated by neurology.  His dopamine supplement was adjusted but then he had dyskinesia and his dose was reduced.  There were no other complications during his hospitalization.  He comes to the skilled nursing facility for PT/OT and supportive care.  He has no specific complaints at the time of this examination.  Objective  There were no vitals taken for this visit.  Physical Exam  Constitutional:       Appearance: Normal appearance.   HENT:      Head: Normocephalic.   Eyes:      Conjunctiva/sclera: Conjunctivae normal.   Cardiovascular:      Rate and Rhythm: Normal rate and regular rhythm.      Heart sounds: Normal heart sounds.   Pulmonary:      Effort: Pulmonary effort is normal.      Breath sounds: Normal breath sounds.   Musculoskeletal:      Cervical back: Neck supple.   Skin:     General: Skin is warm and dry.   Neurological:      Mental Status: He is alert.      Comments: There is extreme slowness of movement, soft voice, slow speech, no visible tremor.         Assessment & Plan  Parkinson's disease with dyskinesia and fluctuating manifestations  This was most likely the underlying etiology of his mechanical falls.  He has been thoroughly evaluated by neurology and his dopamine agonists were adjusted.  He comes here for PT and OT and hopefully to improve functional ability, ADLs, and prevent falls.  He will keep follow-up with neurology and plans to return home.         Ambulatory  dysfunction                Please excuse any errors in grammar or translation related to this dictation. Voice recognition software was utilized to prepare this document.     Electronically Signed By: Rinku Kumar MD   1/14/25  5:23 PM

## 2025-01-18 DIAGNOSIS — K21.9 GASTROESOPHAGEAL REFLUX DISEASE, UNSPECIFIED WHETHER ESOPHAGITIS PRESENT: ICD-10-CM

## 2025-01-20 RX ORDER — OMEPRAZOLE 40 MG/1
40 CAPSULE, DELAYED RELEASE ORAL
Qty: 30 CAPSULE | Refills: 1 | Status: SHIPPED | OUTPATIENT
Start: 2025-01-20 | End: 2025-03-21

## 2025-01-22 ENCOUNTER — NURSING HOME VISIT (OUTPATIENT)
Dept: POST ACUTE CARE | Facility: EXTERNAL LOCATION | Age: 70
End: 2025-01-22
Payer: MEDICARE

## 2025-01-22 DIAGNOSIS — R26.2 AMBULATORY DYSFUNCTION: ICD-10-CM

## 2025-01-22 DIAGNOSIS — G20.B2 PARKINSON'S DISEASE WITH DYSKINESIA AND FLUCTUATING MANIFESTATIONS: Primary | ICD-10-CM

## 2025-01-22 PROCEDURE — 99308 SBSQ NF CARE LOW MDM 20: CPT

## 2025-01-22 NOTE — LETTER
Patient: Kalani Keith  : 1955    Encounter Date: 2025    Visit  Note   Subjective  Kalani Keith is a 69 y.o. male who is being seen at [facility] and evaluated for multiple medical problems. Nursing notes, vital signs, and labs were reviewed in the local facility chart. Orders and medications were reviewed on the local chart.  No chief complaint on file.     This is a 69 year old male evaluated today for general examination during his skilled stay at [facility]. He is reportedly doing well, states he is enjoying the elliptical in the therapy room and feels he's getting stronger. He denies any issues with chest pain, shortness of breath, abdominal pain or bowel/bladder.          Objective  There were no vitals taken for this visit.  Physical Exam  Vitals reviewed.   Constitutional:       Appearance: Normal appearance.   HENT:      Head: Normocephalic.   Cardiovascular:      Rate and Rhythm: Normal rate and regular rhythm.   Pulmonary:      Effort: Pulmonary effort is normal. No respiratory distress.      Breath sounds: Normal breath sounds. No wheezing, rhonchi or rales.   Musculoskeletal:      Cervical back: Neck supple.   Skin:     General: Skin is warm and dry.   Neurological:      Mental Status: He is alert. Mental status is at baseline.   Psychiatric:         Mood and Affect: Mood normal.         Behavior: Behavior normal.         Assessment & Plan  Parkinson's disease with dyskinesia and fluctuating manifestations  He does have advanced parkinsons; postural rigidity, masked face, and dyskinesias. He will continue to benefit from physical and occupational therapies here for recovery.          Ambulatory dysfunction  Reports he is feeling better with his therapy here. We discussed his care today and he has no concerns. He will continue to benefit from PT/OT in hopes of returning to baseline function.                 Please excuse any errors in grammar or translation related to this dictation. Voice  recognition software was utilized to prepare this document.       Electronically Signed By: FLORINDA Shane   1/22/25  4:22 PM

## 2025-01-22 NOTE — ASSESSMENT & PLAN NOTE
He does have advanced parkinsons; postural rigidity, masked face, and dyskinesias. He will continue to benefit from physical and occupational therapies here for recovery.

## 2025-01-22 NOTE — PROGRESS NOTES
Visit  Note   Subjective   Kalani Keith is a 69 y.o. male who is being seen at [facility] and evaluated for multiple medical problems. Nursing notes, vital signs, and labs were reviewed in the local facility chart. Orders and medications were reviewed on the local chart.  No chief complaint on file.     This is a 69 year old male evaluated today for general examination during his skilled stay at [facility]. He is reportedly doing well, states he is enjoying the elliptical in the therapy room and feels he's getting stronger. He denies any issues with chest pain, shortness of breath, abdominal pain or bowel/bladder.          Objective   There were no vitals taken for this visit.  Physical Exam  Vitals reviewed.   Constitutional:       Appearance: Normal appearance.   HENT:      Head: Normocephalic.   Cardiovascular:      Rate and Rhythm: Normal rate and regular rhythm.   Pulmonary:      Effort: Pulmonary effort is normal. No respiratory distress.      Breath sounds: Normal breath sounds. No wheezing, rhonchi or rales.   Musculoskeletal:      Cervical back: Neck supple.   Skin:     General: Skin is warm and dry.   Neurological:      Mental Status: He is alert. Mental status is at baseline.   Psychiatric:         Mood and Affect: Mood normal.         Behavior: Behavior normal.         Assessment & Plan  Parkinson's disease with dyskinesia and fluctuating manifestations  He does have advanced parkinsons; postural rigidity, masked face, and dyskinesias. He will continue to benefit from physical and occupational therapies here for recovery.          Ambulatory dysfunction  Reports he is feeling better with his therapy here. We discussed his care today and he has no concerns. He will continue to benefit from PT/OT in hopes of returning to baseline function.                 Please excuse any errors in grammar or translation related to this dictation. Voice recognition software was utilized to prepare this document.

## 2025-01-22 NOTE — ASSESSMENT & PLAN NOTE
Reports he is feeling better with his therapy here. We discussed his care today and he has no concerns. He will continue to benefit from PT/OT in hopes of returning to baseline function.

## 2025-01-28 ENCOUNTER — APPOINTMENT (OUTPATIENT)
Dept: NEUROLOGY | Facility: CLINIC | Age: 70
End: 2025-01-28
Payer: MEDICARE

## 2025-01-29 ENCOUNTER — APPOINTMENT (OUTPATIENT)
Dept: PRIMARY CARE | Facility: CLINIC | Age: 70
End: 2025-01-29
Payer: MEDICARE

## 2025-02-17 ENCOUNTER — HOME CARE VISIT (OUTPATIENT)
Dept: HOME HEALTH SERVICES | Facility: HOME HEALTH | Age: 70
End: 2025-02-17

## 2025-03-20 ENCOUNTER — NURSING HOME VISIT (OUTPATIENT)
Dept: POST ACUTE CARE | Facility: EXTERNAL LOCATION | Age: 70
End: 2025-03-20
Payer: MEDICARE

## 2025-03-20 DIAGNOSIS — M79.674 PAIN OF TOE OF RIGHT FOOT: Primary | ICD-10-CM

## 2025-03-20 PROCEDURE — 99308 SBSQ NF CARE LOW MDM 20: CPT

## 2025-03-20 NOTE — LETTER
Patient: Kalani Keith  : 1955    Encounter Date: 2025    Visit  Note   Subjective  Kalani Keith is a 69 y.o. male who is being seen at [facility] and evaluated for multiple medical problems. Nursing notes, vital signs, and labs were reviewed in the local facility chart. Orders and medications were reviewed on the local chart.  No chief complaint on file.     This is a 69 year old male evaluated today for reported toe pain on his right foot. He tells me today it is his 3rd toe/toenail that is causing pain. He describes it on and off and as a pressure feeling sometimes. No other concerns today on examination.          Objective  There were no vitals taken for this visit.  Physical Exam  Cardiovascular:      Pulses:           Dorsalis pedis pulses are 2+ on the right side and 2+ on the left side.        Posterior tibial pulses are 2+ on the right side and 2+ on the left side.   Musculoskeletal:      Right foot: Decreased range of motion. Deformity and bunion present.      Left foot: Decreased range of motion. Deformity and bunion present.   Feet:      Right foot:      Skin integrity: Callus present. No ulcer, blister or skin breakdown.      Toenail Condition: Right toenails are normal.      Left foot:      Skin integrity: No ulcer, blister or skin breakdown.      Toenail Condition: Left toenails are normal.      Comments: Right foot great toe significant deformity of MTP overlaps 2nd toe. 3rd toe: no significant deformity, erythema, edema or nail fungus.         Assessment & Plan  Pain of toe of right foot  Advised nursing to pad and protect as needed. He may also use topical lidocaine cream 3% TID prn for pain.               Please excuse any errors in grammar or translation related to this dictation. Voice recognition software was utilized to prepare this document.       Electronically Signed By: FLORINDA Shane   3/20/25  4:54 PM

## 2025-03-20 NOTE — PROGRESS NOTES
Visit  Note   Subjective   Kalani Keith is a 69 y.o. male who is being seen at [facility] and evaluated for multiple medical problems. Nursing notes, vital signs, and labs were reviewed in the local facility chart. Orders and medications were reviewed on the local chart.  No chief complaint on file.     This is a 69 year old male evaluated today for reported toe pain on his right foot. He tells me today it is his 3rd toe/toenail that is causing pain. He describes it on and off and as a pressure feeling sometimes. No other concerns today on examination.          Objective   There were no vitals taken for this visit.  Physical Exam  Cardiovascular:      Pulses:           Dorsalis pedis pulses are 2+ on the right side and 2+ on the left side.        Posterior tibial pulses are 2+ on the right side and 2+ on the left side.   Musculoskeletal:      Right foot: Decreased range of motion. Deformity and bunion present.      Left foot: Decreased range of motion. Deformity and bunion present.   Feet:      Right foot:      Skin integrity: Callus present. No ulcer, blister or skin breakdown.      Toenail Condition: Right toenails are normal.      Left foot:      Skin integrity: No ulcer, blister or skin breakdown.      Toenail Condition: Left toenails are normal.      Comments: Right foot great toe significant deformity of MTP overlaps 2nd toe. 3rd toe: no significant deformity, erythema, edema or nail fungus.         Assessment & Plan  Pain of toe of right foot  Advised nursing to pad and protect as needed. He may also use topical lidocaine cream 3% TID prn for pain.               Please excuse any errors in grammar or translation related to this dictation. Voice recognition software was utilized to prepare this document.

## 2025-03-21 DIAGNOSIS — F32.A DEPRESSION, UNSPECIFIED DEPRESSION TYPE: ICD-10-CM

## 2025-03-21 DIAGNOSIS — K21.9 GASTROESOPHAGEAL REFLUX DISEASE, UNSPECIFIED WHETHER ESOPHAGITIS PRESENT: ICD-10-CM

## 2025-03-25 RX ORDER — SERTRALINE HYDROCHLORIDE 25 MG/1
25 TABLET, FILM COATED ORAL NIGHTLY
Qty: 30 TABLET | Refills: 1 | Status: SHIPPED | OUTPATIENT
Start: 2025-03-25 | End: 2025-05-24

## 2025-03-25 RX ORDER — OMEPRAZOLE 40 MG/1
40 CAPSULE, DELAYED RELEASE ORAL
Qty: 30 CAPSULE | Refills: 1 | Status: SHIPPED | OUTPATIENT
Start: 2025-03-25 | End: 2025-05-24

## 2025-04-01 ENCOUNTER — NURSING HOME VISIT (OUTPATIENT)
Dept: POST ACUTE CARE | Facility: EXTERNAL LOCATION | Age: 70
End: 2025-04-01
Payer: MEDICARE

## 2025-04-01 DIAGNOSIS — G20.B2 PARKINSON'S DISEASE WITH DYSKINESIA AND FLUCTUATING MANIFESTATIONS: Primary | ICD-10-CM

## 2025-04-01 DIAGNOSIS — F33.41 RECURRENT MAJOR DEPRESSIVE DISORDER, IN PARTIAL REMISSION (CMS-HCC): ICD-10-CM

## 2025-04-01 DIAGNOSIS — K21.9 GASTROESOPHAGEAL REFLUX DISEASE WITHOUT ESOPHAGITIS: ICD-10-CM

## 2025-04-01 DIAGNOSIS — K58.0 IRRITABLE BOWEL SYNDROME WITH DIARRHEA: ICD-10-CM

## 2025-04-01 DIAGNOSIS — I10 HTN (HYPERTENSION), BENIGN: ICD-10-CM

## 2025-04-01 PROBLEM — K58.9 IBS (IRRITABLE BOWEL SYNDROME): Status: ACTIVE | Noted: 2025-04-01

## 2025-04-01 PROCEDURE — 99309 SBSQ NF CARE MODERATE MDM 30: CPT | Performed by: FAMILY MEDICINE

## 2025-04-01 NOTE — ASSESSMENT & PLAN NOTE
He continues to follow advice from neurology and is treated with Sinemet.  His symptoms seem well-controlled with no tremor or cogwheel rigidity but he does have some slowness of movement and soft voice.

## 2025-04-01 NOTE — PROGRESS NOTES
Visit  Note   Subjective   Kalani Keith is a 69 y.o. male who is being seen at [facility] and evaluated for multiple medical problems. Nursing notes, vital signs, and labs were reviewed in the local facility chart. Orders and medications were reviewed on the local chart.  No chief complaint on file.     HPI   He has no specific complaints today and feels good.  He is pleasant and happy to converse  Objective   There were no vitals taken for this visit.  Physical Exam  Constitutional:       Appearance: Normal appearance.   HENT:      Head: Normocephalic.   Eyes:      Conjunctiva/sclera: Conjunctivae normal.   Cardiovascular:      Rate and Rhythm: Normal rate and regular rhythm.      Heart sounds: Normal heart sounds.   Pulmonary:      Effort: Pulmonary effort is normal.      Breath sounds: Normal breath sounds.   Musculoskeletal:      Cervical back: Neck supple.   Skin:     General: Skin is warm and dry.   Neurological:      Mental Status: He is alert.      Comments: There is no resting tremor noted.  There is no cogwheel rigidity noted.  There is some slowness of movement dyskinesia as well as a soft weak voice       Assessment/Plan   Assessment & Plan  Parkinson's disease with dyskinesia and fluctuating manifestations  He continues to follow advice from neurology and is treated with Sinemet.  His symptoms seem well-controlled with no tremor or cogwheel rigidity but he does have some slowness of movement and soft voice.       HTN (hypertension), benign  This is managed with amlodipine and most recent blood pressure well-controlled at 118/68       Irritable bowel syndrome with diarrhea  Symptoms are controlled with Linzess       Gastroesophageal reflux disease without esophagitis  We will continue symptom control with PPI       Recurrent major depressive disorder, in partial remission (CMS-HCC)  Symptoms appear controlled.  We will continue his daily dose of sertraline 25 mg              Please excuse any errors in  grammar or translation related to this dictation. Voice recognition software was utilized to prepare this document.

## 2025-04-01 NOTE — LETTER
Patient: Kalani Keith  : 1955    Encounter Date: 2025    Visit  Note   Subjective  Kalani Keith is a 69 y.o. male who is being seen at [facility] and evaluated for multiple medical problems. Nursing notes, vital signs, and labs were reviewed in the local facility chart. Orders and medications were reviewed on the local chart.  No chief complaint on file.     HPI   He has no specific complaints today and feels good.  He is pleasant and happy to converse  Objective  There were no vitals taken for this visit.  Physical Exam  Constitutional:       Appearance: Normal appearance.   HENT:      Head: Normocephalic.   Eyes:      Conjunctiva/sclera: Conjunctivae normal.   Cardiovascular:      Rate and Rhythm: Normal rate and regular rhythm.      Heart sounds: Normal heart sounds.   Pulmonary:      Effort: Pulmonary effort is normal.      Breath sounds: Normal breath sounds.   Musculoskeletal:      Cervical back: Neck supple.   Skin:     General: Skin is warm and dry.   Neurological:      Mental Status: He is alert.      Comments: There is no resting tremor noted.  There is no cogwheel rigidity noted.  There is some slowness of movement dyskinesia as well as a soft weak voice       Assessment/Plan  Assessment & Plan  Parkinson's disease with dyskinesia and fluctuating manifestations  He continues to follow advice from neurology and is treated with Sinemet.  His symptoms seem well-controlled with no tremor or cogwheel rigidity but he does have some slowness of movement and soft voice.       HTN (hypertension), benign  This is managed with amlodipine and most recent blood pressure well-controlled at 118/68       Irritable bowel syndrome with diarrhea  Symptoms are controlled with Linzess       Gastroesophageal reflux disease without esophagitis  We will continue symptom control with PPI       Recurrent major depressive disorder, in partial remission (CMS-HCC)  Symptoms appear controlled.  We will continue his  daily dose of sertraline 25 mg              Please excuse any errors in grammar or translation related to this dictation. Voice recognition software was utilized to prepare this document.       Electronically Signed By: Rinku Kumar MD   4/1/25  2:02 PM

## 2025-05-07 ENCOUNTER — PATIENT OUTREACH (OUTPATIENT)
Dept: PRIMARY CARE | Facility: CLINIC | Age: 70
End: 2025-05-07
Payer: COMMERCIAL

## 2025-05-07 DIAGNOSIS — Z00.00 WELL ADULT EXAM: ICD-10-CM

## 2025-07-09 ENCOUNTER — NURSING HOME VISIT (OUTPATIENT)
Facility: EXTERNAL LOCATION | Age: 70
End: 2025-07-09
Payer: COMMERCIAL

## 2025-07-09 VITALS
SYSTOLIC BLOOD PRESSURE: 164 MMHG | RESPIRATION RATE: 16 BRPM | TEMPERATURE: 99.1 F | BODY MASS INDEX: 26.11 KG/M2 | WEIGHT: 182 LBS | HEART RATE: 86 BPM | DIASTOLIC BLOOD PRESSURE: 95 MMHG

## 2025-07-09 DIAGNOSIS — F33.41 RECURRENT MAJOR DEPRESSIVE DISORDER, IN PARTIAL REMISSION: ICD-10-CM

## 2025-07-09 DIAGNOSIS — K21.9 GASTROESOPHAGEAL REFLUX DISEASE WITHOUT ESOPHAGITIS: ICD-10-CM

## 2025-07-09 DIAGNOSIS — I10 HTN (HYPERTENSION), BENIGN: Primary | ICD-10-CM

## 2025-07-09 DIAGNOSIS — R26.2 AMBULATORY DYSFUNCTION: ICD-10-CM

## 2025-07-09 DIAGNOSIS — G20.B2 PARKINSON'S DISEASE WITH DYSKINESIA AND FLUCTUATING MANIFESTATIONS: ICD-10-CM

## 2025-07-10 NOTE — PROGRESS NOTES
Subjective   Kalani Keith is a 70 y.o. male Here for routine  visit.    HPI There are no new problems and multiple health problems have been reviewed.  The course has been unchanged.  The patient  is oriented.   There are no family members present during time of visit.    he  is sitting up in chair, denies any complaints when asked.   Eating and drinking well.   No concerns per staff.       Review of Systems   Constitutional:  Negative for chills, fatigue and fever.   Respiratory:  Negative for cough and shortness of breath.    Cardiovascular:  Negative for chest pain and palpitations.   Gastrointestinal:  Negative for abdominal pain, constipation, diarrhea, nausea and vomiting.   Genitourinary:  Negative for difficulty urinating.       Objective   BP (!) 164/95   Pulse 86   Temp 37.3 °C (99.1 °F)   Resp 16   Wt 82.6 kg (182 lb)   BMI 26.11 kg/m²     Physical Exam  Constitutional:       General: He is not in acute distress.  HENT:      Head: Normocephalic and atraumatic.   Eyes:      Conjunctiva/sclera: Conjunctivae normal.   Cardiovascular:      Rate and Rhythm: Normal rate and regular rhythm.   Pulmonary:      Effort: Pulmonary effort is normal. No respiratory distress.      Breath sounds: Normal breath sounds.   Abdominal:      General: Bowel sounds are normal. There is no distension.      Palpations: Abdomen is soft.      Tenderness: There is no abdominal tenderness.   Musculoskeletal:      Right lower leg: No edema.      Left lower leg: No edema.      Comments: Bradykinesia noted   Skin:     General: Skin is warm and dry.   Neurological:      General: No focal deficit present.      Mental Status: He is alert and oriented to person, place, and time.      Comments: No tremors noted.  Bradykinesia, soft voice and masked facies present.    Psychiatric:         Mood and Affect: Mood normal.         Behavior: Behavior normal.         Assessment & Plan  HTN (hypertension), benign  BP is slightly elevated at  164/95.  ON amlodipine.  When reviewing bp he is mostly 100-130's.  Amlodipine is at 5mg, if needed will increase.  Will have staff check vitals daily for 5 days to monitor and will adjust amlodipine if indicated.   Parkinson's disease with dyskinesia and fluctuating manifestations  He has bradykinesia and noted to have soft voice.  He follows with neurology and is on sinemet.  Follow up with neurology as scheduled.   Recurrent major depressive disorder, in partial remission  On sertraline, sx appear controlled at present.   staff to monitor and notify for any changes.    Ambulatory dysfunction  continue with supportive and restorative care.           Gastroesophageal reflux disease without esophagitis  We will continue symptom control with PPI.  Currently on omeprazole and sx appear controlled.       labs/meds/orders reviewed  staff to monitor and notify for any changes.  Monitor BP for 5 days and adjust amlodipine if BP consistently > 150  continue with supportive and restorative care  next labs 9/25 and prn

## 2025-07-12 ASSESSMENT — ENCOUNTER SYMPTOMS
CHILLS: 0
VOMITING: 0
PALPITATIONS: 0
DIFFICULTY URINATING: 0
SHORTNESS OF BREATH: 0
ABDOMINAL PAIN: 0
NAUSEA: 0
CONSTIPATION: 0
COUGH: 0
FEVER: 0
FATIGUE: 0
DIARRHEA: 0

## 2025-07-13 NOTE — ASSESSMENT & PLAN NOTE
We will continue symptom control with PPI.  Currently on omeprazole and sx appear controlled.

## 2025-07-13 NOTE — ASSESSMENT & PLAN NOTE
He has bradykinesia and noted to have soft voice.  He follows with neurology and is on sinemet.  Follow up with neurology as scheduled.

## 2025-07-13 NOTE — ASSESSMENT & PLAN NOTE
BP is slightly elevated at 164/95.  ON amlodipine.  When reviewing bp he is mostly 100-130's.  Amlodipine is at 5mg, if needed will increase.  Will have staff check vitals daily for 5 days to monitor and will adjust amlodipine if indicated.

## 2025-07-14 ENCOUNTER — NURSING HOME VISIT (OUTPATIENT)
Facility: EXTERNAL LOCATION | Age: 70
End: 2025-07-14
Payer: COMMERCIAL

## 2025-07-14 VITALS
WEIGHT: 182 LBS | SYSTOLIC BLOOD PRESSURE: 135 MMHG | RESPIRATION RATE: 16 BRPM | TEMPERATURE: 96.8 F | DIASTOLIC BLOOD PRESSURE: 84 MMHG | BODY MASS INDEX: 26.11 KG/M2 | HEART RATE: 73 BPM

## 2025-07-14 DIAGNOSIS — K21.9 GASTROESOPHAGEAL REFLUX DISEASE WITHOUT ESOPHAGITIS: ICD-10-CM

## 2025-07-14 DIAGNOSIS — F33.41 RECURRENT MAJOR DEPRESSIVE DISORDER, IN PARTIAL REMISSION: ICD-10-CM

## 2025-07-14 DIAGNOSIS — G20.B2 PARKINSON'S DISEASE WITH DYSKINESIA AND FLUCTUATING MANIFESTATIONS: ICD-10-CM

## 2025-07-14 DIAGNOSIS — R26.2 AMBULATORY DYSFUNCTION: ICD-10-CM

## 2025-07-14 DIAGNOSIS — I10 HTN (HYPERTENSION), BENIGN: Primary | ICD-10-CM

## 2025-07-14 PROCEDURE — 99309 SBSQ NF CARE MODERATE MDM 30: CPT | Performed by: NURSE PRACTITIONER

## 2025-07-14 NOTE — PROGRESS NOTES
Subjective   Kalani Keith is a 70 y.o. male Here to follow up on htn.   HPI There are no new problems and multiple health problems have been reviewed.  The course has been unchanged.  The patient  is oriented.   There are no family members present during time of visit.    he  is sitting up in chair, denies any complaints when asked.   Eating and drinking well.   No concerns per staff.   BP was elevated at last visit and staff instructed to monitor BP for a few days, bp' shave been acceptable.        Review of Systems   Constitutional:  Negative for chills, fatigue and fever.   Respiratory:  Negative for cough and shortness of breath.    Cardiovascular:  Negative for chest pain and palpitations.   Gastrointestinal:  Negative for abdominal pain, constipation, diarrhea, nausea and vomiting.   Genitourinary:  Negative for difficulty urinating.       Objective   /84   Pulse 73   Temp 36 °C (96.8 °F)   Resp 16   Wt 82.6 kg (182 lb)   BMI 26.11 kg/m²     Physical Exam  Constitutional:       General: He is not in acute distress.  HENT:      Head: Normocephalic and atraumatic.   Eyes:      Conjunctiva/sclera: Conjunctivae normal.   Cardiovascular:      Rate and Rhythm: Normal rate and regular rhythm.   Pulmonary:      Effort: Pulmonary effort is normal. No respiratory distress.      Breath sounds: Normal breath sounds.   Abdominal:      General: Bowel sounds are normal. There is no distension.      Palpations: Abdomen is soft.      Tenderness: There is no abdominal tenderness.   Musculoskeletal:      Right lower leg: No edema.      Left lower leg: No edema.      Comments: Bradykinesia noted   Skin:     General: Skin is warm and dry.   Neurological:      General: No focal deficit present.      Mental Status: He is alert and oriented to person, place, and time.      Comments: No tremors noted.  Bradykinesia, soft voice and masked facies present.    Psychiatric:         Mood and Affect: Mood normal.         Behavior:  Behavior normal.         Assessment & Plan  HTN (hypertension), benign  BP reviewed and acceptable, continue with amlodipine at current dose.  Continue to monitor and adjust meds based on clinical course.    Parkinson's disease with dyskinesia and fluctuating manifestations  He has bradykinesia and noted to have soft voice.  He follows with neurology and is on sinemet.  Follow up with neurology as scheduled.   Recurrent major depressive disorder, in partial remission  On sertraline, sx appear controlled at present.   staff to monitor and notify for any changes.    Gastroesophageal reflux disease without esophagitis  We will continue symptom control with PPI.  Currently on omeprazole and sx appear controlled.       Ambulatory dysfunction  continue with supportive and restorative care.           labs/meds/orders reviewed  staff to monitor and notify for any changes.  continue with supportive and restorative care  next labs 9/25 and prn

## 2025-07-15 ASSESSMENT — ENCOUNTER SYMPTOMS
PALPITATIONS: 0
FATIGUE: 0
CONSTIPATION: 0
SHORTNESS OF BREATH: 0
ABDOMINAL PAIN: 0
COUGH: 0
DIARRHEA: 0
NAUSEA: 0
FEVER: 0
VOMITING: 0
DIFFICULTY URINATING: 0
CHILLS: 0

## 2025-07-15 NOTE — ASSESSMENT & PLAN NOTE
BP reviewed and acceptable, continue with amlodipine at current dose.  Continue to monitor and adjust meds based on clinical course.

## 2025-08-13 ENCOUNTER — NURSING HOME VISIT (OUTPATIENT)
Dept: POST ACUTE CARE | Facility: EXTERNAL LOCATION | Age: 70
End: 2025-08-13
Payer: COMMERCIAL

## 2025-08-13 VITALS
OXYGEN SATURATION: 98 % | HEART RATE: 85 BPM | TEMPERATURE: 97.2 F | DIASTOLIC BLOOD PRESSURE: 84 MMHG | RESPIRATION RATE: 17 BRPM | BODY MASS INDEX: 26.26 KG/M2 | SYSTOLIC BLOOD PRESSURE: 127 MMHG | WEIGHT: 183 LBS

## 2025-08-13 DIAGNOSIS — G20.B2 PARKINSON'S DISEASE WITH DYSKINESIA AND FLUCTUATING MANIFESTATIONS: Primary | ICD-10-CM

## 2025-08-13 DIAGNOSIS — I10 HTN (HYPERTENSION), BENIGN: ICD-10-CM

## 2025-08-13 DIAGNOSIS — F33.41 RECURRENT MAJOR DEPRESSIVE DISORDER, IN PARTIAL REMISSION: ICD-10-CM

## 2025-08-13 DIAGNOSIS — R26.2 AMBULATORY DYSFUNCTION: ICD-10-CM

## 2025-08-13 PROCEDURE — 99309 SBSQ NF CARE MODERATE MDM 30: CPT | Performed by: NURSE PRACTITIONER

## 2025-08-13 ASSESSMENT — ENCOUNTER SYMPTOMS
SHORTNESS OF BREATH: 0
DIFFICULTY URINATING: 0
PALPITATIONS: 0
CHILLS: 0
FATIGUE: 0
ABDOMINAL PAIN: 0
VOMITING: 0
CONSTIPATION: 0
COUGH: 0
NAUSEA: 0
FEVER: 0
DIARRHEA: 0

## (undated) DEVICE — ELECTRO LUBE IS A SINGLE PATIENT USE DEVICE THAT IS INTENDED TO BE USED ON ELECTROSURGICAL ELECTRODES TO REDUCE STICKING.: Brand: KEY SURGICAL ELECTRO LUBE

## (undated) DEVICE — COVER,MAYO STAND,STERILE: Brand: MEDLINE

## (undated) DEVICE — DRAPE,REIN 53X77,STERILE: Brand: MEDLINE

## (undated) DEVICE — VISUALIZATION SYSTEM: Brand: CLEARIFY

## (undated) DEVICE — SEAL UNIV 5-8MM DISP BX/10 -- DA VINCI XI - SNGL USE

## (undated) DEVICE — SUTURE VCRL SZ 3-0 L27IN ABSRB UD L26MM SH 1/2 CIR J416H

## (undated) DEVICE — STERILE POLYISOPRENE POWDER-FREE SURGICAL GLOVES: Brand: PROTEXIS

## (undated) DEVICE — ARM DRAPE

## (undated) DEVICE — TIP COVER ACCESSORY

## (undated) DEVICE — SUTURE MCRYL SZ 4-0 L27IN ABSRB UD L19MM PS-2 1/2 CIR PRIM Y426H

## (undated) DEVICE — SOL IRRIGATION INJ NACL 0.9% 500ML BTL

## (undated) DEVICE — STRAP,POSITIONING,KNEE/BODY,FOAM,4X60": Brand: MEDLINE

## (undated) DEVICE — 3M™ IOBAN™ 2 ANTIMICROBIAL INCISE DRAPE 6650EZ: Brand: IOBAN™ 2

## (undated) DEVICE — AIRSEAL BIFURCATED SMOKE EVAC FILTERED TUBE SET: Brand: AIRSEAL

## (undated) DEVICE — TRAY CATH OD16FR SIL URIN M STATLOK STBL DEV SURSTP

## (undated) DEVICE — COVER LT HNDL PLAS RIG 1 PER PK

## (undated) DEVICE — DEVICE TRNSF SPIK STL 2008S] MICROTEK MEDICAL INC]

## (undated) DEVICE — CLICKLINE SCISSORS INSERT: Brand: CLICKLINE

## (undated) DEVICE — BNDG ADH FABRIC 2X4IN ST LF --

## (undated) DEVICE — SURGICAL PROCEDURE KIT GEN LAPAROSCOPY LF

## (undated) DEVICE — OBTRTR BLDELSS 8MM DISP -- DA VINCI - SNGL USE

## (undated) DEVICE — REM POLYHESIVE ADULT PATIENT RETURN ELECTRODE: Brand: VALLEYLAB

## (undated) DEVICE — NEEDLE HYPO 22GA L1.5IN BLK S STL HUB POLYPR SHLD REG BVL

## (undated) DEVICE — (D)PREP SKN CHLRAPRP APPL 26ML -- CONVERT TO ITEM 371833

## (undated) DEVICE — TROCAR SITE CLOSURE DEVICE: Brand: ENDO CLOSE

## (undated) DEVICE — INFECTION CONTROL KIT SYS